# Patient Record
Sex: FEMALE | Race: BLACK OR AFRICAN AMERICAN | NOT HISPANIC OR LATINO | Employment: OTHER | URBAN - METROPOLITAN AREA
[De-identification: names, ages, dates, MRNs, and addresses within clinical notes are randomized per-mention and may not be internally consistent; named-entity substitution may affect disease eponyms.]

---

## 2019-03-27 ENCOUNTER — APPOINTMENT (OUTPATIENT)
Dept: CT IMAGING | Facility: HOSPITAL | Age: 54
End: 2019-03-27

## 2019-03-27 ENCOUNTER — HOSPITAL ENCOUNTER (EMERGENCY)
Facility: HOSPITAL | Age: 54
Discharge: HOME | End: 2019-03-27
Attending: EMERGENCY MEDICINE | Admitting: EMERGENCY MEDICINE

## 2019-03-27 VITALS
TEMPERATURE: 98.3 F | HEIGHT: 63 IN | HEART RATE: 74 BPM | BODY MASS INDEX: 22.5 KG/M2 | OXYGEN SATURATION: 97 % | DIASTOLIC BLOOD PRESSURE: 103 MMHG | RESPIRATION RATE: 20 BRPM | WEIGHT: 127 LBS | SYSTOLIC BLOOD PRESSURE: 198 MMHG

## 2019-03-27 DIAGNOSIS — N20.0 RIGHT KIDNEY STONE: Primary | ICD-10-CM

## 2019-03-27 DIAGNOSIS — R31.9 HEMATURIA, UNSPECIFIED TYPE: ICD-10-CM

## 2019-03-27 DIAGNOSIS — R10.9 RIGHT FLANK PAIN: ICD-10-CM

## 2019-03-27 LAB
ALBUMIN SERPL-MCNC: 3.9 G/DL (ref 3.2–4.8)
ALBUMIN/GLOB SERPL: 1.1 G/DL (ref 1.5–2.5)
ALP SERPL-CCNC: 125 U/L (ref 25–100)
ALT SERPL W P-5'-P-CCNC: 59 U/L (ref 7–40)
AMPHET+METHAMPHET UR QL: NEGATIVE
AMPHETAMINES UR QL: NEGATIVE
ANION GAP SERPL CALCULATED.3IONS-SCNC: 8 MMOL/L (ref 3–11)
AST SERPL-CCNC: 75 U/L (ref 0–33)
BACTERIA UR QL AUTO: ABNORMAL /HPF
BARBITURATES UR QL SCN: NEGATIVE
BASOPHILS # BLD AUTO: 0.05 10*3/MM3 (ref 0–0.2)
BASOPHILS NFR BLD AUTO: 0.6 % (ref 0–1)
BENZODIAZ UR QL SCN: NEGATIVE
BILIRUB SERPL-MCNC: 0.9 MG/DL (ref 0.3–1.2)
BILIRUB UR QL STRIP: NEGATIVE
BUN BLD-MCNC: 5 MG/DL (ref 9–23)
BUN/CREAT SERPL: 8.3 (ref 7–25)
BUPRENORPHINE SERPL-MCNC: NEGATIVE NG/ML
CALCIUM SPEC-SCNC: 9.2 MG/DL (ref 8.7–10.4)
CANNABINOIDS SERPL QL: POSITIVE
CHLORIDE SERPL-SCNC: 107 MMOL/L (ref 99–109)
CLARITY UR: ABNORMAL
CO2 SERPL-SCNC: 25 MMOL/L (ref 20–31)
COCAINE UR QL: NEGATIVE
COLOR UR: ABNORMAL
CREAT BLD-MCNC: 0.6 MG/DL (ref 0.6–1.3)
DEPRECATED RDW RBC AUTO: 43.6 FL (ref 37–54)
EOSINOPHIL # BLD AUTO: 0.14 10*3/MM3 (ref 0–0.3)
EOSINOPHIL NFR BLD AUTO: 1.6 % (ref 0–3)
ERYTHROCYTE [DISTWIDTH] IN BLOOD BY AUTOMATED COUNT: 13.3 % (ref 11.3–14.5)
GFR SERPL CREATININE-BSD FRML MDRD: 126 ML/MIN/1.73
GLOBULIN UR ELPH-MCNC: 3.5 GM/DL
GLUCOSE BLD-MCNC: 137 MG/DL (ref 70–100)
GLUCOSE UR STRIP-MCNC: NEGATIVE MG/DL
HCT VFR BLD AUTO: 41.8 % (ref 34.5–44)
HGB BLD-MCNC: 14.4 G/DL (ref 11.5–15.5)
HGB UR QL STRIP.AUTO: ABNORMAL
HYALINE CASTS UR QL AUTO: ABNORMAL /LPF
IMM GRANULOCYTES # BLD AUTO: 0.04 10*3/MM3 (ref 0–0.05)
IMM GRANULOCYTES NFR BLD AUTO: 0.5 % (ref 0–0.6)
KETONES UR QL STRIP: NEGATIVE
LEUKOCYTE ESTERASE UR QL STRIP.AUTO: ABNORMAL
LYMPHOCYTES # BLD AUTO: 2.79 10*3/MM3 (ref 0.6–4.8)
LYMPHOCYTES NFR BLD AUTO: 32.6 % (ref 24–44)
MCH RBC QN AUTO: 30.8 PG (ref 27–31)
MCHC RBC AUTO-ENTMCNC: 34.4 G/DL (ref 32–36)
MCV RBC AUTO: 89.5 FL (ref 80–99)
METHADONE UR QL SCN: NEGATIVE
MONOCYTES # BLD AUTO: 0.48 10*3/MM3 (ref 0–1)
MONOCYTES NFR BLD AUTO: 5.6 % (ref 0–12)
NEUTROPHILS # BLD AUTO: 5.06 10*3/MM3 (ref 1.5–8.3)
NEUTROPHILS NFR BLD AUTO: 59.1 % (ref 41–71)
NITRITE UR QL STRIP: NEGATIVE
OPIATES UR QL: NEGATIVE
OXYCODONE UR QL SCN: NEGATIVE
PCP UR QL SCN: NEGATIVE
PH UR STRIP.AUTO: 6.5 [PH] (ref 5–8)
PLATELET # BLD AUTO: 158 10*3/MM3 (ref 150–450)
PMV BLD AUTO: 10.5 FL (ref 6–12)
POTASSIUM BLD-SCNC: 3.8 MMOL/L (ref 3.5–5.5)
PROPOXYPH UR QL: NEGATIVE
PROT SERPL-MCNC: 7.4 G/DL (ref 5.7–8.2)
PROT UR QL STRIP: ABNORMAL
RBC # BLD AUTO: 4.67 10*6/MM3 (ref 3.89–5.14)
RBC # UR: ABNORMAL /HPF
REF LAB TEST METHOD: ABNORMAL
SODIUM BLD-SCNC: 140 MMOL/L (ref 132–146)
SP GR UR STRIP: 1.01 (ref 1–1.03)
SQUAMOUS #/AREA URNS HPF: ABNORMAL /HPF
TRICYCLICS UR QL SCN: POSITIVE
UROBILINOGEN UR QL STRIP: ABNORMAL
WBC NRBC COR # BLD: 8.56 10*3/MM3 (ref 3.5–10.8)
WBC UR QL AUTO: ABNORMAL /HPF

## 2019-03-27 PROCEDURE — 96376 TX/PRO/DX INJ SAME DRUG ADON: CPT

## 2019-03-27 PROCEDURE — 99283 EMERGENCY DEPT VISIT LOW MDM: CPT

## 2019-03-27 PROCEDURE — 96365 THER/PROPH/DIAG IV INF INIT: CPT

## 2019-03-27 PROCEDURE — 25010000002 HYDROMORPHONE PER 4 MG: Performed by: EMERGENCY MEDICINE

## 2019-03-27 PROCEDURE — 74176 CT ABD & PELVIS W/O CONTRAST: CPT

## 2019-03-27 PROCEDURE — 25010000002 CEFTRIAXONE PER 250 MG: Performed by: NURSE PRACTITIONER

## 2019-03-27 PROCEDURE — 85025 COMPLETE CBC W/AUTO DIFF WBC: CPT | Performed by: NURSE PRACTITIONER

## 2019-03-27 PROCEDURE — 81001 URINALYSIS AUTO W/SCOPE: CPT | Performed by: NURSE PRACTITIONER

## 2019-03-27 PROCEDURE — 80306 DRUG TEST PRSMV INSTRMNT: CPT | Performed by: EMERGENCY MEDICINE

## 2019-03-27 PROCEDURE — 80053 COMPREHEN METABOLIC PANEL: CPT | Performed by: NURSE PRACTITIONER

## 2019-03-27 PROCEDURE — 25010000002 ONDANSETRON PER 1 MG: Performed by: EMERGENCY MEDICINE

## 2019-03-27 PROCEDURE — 96375 TX/PRO/DX INJ NEW DRUG ADDON: CPT

## 2019-03-27 RX ORDER — ONDANSETRON 4 MG/1
4 TABLET, ORALLY DISINTEGRATING ORAL EVERY 8 HOURS PRN
Qty: 9 TABLET | Refills: 0 | Status: SHIPPED | OUTPATIENT
Start: 2019-03-27 | End: 2022-05-10 | Stop reason: SDUPTHER

## 2019-03-27 RX ORDER — ONDANSETRON 4 MG/1
8 TABLET, FILM COATED ORAL ONCE
Status: DISCONTINUED | OUTPATIENT
Start: 2019-03-27 | End: 2019-03-27 | Stop reason: HOSPADM

## 2019-03-27 RX ORDER — HYDROMORPHONE HYDROCHLORIDE 1 MG/ML
0.5 INJECTION, SOLUTION INTRAMUSCULAR; INTRAVENOUS; SUBCUTANEOUS ONCE
Status: COMPLETED | OUTPATIENT
Start: 2019-03-27 | End: 2019-03-27

## 2019-03-27 RX ORDER — ONDANSETRON 2 MG/ML
4 INJECTION INTRAMUSCULAR; INTRAVENOUS ONCE
Status: COMPLETED | OUTPATIENT
Start: 2019-03-27 | End: 2019-03-27

## 2019-03-27 RX ORDER — CEFTRIAXONE SODIUM 1 G/50ML
1 INJECTION, SOLUTION INTRAVENOUS ONCE
Status: COMPLETED | OUTPATIENT
Start: 2019-03-27 | End: 2019-03-27

## 2019-03-27 RX ORDER — OXYCODONE HYDROCHLORIDE AND ACETAMINOPHEN 5; 325 MG/1; MG/1
1 TABLET ORAL EVERY 6 HOURS PRN
Qty: 12 TABLET | Refills: 0 | Status: SHIPPED | OUTPATIENT
Start: 2019-03-27 | End: 2022-05-10 | Stop reason: SDUPTHER

## 2019-03-27 RX ORDER — SODIUM CHLORIDE 0.9 % (FLUSH) 0.9 %
10 SYRINGE (ML) INJECTION AS NEEDED
Status: DISCONTINUED | OUTPATIENT
Start: 2019-03-27 | End: 2019-03-27 | Stop reason: HOSPADM

## 2019-03-27 RX ORDER — CEFDINIR 300 MG/1
300 CAPSULE ORAL 2 TIMES DAILY
Qty: 20 CAPSULE | Refills: 0 | Status: SHIPPED | OUTPATIENT
Start: 2019-03-27 | End: 2022-05-10 | Stop reason: SDUPTHER

## 2019-03-27 RX ADMIN — CEFTRIAXONE SODIUM 1 G: 1 INJECTION, SOLUTION INTRAVENOUS at 16:11

## 2019-03-27 RX ADMIN — HYDROMORPHONE HYDROCHLORIDE 0.5 MG: 1 INJECTION, SOLUTION INTRAMUSCULAR; INTRAVENOUS; SUBCUTANEOUS at 16:09

## 2019-03-27 RX ADMIN — ONDANSETRON 4 MG: 2 INJECTION INTRAMUSCULAR; INTRAVENOUS at 15:15

## 2019-03-27 RX ADMIN — SODIUM CHLORIDE 1000 ML: 9 INJECTION, SOLUTION INTRAVENOUS at 15:11

## 2019-03-27 RX ADMIN — HYDROMORPHONE HYDROCHLORIDE 0.5 MG: 1 INJECTION, SOLUTION INTRAMUSCULAR; INTRAVENOUS; SUBCUTANEOUS at 15:16

## 2022-05-10 ENCOUNTER — OFFICE VISIT (OUTPATIENT)
Dept: FAMILY MEDICINE CLINIC | Facility: CLINIC | Age: 57
End: 2022-05-10

## 2022-05-10 VITALS
DIASTOLIC BLOOD PRESSURE: 88 MMHG | HEART RATE: 98 BPM | HEIGHT: 63 IN | SYSTOLIC BLOOD PRESSURE: 146 MMHG | OXYGEN SATURATION: 96 % | TEMPERATURE: 98.2 F | WEIGHT: 105 LBS | BODY MASS INDEX: 18.61 KG/M2

## 2022-05-10 DIAGNOSIS — M47.817 LUMBOSACRAL SPONDYLOSIS WITHOUT MYELOPATHY: ICD-10-CM

## 2022-05-10 DIAGNOSIS — I25.10 CAD IN NATIVE ARTERY: ICD-10-CM

## 2022-05-10 DIAGNOSIS — Z85.41 HX OF CERVICAL CANCER: ICD-10-CM

## 2022-05-10 DIAGNOSIS — K73.9 CHRONIC HEPATITIS: Primary | ICD-10-CM

## 2022-05-10 PROBLEM — K63.9 DISORDER OF INTESTINE: Status: ACTIVE | Noted: 2019-04-01

## 2022-05-10 PROBLEM — Z79.891 LONG-TERM CURRENT USE OF OPIATE ANALGESIC: Status: ACTIVE | Noted: 2019-04-01

## 2022-05-10 PROBLEM — M25.559 HIP PAIN: Status: ACTIVE | Noted: 2019-04-01

## 2022-05-10 PROBLEM — E11.9 DIABETES MELLITUS: Status: ACTIVE | Noted: 2019-04-01

## 2022-05-10 PROBLEM — G89.4 CHRONIC PAIN DISORDER: Status: ACTIVE | Noted: 2019-04-01

## 2022-05-10 PROBLEM — R10.2 PELVIC AND PERINEAL PAIN: Status: ACTIVE | Noted: 2019-04-01

## 2022-05-10 PROBLEM — I10 HYPERTENSIVE DISORDER: Status: ACTIVE | Noted: 2019-04-01

## 2022-05-10 PROBLEM — G43.909 MIGRAINE: Status: ACTIVE | Noted: 2019-04-01

## 2022-05-10 PROBLEM — S36.129A: Status: ACTIVE | Noted: 2019-04-01

## 2022-05-10 PROBLEM — K27.9 PEPTIC ULCER: Status: ACTIVE | Noted: 2019-04-01

## 2022-05-10 PROCEDURE — 99204 OFFICE O/P NEW MOD 45 MIN: CPT | Performed by: PHYSICIAN ASSISTANT

## 2022-05-10 RX ORDER — METOPROLOL SUCCINATE AND HYDROCHLOROTHIAZIDE 12.5; 1 MG/1; MG/1
TABLET ORAL
COMMUNITY
End: 2022-05-10 | Stop reason: SDUPTHER

## 2022-05-10 RX ORDER — INSULIN GLARGINE 100 [IU]/ML
15 INJECTION, SOLUTION SUBCUTANEOUS
COMMUNITY
End: 2022-05-26

## 2022-05-10 RX ORDER — AMLODIPINE BESYLATE 5 MG/1
5 TABLET ORAL 2 TIMES DAILY
COMMUNITY
End: 2022-05-26 | Stop reason: SDUPTHER

## 2022-05-10 RX ORDER — BISACODYL 5 MG/1
5 TABLET, DELAYED RELEASE ORAL 2 TIMES DAILY PRN
COMMUNITY
End: 2022-05-26

## 2022-05-10 RX ORDER — AMITRIPTYLINE HYDROCHLORIDE 50 MG/1
50 TABLET, FILM COATED ORAL NIGHTLY
COMMUNITY
End: 2022-05-26 | Stop reason: SDUPTHER

## 2022-05-10 RX ORDER — OXYCODONE HYDROCHLORIDE 10 MG/1
TABLET ORAL EVERY 8 HOURS SCHEDULED
COMMUNITY
End: 2022-05-10 | Stop reason: SDUPTHER

## 2022-05-10 RX ORDER — ASPIRIN 81 MG/1
81 TABLET ORAL DAILY
COMMUNITY
End: 2022-05-26 | Stop reason: SDUPTHER

## 2022-05-10 RX ORDER — QUETIAPINE FUMARATE 300 MG/1
300 TABLET, FILM COATED ORAL NIGHTLY
COMMUNITY
End: 2022-05-26 | Stop reason: SDUPTHER

## 2022-05-10 RX ORDER — AMLODIPINE BESYLATE AND ATORVASTATIN CALCIUM 10; 20 MG/1; MG/1
TABLET, FILM COATED ORAL
COMMUNITY
End: 2022-05-10 | Stop reason: SDUPTHER

## 2022-05-10 RX ORDER — ATORVASTATIN CALCIUM 40 MG/1
40 TABLET, FILM COATED ORAL DAILY
COMMUNITY
End: 2022-05-26 | Stop reason: SDUPTHER

## 2022-05-10 RX ORDER — OXYCODONE HYDROCHLORIDE 10 MG/1
10 TABLET ORAL EVERY 6 HOURS PRN
COMMUNITY
End: 2022-06-10

## 2022-05-10 RX ORDER — METOPROLOL TARTRATE 100 MG/1
100 TABLET ORAL 2 TIMES DAILY
COMMUNITY
End: 2022-05-26 | Stop reason: SDUPTHER

## 2022-05-10 RX ORDER — POLYETHYLENE GLYCOL 3350 17 G/17G
17 POWDER, FOR SOLUTION ORAL DAILY PRN
COMMUNITY
End: 2022-05-26 | Stop reason: SDUPTHER

## 2022-05-10 RX ORDER — GABAPENTIN 400 MG/1
800 CAPSULE ORAL EVERY 8 HOURS SCHEDULED
COMMUNITY

## 2022-05-10 RX ORDER — PANTOPRAZOLE SODIUM 40 MG/1
40 TABLET, DELAYED RELEASE ORAL DAILY
COMMUNITY
End: 2022-05-26 | Stop reason: SDUPTHER

## 2022-05-10 NOTE — PROGRESS NOTES
New Patient Office Visit      Date: 05/10/2022   Patient Name: Kaylie Cruz  : 1965   MRN: [id number]     Chief Complaint:    Chief Complaint   Patient presents with   • Med Refill   • Establish Care       History of Present Illness: Kaylie Cruz is a 56 y.o. female who is here today to establish care.  Patient is a bit of a difficult historian.  However it seems that she had been living in [place] and going to medical providers in [place].  About 4 weeks ago she stated she was in [facility] for what they told her was a mild heart attack.  She states she did not have to have a stent.  She also has chronic hepatitis C, she does have cirrhosis and ascites.  She has not had any treatment in the recent past for this or has seen anyone for this.  She does have some discharge papers from her hospital visit and that was one of the referrals that was made to a local provider for hepatitis C.  However she is living here now and needs referrals to a pain management down here as well as provider for hepatitis C.  She does have lumbosacral spondylosis and chronic pain for which she has seen [facility] here in [place] in the past.  She has not been there in approximately 2 years but would like to go back and see them for evaluation.    She also has hypertension and diabetes.  She states her diabetes has been staying well controlled.  It appears her metformin was stopped in the hospital and she was placed on Lantus at night and then insulin lispro with meals.  Also has a diagnosis of schizophrenia.  She has a history of cervical cancer status post surgical resection, chemotherapy and radiation.  She states she has had 4 abdominal surgeries and at one time had a colostomy.  She remembers very remote treatment for her hepatitis C with injections.    I review what little discharge information I have from [facility].  It does  include a medication list which is different from what she recollected from her memory.  For now I am going to go by the hospital discharge summary for her medications.    Subjective      Review of Systems:   Review of Systems   Constitutional: Positive for fatigue. Negative for diaphoresis.   HENT: Negative for trouble swallowing.    Eyes: Negative for visual disturbance.   Respiratory: Negative for cough, chest tightness, shortness of breath and wheezing.    Cardiovascular: Negative for chest pain and leg swelling.   Gastrointestinal: Positive for abdominal pain.   Musculoskeletal: Positive for arthralgias, back pain and myalgias.   Neurological: Positive for numbness.   Psychiatric/Behavioral: Negative for depressed mood. The patient is not nervous/anxious.         Past Medical History:   Past Medical History:   Diagnosis Date   • Depression    • Hypertension        Past Surgical History:   Past Surgical History:   Procedure Laterality Date   • BLADDER SURGERY     • HYSTERECTOMY         Family History: History reviewed. No pertinent family history.    Social History:   Social History     Socioeconomic History   • Marital status: Single   Tobacco Use   • Smoking status: Current Every Day Smoker     Packs/day: 0.50     Types: Cigarettes   • Smokeless tobacco: Never Used   • Tobacco comment: E- cigarettes   Substance and Sexual Activity   • Alcohol use: No   • Drug use: No       Medications:     Current Outpatient Medications:   •  amitriptyline (ELAVIL) 50 MG tablet, Take 50 mg by mouth Every Night., Disp: , Rfl:   •  amLODIPine (NORVASC) 5 MG tablet, Take 5 mg by mouth 2 (Two) Times a Day., Disp: , Rfl:   •  aspirin 81 MG EC tablet, Take 81 mg by mouth Daily., Disp: , Rfl:   •  atorvastatin (LIPITOR) 40 MG tablet, Take 40 mg by mouth Daily., Disp: , Rfl:   •  bisacodyl (DULCOLAX) 5 MG EC tablet, Take 5 mg by mouth 2 (Two) Times a Day As Needed., Disp: , Rfl:   •  gabapentin (NEURONTIN) 600 MG tablet, Every 8  "(Eight) Hours., Disp: , Rfl:   •  insulin glargine (LANTUS, SEMGLEE) 100 UNIT/ML injection, Inject 15 Units under the skin into the appropriate area as directed every night at bedtime., Disp: , Rfl:   •  Insulin Lispro 100 UNIT/ML solution cartridge, Inject 6 Units under the skin into the appropriate area as directed 3 (Three) Times a Day With Meals., Disp: , Rfl:   •  linaclotide (LINZESS) 145 MCG capsule capsule, Take 145 mcg by mouth. 2 capsules po QD, Disp: , Rfl:   •  metoprolol tartrate (LOPRESSOR) 100 MG tablet, Take 100 mg by mouth 2 (Two) Times a Day., Disp: , Rfl:   •  oxyCODONE (ROXICODONE) 10 MG tablet, Take 10 mg by mouth Every 6 (Six) Hours As Needed., Disp: , Rfl:   •  pantoprazole (PROTONIX) 40 MG EC tablet, Take 40 mg by mouth Daily., Disp: , Rfl:   •  polyethylene glycol (MIRALAX) 17 GM/SCOOP powder, Take 17 g by mouth Daily As Needed., Disp: , Rfl:   •  QUEtiapine (SEROquel) 300 MG tablet, Take 300 mg by mouth Every Night., Disp: , Rfl:     Allergies:   Allergies   Allergen Reactions   • Codeine Hives   • Morphine Hives   • Tagamet [Cimetidine] Other (See Comments)     DISCOLORATION OF SKIN   • Toradol [Ketorolac Tromethamine] Hives       Objective     Vital Signs:   Vitals:    05/10/22 1402   BP: 146/88   Pulse: 98   Temp: 98.2 °F (36.8 °C)   SpO2: 96%   Weight: 47.6 kg (105 lb)   Height: 160 cm (63\")   PainSc: 0-No pain     Body mass index is 18.6 kg/m².   BMI is within normal parameters. No follow-up required.      Physical Exam:   Physical Exam  Vitals and nursing note reviewed.   Constitutional:       Appearance: Normal appearance.   HENT:      Head: Normocephalic and atraumatic.      Nose: Nose normal. No congestion or rhinorrhea.      Mouth/Throat:      Mouth: Mucous membranes are moist.      Pharynx: Oropharynx is clear.   Eyes:      General: No scleral icterus.  Cardiovascular:      Rate and Rhythm: Normal rate and regular rhythm.      Heart sounds: Normal heart sounds.   Pulmonary:      " Effort: Pulmonary effort is normal.      Breath sounds: Normal breath sounds.   Abdominal:      General: Abdomen is protuberant.      Palpations: Abdomen is soft. There is fluid wave and hepatomegaly.      Tenderness: There is generalized abdominal tenderness.   Musculoskeletal:      Cervical back: Neck supple.      Right lower leg: No edema.      Left lower leg: No edema.   Lymphadenopathy:      Cervical: No cervical adenopathy.   Skin:     General: Skin is warm and dry.   Neurological:      Mental Status: She is alert.   Psychiatric:         Mood and Affect: Mood normal.            Assessment / Plan      Assessment/Plan:   Diagnoses and all orders for this visit:    1. Chronic hepatitis (HCC) (Primary)  -     Ambulatory Referral to Gastroenterology    2. Lumbosacral spondylosis without myelopathy  -     Ambulatory Referral to Pain Management    3. CAD in native artery    4. Hx of cervical cancer         1. Exam certainly does show hepatomegaly with some ascites.  She does not appear to be short of breath or toxic.  We will arrange an urgent referral for her hepatitis C and cirrhosis.  We will also arrange referral back to [facility] for pain management.  I am going to request records from her hospital stay 4 weeks ago rather than rechecking labs today since she is a very difficult stick for venipuncture.  I will also request actual discharge summary and any radiology reports as well.  Once these are available I will review these and make further management decisions.      Follow Up:   No follow-ups on file.    Guadalupe Ojeda PA-C   [facility]

## 2022-05-11 ENCOUNTER — TELEPHONE (OUTPATIENT)
Dept: FAMILY MEDICINE CLINIC | Facility: CLINIC | Age: 57
End: 2022-05-11

## 2022-05-11 NOTE — TELEPHONE ENCOUNTER
REC'D OUTSIDE RECORDS FROM [facility] PER RECORDS REQUEST, PUT IN SCANNING TO BE ROUTED TO [facility]

## 2022-05-23 ENCOUNTER — TELEPHONE (OUTPATIENT)
Dept: FAMILY MEDICINE CLINIC | Facility: CLINIC | Age: 57
End: 2022-05-23

## 2022-05-23 PROBLEM — F20.9 SCHIZOPHRENIA: Status: ACTIVE | Noted: 2022-03-31

## 2022-05-23 NOTE — TELEPHONE ENCOUNTER
Caller: Kaylie Cruz    Relationship: Self    Best call back number: [phone]    What is the medical concern/diagnosis:   LUMBOSACRAL SPONDYLOSIS WITHOUT MYELOPATHY     What specialty or service is being requested:   PAIN MANAGEMENT     What is the provider, practice or medical service name:  [facility]     Any additional details:   PATIENT WOULD LIKE FOR HER REFERRAL TO PAIN MANAGEMENT TO BE RESENT SINCE SHE HAS A APPOINTMENT SCHEDULED

## 2022-05-23 NOTE — TELEPHONE ENCOUNTER
Caller: Kaylie Cruz    Relationship: Self    Best call back number: [phone]        What is the provider, practice or medical service name: [facility]         Any additional details: THE PATIENT STATES THAT SHE WAS SEEN ON 05/10/2022 AND WAS SUPPOSED TO BE REFERRED TO [facility] SHE STATES THAT [facility] TOLD HER THAT THEY DO NOT HAVE A REFERRAL PLEASE CALL THE PATIENT TO LET HER KNOW IF THAT WAS COMPLETED THE PATIENT STATES THAT SHE IS SCHEDULED TO BE SEEN BY CATY ARMSTRONG ON 06/07/2022 AT 9:30 AND SHE NEEDS THE REFERRAL SENT TO THEM BEFORE HER APPOINTMENT

## 2022-05-26 ENCOUNTER — OFFICE VISIT (OUTPATIENT)
Dept: FAMILY MEDICINE CLINIC | Facility: CLINIC | Age: 57
End: 2022-05-26

## 2022-05-26 ENCOUNTER — LAB (OUTPATIENT)
Dept: LAB | Facility: HOSPITAL | Age: 57
End: 2022-05-26

## 2022-05-26 VITALS
HEIGHT: 63 IN | OXYGEN SATURATION: 99 % | DIASTOLIC BLOOD PRESSURE: 90 MMHG | HEART RATE: 96 BPM | BODY MASS INDEX: 19.31 KG/M2 | SYSTOLIC BLOOD PRESSURE: 140 MMHG | WEIGHT: 109 LBS

## 2022-05-26 DIAGNOSIS — Z79.4 TYPE 2 DIABETES MELLITUS WITH DIABETIC POLYNEUROPATHY, WITH LONG-TERM CURRENT USE OF INSULIN: ICD-10-CM

## 2022-05-26 DIAGNOSIS — I10 PRIMARY HYPERTENSION: ICD-10-CM

## 2022-05-26 DIAGNOSIS — Z85.41 HX OF CERVICAL CANCER: ICD-10-CM

## 2022-05-26 DIAGNOSIS — K74.60 DECOMPENSATED HEPATIC CIRRHOSIS: Primary | ICD-10-CM

## 2022-05-26 DIAGNOSIS — K73.9 CHRONIC HEPATITIS: ICD-10-CM

## 2022-05-26 DIAGNOSIS — K72.90 DECOMPENSATED HEPATIC CIRRHOSIS: Primary | ICD-10-CM

## 2022-05-26 DIAGNOSIS — E11.42 TYPE 2 DIABETES MELLITUS WITH DIABETIC POLYNEUROPATHY, WITH LONG-TERM CURRENT USE OF INSULIN: ICD-10-CM

## 2022-05-26 DIAGNOSIS — Z00.00 ENCOUNTER FOR MEDICAL EXAMINATION TO ESTABLISH CARE: ICD-10-CM

## 2022-05-26 DIAGNOSIS — G89.4 CHRONIC PAIN DISORDER: ICD-10-CM

## 2022-05-26 DIAGNOSIS — I85.00 ESOPHAGEAL VARICES WITHOUT BLEEDING, UNSPECIFIED ESOPHAGEAL VARICES TYPE: ICD-10-CM

## 2022-05-26 DIAGNOSIS — I50.20 HFREF (HEART FAILURE WITH REDUCED EJECTION FRACTION): ICD-10-CM

## 2022-05-26 DIAGNOSIS — F20.9 SCHIZOPHRENIA, UNSPECIFIED TYPE: ICD-10-CM

## 2022-05-26 DIAGNOSIS — I25.10 CORONARY ARTERY DISEASE INVOLVING NATIVE CORONARY ARTERY OF NATIVE HEART WITHOUT ANGINA PECTORIS: ICD-10-CM

## 2022-05-26 DIAGNOSIS — B18.2 CHRONIC HEPATITIS C WITHOUT HEPATIC COMA: ICD-10-CM

## 2022-05-26 PROBLEM — K63.9 DISORDER OF INTESTINE: Status: RESOLVED | Noted: 2019-04-01 | Resolved: 2022-05-26

## 2022-05-26 PROBLEM — E11.9 DIABETES MELLITUS: Status: RESOLVED | Noted: 2019-04-01 | Resolved: 2022-05-26

## 2022-05-26 LAB
DEPRECATED RDW RBC AUTO: 53.2 FL (ref 37–54)
ERYTHROCYTE [DISTWIDTH] IN BLOOD BY AUTOMATED COUNT: 17.4 % (ref 12.3–15.4)
EXPIRATION DATE: NORMAL
HBA1C MFR BLD: 8.5 %
HCT VFR BLD AUTO: 29.6 % (ref 34–46.6)
HGB BLD-MCNC: 9.4 G/DL (ref 12–15.9)
INR PPP: 1.41 (ref 0.84–1.13)
Lab: NORMAL
MCH RBC QN AUTO: 26.6 PG (ref 26.6–33)
MCHC RBC AUTO-ENTMCNC: 31.8 G/DL (ref 31.5–35.7)
MCV RBC AUTO: 83.9 FL (ref 79–97)
PLATELET # BLD AUTO: 151 10*3/MM3 (ref 140–450)
PMV BLD AUTO: 11.2 FL (ref 6–12)
PROTHROMBIN TIME: 17.2 SECONDS (ref 11.4–14.4)
RBC # BLD AUTO: 3.53 10*6/MM3 (ref 3.77–5.28)
WBC NRBC COR # BLD: 4.52 10*3/MM3 (ref 3.4–10.8)

## 2022-05-26 PROCEDURE — 87522 HEPATITIS C REVRS TRNSCRPJ: CPT

## 2022-05-26 PROCEDURE — 84443 ASSAY THYROID STIM HORMONE: CPT

## 2022-05-26 PROCEDURE — 86706 HEP B SURFACE ANTIBODY: CPT

## 2022-05-26 PROCEDURE — 80061 LIPID PANEL: CPT

## 2022-05-26 PROCEDURE — 87340 HEPATITIS B SURFACE AG IA: CPT

## 2022-05-26 PROCEDURE — 85027 COMPLETE CBC AUTOMATED: CPT

## 2022-05-26 PROCEDURE — 3052F HG A1C>EQUAL 8.0%<EQUAL 9.0%: CPT | Performed by: STUDENT IN AN ORGANIZED HEALTH CARE EDUCATION/TRAINING PROGRAM

## 2022-05-26 PROCEDURE — 86803 HEPATITIS C AB TEST: CPT

## 2022-05-26 PROCEDURE — 99215 OFFICE O/P EST HI 40 MIN: CPT | Performed by: STUDENT IN AN ORGANIZED HEALTH CARE EDUCATION/TRAINING PROGRAM

## 2022-05-26 PROCEDURE — 82043 UR ALBUMIN QUANTITATIVE: CPT

## 2022-05-26 PROCEDURE — 83036 HEMOGLOBIN GLYCOSYLATED A1C: CPT | Performed by: STUDENT IN AN ORGANIZED HEALTH CARE EDUCATION/TRAINING PROGRAM

## 2022-05-26 PROCEDURE — 85610 PROTHROMBIN TIME: CPT

## 2022-05-26 PROCEDURE — 82570 ASSAY OF URINE CREATININE: CPT

## 2022-05-26 PROCEDURE — 80053 COMPREHEN METABOLIC PANEL: CPT

## 2022-05-26 RX ORDER — NALOXONE HYDROCHLORIDE 4 MG/.1ML
SPRAY NASAL
COMMUNITY
Start: 2022-03-03 | End: 2022-06-10 | Stop reason: HOSPADM

## 2022-05-26 RX ORDER — QUETIAPINE FUMARATE 300 MG/1
300 TABLET, FILM COATED ORAL NIGHTLY
Qty: 30 TABLET | Refills: 2 | Status: SHIPPED | OUTPATIENT
Start: 2022-05-26 | End: 2022-07-29 | Stop reason: SDUPTHER

## 2022-05-26 RX ORDER — FUROSEMIDE 20 MG/1
20 TABLET ORAL DAILY
Qty: 30 TABLET | Refills: 2 | Status: SHIPPED | OUTPATIENT
Start: 2022-05-26 | End: 2022-06-10 | Stop reason: SDUPTHER

## 2022-05-26 RX ORDER — POLYETHYLENE GLYCOL 3350 17 G/17G
17 POWDER, FOR SOLUTION ORAL DAILY PRN
Qty: 255 G | Refills: 2 | Status: SHIPPED | OUTPATIENT
Start: 2022-05-26 | End: 2022-07-27

## 2022-05-26 RX ORDER — ASPIRIN 81 MG/1
81 TABLET ORAL DAILY
Qty: 30 TABLET | Refills: 2 | Status: SHIPPED | OUTPATIENT
Start: 2022-05-26 | End: 2022-07-29 | Stop reason: SDUPTHER

## 2022-05-26 RX ORDER — ATORVASTATIN CALCIUM 40 MG/1
40 TABLET, FILM COATED ORAL DAILY
Qty: 30 TABLET | Refills: 2 | Status: SHIPPED | OUTPATIENT
Start: 2022-05-26 | End: 2022-07-29 | Stop reason: SDUPTHER

## 2022-05-26 RX ORDER — HYDROCHLOROTHIAZIDE 25 MG/1
25 TABLET ORAL DAILY
COMMUNITY
Start: 2022-03-22 | End: 2022-07-29

## 2022-05-26 RX ORDER — CLONIDINE HYDROCHLORIDE 0.1 MG/1
0.1 TABLET ORAL 2 TIMES DAILY
COMMUNITY
Start: 2022-03-22 | End: 2022-05-26

## 2022-05-26 RX ORDER — AMITRIPTYLINE HYDROCHLORIDE 50 MG/1
50 TABLET, FILM COATED ORAL NIGHTLY
Qty: 30 TABLET | Refills: 2 | Status: SHIPPED | OUTPATIENT
Start: 2022-05-26 | End: 2022-07-29 | Stop reason: SDUPTHER

## 2022-05-26 RX ORDER — PANTOPRAZOLE SODIUM 40 MG/1
40 TABLET, DELAYED RELEASE ORAL DAILY
Qty: 30 TABLET | Refills: 2 | Status: SHIPPED | OUTPATIENT
Start: 2022-05-26 | End: 2022-07-29

## 2022-05-26 RX ORDER — LANCETS 30 GAUGE
EACH MISCELLANEOUS
COMMUNITY
Start: 2022-03-04

## 2022-05-26 RX ORDER — BLOOD SUGAR DIAGNOSTIC
STRIP MISCELLANEOUS
COMMUNITY
Start: 2022-03-04

## 2022-05-26 RX ORDER — INSULIN GLARGINE 100 [IU]/ML
10 INJECTION, SOLUTION SUBCUTANEOUS NIGHTLY
Qty: 3 ML | Refills: 2 | Status: SHIPPED | OUTPATIENT
Start: 2022-05-26 | End: 2022-07-29 | Stop reason: SDUPTHER

## 2022-05-26 RX ORDER — METOPROLOL TARTRATE 50 MG/1
50 TABLET, FILM COATED ORAL 2 TIMES DAILY
Qty: 60 TABLET | Refills: 2 | Status: SHIPPED | OUTPATIENT
Start: 2022-05-26 | End: 2022-07-29 | Stop reason: SDUPTHER

## 2022-05-26 RX ORDER — FUROSEMIDE 20 MG/1
10 TABLET ORAL 2 TIMES DAILY
COMMUNITY
End: 2022-05-26

## 2022-05-26 RX ORDER — LANOLIN ALCOHOL/MO/W.PET/CERES
CREAM (GRAM) TOPICAL
COMMUNITY
Start: 2022-03-22

## 2022-05-26 RX ORDER — AMLODIPINE BESYLATE 5 MG/1
5 TABLET ORAL DAILY
Qty: 30 TABLET | Refills: 2 | Status: SHIPPED | OUTPATIENT
Start: 2022-05-26 | End: 2022-07-29 | Stop reason: SDUPTHER

## 2022-05-26 NOTE — PROGRESS NOTES
New Patient Office Visit      Patient Name: Kaylie Cruz  : 1965   MRN: [id number]   Care Team: Patient Care Team:  Iesha Harris DO as PCP - General (Internal Medicine)    Chief Complaint:    Chief Complaint   Patient presents with   • Establish Care   • hepatits      Hep c enlarged liver        History of Present Illness: Kaylie Cruz is a 56 y.o. female with hypertension, chronic hepatitis C, decompensated cirrhosis, esophageal and gastric varices, mood disorder, schizophrenia, history of cervical cancer, polysubstance abuse who is here today to establish care.  She is from [place] but has been living in [place] until recently.  She has not followed with primary care doctor in some time.  Was recently admitted to [facility] for decompensated cirrhosis and type II NSTEMI as detailed below.      Reviewed recent discharge summary.  Patient was admitted 3/30/2022 and discharged on 2022.  She presented with chest pain, cardiac work-up concerning for NSTEMI.  CT revealed severe coronary calcifications and left heart cath was performed revealing nonobstructive CAD-LAD with 60% stenosis in mid region.  Echo on 3/30/2022 with mild concentric LV hypertrophy and globally reduced EF 49%, grade 1 diastolic dysfunction..  Medical management was recommended.  She was started on aspirin, statin, beta-blocker, nitrates as needed.  GI was consulted for decompensated cirrhosis.  EGD was performed on 2022 which revealed portal hypertensive gastropathy, gastric varices, nonbleeding esophageal varices.  She was started on PPI and provided with Lasix to help control fluid retention.  Psychiatry was also consulted for history of schizophrenia and mood disorder.  She was started on Elavil and Seroquel.  She was discharged to [facility] in [place] for history of crack cocaine abuse but reports last use was 1 year ago.     She was discharged  with medications but ran out about one week ago. Has not taken any medications other than insulin in the past week. Overall she is feeling stable. Reports chronic abdominal discomfort/distention. Has noticed some increase LE edema since not taking her lasix over the past week. Admits to some nausea. Denies EtOH use. Denies hematemesis. Taking miralax as needed for constipation. Denies hx of HE.     Follows with [facility] for chronic pelvic pain, back pain, neuropathy.     History of ovarian cancer - s/p JONATHON and multiple surgical procedures - unclear of dates or complete treatment history. Has not followed with oncology in >1 year.    T2DM - uncontrolled, she is currently only taking short acting 5u TID, not taking lantus.     Anxiety/Depression/Schizophrenic/Paranoid- was taking amitriptyline 50mg daily for mood and headaches, seroquel 300mg qhs. Wants to establish with mental health in person.      Subjective      Review of Systems:   Review of Systems - See HPI    Past Medical History:   Past Medical History:   Diagnosis Date   • Anemia    • Cancer (HCC)     cervical   • Depression    • Hypertension    • Infectious viral hepatitis    • Myocardial infarction (HCC)    • Substance abuse (HCC)        Past Surgical History:   Past Surgical History:   Procedure Laterality Date   • BLADDER SURGERY     • CARDIAC CATHETERIZATION      4/3/2022   • CHOLECYSTECTOMY     • COLON RESECTION      7/19/2017, descending and transverse colon   • COLON SURGERY     • COLONOSCOPY      7/19/2017   • ENDOSCOPY      EGD 4/1/22   • HYSTERECTOMY     • TRANSESOPHAGEAL ECHOCARDIOGRAM (AVINASH)      Global hyokinesis, 49% EF, LVH       Family History: History reviewed. No pertinent family history.    Social History:   Social History     Socioeconomic History   • Marital status: Single   Tobacco Use   • Smoking status: Current Every Day Smoker     Packs/day: 0.50     Types: Cigarettes   • Smokeless tobacco: Never Used   • Tobacco  comment: E- cigarettes   Substance and Sexual Activity   • Alcohol use: No   • Drug use: Not Currently     Types: Marijuana, Cocaine(coke)     Comment: [facility] History April 2022       Tobacco History:   Social History     Tobacco Use   Smoking Status Current Every Day Smoker   • Packs/day: 0.50   • Types: Cigarettes   Smokeless Tobacco Never Used   Tobacco Comment    E- cigarettes       Medications:     Current Outpatient Medications:   •  amitriptyline (ELAVIL) 50 MG tablet, Take 1 tablet by mouth Every Night., Disp: 30 tablet, Rfl: 2  •  amLODIPine (NORVASC) 5 MG tablet, Take 1 tablet by mouth Daily., Disp: 30 tablet, Rfl: 2  •  aspirin 81 MG EC tablet, Take 1 tablet by mouth Daily., Disp: 30 tablet, Rfl: 2  •  atorvastatin (LIPITOR) 40 MG tablet, Take 1 tablet by mouth Daily., Disp: 30 tablet, Rfl: 2  •  Easy Touch Lancets 30G misc, , Disp: , Rfl:   •  gabapentin (NEURONTIN) 600 MG tablet, Every 8 (Eight) Hours., Disp: , Rfl:   •  hydroCHLOROthiazide (HYDRODIURIL) 25 MG tablet, Take 25 mg by mouth Daily., Disp: , Rfl:   •  insulin glargine (LANTUS, SEMGLEE) 100 UNIT/ML injection, Inject 10 Units under the skin into the appropriate area as directed Every Night., Disp: 3 mL, Rfl: 2  •  Insulin Lispro 100 UNIT/ML solution cartridge, Inject 5 Units under the skin into the appropriate area as directed 3 (Three) Times a Day With Meals., Disp: 9 mL, Rfl: 2  •  metoprolol tartrate (LOPRESSOR) 50 MG tablet, Take 1 tablet by mouth 2 (Two) Times a Day., Disp: 60 tablet, Rfl: 2  •  naloxone (NARCAN) 4 MG/0.1ML nasal spray, , Disp: , Rfl:   •  OneTouch Verio test strip, , Disp: , Rfl:   •  oxyCODONE (ROXICODONE) 10 MG tablet, Take 10 mg by mouth Every 6 (Six) Hours As Needed., Disp: , Rfl:   •  pantoprazole (PROTONIX) 40 MG EC tablet, Take 1 tablet by mouth Daily., Disp: 30 tablet, Rfl: 2  •  polyethylene glycol (MIRALAX) 17 GM/SCOOP powder, Take 17 g by mouth Daily As Needed (constipation)., Disp: 255 g, Rfl:  "2  •  QUEtiapine (SEROquel) 300 MG tablet, Take 1 tablet by mouth Every Night., Disp: 30 tablet, Rfl: 2  •  thiamine 100 MG tablet, , Disp: , Rfl:   •  furosemide (Lasix) 20 MG tablet, Take 1 tablet by mouth Daily., Disp: 30 tablet, Rfl: 2    Allergies:   Allergies   Allergen Reactions   • Codeine Hives   • Morphine Hives   • Tagamet [Cimetidine] Other (See Comments)     DISCOLORATION OF SKIN   • Toradol [Ketorolac Tromethamine] Hives       Objective     Physical Exam:  Vital Signs:   Vitals:    05/26/22 1434   BP: 140/90   Pulse: 96   SpO2: 99%   Weight: 49.4 kg (109 lb)   Height: 160 cm (63\")     Body mass index is 19.31 kg/m².     Physical Exam  Vitals reviewed.   Constitutional:       Appearance: Normal appearance.      Comments: Chronically ill appearing   Eyes:      Conjunctiva/sclera: Conjunctivae normal.   Cardiovascular:      Rate and Rhythm: Normal rate and regular rhythm.      Pulses: Normal pulses.      Heart sounds: Normal heart sounds. No murmur heard.  Pulmonary:      Effort: Pulmonary effort is normal. No respiratory distress.      Breath sounds: Normal breath sounds. No wheezing or rhonchi.   Abdominal:      Comments: Ascites present. Abdomen is nontender, soft. Hepatomegaly present.   Skin:     General: Skin is warm and dry.   Neurological:      General: No focal deficit present.      Mental Status: She is alert and oriented to person, place, and time.      Cranial Nerves: No cranial nerve deficit.   Psychiatric:         Mood and Affect: Mood normal.         Behavior: Behavior normal.         Judgment: Judgment normal.         Assessment / Plan      Assessment/Plan:   Problems Addressed This Visit    Patient presents with complex medical history, numerous uncontrolled medical issues and poor history. I reviewed records from [facility] hospitalization (scanned under media). Explained to patient the importance of following up with myself along with her subspecialities, emphasized the importance of " medical compliance.    Diagnoses and all orders for this visit:    1. Decompensated hepatic cirrhosis (HCC) (Primary)  2. Esophageal varices without bleeding, unspecified esophageal varices type (HCC)  3. Chronic hepatitis C without hepatic coma (HCC)  -     pantoprazole (PROTONIX) 40 MG EC tablet; Take 1 tablet by mouth Daily.  Dispense: 30 tablet; Refill: 2  -     Protime-INR; Future  -     HCV Antibody Rfx To Qnt PCR; Future  -     Hepatitis B Surface Antibody; Future  -     Hepatitis B surface antigen; Future  Has appointment to establish with GI tomorrow. Cirrhosis is decompensated by ascites, nonbleeding EV. EGD 4/1/22 per discharge summary scanned into media. Will obtain labs today. Will continue to follow up with GI consult. Avoid EtOH. Discussed importance of avoiding NSAIDs. Avoid Tylenol >2g. Follow low salt diet. Refilled PPI.     She is currently on ASA81 for CAD - will need to monitor plt closely and discuss risks vs benefit of continued therapy.    4. Type 2 diabetes mellitus with diabetic polyneuropathy, with long-term current use of insulin (HCC)  -     CBC (No Diff); Future  -     Lipid Panel; Future  -     Comprehensive Metabolic Panel; Future  -     TSH; Future  -     Microalbumin / Creatinine Urine Ratio - Urine, Clean Catch; Future  -     POC Glycosylated Hemoglobin (Hb A1C)  -     Insulin Lispro 100 UNIT/ML solution cartridge; Inject 5 Units under the skin into the appropriate area as directed 3 (Three) Times a Day With Meals.  Dispense: 9 mL; Refill: 2  -     insulin glargine (LANTUS, SEMGLEE) 100 UNIT/ML injection; Inject 10 Units under the skin into the appropriate area as directed Every Night.  Dispense: 3 mL; Refill: 2    Uncontrolled due to medication noncompliance. Will refill insulin today - educated on appropriate use. We will continue 5u TID with meals and add Lantus 10u qhs. She will continue to check her FSBG at home and we will follow up in 2 weeks.    5. Primary hypertension  -      CBC (No Diff); Future  -     Lipid Panel; Future  -     Comprehensive Metabolic Panel; Future  -     TSH; Future  -     Microalbumin / Creatinine Urine Ratio - Urine, Clean Catch; Future  -     amLODIPine (NORVASC) 5 MG tablet; Take 1 tablet by mouth Daily.  Dispense: 30 tablet; Refill: 2  -     metoprolol tartrate (LOPRESSOR) 50 MG tablet; Take 1 tablet by mouth 2 (Two) Times a Day.  Dispense: 60 tablet; Refill: 2    Uncontrolled due to medication noncompliance - restart amlodipine and metoprolol today    6. Schizophrenia, unspecified type ([facility])  -     amitriptyline (ELAVIL) 50 MG tablet; Take 1 tablet by mouth Every Night.  Dispense: 30 tablet; Refill: 2  -     QUEtiapine (SEROquel) 300 MG tablet; Take 1 tablet by mouth Every Night.  Dispense: 30 tablet; Refill: 2    Needs to establish with behavioral health - she would like in person therapy and medication management. I have given her information to reach out to [place] [facility].     7. Coronary artery disease involving native coronary artery of native heart without angina pectoris  8. HFrEF (heart failure with reduced ejection fraction) ([facility])  -     Ambulatory Referral to Cardiology  -     aspirin 81 MG EC tablet; Take 1 tablet by mouth Daily.  Dispense: 30 tablet; Refill: 2  -     atorvastatin (LIPITOR) 40 MG tablet; Take 1 tablet by mouth Daily.  Dispense: 30 tablet; Refill: 2  -     metoprolol tartrate (LOPRESSOR) 50 MG tablet; Take 1 tablet by mouth 2 (Two) Times a Day.  Dispense: 60 tablet; Refill: 2  -     furosemide (Lasix) 20 MG tablet; Take 1 tablet by mouth Daily.  Dispense: 30 tablet; Refill: 2    Reviewed discharge summary from [facility] scanned into media -   Echo 3/2022 - EF 49%, global hypokinesis, grade 1 diastolic dysfunction  C 4/3/2022 - nonobstructive LAD to LAD 60% in mid region    Referred to Cardiology - refilled her ASA81, metoprolol, statin. We will obtain labs today. She does have trace LE edema today, has not been taking  her lasix. Refilled today.    9. Chronic pain disorder  Following with  [facility] for chronic pelvic pain, back pain, neuropathy.     10. Hx of cervical cancer  Need to discuss further at next visit in 2 weeks, did not have time today given above problems. She is overdue for follow up with oncology.    11. Encounter for medical examination to establish care  -     CBC (No Diff); Future  -     Lipid Panel; Future  -     Comprehensive Metabolic Panel; Future  -     TSH; Future  -     Microalbumin / Creatinine Urine Ratio - Urine, Clean Catch; Future    Other orders  -     polyethylene glycol (MIRALAX) 17 GM/SCOOP powder; Take 17 g by mouth Daily As Needed (constipation).  Dispense: 255 g; Refill: 2          Plan of care reviewed with patient at the conclusion of today's visit. Education was provided regarding diagnosis and management.  Patient verbalizes understanding of and agreement with management plan.      Follow Up:   Return in about 2 weeks (around 6/9/2022) for Recheck HTN, T2DM, cirrhosis - needs 30 minute appointment.    I spent 65 minutes caring for Kaylie on this date of service. This time includes time spent by me in the following activities:preparing for the visit, reviewing tests, obtaining and/or reviewing a separately obtained history, performing a medically appropriate examination and/or evaluation , counseling and educating the patient/family/caregiver, ordering medications, tests, or procedures, referring and communicating with other health care professionals  and documenting information in the medical record      DO FIOR Sharma RD  [facility]  [address]  [facility] [address]  Fax [phone]  Phone [phone]

## 2022-05-27 ENCOUNTER — OFFICE VISIT (OUTPATIENT)
Dept: GASTROENTEROLOGY | Facility: CLINIC | Age: 57
End: 2022-05-27

## 2022-05-27 ENCOUNTER — LAB (OUTPATIENT)
Dept: LAB | Facility: HOSPITAL | Age: 57
End: 2022-05-27

## 2022-05-27 VITALS
HEIGHT: 63 IN | TEMPERATURE: 98.4 F | SYSTOLIC BLOOD PRESSURE: 153 MMHG | DIASTOLIC BLOOD PRESSURE: 97 MMHG | WEIGHT: 109.6 LBS | HEART RATE: 105 BPM | BODY MASS INDEX: 19.42 KG/M2

## 2022-05-27 DIAGNOSIS — K74.60 CIRRHOSIS OF LIVER WITHOUT ASCITES, UNSPECIFIED HEPATIC CIRRHOSIS TYPE: ICD-10-CM

## 2022-05-27 DIAGNOSIS — B18.2 CHRONIC HEPATITIS C WITHOUT HEPATIC COMA: ICD-10-CM

## 2022-05-27 DIAGNOSIS — B18.2 CHRONIC HEPATITIS C WITHOUT HEPATIC COMA: Primary | ICD-10-CM

## 2022-05-27 LAB
ALBUMIN SERPL-MCNC: 2.6 G/DL (ref 3.5–5.2)
ALBUMIN SERPL-MCNC: 3 G/DL (ref 3.5–5.2)
ALBUMIN UR-MCNC: 10.4 MG/DL
ALBUMIN/GLOB SERPL: 0.5 G/DL
ALBUMIN/GLOB SERPL: 0.6 G/DL
ALP SERPL-CCNC: 160 U/L (ref 39–117)
ALP SERPL-CCNC: 173 U/L (ref 39–117)
ALT SERPL W P-5'-P-CCNC: 22 U/L (ref 1–33)
ALT SERPL W P-5'-P-CCNC: 29 U/L (ref 1–33)
ANION GAP SERPL CALCULATED.3IONS-SCNC: 5.6 MMOL/L (ref 5–15)
ANION GAP SERPL CALCULATED.3IONS-SCNC: 8.4 MMOL/L (ref 5–15)
AST SERPL-CCNC: 43 U/L (ref 1–32)
AST SERPL-CCNC: 52 U/L (ref 1–32)
BASOPHILS # BLD AUTO: 0.07 10*3/MM3 (ref 0–0.2)
BASOPHILS NFR BLD AUTO: 1.4 % (ref 0–1.5)
BILIRUB SERPL-MCNC: 1.3 MG/DL (ref 0–1.2)
BILIRUB SERPL-MCNC: 1.5 MG/DL (ref 0–1.2)
BUN SERPL-MCNC: 16 MG/DL (ref 6–20)
BUN SERPL-MCNC: 16 MG/DL (ref 6–20)
BUN/CREAT SERPL: 16.3 (ref 7–25)
BUN/CREAT SERPL: 17.8 (ref 7–25)
CALCIUM SPEC-SCNC: 8.7 MG/DL (ref 8.6–10.5)
CALCIUM SPEC-SCNC: 8.8 MG/DL (ref 8.6–10.5)
CHLORIDE SERPL-SCNC: 101 MMOL/L (ref 98–107)
CHLORIDE SERPL-SCNC: 107 MMOL/L (ref 98–107)
CHOLEST SERPL-MCNC: 98 MG/DL (ref 0–200)
CO2 SERPL-SCNC: 20.4 MMOL/L (ref 22–29)
CO2 SERPL-SCNC: 21.6 MMOL/L (ref 22–29)
CREAT SERPL-MCNC: 0.9 MG/DL (ref 0.57–1)
CREAT SERPL-MCNC: 0.98 MG/DL (ref 0.57–1)
CREAT UR-MCNC: 52.9 MG/DL
DEPRECATED RDW RBC AUTO: 48.1 FL (ref 37–54)
EGFRCR SERPLBLD CKD-EPI 2021: 67 ML/MIN/1.73
EGFRCR SERPLBLD CKD-EPI 2021: 74.3 ML/MIN/1.73
EOSINOPHIL # BLD AUTO: 0.13 10*3/MM3 (ref 0–0.4)
EOSINOPHIL NFR BLD AUTO: 2.6 % (ref 0.3–6.2)
ERYTHROCYTE [DISTWIDTH] IN BLOOD BY AUTOMATED COUNT: 15.9 % (ref 12.3–15.4)
GLOBULIN UR ELPH-MCNC: 5 GM/DL
GLOBULIN UR ELPH-MCNC: 5.3 GM/DL
GLUCOSE SERPL-MCNC: 307 MG/DL (ref 65–99)
GLUCOSE SERPL-MCNC: 366 MG/DL (ref 65–99)
HBV SURFACE AB SER RIA-ACNC: NORMAL
HBV SURFACE AB SER RIA-ACNC: NORMAL
HBV SURFACE AG SERPL QL IA: NORMAL
HBV SURFACE AG SERPL QL IA: NORMAL
HCT VFR BLD AUTO: 27 % (ref 34–46.6)
HDLC SERPL-MCNC: 33 MG/DL (ref 40–60)
HGB BLD-MCNC: 8.8 G/DL (ref 12–15.9)
HIV1+2 AB SER QL: NORMAL
INR PPP: 1.36 (ref 0.84–1.13)
LDLC SERPL CALC-MCNC: 37 MG/DL (ref 0–100)
LDLC/HDLC SERPL: 0.97 {RATIO}
LYMPHOCYTES # BLD AUTO: 1.37 10*3/MM3 (ref 0.7–3.1)
LYMPHOCYTES NFR BLD AUTO: 27.2 % (ref 19.6–45.3)
MCH RBC QN AUTO: 27 PG (ref 26.6–33)
MCHC RBC AUTO-ENTMCNC: 32.6 G/DL (ref 31.5–35.7)
MCV RBC AUTO: 82.8 FL (ref 79–97)
MICROALBUMIN/CREAT UR: 196.6 MG/G
MONOCYTES # BLD AUTO: 0.38 10*3/MM3 (ref 0.1–0.9)
MONOCYTES NFR BLD AUTO: 7.6 % (ref 5–12)
NEUTROPHILS NFR BLD AUTO: 3.06 10*3/MM3 (ref 1.7–7)
NEUTROPHILS NFR BLD AUTO: 60.8 % (ref 42.7–76)
PLATELET # BLD AUTO: 129 10*3/MM3 (ref 140–450)
POTASSIUM SERPL-SCNC: 3.9 MMOL/L (ref 3.5–5.2)
POTASSIUM SERPL-SCNC: 3.9 MMOL/L (ref 3.5–5.2)
PROT SERPL-MCNC: 7.9 G/DL (ref 6–8.5)
PROT SERPL-MCNC: 8 G/DL (ref 6–8.5)
PROTHROMBIN TIME: 16.7 SECONDS (ref 11.4–14.4)
RBC # BLD AUTO: 3.26 10*6/MM3 (ref 3.77–5.28)
SODIUM SERPL-SCNC: 131 MMOL/L (ref 136–145)
SODIUM SERPL-SCNC: 133 MMOL/L (ref 136–145)
TRIGL SERPL-MCNC: 165 MG/DL (ref 0–150)
TSH SERPL DL<=0.05 MIU/L-ACNC: 0.8 UIU/ML (ref 0.27–4.2)
VLDLC SERPL-MCNC: 28 MG/DL (ref 5–40)
WBC NRBC COR # BLD: 5.03 10*3/MM3 (ref 3.4–10.8)

## 2022-05-27 PROCEDURE — 87522 HEPATITIS C REVRS TRNSCRPJ: CPT

## 2022-05-27 PROCEDURE — 87340 HEPATITIS B SURFACE AG IA: CPT

## 2022-05-27 PROCEDURE — 86706 HEP B SURFACE ANTIBODY: CPT

## 2022-05-27 PROCEDURE — 80053 COMPREHEN METABOLIC PANEL: CPT

## 2022-05-27 PROCEDURE — 86708 HEPATITIS A ANTIBODY: CPT

## 2022-05-27 PROCEDURE — 99204 OFFICE O/P NEW MOD 45 MIN: CPT | Performed by: NURSE PRACTITIONER

## 2022-05-27 PROCEDURE — G0432 EIA HIV-1/HIV-2 SCREEN: HCPCS

## 2022-05-27 PROCEDURE — 86317 IMMUNOASSAY INFECTIOUS AGENT: CPT

## 2022-05-27 PROCEDURE — 85025 COMPLETE CBC W/AUTO DIFF WBC: CPT

## 2022-05-27 PROCEDURE — 86704 HEP B CORE ANTIBODY TOTAL: CPT

## 2022-05-27 PROCEDURE — 87902 NFCT AGT GNTYP ALYS HEP C: CPT

## 2022-05-27 PROCEDURE — 81596 NFCT DS CHRNC HCV 6 ASSAYS: CPT

## 2022-05-27 PROCEDURE — 85610 PROTHROMBIN TIME: CPT

## 2022-05-27 RX ORDER — ONDANSETRON 4 MG/1
4 TABLET, FILM COATED ORAL AS NEEDED
COMMUNITY
Start: 2022-03-22 | End: 2022-06-10

## 2022-05-27 RX ORDER — INSULIN ASPART 100 [IU]/ML
INJECTION, SOLUTION INTRAVENOUS; SUBCUTANEOUS
COMMUNITY
Start: 2022-04-08 | End: 2022-06-10 | Stop reason: SDUPTHER

## 2022-05-27 RX ORDER — CHOLECALCIFEROL (VITAMIN D3) 125 MCG
5 CAPSULE ORAL AS NEEDED
COMMUNITY
Start: 2022-03-22 | End: 2022-06-10 | Stop reason: HOSPADM

## 2022-05-28 LAB
HAV AB SER QL IA: POSITIVE
HBV CORE AB SERPL QL IA: POSITIVE
HBV SURFACE AB SER-ACNC: <3.1 MIU/ML

## 2022-05-30 LAB
DIAGNOSTIC IMP SPEC-IMP: NORMAL
HCV AB S/CO SERPL IA: >11 S/CO RATIO (ref 0–0.9)
HCV RNA SERPL NAA+PROBE-ACNC: NORMAL IU/ML
HCV RNA SERPL NAA+PROBE-ACNC: NORMAL IU/ML
HCV RNA SERPL NAA+PROBE-LOG IU: 7.04 LOG10 IU/ML
REF LAB TEST REF RANGE: NORMAL

## 2022-05-31 LAB
A2 MACROGLOB SERPL-MCNC: 356 MG/DL (ref 110–276)
ALT SERPL W P-5'-P-CCNC: 23 IU/L (ref 0–40)
APO A-I SERPL-MCNC: 100 MG/DL (ref 116–209)
BILIRUB SERPL-MCNC: 1.2 MG/DL (ref 0–1.2)
FIBROSIS SCORING:: ABNORMAL
FIBROSIS STAGE SERPL QL: ABNORMAL
GGT SERPL-CCNC: 336 IU/L (ref 0–60)
HAPTOGLOB SERPL-MCNC: <10 MG/DL (ref 33–346)
HCV AB SER QL: ABNORMAL
HCV GENTYP SERPL NAA+PROBE: NORMAL
HCV GENTYP SERPL NAA+PROBE: NORMAL
HCV RNA SERPL NAA+PROBE-ACNC: NORMAL IU/ML
HCV RNA SERPL NAA+PROBE-LOG IU: 6.87 LOG10 IU/ML
LABORATORY COMMENT REPORT: ABNORMAL
LABORATORY COMMENT REPORT: NORMAL
LABORATORY COMMENT REPORT: NORMAL
LIVER FIBR SCORE SERPL CALC.FIBROSURE: 0.97 (ref 0–0.21)
NECROINFLAMM ACTIVITY SCORING:: ABNORMAL
NECROINFLAMMATORY ACT GRADE SERPL QL: ABNORMAL
NECROINFLAMMATORY ACT SCORE SERPL: 0.23 (ref 0–0.17)
SERVICE CMNT-IMP: ABNORMAL

## 2022-06-07 ENCOUNTER — TELEPHONE (OUTPATIENT)
Dept: FAMILY MEDICINE CLINIC | Facility: CLINIC | Age: 57
End: 2022-06-07

## 2022-06-07 NOTE — TELEPHONE ENCOUNTER
Caller: Kaylie Cruz    Relationship: Self    Best call back number: [phone]    What is the best time to reach you: ANYTIME     Who are you requesting to speak with (clinical staff, provider,  specific staff member): CLINICAL STAFF        What was the call regarding: PATIENT STATES THAT SHE NEEDS TO KNOW THE ADDRESS FOR [facility]     Do you require a callback: YES

## 2022-06-10 ENCOUNTER — OFFICE VISIT (OUTPATIENT)
Dept: FAMILY MEDICINE CLINIC | Facility: CLINIC | Age: 57
End: 2022-06-10

## 2022-06-10 VITALS
OXYGEN SATURATION: 99 % | SYSTOLIC BLOOD PRESSURE: 132 MMHG | HEART RATE: 113 BPM | BODY MASS INDEX: 19.07 KG/M2 | WEIGHT: 107.6 LBS | DIASTOLIC BLOOD PRESSURE: 90 MMHG | HEIGHT: 63 IN

## 2022-06-10 DIAGNOSIS — Z79.4 TYPE 2 DIABETES MELLITUS WITH DIABETIC POLYNEUROPATHY, WITH LONG-TERM CURRENT USE OF INSULIN: Primary | ICD-10-CM

## 2022-06-10 DIAGNOSIS — K74.60 DECOMPENSATED HEPATIC CIRRHOSIS: ICD-10-CM

## 2022-06-10 DIAGNOSIS — E11.42 TYPE 2 DIABETES MELLITUS WITH DIABETIC POLYNEUROPATHY, WITH LONG-TERM CURRENT USE OF INSULIN: Primary | ICD-10-CM

## 2022-06-10 DIAGNOSIS — I50.20 HFREF (HEART FAILURE WITH REDUCED EJECTION FRACTION): ICD-10-CM

## 2022-06-10 DIAGNOSIS — K72.90 DECOMPENSATED HEPATIC CIRRHOSIS: ICD-10-CM

## 2022-06-10 DIAGNOSIS — I10 PRIMARY HYPERTENSION: ICD-10-CM

## 2022-06-10 PROCEDURE — 99214 OFFICE O/P EST MOD 30 MIN: CPT | Performed by: STUDENT IN AN ORGANIZED HEALTH CARE EDUCATION/TRAINING PROGRAM

## 2022-06-10 RX ORDER — POTASSIUM CHLORIDE 20 MEQ/1
20 TABLET, EXTENDED RELEASE ORAL DAILY PRN
Qty: 30 TABLET | Refills: 2 | Status: SHIPPED | OUTPATIENT
Start: 2022-06-10 | End: 2022-09-10

## 2022-06-10 RX ORDER — INSULIN ASPART 100 [IU]/ML
5 INJECTION, SOLUTION INTRAVENOUS; SUBCUTANEOUS
Qty: 9 ML | Refills: 5 | Status: SHIPPED | OUTPATIENT
Start: 2022-06-10 | End: 2022-12-16 | Stop reason: SDUPTHER

## 2022-06-10 RX ORDER — FUROSEMIDE 20 MG/1
20 TABLET ORAL DAILY PRN
Qty: 30 TABLET | Refills: 2 | Status: SHIPPED | OUTPATIENT
Start: 2022-06-10 | End: 2022-08-18 | Stop reason: SDUPTHER

## 2022-06-10 RX ORDER — PEN NEEDLE, DIABETIC 30 GX3/16"
1 NEEDLE, DISPOSABLE MISCELLANEOUS
Qty: 300 EACH | Refills: 2 | Status: SHIPPED | OUTPATIENT
Start: 2022-06-10

## 2022-06-10 NOTE — PROGRESS NOTES
Established Office Visit      Patient Name: Kaylie Cruz  : 1965   MRN: [id number]   Care Team: Patient Care Team:  Iehsa Harris DO as PCP - General (Internal Medicine)    Chief Complaint:    Chief Complaint   Patient presents with   • Hypertension   • Diabetes       History of Present Illness: Kaylie Cruz is a 56 y.o. female who is here today to follow up with HTN, T2DM, HCV cirrhosis. She is still living in sober living home, has not used since last visit. Feeling well today.    HCV, decompensated cirrhosis - since last visit she has established with GI. She has been approved for Mavyret and is eager to start treatment. Denies nausea, vomiting, abdominal pain but she has noticed some additional bloating and LE edema since being off lasix. Denies SOA    T2DM - has been compliant with lantus 10u daily but she was unable to  her meal time insulin from the pharmacy. Denies hypoglycemic episodes.    HTN - elevated today but she checks at home and reports almost all readings at goal. Denies chest pain. She reports compliance with all medications but has not taken her meds yet this morning.    Subjective      Review of Systems:   Review of Systems - See HPI    I have reviewed and the following portions of the patient's history were updated as appropriate: past family history, past medical history, past social history, past surgical history and problem list.    Medications:     Current Outpatient Medications:   •  amitriptyline (ELAVIL) 50 MG tablet, Take 1 tablet by mouth Every Night., Disp: 30 tablet, Rfl: 2  •  amLODIPine (NORVASC) 5 MG tablet, Take 1 tablet by mouth Daily., Disp: 30 tablet, Rfl: 2  •  aspirin 81 MG EC tablet, Take 1 tablet by mouth Daily., Disp: 30 tablet, Rfl: 2  •  atorvastatin (LIPITOR) 40 MG tablet, Take 1 tablet by mouth Daily., Disp: 30 tablet, Rfl: 2  •  Easy Touch Lancets 30G misc, , Disp: , Rfl:   •  furosemide (Lasix) 20 MG tablet, Take 1 tablet by mouth  "Daily As Needed (fluid retention)., Disp: 30 tablet, Rfl: 2  •  gabapentin (NEURONTIN) 600 MG tablet, Every 8 (Eight) Hours., Disp: , Rfl:   •  hydroCHLOROthiazide (HYDRODIURIL) 25 MG tablet, Take 25 mg by mouth Daily., Disp: , Rfl:   •  Insulin Aspart FlexPen 100 UNIT/ML solution pen-injector, Inject 5 Units under the skin into the appropriate area as directed 3 (Three) Times a Day Before Meals., Disp: 9 mL, Rfl: 5  •  insulin glargine (LANTUS, SEMGLEE) 100 UNIT/ML injection, Inject 10 Units under the skin into the appropriate area as directed Every Night., Disp: 3 mL, Rfl: 2  •  metoprolol tartrate (LOPRESSOR) 50 MG tablet, Take 1 tablet by mouth 2 (Two) Times a Day., Disp: 60 tablet, Rfl: 2  •  OneTouch Verio test strip, , Disp: , Rfl:   •  pantoprazole (PROTONIX) 40 MG EC tablet, Take 1 tablet by mouth Daily., Disp: 30 tablet, Rfl: 2  •  polyethylene glycol (MIRALAX) 17 GM/SCOOP powder, Take 17 g by mouth Daily As Needed (constipation)., Disp: 255 g, Rfl: 2  •  QUEtiapine (SEROquel) 300 MG tablet, Take 1 tablet by mouth Every Night., Disp: 30 tablet, Rfl: 2  •  Insulin Pen Needle (Pen Needles) 31G X 5 MM misc, 1 each 4 (Four) Times a Day Before Meals & at Bedtime., Disp: 300 each, Rfl: 2  •  potassium chloride (K-DUR,KLOR-CON) 20 MEQ CR tablet, Take 1 tablet by mouth Daily As Needed (when taking furosemide (lasix))., Disp: 30 tablet, Rfl: 2  •  thiamine 100 MG tablet, , Disp: , Rfl:     Allergies:   Allergies   Allergen Reactions   • Codeine Hives   • Morphine Hives   • Tagamet [Cimetidine] Other (See Comments)     DISCOLORATION OF SKIN   • Toradol [Ketorolac Tromethamine] Hives       Objective     Physical Exam:  Vital Signs:   Vitals:    06/10/22 0838   BP: 132/90   Pulse: 113   SpO2: 99%   Weight: 48.8 kg (107 lb 9.6 oz)   Height: 160 cm (63\")     Body mass index is 19.06 kg/m².     Physical Exam  Vitals reviewed.   Constitutional:       General: She is not in acute distress.     Appearance: Normal " appearance. She is not ill-appearing.   Cardiovascular:      Rate and Rhythm: Normal rate.      Pulses: Normal pulses.      Comments: +trace b/l LE edema   Pulmonary:      Effort: Pulmonary effort is normal. No respiratory distress.   Abdominal:      General: There is distension.      Tenderness: There is no abdominal tenderness.   Skin:     General: Skin is warm and dry.   Neurological:      General: No focal deficit present.      Mental Status: She is alert and oriented to person, place, and time.   Psychiatric:         Mood and Affect: Mood normal.         Behavior: Behavior normal.         Judgment: Judgment normal.         Assessment / Plan      Assessment/Plan:   Problems Addressed This Visit  Diagnoses and all orders for this visit:    1. Type 2 diabetes mellitus with diabetic polyneuropathy, with long-term current use of insulin (formerly [facility]) (Primary)  -     Insulin Pen Needle (Pen Needles) 31G X 5 MM misc; 1 each 4 (Four) Times a Day Before Meals & at Bedtime.  Dispense: 300 each; Refill: 2  -     Insulin Aspart FlexPen 100 UNIT/ML solution pen-injector; Inject 5 Units under the skin into the appropriate area as directed 3 (Three) Times a Day Before Meals.  Dispense: 9 mL; Refill: 5    A1c 8.5%. Has not been taking Aspart. Resent to pharmacy. Continue lantus 10u qhs and aspart 5u TID with meals. Continue keeping log and bring to next appointment. Goal A1c <7%. Counseled on importance of dietary modification. Her LDL is at goal <70 on statin.         2. HFrEF (heart failure with reduced ejection fraction) (formerly [facility])  3. CAD  -     furosemide (Lasix) 20 MG tablet; Take 1 tablet by mouth Daily As Needed (fluid retention).  Dispense: 30 tablet; Refill: 2  -     potassium chloride (K-DUR,KLOR-CON) 20 MEQ CR tablet; Take 1 tablet by mouth Daily As Needed (when taking furosemide (lasix)).  Dispense: 30 tablet; Refill: 2    Echo 3/2022 - EF 49%, global hypokinesis, grade 1 diastolic dysfunction  C 4/3/2022 - nonobstructive  LAD to LAD 60% in mid region    Appears to be volume up today - restart lasix 20mg as needed with potassium supplement. Encouraged her to let me know if she feels LE edema or abdominal distention is not well controlled. Encouraged her to weight daily and follow low Na diet.     Referred to Cardiology  Last visit - has appointment next month. Reminded her of this appointment and stressed the importance of follow up. Continue ASA81, metoprolol, statin. Her HR is elevated today, she has not taken her BB.     3. Decompensated hepatic cirrhosis (HCC)  2/2 chronic HCV. Following with GI and planning to start treatment for this. She has received her first HBV vaccine. Due for next at end of June. Cirrhosis is decompensated by ascites, nonbleeding EV. EGD 4/1/22. Encouraged compliance with PPI. Restart lasix as detailed above. Discussed importance of avoiding NSAIDs and alcohol. Avoid Tylenol >2g. Follow low salt diet.     4. Primary hypertension  Slightly elevated today but has not taken her medications this morning. We discussed goal BP <130/80. She will keep log at home and let me know if persistently elevated. Continue amlodipine and metoprolol.         Plan of care reviewed with patient at the conclusion of today's visit. Education was provided regarding diagnosis and management.  Patient verbalizes understanding of and agreement with management plan.    Follow Up:   Return in about 7 weeks (around 7/29/2022) for Recheck.        DO FIOR Sharma RD  [facility]  [address]  [facility] [address]  Fax [phone]  Phone [phone]

## 2022-06-16 ENCOUNTER — OFFICE VISIT (OUTPATIENT)
Dept: GASTROENTEROLOGY | Facility: CLINIC | Age: 57
End: 2022-06-16

## 2022-06-16 VITALS
BODY MASS INDEX: 18.74 KG/M2 | DIASTOLIC BLOOD PRESSURE: 93 MMHG | HEART RATE: 97 BPM | SYSTOLIC BLOOD PRESSURE: 162 MMHG | WEIGHT: 105.8 LBS | TEMPERATURE: 98.2 F

## 2022-06-16 DIAGNOSIS — B18.2 CHRONIC HEPATITIS C WITHOUT HEPATIC COMA: Primary | ICD-10-CM

## 2022-06-16 DIAGNOSIS — K74.60 CIRRHOSIS OF LIVER WITHOUT ASCITES, UNSPECIFIED HEPATIC CIRRHOSIS TYPE: ICD-10-CM

## 2022-06-16 DIAGNOSIS — Z01.812 ENCOUNTER FOR PREPROCEDURE SCREENING LABORATORY TESTING FOR COVID-19: Primary | ICD-10-CM

## 2022-06-16 DIAGNOSIS — Z20.822 ENCOUNTER FOR PREPROCEDURE SCREENING LABORATORY TESTING FOR COVID-19: Primary | ICD-10-CM

## 2022-06-16 PROCEDURE — 99214 OFFICE O/P EST MOD 30 MIN: CPT | Performed by: NURSE PRACTITIONER

## 2022-06-16 RX ORDER — FOLIC ACID 1 MG/1
1 TABLET ORAL DAILY
COMMUNITY
Start: 2022-02-07 | End: 2023-02-07

## 2022-06-16 RX ORDER — NORTRIPTYLINE HYDROCHLORIDE 25 MG/1
25 CAPSULE ORAL DAILY
COMMUNITY
End: 2022-07-29

## 2022-06-16 RX ORDER — VELPATASVIR AND SOFOSBUVIR 100; 400 MG/1; MG/1
TABLET, FILM COATED ORAL
COMMUNITY
Start: 2022-06-09 | End: 2022-12-16

## 2022-06-16 RX ORDER — LOPERAMIDE HYDROCHLORIDE 2 MG/1
2 CAPSULE ORAL
COMMUNITY
End: 2022-07-29

## 2022-06-16 NOTE — PROGRESS NOTES
GASTROENTEROLOGY OFFICE NOTE  Kaylie Cruz  [id number]  1965    CARE TEAM  Patient Care Team:  Iesha Harris DO as PCP - General (Internal Medicine)  Provider, No Known as PCP - Family Medicine  Provider, No Known  Provider, No Known  Provider, No Known    Referring Provider: Iesha Harris DO    Chief Complaint   Patient presents with   • Hepatitis        HISTORY OF PRESENT ILLNESS:  Ms. Cruz is seen today to begin treatment for chronic hepatitis C, genotype 1a.    She was diagnosed with cirrhosis secondary to chronic hepatitis C while hospitalized at [facility] for an NSTEMI in April 2022.    PAST MEDICAL HISTORY  Past Medical History:   Diagnosis Date   • Anemia    • Cancer (HCC)     cervical   • Depression    • Hyperlipidemia    • Hypertension    • Infectious viral hepatitis    • Myocardial infarction (HCC)    • Substance abuse (HCC)         PAST SURGICAL HISTORY  Past Surgical History:   Procedure Laterality Date   • BLADDER SURGERY     • CARDIAC CATHETERIZATION      4/3/2022   • CHOLECYSTECTOMY     • COLON RESECTION      7/19/2017, descending and transverse colon   • COLON SURGERY     • COLONOSCOPY      7/19/2017   • ENDOSCOPY      EGD 4/1/22   • HYSTERECTOMY     • TRANSESOPHAGEAL ECHOCARDIOGRAM (AVINASH)      Global hyokinesis, 49% EF, LVH        MEDICATIONS:    Current Outpatient Medications:   •  amitriptyline (ELAVIL) 50 MG tablet, Take 1 tablet by mouth Every Night., Disp: 30 tablet, Rfl: 2  •  amLODIPine (NORVASC) 5 MG tablet, Take 1 tablet by mouth Daily., Disp: 30 tablet, Rfl: 2  •  aspirin 81 MG EC tablet, Take 1 tablet by mouth Daily., Disp: 30 tablet, Rfl: 2  •  atorvastatin (LIPITOR) 40 MG tablet, Take 1 tablet by mouth Daily., Disp: 30 tablet, Rfl: 2  •  Cholecalciferol 50 MCG (2000 UT) capsule, Take 2,000 Units by mouth Daily., Disp: , Rfl:   •  Easy Touch Lancets 30G misc, , Disp: , Rfl:   •  folic acid (FOLVITE) 1 MG tablet, Take 1 mg by mouth Daily., Disp: , Rfl:    •  furosemide (Lasix) 20 MG tablet, Take 1 tablet by mouth Daily As Needed (fluid retention)., Disp: 30 tablet, Rfl: 2  •  gabapentin (NEURONTIN) 600 MG tablet, Every 8 (Eight) Hours., Disp: , Rfl:   •  hydroCHLOROthiazide (HYDRODIURIL) 25 MG tablet, Take 25 mg by mouth Daily., Disp: , Rfl:   •  Insulin Aspart FlexPen 100 UNIT/ML solution pen-injector, Inject 5 Units under the skin into the appropriate area as directed 3 (Three) Times a Day Before Meals., Disp: 9 mL, Rfl: 5  •  insulin glargine (LANTUS, SEMGLEE) 100 UNIT/ML injection, Inject 10 Units under the skin into the appropriate area as directed Every Night., Disp: 3 mL, Rfl: 2  •  Insulin Pen Needle (Pen Needles) 31G X 5 MM misc, 1 each 4 (Four) Times a Day Before Meals & at Bedtime., Disp: 300 each, Rfl: 2  •  loperamide (IMODIUM) 2 MG capsule, Take 2 mg by mouth., Disp: , Rfl:   •  metoprolol tartrate (LOPRESSOR) 50 MG tablet, Take 1 tablet by mouth 2 (Two) Times a Day., Disp: 60 tablet, Rfl: 2  •  nortriptyline (PAMELOR) 25 MG capsule, Take 25 mg by mouth Daily., Disp: , Rfl:   •  OneTouch Verio test strip, , Disp: , Rfl:   •  pantoprazole (PROTONIX) 40 MG EC tablet, Take 1 tablet by mouth Daily., Disp: 30 tablet, Rfl: 2  •  polyethylene glycol (MIRALAX) 17 GM/SCOOP powder, Take 17 g by mouth Daily As Needed (constipation)., Disp: 255 g, Rfl: 2  •  potassium chloride (K-DUR,KLOR-CON) 20 MEQ CR tablet, Take 1 tablet by mouth Daily As Needed (when taking furosemide (lasix))., Disp: 30 tablet, Rfl: 2  •  QUEtiapine (SEROquel) 300 MG tablet, Take 1 tablet by mouth Every Night., Disp: 30 tablet, Rfl: 2  •  Sofosbuvir-Velpatasvir 400-100 MG tablet, , Disp: , Rfl:   •  thiamine 100 MG tablet, , Disp: , Rfl:     ALLERGIES  Allergies   Allergen Reactions   • Codeine Hives   • Morphine Hives   • Tagamet [Cimetidine] Other (See Comments)     DISCOLORATION OF SKIN   • Toradol [Ketorolac Tromethamine] Hives       FAMILY HISTORY:  Family History   Problem Relation  Age of Onset   • Colon cancer Neg Hx        SOCIAL HISTORY  Social History     Socioeconomic History   • Marital status: Single   Tobacco Use   • Smoking status: Current Every Day Smoker     Packs/day: 0.50     Types: Cigarettes   • Smokeless tobacco: Never Used   • Tobacco comment: E- cigarettes   Vaping Use   • Vaping Use: Every day   • Substances: Flavoring   • Devices: Disposable   Substance and Sexual Activity   • Alcohol use: No   • Drug use: Not Currently     Types: Marijuana, Cocaine(coke)     Comment: from [facility] History April 2022   • Sexual activity: Defer         PHYSICAL EXAM   /93 (BP Location: Right arm, Patient Position: Sitting, Cuff Size: Adult)   Pulse 97   Temp 98.2 °F (36.8 °C) (Temporal)   Wt 48 kg (105 lb 12.8 oz)   BMI 18.74 kg/m²   Physical Exam  Vitals and nursing note reviewed.   Constitutional:       Appearance: Normal appearance. She is well-developed.   HENT:      Head: Normocephalic and atraumatic.      Nose: Nose normal.      Mouth/Throat:      Mouth: Mucous membranes are moist.      Pharynx: Oropharynx is clear.   Eyes:      Pupils: Pupils are equal, round, and reactive to light.   Cardiovascular:      Rate and Rhythm: Normal rate and regular rhythm.   Pulmonary:      Effort: Pulmonary effort is normal.      Breath sounds: Normal breath sounds. No wheezing or rales.   Abdominal:      General: Bowel sounds are normal.      Palpations: Abdomen is soft. There is no mass.      Tenderness: There is no abdominal tenderness. There is no guarding or rebound.      Hernia: No hernia is present.   Musculoskeletal:         General: Normal range of motion.      Cervical back: Normal range of motion and neck supple.   Skin:     General: Skin is warm and dry.   Neurological:      Mental Status: She is alert and oriented to person, place, and time.      Cranial Nerves: No cranial nerve deficit.   Psychiatric:         Behavior: Behavior normal.         Judgment: Judgment normal.            Results Review:  4/1/2022:  EGD: Distal esophagitis, hiatal hernia, portal hypertensive gastropathy, suggestion of fundal gastric varices.    Exam/Procedure: CT ABDOMEN PELVIS W IV CONTRAST ordered by EDGARDO MORAN, [id number]     CLINICAL INDICATION:   Abdominal distension     TECHNIQUE:   Imaging of the abdomen and pelvis was performed, from lung bases through pubic symphysis, using spiral technique, following administration of IV contrast, Omnipaque 300, 100 mL. Delayed (excretory phase) images were performed through the kidneys. Reformatted images in the coronal and sagittal planes were generated from the axial data set to facilitate diagnostic accuracy.     Total DLP (Dose-Length Product): 1175. Please note: The reported value represents the total of one or more individual components during the CT acquisition on this date and at this time, and as such, the same value may appear in more than one CT report depending on the interpreting/reporting physicians.     COMPARISON:   CT abdomen and pelvis without contrast 12/31/2021. CT abdomen and pelvis with IV contrast 820 8/2/2020.     FINDINGS:   Lung Bases: Bibasilar atelectasis. Additional somewhat more focal area of airspace disease posteriorly in the left base may be atelectasis, infection or aspiration, series 3 image 4. Small hiatal hernia.     Liver/Gallbladder/Biliary system: Shrunken nodular hepatic contour consistent with hepatocellular disease/ cirrhotic morphology. Cholecystectomy. Ectasia of the common bile duct likely due to postcholecystectomy state.     Spleen: The spleen enhances homogeneously.     Pancreas: The pancreas enhances homogeneously.     Adrenals: The adrenals are morphologically unremarkable.     Kidneys: The kidneys demonstrate symmetric nephrogram and excretion. No renal or ureteral calculi. No hydronephrosis. Surgical clips anterior to the left kidney.     Bowel/Mesentery: The small bowel loops are not dilated. The large bowel  loops are not dilated. Moderate volume of formed stool throughout the colon suggest a degree of constipation. The appendix is not visualized. Mesenteric edema.     Vessels/Lymph Nodes: Diffuse aortic and iliac artery atherosclerosis without aneurysmal dilatation. No lymphadenopathy within the abdomen or pelvis. Perigastric varicosities.     Fluid Survey: Small to moderate volume of ascites throughout the abdomen and pelvis.     Pelvis: Bladder distended. Prior hysterectomy. Bowel anastomotic sutures in the pelvis.     Body Wall: Unchanged asymmetric thickening of the right rectus abdominis muscle may be secondary to scarring. Subcutaneous edema.     Bones: No acute fracture.  Exam End: 22 14:44 Last Resulted: 22 15:18   Received From: [facility]  Result Received: 22 09:41     ASSESSMENT / PLAN  1.  Chronic hepatitis C  HCV treatment teaching to patient  12 week treatment with Epclusa on 2022    Discussed various aspects of treatment includin. How to contact office staff for any questions/concerns..   2. Refill process, Rx coming from [facility] and contact information given to patient.  First fill was mobile dispensed to patient in GI Clinic.  3. Given general information on Epclusa, HCV, and sunscreen use.  Patient instructed to replace razor, toothbrush, nail file/clippers, and tweezers in 30 days.  4. Follow up appointments and bloodwork schedule discussed with patient.  5. Patient medication list was reviewed and updated for accuracy.  Updated med list reviewed for DDIs. Interactions identified via Hepatitis [place] with pantoprazole, she will discontinue use of pantoprazole while taking Epclusa.   Patient understands the importance of contacting my office prior to any changes/additions in medication regimen.   6. Counseled on appearance and packaging of medications, reminded of keep out of reach of children. Reviewed  Epclusa daily dosing schedule (1 tablet once daily at same time  each day), potential side effects and how to prevent and treat them including staying well hydrated with plenty of water (6-8 glasses of water/day), Tylenol up to 2000 mg total per day (4 ES tabs/day or 6 regular tabs/day) if needed for headache, moisturizing lotions for any skin reactions and report if any concern, medication storage at room temperature, pregnancy considerations (TWO forms of barrier birth control to prevent pregancy), and taking medication to the hospital, if admitted.  Pain:  - Epclusa 400/100 mg p.o. daily for 12 weeks  - Discontinue pantoprazole while on Epclusa    2.  Cirrhosis  - EGD in April 2023 to assess for varices  - Ultrasound abdomen in August for HCC surveillance  - 2 g sodium diet  - Avoid NSAIDs; Tylenol okay but not to exceed 2000 mg daily     Return in about 3 months (around 9/16/2022).    I discussed the patients findings and my recommendations with patient    FRANK Styles

## 2022-07-06 ENCOUNTER — TELEPHONE (OUTPATIENT)
Dept: FAMILY MEDICINE CLINIC | Facility: CLINIC | Age: 57
End: 2022-07-06

## 2022-07-06 NOTE — TELEPHONE ENCOUNTER
Okay to double her dose (take lasix 40mg daily) over the next 1-2 days. If no improvement or if she develops abdominal tenderness, vomiting, shortness of breath, decreased urine output, or fever she needs be seen. If it is her abdomen that is retaining fluid unresponsive to lasix, it would be best to be evaluated in the ED. She has cirrhosis and would need an abdominal ultrasound to look for fluid and possibly paracentesis (drainage)      Contacted patient to notify. Verbalized understanding

## 2022-07-06 NOTE — TELEPHONE ENCOUNTER
Caller: Kaylie Cruz    Relationship: Self    Best call back number: [phone]    What is the best time to reach you: anythime    Who are you requesting to speak with (clinical staff, provider,  specific staff member): Dr. Harris    Do you know the name of the person who called:     What was the call regarding: patient is retaining fluid and she is on fluid pills and they do not seem to be helping and patient would like to know what else can be done or if the dosage can be increased    Do you require a callback: YES

## 2022-07-27 RX ORDER — POLYETHYLENE GLYCOL 3350 17 G/17G
POWDER, FOR SOLUTION ORAL
Qty: 238 G | Refills: 1 | Status: SHIPPED | OUTPATIENT
Start: 2022-07-27

## 2022-07-29 ENCOUNTER — OFFICE VISIT (OUTPATIENT)
Dept: FAMILY MEDICINE CLINIC | Facility: CLINIC | Age: 57
End: 2022-07-29

## 2022-07-29 VITALS
BODY MASS INDEX: 20.25 KG/M2 | TEMPERATURE: 97.7 F | HEIGHT: 63 IN | SYSTOLIC BLOOD PRESSURE: 132 MMHG | OXYGEN SATURATION: 100 % | RESPIRATION RATE: 18 BRPM | HEART RATE: 85 BPM | WEIGHT: 114.3 LBS | DIASTOLIC BLOOD PRESSURE: 84 MMHG

## 2022-07-29 DIAGNOSIS — K72.90 DECOMPENSATED HEPATIC CIRRHOSIS: ICD-10-CM

## 2022-07-29 DIAGNOSIS — Z79.4 TYPE 2 DIABETES MELLITUS WITH DIABETIC POLYNEUROPATHY, WITH LONG-TERM CURRENT USE OF INSULIN: ICD-10-CM

## 2022-07-29 DIAGNOSIS — R87.623 HIGH GRADE SQUAMOUS INTRAEPITHELIAL LESION (HGSIL) ON CYTOLOGIC SMEAR OF VAGINA: ICD-10-CM

## 2022-07-29 DIAGNOSIS — Z79.4 TYPE 2 DIABETES MELLITUS WITH HYPERGLYCEMIA, WITH LONG-TERM CURRENT USE OF INSULIN: Primary | ICD-10-CM

## 2022-07-29 DIAGNOSIS — I25.10 CORONARY ARTERY DISEASE INVOLVING NATIVE CORONARY ARTERY OF NATIVE HEART WITHOUT ANGINA PECTORIS: ICD-10-CM

## 2022-07-29 DIAGNOSIS — K74.60 DECOMPENSATED HEPATIC CIRRHOSIS: ICD-10-CM

## 2022-07-29 DIAGNOSIS — I50.20 HFREF (HEART FAILURE WITH REDUCED EJECTION FRACTION): ICD-10-CM

## 2022-07-29 DIAGNOSIS — C52 VAGINAL CANCER: ICD-10-CM

## 2022-07-29 DIAGNOSIS — B18.2 CHRONIC HEPATITIS C WITHOUT HEPATIC COMA: ICD-10-CM

## 2022-07-29 DIAGNOSIS — E11.65 TYPE 2 DIABETES MELLITUS WITH HYPERGLYCEMIA, WITH LONG-TERM CURRENT USE OF INSULIN: Primary | ICD-10-CM

## 2022-07-29 DIAGNOSIS — E11.42 TYPE 2 DIABETES MELLITUS WITH DIABETIC POLYNEUROPATHY, WITH LONG-TERM CURRENT USE OF INSULIN: ICD-10-CM

## 2022-07-29 DIAGNOSIS — F20.9 SCHIZOPHRENIA, UNSPECIFIED TYPE: ICD-10-CM

## 2022-07-29 DIAGNOSIS — I10 PRIMARY HYPERTENSION: ICD-10-CM

## 2022-07-29 PROBLEM — Z85.41 HX OF CERVICAL CANCER: Status: RESOLVED | Noted: 2022-05-10 | Resolved: 2022-07-29

## 2022-07-29 PROBLEM — M25.559 HIP PAIN: Status: RESOLVED | Noted: 2019-04-01 | Resolved: 2022-07-29

## 2022-07-29 PROBLEM — S36.129A: Status: RESOLVED | Noted: 2019-04-01 | Resolved: 2022-07-29

## 2022-07-29 PROBLEM — K27.9 PEPTIC ULCER: Status: RESOLVED | Noted: 2019-04-01 | Resolved: 2022-07-29

## 2022-07-29 PROBLEM — R10.2 PELVIC AND PERINEAL PAIN: Status: RESOLVED | Noted: 2019-04-01 | Resolved: 2022-07-29

## 2022-07-29 PROCEDURE — 99214 OFFICE O/P EST MOD 30 MIN: CPT | Performed by: STUDENT IN AN ORGANIZED HEALTH CARE EDUCATION/TRAINING PROGRAM

## 2022-07-29 RX ORDER — QUETIAPINE FUMARATE 300 MG/1
300 TABLET, FILM COATED ORAL NIGHTLY
Qty: 30 TABLET | Refills: 2 | Status: SHIPPED | OUTPATIENT
Start: 2022-07-29 | End: 2022-12-16 | Stop reason: SDUPTHER

## 2022-07-29 RX ORDER — INSULIN GLARGINE 100 [IU]/ML
10 INJECTION, SOLUTION SUBCUTANEOUS NIGHTLY
Qty: 3 ML | Refills: 2 | Status: SHIPPED | OUTPATIENT
Start: 2022-07-29 | End: 2022-09-09 | Stop reason: SDUPTHER

## 2022-07-29 RX ORDER — GABAPENTIN 800 MG/1
800 TABLET ORAL DAILY
Status: ON HOLD | COMMUNITY
Start: 2022-07-07 | End: 2022-09-29

## 2022-07-29 RX ORDER — METOPROLOL TARTRATE 50 MG/1
50 TABLET, FILM COATED ORAL 2 TIMES DAILY
Qty: 60 TABLET | Refills: 2 | Status: SHIPPED | OUTPATIENT
Start: 2022-07-29 | End: 2022-12-16

## 2022-07-29 RX ORDER — ASPIRIN 81 MG/1
81 TABLET ORAL DAILY
Qty: 30 TABLET | Refills: 2 | Status: SHIPPED | OUTPATIENT
Start: 2022-07-29

## 2022-07-29 RX ORDER — AMITRIPTYLINE HYDROCHLORIDE 50 MG/1
50 TABLET, FILM COATED ORAL NIGHTLY
Qty: 30 TABLET | Refills: 2 | Status: SHIPPED | OUTPATIENT
Start: 2022-07-29 | End: 2022-12-16 | Stop reason: SDUPTHER

## 2022-07-29 RX ORDER — AMLODIPINE BESYLATE 5 MG/1
5 TABLET ORAL DAILY
Qty: 30 TABLET | Refills: 2 | Status: SHIPPED | OUTPATIENT
Start: 2022-07-29 | End: 2022-12-16 | Stop reason: SDUPTHER

## 2022-07-29 RX ORDER — ATORVASTATIN CALCIUM 40 MG/1
40 TABLET, FILM COATED ORAL DAILY
Qty: 30 TABLET | Refills: 2 | Status: SHIPPED | OUTPATIENT
Start: 2022-07-29 | End: 2022-12-16

## 2022-07-29 NOTE — PROGRESS NOTES
"  Established Office Visit      Patient Name: Kaylie Cruz  : 1965   MRN: [id number]   Care Team: Patient Care Team:  Iesha Harris DO as PCP - General (Internal Medicine)  Provider, No Known  Provider, No Known  Provider, No Known    Chief Complaint:    Chief Complaint   Patient presents with   • Follow-up     7 week   • Diabetes   • Hypertension       History of Present Illness: Kaylie Cruz is a 57 y.o. female with cervical cancer s/p hysterectomy and chemo radiation, HFrEF, liver cirrhosis, diabetes, hepatitis-C, history of substance use now in remission, and HTN who is here today to follow up with HTN, T2DM.    Since last visit she was sent to  [facility] for hyperglycemic episode with excessive daytime sleepiness. Her glucose was 570 but she was AAOx3 and refused treatment with IVF and medications. This is documented in her chart but when I brought this up she denies ever going to the ER or experiencing any of this.   She has been compliant with lantus 10u qhs and Aspart 5u TID with meals. She reports all readings between 100-140. She requests referral to diabetic specialist and would like to discuss CGM.    HCV, cirrhosis - following with GI.recently started treatment for HCV and is tolerating this well. Denies medication side effect.   Feels volume status has improved with lasix daily. Continue to have intermittent bilateral LE edema but this is more activity dependent.     HTN - well controlled with amlodipine and metoprolol     HFrEF, CAD - compliant with BB, ASA, statin. Denies chest pain ,SOA, exertional decline. She did not follow up with cardiology this month as planned because she was scared of them \"working on her heart\". She is agreeable to reschedule.     Schizophrenia, depression, anxiety - follows with therapist at [facility]. Has been taking Seroquel and Amitriptiline for years and feels mood is stable with these.     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 She was lost to follow up. Per review of records, she had vaginal pap smear 06/2021 with HGSIL but did not follow up with GynOnc as recommended. She denies current pelvic pain or vaginal bleeding.       Subjective      Review of Systems:   Review of Systems - See HPI    I have reviewed and the following portions of the patient's history were updated as appropriate: past family history, past medical history, past social history, past surgical history and problem list.    Medications:     Current Outpatient Medications:   •  amitriptyline (ELAVIL) 50 MG tablet, Take 1 tablet by mouth Every Night., Disp: 30 tablet, Rfl: 2  •  amLODIPine (NORVASC) 5 MG tablet, Take 1 tablet by mouth Daily., Disp: 30 tablet, Rfl: 2  •  aspirin 81 MG EC tablet, Take 1 tablet by mouth Daily., Disp: 30 tablet, Rfl: 2  •  atorvastatin (LIPITOR) 40 MG tablet, Take 1 tablet by mouth Daily., Disp: 30 tablet, Rfl: 2  •  Cholecalciferol 50 MCG (2000 UT) capsule, Take 2,000 Units by mouth Daily., Disp: , Rfl:   •  Easy Touch Lancets 30G misc, , Disp: , Rfl:   •  folic acid (FOLVITE) 1 MG tablet, Take 1 mg by mouth Daily., Disp: , Rfl:   •  furosemide (Lasix) 20 MG tablet, Take 1 tablet by mouth Daily As Needed (fluid retention)., Disp: 30 tablet, Rfl: 2  •  gabapentin (NEURONTIN) 800 MG tablet, Take 800 mg by mouth Daily., Disp: , Rfl:   •  Insulin Aspart FlexPen 100 UNIT/ML solution pen-injector, Inject 5 Units under the skin into the appropriate area as directed 3 (Three) Times a Day Before Meals., Disp: 9 mL, Rfl: 5  •  insulin glargine (LANTUS, SEMGLEE) 100 UNIT/ML injection, Inject 10 Units under the skin into the appropriate area as directed Every Night., Disp: 3 mL, Rfl: 2  •  Insulin Pen Needle (Pen Needles) 31G X 5 MM misc, 1 each 4 (Four) Times a Day Before Meals & at Bedtime., Disp: 300 each, Rfl: 2  •  metoprolol tartrate (LOPRESSOR) 50 MG tablet, Take 1 tablet by mouth 2 (Two) Times a Day.,  "Disp: 60 tablet, Rfl: 2  •  OneTouch Verio test strip, , Disp: , Rfl:   •  polyethylene glycol (MIRALAX) 17 GM/SCOOP powder, TAKE 17 GRAMS BY MOUTH DAILY AS NEEDED (CONSTIPATION)., Disp: 238 g, Rfl: 1  •  potassium chloride (K-DUR,KLOR-CON) 20 MEQ CR tablet, Take 1 tablet by mouth Daily As Needed (when taking furosemide (lasix))., Disp: 30 tablet, Rfl: 2  •  QUEtiapine (SEROquel) 300 MG tablet, Take 1 tablet by mouth Every Night., Disp: 30 tablet, Rfl: 2  •  Sofosbuvir-Velpatasvir 400-100 MG tablet, , Disp: , Rfl:   •  thiamine 100 MG tablet, , Disp: , Rfl:   •  gabapentin (NEURONTIN) 600 MG tablet, Every 8 (Eight) Hours., Disp: , Rfl:     Allergies:   Allergies   Allergen Reactions   • Codeine Hives   • Morphine Hives   • Tagamet [Cimetidine] Other (See Comments)     DISCOLORATION OF SKIN   • Toradol [Ketorolac Tromethamine] Hives       Objective     Physical Exam:  Vital Signs:   Vitals:    07/29/22 1501   BP: 132/84   BP Location: Left arm   Patient Position: Sitting   Cuff Size: Adult   Pulse: 85   Resp: 18   Temp: 97.7 °F (36.5 °C)   SpO2: 100%   Weight: 51.8 kg (114 lb 4.8 oz)   Height: 160 cm (63\")     Body mass index is 20.25 kg/m².     Physical Exam  Vitals reviewed.   Constitutional:       General: She is not in acute distress.     Appearance: Normal appearance.   Cardiovascular:      Rate and Rhythm: Normal rate.      Pulses: Normal pulses.      Comments: +trace bilateral LE edema  Pulmonary:      Effort: Pulmonary effort is normal. No respiratory distress.   Abdominal:      General: There is distension.      Tenderness: There is no abdominal tenderness. There is no guarding or rebound.   Skin:     General: Skin is warm and dry.   Neurological:      Mental Status: She is alert.   Psychiatric:         Mood and Affect: Mood normal.         Behavior: Behavior normal.         Judgment: Judgment normal.         Assessment / Plan      Assessment/Plan:   Problems Addressed This Visit  Diagnoses and all orders " for this visit:    1. Type 2 diabetes mellitus with hyperglycemia, with long-term current use of insulin ([facility]) (Primary)  -     Ambulatory Referral to Endocrinology    A1c 8.5% 05/2022 while not taking medications. She reports FSBG at goal with compliance with lantus 10u qhs and aspart 5u TID with meals, will plan to repeat A1c at next visit. Counseled on importance of dietary modification. Her LDL is at goal <70 on statin. Referred to endocrinology per patient request. She is due for eye exam but we were unable to discuss this due to time constraint    2. Primary hypertension  3. Coronary artery disease involving native coronary artery of native heart without angina pectoris  4. HFrEF (heart failure with reduced ejection fraction) ([facility])  -     aspirin 81 MG EC tablet; Take 1 tablet by mouth Daily.  Dispense: 30 tablet; Refill: 2  -     atorvastatin (LIPITOR) 40 MG tablet; Take 1 tablet by mouth Daily.  Dispense: 30 tablet; Refill: 2  -     metoprolol tartrate (LOPRESSOR) 50 MG tablet; Take 1 tablet by mouth 2 (Two) Times a Day.  Dispense: 60 tablet; Refill: 2    Echo 3/2022 - EF 49%, global hypokinesis, grade 1 diastolic dysfunction  LHC 4/3/2022 - nonobstructive LAD to LAD 60% in mid region  Encouraged her to reschedule with Cardiology - refilled her ASA81, metoprolol, statin. Avoid ACEi with ascites.   BP is well controlled, goal is <130/80    5. Decompensated hepatic cirrhosis (HCC)  6. Chronic HCV - currently following with GI and on Epclusa   Following with GI. Cirrhosis is decompensated by ascites, nonbleeding EV. EGD 4/1/22. Discussed importance of avoiding NSAIDs and alcohol. Avoid Tylenol >2g. Follow low salt diet.   Has appointment for US (HCV) surveillance in august  Holding PPI while on Epclusa   EDG due 04/2023 for EV screening    7. Schizophrenia, unspecified type (HCC)  -     amitriptyline (ELAVIL) 50 MG tablet; Take 1 tablet by mouth Every Night.  Dispense: 30 tablet; Refill: 2  -     QUEtiapine  (SEROquel) 300 MG tablet; Take 1 tablet by mouth Every Night.  Dispense: 30 tablet; Refill: 2  Following with [facility] for talk therapy. She has been on current medications for years and mood has remained stable. Will continue without changes.    8. 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 Vaginal cancer (HCC)  -     Ambulatory Referral to Gynecologic Oncology     Diagnosed 2010? s/p radiation, surgical resection, and hysterectomy. Previously following with Dr. Roxanne Chaudhry, Gyn-Onc in [place] with [facility]. Notes are in chart. Reviewed records, she had vaginal pap smear with HGSIL and was lost to follow up. I will refer her to gyn-onc today to transition care in [place].       Plan of care reviewed with patient at the conclusion of today's visit. Education was provided regarding diagnosis and management.  Patient verbalizes understanding of and agreement with management plan.    Follow Up:   Return in about 3 months (around 10/29/2022) for Recheck.        DO FIOR Sharma RD  [facility]  [address]  [facility] [address]  Fax [phone]  Phone [phone]

## 2022-08-09 DIAGNOSIS — C52 VAGINAL CANCER: ICD-10-CM

## 2022-08-09 DIAGNOSIS — R87.623 HIGH GRADE SQUAMOUS INTRAEPITHELIAL LESION (HGSIL) ON CYTOLOGIC SMEAR OF VAGINA: Primary | ICD-10-CM

## 2022-08-18 ENCOUNTER — TELEPHONE (OUTPATIENT)
Dept: FAMILY MEDICINE CLINIC | Facility: CLINIC | Age: 57
End: 2022-08-18

## 2022-08-18 DIAGNOSIS — I50.20 HFREF (HEART FAILURE WITH REDUCED EJECTION FRACTION): ICD-10-CM

## 2022-08-18 RX ORDER — FUROSEMIDE 20 MG/1
20 TABLET ORAL DAILY PRN
Qty: 30 TABLET | Refills: 2 | Status: SHIPPED | OUTPATIENT
Start: 2022-08-18 | End: 2022-09-09 | Stop reason: SDUPTHER

## 2022-08-18 NOTE — TELEPHONE ENCOUNTER
Incoming Refill Request      Medication requested (name and dose): LASIX    Pharmacy where request should be sent: [facility]    Additional details provided by patient: PATIENT SAYS SHE HAS NO MORE OF THE MEDICATION. SHE IS REQUESTING A CALL FROM AN MA OR BELINDA REGARDING HER MEDICATION    Best call back number: [phone]    Does the patient have less than a 3 day supply:  [x] Yes  [] No    Gia LOYA Rep  08/18/22, 09:00 EDT

## 2022-08-18 NOTE — TELEPHONE ENCOUNTER
Rx Refill Note  Requested Prescriptions     Signed Prescriptions Disp Refills   • furosemide (Lasix) 20 MG tablet 30 tablet 2     Sig: Take 1 tablet by mouth Daily As Needed (fluid retention).     Authorizing Provider: HUBER TREVINO     Ordering User: AMPARO GALICIA      Last office visit with prescribing clinician: 7/29/2022      Next office visit with prescribing clinician: 10/31/2022            Amparo Galicia MA  08/18/22, 11:54 EDT

## 2022-08-22 DIAGNOSIS — E11.42 TYPE 2 DIABETES MELLITUS WITH DIABETIC POLYNEUROPATHY, WITH LONG-TERM CURRENT USE OF INSULIN: ICD-10-CM

## 2022-08-22 DIAGNOSIS — Z79.4 TYPE 2 DIABETES MELLITUS WITH DIABETIC POLYNEUROPATHY, WITH LONG-TERM CURRENT USE OF INSULIN: ICD-10-CM

## 2022-08-23 RX ORDER — INSULIN GLARGINE 100 [IU]/ML
INJECTION, SOLUTION SUBCUTANEOUS
Qty: 3 ML | Refills: 1 | OUTPATIENT
Start: 2022-08-23

## 2022-09-09 ENCOUNTER — OFFICE VISIT (OUTPATIENT)
Dept: FAMILY MEDICINE CLINIC | Facility: CLINIC | Age: 57
End: 2022-09-09

## 2022-09-09 VITALS
HEART RATE: 88 BPM | SYSTOLIC BLOOD PRESSURE: 152 MMHG | HEIGHT: 63 IN | WEIGHT: 126 LBS | DIASTOLIC BLOOD PRESSURE: 80 MMHG | BODY MASS INDEX: 22.32 KG/M2 | OXYGEN SATURATION: 98 %

## 2022-09-09 DIAGNOSIS — Z79.4 TYPE 2 DIABETES MELLITUS WITH DIABETIC POLYNEUROPATHY, WITH LONG-TERM CURRENT USE OF INSULIN: ICD-10-CM

## 2022-09-09 DIAGNOSIS — R06.2 WHEEZING: ICD-10-CM

## 2022-09-09 DIAGNOSIS — E11.42 TYPE 2 DIABETES MELLITUS WITH DIABETIC POLYNEUROPATHY, WITH LONG-TERM CURRENT USE OF INSULIN: ICD-10-CM

## 2022-09-09 DIAGNOSIS — I25.10 CORONARY ARTERY DISEASE INVOLVING NATIVE CORONARY ARTERY OF NATIVE HEART WITHOUT ANGINA PECTORIS: ICD-10-CM

## 2022-09-09 DIAGNOSIS — F20.9 SCHIZOPHRENIA, UNSPECIFIED TYPE: ICD-10-CM

## 2022-09-09 DIAGNOSIS — I10 PRIMARY HYPERTENSION: ICD-10-CM

## 2022-09-09 DIAGNOSIS — I50.20 HFREF (HEART FAILURE WITH REDUCED EJECTION FRACTION): ICD-10-CM

## 2022-09-09 DIAGNOSIS — K72.90 DECOMPENSATED HEPATIC CIRRHOSIS: Primary | ICD-10-CM

## 2022-09-09 DIAGNOSIS — Z72.0 TOBACCO USE: ICD-10-CM

## 2022-09-09 DIAGNOSIS — R87.623 HIGH GRADE SQUAMOUS INTRAEPITHELIAL LESION (HGSIL) ON CYTOLOGIC SMEAR OF VAGINA: ICD-10-CM

## 2022-09-09 DIAGNOSIS — K74.60 DECOMPENSATED HEPATIC CIRRHOSIS: Primary | ICD-10-CM

## 2022-09-09 DIAGNOSIS — R31.0 GROSS HEMATURIA: ICD-10-CM

## 2022-09-09 LAB
EXPIRATION DATE: NORMAL
HBA1C MFR BLD: 6.6 %
Lab: NORMAL

## 2022-09-09 PROCEDURE — 99215 OFFICE O/P EST HI 40 MIN: CPT | Performed by: STUDENT IN AN ORGANIZED HEALTH CARE EDUCATION/TRAINING PROGRAM

## 2022-09-09 PROCEDURE — 81001 URINALYSIS AUTO W/SCOPE: CPT | Performed by: STUDENT IN AN ORGANIZED HEALTH CARE EDUCATION/TRAINING PROGRAM

## 2022-09-09 PROCEDURE — 3044F HG A1C LEVEL LT 7.0%: CPT | Performed by: STUDENT IN AN ORGANIZED HEALTH CARE EDUCATION/TRAINING PROGRAM

## 2022-09-09 PROCEDURE — 87086 URINE CULTURE/COLONY COUNT: CPT | Performed by: STUDENT IN AN ORGANIZED HEALTH CARE EDUCATION/TRAINING PROGRAM

## 2022-09-09 PROCEDURE — 83036 HEMOGLOBIN GLYCOSYLATED A1C: CPT | Performed by: STUDENT IN AN ORGANIZED HEALTH CARE EDUCATION/TRAINING PROGRAM

## 2022-09-09 RX ORDER — HYDROCHLOROTHIAZIDE 12.5 MG/1
12.5 TABLET ORAL DAILY
Qty: 30 TABLET | Refills: 2 | Status: SHIPPED | OUTPATIENT
Start: 2022-09-09 | End: 2022-09-09

## 2022-09-09 RX ORDER — FUROSEMIDE 40 MG/1
40 TABLET ORAL DAILY
Qty: 30 TABLET | Refills: 5 | Status: SHIPPED | OUTPATIENT
Start: 2022-09-09 | End: 2022-12-16 | Stop reason: SDUPTHER

## 2022-09-09 RX ORDER — SPIRONOLACTONE 50 MG/1
50 TABLET, FILM COATED ORAL DAILY
Qty: 30 TABLET | Refills: 2 | Status: SHIPPED | OUTPATIENT
Start: 2022-09-09 | End: 2022-12-16 | Stop reason: SDUPTHER

## 2022-09-09 RX ORDER — LACTULOSE 10 G/15ML
10 SOLUTION ORAL 3 TIMES DAILY PRN
Qty: 473 ML | Refills: 1 | Status: SHIPPED | OUTPATIENT
Start: 2022-09-09

## 2022-09-09 RX ORDER — ALBUTEROL SULFATE 90 UG/1
2 AEROSOL, METERED RESPIRATORY (INHALATION) EVERY 4 HOURS PRN
Qty: 8 G | Refills: 1 | Status: SHIPPED | OUTPATIENT
Start: 2022-09-09 | End: 2022-12-16 | Stop reason: SDUPTHER

## 2022-09-09 RX ORDER — INSULIN GLARGINE 100 [IU]/ML
10 INJECTION, SOLUTION SUBCUTANEOUS NIGHTLY
Qty: 3 ML | Refills: 2 | Status: SHIPPED | OUTPATIENT
Start: 2022-09-09 | End: 2022-12-16 | Stop reason: SDUPTHER

## 2022-09-09 NOTE — PROGRESS NOTES
Established Office Visit      Patient Name: Kaylie Cruz  : 1965   MRN: [id number]   Care Team: Patient Care Team:  Iesha Harris DO as PCP - General (Internal Medicine)  Provider, No Known  Provider, No Known  Provider, No Known    Chief Complaint:    Chief Complaint   Patient presents with   • Fall     Pt had fallen, and having hip pain. Needs PT referral.    • Edema     Discuss dose increase on water pills.    • Wheezing     Discuss inhaler    • Cystitis     Has blood clots in bladder and would like to discuss referral        History of Present Illness: Kaylie Cruz is a 57 y.o. female  T2DM, vaginal squamous cell carcinoma in  s/p WPRT, vaginal brachytherapy hysterectomy, HFrEF (EF 49%), HCV cirrhosis, history of substance use now in remission, schizophrenia, CAD and HTN who is here today to follow up with chronic medical problems. She has several complaints today.    She was recently seen at  ER for hematuria and dysuria. She was treated with antibiotics and completed course. Dysuria resolved but hematuria persists. She continues to see bright red urine intermittently. She has never experienced this in the past and is worried.       She is retaining more fluid in her abdomen. She is compliant with her lasix 20mg daily. Wants to increase dose. She is compliant with her HCV treatment. She has follow up with GI this month but was unaware of appointment.    She denies cardiac symptoms. Compliant with her BB, ASA, statin. Her BP remained uncontrolled.     Schizophrenia, depression, anxiety - follows with therapist at [facility]. Taking Seroquel and Amitriptiline. Mood is stable     T2DM - reports home glucose readings between 100-135. She is now compliant with lantus 10u qhs and aspart 5u TID with meals. She requests new lantus.     She still has not followed up with GYN regarding HGSIL    Tobacco use - smoking 1/2 ppd.  She complains of hearing herself wheezing at night time. She does  not cough. She does not feel shortness of breath currently. She requests an albuterol inhaler as this has helped in the past. Has never had PFTs or been worked up for underlying lung disease.     Subjective      Review of Systems:   Review of Systems - See HPI    I have reviewed and the following portions of the patient's history were updated as appropriate: past family history, past medical history, past social history, past surgical history and problem list.    Medications:     Current Outpatient Medications:   •  furosemide (LASIX) 40 MG tablet, Take 1 tablet by mouth Daily., Disp: 30 tablet, Rfl: 5  •  insulin glargine (LANTUS, SEMGLEE) 100 UNIT/ML injection, Inject 10 Units under the skin into the appropriate area as directed Every Night., Disp: 3 mL, Rfl: 2  •  albuterol sulfate  (90 Base) MCG/ACT inhaler, Inhale 2 puffs Every 4 (Four) Hours As Needed for Wheezing., Disp: 8 g, Rfl: 1  •  amitriptyline (ELAVIL) 50 MG tablet, Take 1 tablet by mouth Every Night., Disp: 30 tablet, Rfl: 2  •  amLODIPine (NORVASC) 5 MG tablet, Take 1 tablet by mouth Daily., Disp: 30 tablet, Rfl: 2  •  aspirin 81 MG EC tablet, Take 1 tablet by mouth Daily., Disp: 30 tablet, Rfl: 2  •  atorvastatin (LIPITOR) 40 MG tablet, Take 1 tablet by mouth Daily., Disp: 30 tablet, Rfl: 2  •  Cholecalciferol 50 MCG (2000 UT) capsule, Take 2,000 Units by mouth Daily., Disp: , Rfl:   •  Easy Touch Lancets 30G misc, , Disp: , Rfl:   •  folic acid (FOLVITE) 1 MG tablet, Take 1 mg by mouth Daily., Disp: , Rfl:   •  gabapentin (NEURONTIN) 600 MG tablet, Every 8 (Eight) Hours., Disp: , Rfl:   •  gabapentin (NEURONTIN) 800 MG tablet, Take 800 mg by mouth Daily., Disp: , Rfl:   •  Insulin Aspart FlexPen 100 UNIT/ML solution pen-injector, Inject 5 Units under the skin into the appropriate area as directed 3 (Three) Times a Day Before Meals., Disp: 9 mL, Rfl: 5  •  Insulin Pen Needle (Pen Needles) 31G X 5 MM misc, 1 each 4 (Four) Times a Day Before  "Meals & at Bedtime., Disp: 300 each, Rfl: 2  •  lactulose (CHRONULAC) 10 GM/15ML solution, Take 15 mL by mouth 3 (Three) Times a Day As Needed (constipation)., Disp: 473 mL, Rfl: 1  •  metoprolol tartrate (LOPRESSOR) 50 MG tablet, Take 1 tablet by mouth 2 (Two) Times a Day., Disp: 60 tablet, Rfl: 2  •  OneTouch Verio test strip, , Disp: , Rfl:   •  polyethylene glycol (MIRALAX) 17 GM/SCOOP powder, TAKE 17 GRAMS BY MOUTH DAILY AS NEEDED (CONSTIPATION)., Disp: 238 g, Rfl: 1  •  potassium chloride (K-DUR,KLOR-CON) 20 MEQ CR tablet, Take 1 tablet by mouth Daily As Needed (when taking furosemide (lasix))., Disp: 30 tablet, Rfl: 2  •  QUEtiapine (SEROquel) 300 MG tablet, Take 1 tablet by mouth Every Night., Disp: 30 tablet, Rfl: 2  •  Sofosbuvir-Velpatasvir 400-100 MG tablet, , Disp: , Rfl:   •  spironolactone (Aldactone) 50 MG tablet, Take 1 tablet by mouth Daily., Disp: 30 tablet, Rfl: 2  •  thiamine 100 MG tablet, , Disp: , Rfl:     Allergies:   Allergies   Allergen Reactions   • Codeine Hives   • Morphine Hives   • Tagamet [Cimetidine] Other (See Comments)     DISCOLORATION OF SKIN   • Toradol [Ketorolac Tromethamine] Hives       Objective     Physical Exam:  Vital Signs:   Vitals:    09/09/22 1346   BP: 152/80   Pulse: 88   SpO2: 98%   Weight: 57.2 kg (126 lb)   Height: 160 cm (63\")     Body mass index is 22.32 kg/m².     Physical Exam  Vitals reviewed.   Constitutional:       Appearance: Normal appearance.   Cardiovascular:      Rate and Rhythm: Normal rate.      Comments: +1 pitting edema in bilateral LE   Pulmonary:      Effort: Pulmonary effort is normal. No respiratory distress.   Abdominal:      General: There is distension.      Palpations: Abdomen is soft.      Tenderness: There is no abdominal tenderness. There is no guarding or rebound.   Skin:     General: Skin is warm and dry.   Neurological:      Mental Status: She is alert.   Psychiatric:         Mood and Affect: Mood normal.         Behavior: Behavior " normal.         Judgment: Judgment normal.         Assessment / Plan      Assessment/Plan:   Problems Addressed This Visit  Diagnoses and all orders for this visit:    1. Decompensated hepatic cirrhosis (HCC) (Primary)  -     spironolactone (Aldactone) 50 MG tablet; Take 1 tablet by mouth Daily.  Dispense: 30 tablet; Refill: 2  -     lactulose (CHRONULAC) 10 GM/15ML solution; Take 15 mL by mouth 3 (Three) Times a Day As Needed (constipation).  Dispense: 473 mL; Refill: 1    Following with GI - next appointment is 9/20/22. Reminded her of this appointment.   Cirrhosis is decompensated by ascites, nonbleeding EV.  Holding PPI while on Epclusa   Added Lactulose 15mL TID as needed for constipation - counseled on importance of this to avoid HE  Continue Lasix, increase to 40mg daily to help with ascites. Will add Spironolactone 50mg daily today.  EDG due 04/2023 for EV screening  Discussed importance of avoiding NSAIDs and alcohol. Avoid Tylenol >2g. Follow low salt diet.   Missed US appointment for HCC surveillance in 8/2022    2. HFrEF (heart failure with reduced ejection fraction) (HCC)  3. Coronary artery disease involving native coronary artery of native heart without angina pectoris  4. Primary hypertension  -     spironolactone (Aldactone) 50 MG tablet; Take 1 tablet by mouth Daily.  Dispense: 30 tablet; Refill: 2  -     furosemide (LASIX) 40 MG tablet; Take 1 tablet by mouth Daily.  Dispense: 30 tablet; Refill: 5    Echo 3/2022 - EF 49%, global hypokinesis, grade 1 diastolic dysfunction  LHC 4/3/2022 - nonobstructive LAD to LAD 60% in mid region  Again, I have strongly encouraged her to reschedule with Cardiology. I have provided her with the office number to reschedule appointment with Dr. Daniel.  Continue ASA81, metoprolol, statin. Avoid ACEi with ascites.   Add spironolactone to help with ascites and HTN    5. Type 2 diabetes mellitus with diabetic polyneuropathy, with long-term current use of insulin (HCC)  -      POC Glycosylated Hemoglobin (Hb A1C)  -     Ambulatory Referral for Diabetic Eye Exam-Optometry  -     insulin glargine (LANTUS, SEMGLEE) 100 UNIT/ML injection; Inject 10 Units under the skin into the appropriate area as directed Every Night.  Dispense: 3 mL; Refill: 2    A1c has improved from 8.5% to 6.6% with medication compliance over the past 3 months  Continue Lantus 10u qhs and Aspart 5u TID  Referred for diabetic eye exam  LDL is at goal <70 on atorvastatin 40mg daily  Avoid ACEi with ascites.   Has appointment to establish with Endocrinology next month    6. Gross hematuria  -     Ambulatory Referral to Urology  -     Urinalysis With Microscopic - Urine, Clean Catch; Future  -     Urine Culture - Urine, Urine, Clean Catch; Future    CT A/P 08/2022 - new 2.6 x 4.8 cm heterogenous higher density focus located within the dependent left portion of the urinary bladder, thought to be consistent with blood clot  Referred to Urology   Will repeat UA today to confirm resolution of infection  Encouraged smoking cessation    7. Wheezing  8. Tobacco use  -     albuterol sulfate  (90 Base) MCG/ACT inhaler; Inhale 2 puffs Every 4 (Four) Hours As Needed for Wheezing.  Dispense: 8 g; Refill: 1    Encouraged smoking cessation. Due to time limitations, we did not have time to discuss this in detail today. She would benefit from workup for underlying obstructive/restrictive disease but she requests holding off on PFT referral/imaging while undergoing workup for above problems.     9. 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 Vaginal cancer (HCC)  -     Ambulatory Referral to Gynecologic Oncology     Diagnosed 2010? s/p radiation, surgical resection, and hysterectomy. Previously following with Dr. Roxanne Chaudhry, Gyn-Onc in [place] with [facility]. Notes are in chart. Reviewed records, she had vaginal pap smear with HGSIL and was lost to follow up. She has been  referred to GYN and gyn-onc. I have encouraged her again to follow up with these appointments.    11. Schizophrenia, unspecified type (HCC)  Following with [facility] for talk therapy. She has been on current medications for years and mood has remained stable.         Plan of care reviewed with patient at the conclusion of today's visit. Education was provided regarding diagnosis and management.  Patient verbalizes understanding of and agreement with management plan.    Follow Up:   Return in about 6 weeks (around 10/21/2022) for Recheck - needs 30 min appointment for all future appointments .    I spent 58 minutes caring for Kaylie on this date of service. This time includes time spent by me in the following activities:preparing for the visit, reviewing tests, obtaining and/or reviewing a separately obtained history, performing a medically appropriate examination and/or evaluation , counseling and educating the patient/family/caregiver, ordering medications, tests, or procedures, referring and communicating with other health care professionals  and documenting information in the medical record    DO FIOR Sharma RD  [facility]  [address]  [facility] [address]  Fax [phone]  Phone [phone]

## 2022-09-10 LAB
BACTERIA SPEC AEROBE CULT: NO GROWTH
BACTERIA UR QL AUTO: ABNORMAL /HPF
BILIRUB UR QL STRIP: NEGATIVE
CLARITY UR: CLEAR
COLOR UR: YELLOW
GLUCOSE UR STRIP-MCNC: NEGATIVE MG/DL
HGB UR QL STRIP.AUTO: ABNORMAL
HYALINE CASTS UR QL AUTO: ABNORMAL /LPF
KETONES UR QL STRIP: NEGATIVE
LEUKOCYTE ESTERASE UR QL STRIP.AUTO: ABNORMAL
NITRITE UR QL STRIP: NEGATIVE
PH UR STRIP.AUTO: 7 [PH] (ref 5–8)
PROT UR QL STRIP: ABNORMAL
RBC # UR STRIP: ABNORMAL /HPF
REF LAB TEST METHOD: ABNORMAL
SP GR UR STRIP: 1.01 (ref 1–1.03)
SQUAMOUS #/AREA URNS HPF: ABNORMAL /HPF
UROBILINOGEN UR QL STRIP: ABNORMAL
WBC # UR STRIP: ABNORMAL /HPF

## 2022-09-10 RX ORDER — POTASSIUM CHLORIDE 20 MEQ/1
20 TABLET, EXTENDED RELEASE ORAL DAILY PRN
Qty: 30 TABLET | Refills: 1 | Status: SHIPPED | OUTPATIENT
Start: 2022-09-10 | End: 2022-12-16

## 2022-09-27 ENCOUNTER — APPOINTMENT (OUTPATIENT)
Dept: CT IMAGING | Facility: HOSPITAL | Age: 57
End: 2022-09-27

## 2022-09-27 ENCOUNTER — HOSPITAL ENCOUNTER (INPATIENT)
Facility: HOSPITAL | Age: 57
LOS: 15 days | Discharge: LEFT BEFORE DISCHARGE | End: 2022-10-12
Attending: EMERGENCY MEDICINE | Admitting: PEDIATRICS

## 2022-09-27 ENCOUNTER — APPOINTMENT (OUTPATIENT)
Dept: GENERAL RADIOLOGY | Facility: HOSPITAL | Age: 57
End: 2022-09-27

## 2022-09-27 DIAGNOSIS — A41.9 SEPSIS, DUE TO UNSPECIFIED ORGANISM, UNSPECIFIED WHETHER ACUTE ORGAN DYSFUNCTION PRESENT: ICD-10-CM

## 2022-09-27 DIAGNOSIS — R13.10 DYSPHAGIA, UNSPECIFIED TYPE: ICD-10-CM

## 2022-09-27 DIAGNOSIS — R10.9 RIGHT SIDED ABDOMINAL PAIN: ICD-10-CM

## 2022-09-27 DIAGNOSIS — J18.9 PNEUMONIA OF RIGHT MIDDLE LOBE DUE TO INFECTIOUS ORGANISM: Primary | ICD-10-CM

## 2022-09-27 DIAGNOSIS — K74.60 CIRRHOSIS OF LIVER WITH ASCITES, UNSPECIFIED HEPATIC CIRRHOSIS TYPE: ICD-10-CM

## 2022-09-27 DIAGNOSIS — R18.8 CIRRHOSIS OF LIVER WITH ASCITES, UNSPECIFIED HEPATIC CIRRHOSIS TYPE: ICD-10-CM

## 2022-09-27 DIAGNOSIS — I50.9 ACUTE ON CHRONIC CONGESTIVE HEART FAILURE, UNSPECIFIED HEART FAILURE TYPE: ICD-10-CM

## 2022-09-27 DIAGNOSIS — N17.9 AKI (ACUTE KIDNEY INJURY): ICD-10-CM

## 2022-09-27 LAB
ALBUMIN SERPL-MCNC: 2.3 G/DL (ref 3.5–5.2)
ALBUMIN/GLOB SERPL: 0.5 G/DL
ALP SERPL-CCNC: 118 U/L (ref 39–117)
ALT SERPL W P-5'-P-CCNC: 24 U/L (ref 1–33)
AMMONIA BLD-SCNC: 52 UMOL/L (ref 11–51)
ANION GAP SERPL CALCULATED.3IONS-SCNC: 13 MMOL/L (ref 5–15)
ANISOCYTOSIS BLD QL: ABNORMAL
APTT PPP: 30.3 SECONDS (ref 60–90)
AST SERPL-CCNC: 74 U/L (ref 1–32)
BASOPHILS # BLD MANUAL: 0 10*3/MM3 (ref 0–0.2)
BASOPHILS NFR BLD MANUAL: 0 % (ref 0–1.5)
BILIRUB SERPL-MCNC: 1.7 MG/DL (ref 0–1.2)
BUN SERPL-MCNC: 40 MG/DL (ref 6–20)
BUN/CREAT SERPL: 30.3 (ref 7–25)
BURR CELLS BLD QL SMEAR: ABNORMAL
CALCIUM SPEC-SCNC: 8.5 MG/DL (ref 8.6–10.5)
CHLORIDE SERPL-SCNC: 101 MMOL/L (ref 98–107)
CO2 SERPL-SCNC: 18 MMOL/L (ref 22–29)
CREAT SERPL-MCNC: 1.32 MG/DL (ref 0.57–1)
D-LACTATE SERPL-SCNC: 1.8 MMOL/L (ref 0.5–2)
D-LACTATE SERPL-SCNC: 2.5 MMOL/L (ref 0.5–2)
DEPRECATED RDW RBC AUTO: 47.4 FL (ref 37–54)
EGFRCR SERPLBLD CKD-EPI 2021: 46.6 ML/MIN/1.73
EOSINOPHIL # BLD MANUAL: 0 10*3/MM3 (ref 0–0.4)
EOSINOPHIL NFR BLD MANUAL: 0 % (ref 0.3–6.2)
ERYTHROCYTE [DISTWIDTH] IN BLOOD BY AUTOMATED COUNT: 16.2 % (ref 12.3–15.4)
GLOBULIN UR ELPH-MCNC: 5 GM/DL
GLUCOSE BLDC GLUCOMTR-MCNC: 215 MG/DL (ref 70–130)
GLUCOSE BLDC GLUCOMTR-MCNC: 308 MG/DL (ref 70–130)
GLUCOSE SERPL-MCNC: 156 MG/DL (ref 65–99)
HCT VFR BLD AUTO: 28.1 % (ref 34–46.6)
HGB BLD-MCNC: 9.3 G/DL (ref 12–15.9)
HOLD SPECIMEN: NORMAL
INR PPP: 1.59 (ref 0.84–1.13)
LIPASE SERPL-CCNC: 11 U/L (ref 13–60)
LYMPHOCYTES # BLD MANUAL: 0.77 10*3/MM3 (ref 0.7–3.1)
LYMPHOCYTES NFR BLD MANUAL: 3 % (ref 5–12)
MCH RBC QN AUTO: 26.8 PG (ref 26.6–33)
MCHC RBC AUTO-ENTMCNC: 33.1 G/DL (ref 31.5–35.7)
MCV RBC AUTO: 81 FL (ref 79–97)
MONOCYTES # BLD: 0.46 10*3/MM3 (ref 0.1–0.9)
NEUTROPHILS # BLD AUTO: 14.15 10*3/MM3 (ref 1.7–7)
NEUTROPHILS NFR BLD MANUAL: 73 % (ref 42.7–76)
NEUTS BAND NFR BLD MANUAL: 19 % (ref 0–5)
NEUTS VAC BLD QL SMEAR: ABNORMAL
NRBC SPEC MANUAL: 0 /100 WBC (ref 0–0.2)
NT-PROBNP SERPL-MCNC: 6712 PG/ML (ref 0–900)
PLAT MORPH BLD: NORMAL
PLATELET # BLD AUTO: 163 10*3/MM3 (ref 140–450)
PMV BLD AUTO: 10.5 FL (ref 6–12)
POTASSIUM SERPL-SCNC: 4.1 MMOL/L (ref 3.5–5.2)
PROCALCITONIN SERPL-MCNC: 5.02 NG/ML (ref 0–0.25)
PROT SERPL-MCNC: 7.3 G/DL (ref 6–8.5)
PROTHROMBIN TIME: 19 SECONDS (ref 11.4–14.4)
QT INTERVAL: 408 MS
QTC INTERVAL: 493 MS
RBC # BLD AUTO: 3.47 10*6/MM3 (ref 3.77–5.28)
SMUDGE CELLS BLD QL SMEAR: ABNORMAL
SODIUM SERPL-SCNC: 132 MMOL/L (ref 136–145)
TROPONIN T SERPL-MCNC: 0.02 NG/ML (ref 0–0.03)
VARIANT LYMPHS NFR BLD MANUAL: 5 % (ref 19.6–45.3)
WBC NRBC COR # BLD: 15.38 10*3/MM3 (ref 3.4–10.8)
WHOLE BLOOD HOLD COAG: NORMAL
WHOLE BLOOD HOLD SPECIMEN: NORMAL

## 2022-09-27 PROCEDURE — 99285 EMERGENCY DEPT VISIT HI MDM: CPT

## 2022-09-27 PROCEDURE — 99221 1ST HOSP IP/OBS SF/LOW 40: CPT | Performed by: FAMILY MEDICINE

## 2022-09-27 PROCEDURE — 83880 ASSAY OF NATRIURETIC PEPTIDE: CPT | Performed by: PHYSICIAN ASSISTANT

## 2022-09-27 PROCEDURE — 0 PHYTONADIONE 10 MG/ML SOLUTION 1 ML AMPULE: Performed by: FAMILY MEDICINE

## 2022-09-27 PROCEDURE — 93005 ELECTROCARDIOGRAM TRACING: CPT | Performed by: PHYSICIAN ASSISTANT

## 2022-09-27 PROCEDURE — 63710000001 INSULIN DETEMIR PER 5 UNITS: Performed by: FAMILY MEDICINE

## 2022-09-27 PROCEDURE — 25010000002 ONDANSETRON PER 1 MG: Performed by: EMERGENCY MEDICINE

## 2022-09-27 PROCEDURE — 82140 ASSAY OF AMMONIA: CPT | Performed by: PHYSICIAN ASSISTANT

## 2022-09-27 PROCEDURE — 85025 COMPLETE CBC W/AUTO DIFF WBC: CPT | Performed by: PHYSICIAN ASSISTANT

## 2022-09-27 PROCEDURE — 85007 BL SMEAR W/DIFF WBC COUNT: CPT | Performed by: PHYSICIAN ASSISTANT

## 2022-09-27 PROCEDURE — 83605 ASSAY OF LACTIC ACID: CPT | Performed by: PHYSICIAN ASSISTANT

## 2022-09-27 PROCEDURE — 83690 ASSAY OF LIPASE: CPT | Performed by: PHYSICIAN ASSISTANT

## 2022-09-27 PROCEDURE — 25010000002 PIPERACILLIN SOD-TAZOBACTAM PER 1 G: Performed by: FAMILY MEDICINE

## 2022-09-27 PROCEDURE — 25010000002 CEFTRIAXONE PER 250 MG: Performed by: PHYSICIAN ASSISTANT

## 2022-09-27 PROCEDURE — 25010000002 AZITHROMYCIN PER 500 MG: Performed by: PHYSICIAN ASSISTANT

## 2022-09-27 PROCEDURE — 82962 GLUCOSE BLOOD TEST: CPT

## 2022-09-27 PROCEDURE — 74176 CT ABD & PELVIS W/O CONTRAST: CPT

## 2022-09-27 PROCEDURE — 63710000001 INSULIN LISPRO (HUMAN) PER 5 UNITS: Performed by: FAMILY MEDICINE

## 2022-09-27 PROCEDURE — 71045 X-RAY EXAM CHEST 1 VIEW: CPT

## 2022-09-27 PROCEDURE — 87040 BLOOD CULTURE FOR BACTERIA: CPT | Performed by: PHYSICIAN ASSISTANT

## 2022-09-27 PROCEDURE — 84484 ASSAY OF TROPONIN QUANT: CPT | Performed by: PHYSICIAN ASSISTANT

## 2022-09-27 PROCEDURE — 80053 COMPREHEN METABOLIC PANEL: CPT | Performed by: PHYSICIAN ASSISTANT

## 2022-09-27 PROCEDURE — 84145 PROCALCITONIN (PCT): CPT | Performed by: PHYSICIAN ASSISTANT

## 2022-09-27 PROCEDURE — 85610 PROTHROMBIN TIME: CPT | Performed by: PHYSICIAN ASSISTANT

## 2022-09-27 PROCEDURE — 85730 THROMBOPLASTIN TIME PARTIAL: CPT | Performed by: PHYSICIAN ASSISTANT

## 2022-09-27 PROCEDURE — 36415 COLL VENOUS BLD VENIPUNCTURE: CPT

## 2022-09-27 RX ORDER — OXYCODONE HYDROCHLORIDE 5 MG/1
5 TABLET ORAL EVERY 4 HOURS PRN
Status: DISCONTINUED | OUTPATIENT
Start: 2022-09-27 | End: 2022-10-02

## 2022-09-27 RX ORDER — ACETAMINOPHEN 325 MG/1
650 TABLET ORAL EVERY 4 HOURS PRN
Status: DISCONTINUED | OUTPATIENT
Start: 2022-09-27 | End: 2022-10-12 | Stop reason: HOSPADM

## 2022-09-27 RX ORDER — ONDANSETRON 2 MG/ML
4 INJECTION INTRAMUSCULAR; INTRAVENOUS EVERY 6 HOURS PRN
Status: DISCONTINUED | OUTPATIENT
Start: 2022-09-27 | End: 2022-10-12 | Stop reason: HOSPADM

## 2022-09-27 RX ORDER — SODIUM CHLORIDE 0.9 % (FLUSH) 0.9 %
10 SYRINGE (ML) INJECTION AS NEEDED
Status: DISCONTINUED | OUTPATIENT
Start: 2022-09-27 | End: 2022-10-12 | Stop reason: HOSPADM

## 2022-09-27 RX ORDER — INSULIN LISPRO 100 [IU]/ML
0-9 INJECTION, SOLUTION INTRAVENOUS; SUBCUTANEOUS
Status: DISCONTINUED | OUTPATIENT
Start: 2022-09-27 | End: 2022-10-12 | Stop reason: HOSPADM

## 2022-09-27 RX ORDER — ALBUTEROL SULFATE 2.5 MG/3ML
2.5 SOLUTION RESPIRATORY (INHALATION) EVERY 6 HOURS PRN
Refills: 1 | Status: DISCONTINUED | OUTPATIENT
Start: 2022-09-27 | End: 2022-10-12 | Stop reason: HOSPADM

## 2022-09-27 RX ORDER — AMLODIPINE BESYLATE 5 MG/1
5 TABLET ORAL DAILY
Status: DISCONTINUED | OUTPATIENT
Start: 2022-09-27 | End: 2022-10-12 | Stop reason: HOSPADM

## 2022-09-27 RX ORDER — SODIUM CHLORIDE 9 MG/ML
100 INJECTION, SOLUTION INTRAVENOUS CONTINUOUS
Status: ACTIVE | OUTPATIENT
Start: 2022-09-27 | End: 2022-09-28

## 2022-09-27 RX ORDER — ACETAMINOPHEN 160 MG/5ML
650 SOLUTION ORAL EVERY 4 HOURS PRN
Status: DISCONTINUED | OUTPATIENT
Start: 2022-09-27 | End: 2022-10-12 | Stop reason: HOSPADM

## 2022-09-27 RX ORDER — AMITRIPTYLINE HYDROCHLORIDE 50 MG/1
50 TABLET, FILM COATED ORAL NIGHTLY
Status: DISCONTINUED | OUTPATIENT
Start: 2022-09-27 | End: 2022-10-03

## 2022-09-27 RX ORDER — METOPROLOL TARTRATE 50 MG/1
50 TABLET, FILM COATED ORAL 2 TIMES DAILY
Status: DISCONTINUED | OUTPATIENT
Start: 2022-09-27 | End: 2022-10-12 | Stop reason: HOSPADM

## 2022-09-27 RX ORDER — SODIUM CHLORIDE 0.9 % (FLUSH) 0.9 %
10 SYRINGE (ML) INJECTION EVERY 12 HOURS SCHEDULED
Status: DISCONTINUED | OUTPATIENT
Start: 2022-09-27 | End: 2022-10-12 | Stop reason: HOSPADM

## 2022-09-27 RX ORDER — NICOTINE POLACRILEX 4 MG
15 LOZENGE BUCCAL
Status: DISCONTINUED | OUTPATIENT
Start: 2022-09-27 | End: 2022-10-12 | Stop reason: HOSPADM

## 2022-09-27 RX ORDER — DEXTROSE MONOHYDRATE 25 G/50ML
25 INJECTION, SOLUTION INTRAVENOUS
Status: DISCONTINUED | OUTPATIENT
Start: 2022-09-27 | End: 2022-10-12 | Stop reason: HOSPADM

## 2022-09-27 RX ORDER — ONDANSETRON 2 MG/ML
4 INJECTION INTRAMUSCULAR; INTRAVENOUS ONCE
Status: COMPLETED | OUTPATIENT
Start: 2022-09-27 | End: 2022-09-27

## 2022-09-27 RX ORDER — OXYCODONE AND ACETAMINOPHEN 7.5; 325 MG/1; MG/1
1 TABLET ORAL ONCE
Status: COMPLETED | OUTPATIENT
Start: 2022-09-27 | End: 2022-09-27

## 2022-09-27 RX ORDER — FOLIC ACID 1 MG/1
1 TABLET ORAL DAILY
Status: DISCONTINUED | OUTPATIENT
Start: 2022-09-27 | End: 2022-10-12 | Stop reason: HOSPADM

## 2022-09-27 RX ORDER — ASPIRIN 81 MG/1
81 TABLET ORAL DAILY
Status: DISCONTINUED | OUTPATIENT
Start: 2022-09-27 | End: 2022-10-12 | Stop reason: HOSPADM

## 2022-09-27 RX ORDER — ONDANSETRON 4 MG/1
4 TABLET, FILM COATED ORAL EVERY 6 HOURS PRN
Status: DISCONTINUED | OUTPATIENT
Start: 2022-09-27 | End: 2022-10-12 | Stop reason: HOSPADM

## 2022-09-27 RX ORDER — ACETAMINOPHEN 650 MG/1
650 SUPPOSITORY RECTAL EVERY 4 HOURS PRN
Status: DISCONTINUED | OUTPATIENT
Start: 2022-09-27 | End: 2022-10-12 | Stop reason: HOSPADM

## 2022-09-27 RX ORDER — GABAPENTIN 300 MG/1
300 CAPSULE ORAL EVERY 8 HOURS SCHEDULED
Status: DISCONTINUED | OUTPATIENT
Start: 2022-09-27 | End: 2022-10-03

## 2022-09-27 RX ADMIN — AZITHROMYCIN 500 MG: 500 INJECTION, POWDER, LYOPHILIZED, FOR SOLUTION INTRAVENOUS at 13:46

## 2022-09-27 RX ADMIN — INSULIN LISPRO 7 UNITS: 100 INJECTION, SOLUTION INTRAVENOUS; SUBCUTANEOUS at 17:59

## 2022-09-27 RX ADMIN — SODIUM CHLORIDE 2 G: 900 INJECTION INTRAVENOUS at 13:07

## 2022-09-27 RX ADMIN — AMLODIPINE BESYLATE 5 MG: 5 TABLET ORAL at 15:57

## 2022-09-27 RX ADMIN — Medication 10 ML: at 21:19

## 2022-09-27 RX ADMIN — OXYCODONE HYDROCHLORIDE AND ACETAMINOPHEN 1 TABLET: 7.5; 325 TABLET ORAL at 12:20

## 2022-09-27 RX ADMIN — FOLIC ACID 1 MG: 1 TABLET ORAL at 15:56

## 2022-09-27 RX ADMIN — THIAMINE HCL TAB 100 MG 100 MG: 100 TAB at 15:56

## 2022-09-27 RX ADMIN — ONDANSETRON 4 MG: 2 INJECTION INTRAMUSCULAR; INTRAVENOUS at 12:20

## 2022-09-27 RX ADMIN — PHYTONADIONE 10 MG: 10 INJECTION, EMULSION INTRAMUSCULAR; INTRAVENOUS; SUBCUTANEOUS at 15:57

## 2022-09-27 RX ADMIN — QUETIAPINE FUMARATE 150 MG: 100 TABLET ORAL at 21:15

## 2022-09-27 RX ADMIN — OXYCODONE 5 MG: 5 TABLET ORAL at 18:05

## 2022-09-27 RX ADMIN — SODIUM CHLORIDE 100 ML/HR: 9 INJECTION, SOLUTION INTRAVENOUS at 15:57

## 2022-09-27 RX ADMIN — METOPROLOL TARTRATE 50 MG: 50 TABLET, FILM COATED ORAL at 21:14

## 2022-09-27 RX ADMIN — INSULIN DETEMIR 10 UNITS: 100 INJECTION, SOLUTION SUBCUTANEOUS at 21:19

## 2022-09-27 RX ADMIN — AMITRIPTYLINE HYDROCHLORIDE 50 MG: 50 TABLET, FILM COATED ORAL at 21:14

## 2022-09-27 RX ADMIN — TAZOBACTAM SODIUM AND PIPERACILLIN SODIUM 3.38 G: 375; 3 INJECTION, SOLUTION INTRAVENOUS at 21:10

## 2022-09-27 RX ADMIN — ASPIRIN 81 MG: 81 TABLET, COATED ORAL at 15:56

## 2022-09-27 RX ADMIN — TAZOBACTAM SODIUM AND PIPERACILLIN SODIUM 3.38 G: 375; 3 INJECTION, SOLUTION INTRAVENOUS at 15:57

## 2022-09-27 RX ADMIN — GABAPENTIN 300 MG: 300 CAPSULE ORAL at 21:14

## 2022-09-27 RX ADMIN — GABAPENTIN 300 MG: 300 CAPSULE ORAL at 15:56

## 2022-09-28 ENCOUNTER — APPOINTMENT (OUTPATIENT)
Dept: ULTRASOUND IMAGING | Facility: HOSPITAL | Age: 57
End: 2022-09-28

## 2022-09-28 ENCOUNTER — APPOINTMENT (OUTPATIENT)
Dept: CT IMAGING | Facility: HOSPITAL | Age: 57
End: 2022-09-28

## 2022-09-28 LAB
ALBUMIN SERPL-MCNC: 1.7 G/DL (ref 3.5–5.2)
ALBUMIN/GLOB SERPL: 0.4 G/DL
ALP SERPL-CCNC: 82 U/L (ref 39–117)
ALT SERPL W P-5'-P-CCNC: 17 U/L (ref 1–33)
AMMONIA BLD-SCNC: 64 UMOL/L (ref 11–51)
AMPHET+METHAMPHET UR QL: NEGATIVE
AMPHETAMINES UR QL: NEGATIVE
ANION GAP SERPL CALCULATED.3IONS-SCNC: 9 MMOL/L (ref 5–15)
APPEARANCE FLD: CLEAR
ARTERIAL PATENCY WRIST A: ABNORMAL
AST SERPL-CCNC: 44 U/L (ref 1–32)
ATMOSPHERIC PRESS: ABNORMAL MM[HG]
BACTERIA UR QL AUTO: ABNORMAL /HPF
BARBITURATES UR QL SCN: NEGATIVE
BASE EXCESS BLDA CALC-SCNC: -3.4 MMOL/L (ref 0–2)
BASOPHILS # BLD MANUAL: 0 10*3/MM3 (ref 0–0.2)
BASOPHILS NFR BLD MANUAL: 0 % (ref 0–1.5)
BDY SITE: ABNORMAL
BENZODIAZ UR QL SCN: NEGATIVE
BILIRUB SERPL-MCNC: 0.9 MG/DL (ref 0–1.2)
BILIRUB UR QL STRIP: ABNORMAL
BODY TEMPERATURE: 37 C
BUN SERPL-MCNC: 44 MG/DL (ref 6–20)
BUN/CREAT SERPL: 31 (ref 7–25)
BUPRENORPHINE SERPL-MCNC: NEGATIVE NG/ML
BURR CELLS BLD QL SMEAR: ABNORMAL
CALCIUM SPEC-SCNC: 8 MG/DL (ref 8.6–10.5)
CANNABINOIDS SERPL QL: NEGATIVE
CHLORIDE SERPL-SCNC: 105 MMOL/L (ref 98–107)
CLARITY UR: CLEAR
CO2 BLDA-SCNC: 22.1 MMOL/L (ref 22–33)
CO2 SERPL-SCNC: 20 MMOL/L (ref 22–29)
COCAINE UR QL: POSITIVE
COHGB MFR BLD: 1.2 % (ref 0–2)
COLOR FLD: YELLOW
COLOR UR: ABNORMAL
CREAT SERPL-MCNC: 1.42 MG/DL (ref 0.57–1)
DEPRECATED RDW RBC AUTO: 45.8 FL (ref 37–54)
DOHLE BODIES: PRESENT
EGFRCR SERPLBLD CKD-EPI 2021: 42.7 ML/MIN/1.73
EOSINOPHIL # BLD MANUAL: 0 10*3/MM3 (ref 0–0.4)
EOSINOPHIL NFR BLD MANUAL: 0 % (ref 0.3–6.2)
EPAP: 0
ERYTHROCYTE [DISTWIDTH] IN BLOOD BY AUTOMATED COUNT: 16 % (ref 12.3–15.4)
GLOBULIN UR ELPH-MCNC: 4.3 GM/DL
GLUCOSE BLDC GLUCOMTR-MCNC: 127 MG/DL (ref 70–130)
GLUCOSE BLDC GLUCOMTR-MCNC: 170 MG/DL (ref 70–130)
GLUCOSE BLDC GLUCOMTR-MCNC: 171 MG/DL (ref 70–130)
GLUCOSE BLDC GLUCOMTR-MCNC: 190 MG/DL (ref 70–130)
GLUCOSE BLDC GLUCOMTR-MCNC: 209 MG/DL (ref 70–130)
GLUCOSE SERPL-MCNC: 137 MG/DL (ref 65–99)
GLUCOSE UR STRIP-MCNC: NEGATIVE MG/DL
HCO3 BLDA-SCNC: 21 MMOL/L (ref 20–26)
HCT VFR BLD AUTO: 24.2 % (ref 34–46.6)
HCT VFR BLD CALC: 25.1 % (ref 38–51)
HGB BLD-MCNC: 8.2 G/DL (ref 12–15.9)
HGB BLDA-MCNC: 8.2 G/DL (ref 14–18)
HGB UR QL STRIP.AUTO: NEGATIVE
HYALINE CASTS UR QL AUTO: ABNORMAL /LPF
INHALED O2 CONCENTRATION: 21 %
INR PPP: 1.69 (ref 0.84–1.13)
IPAP: 0
KETONES UR QL STRIP: NEGATIVE
L PNEUMO1 AG UR QL IA: NEGATIVE
LEUKOCYTE ESTERASE UR QL STRIP.AUTO: NEGATIVE
LYMPHOCYTES # BLD MANUAL: 0.74 10*3/MM3 (ref 0.7–3.1)
LYMPHOCYTES NFR BLD MANUAL: 4 % (ref 5–12)
LYMPHOCYTES NFR FLD MANUAL: 2 %
MACROPHAGE FLUID: 29 %
MAGNESIUM SERPL-MCNC: 2.3 MG/DL (ref 1.6–2.6)
MCH RBC QN AUTO: 26.9 PG (ref 26.6–33)
MCHC RBC AUTO-ENTMCNC: 33.9 G/DL (ref 31.5–35.7)
MCV RBC AUTO: 79.3 FL (ref 79–97)
MESOTHL CELL NFR FLD MANUAL: 2 %
METHADONE UR QL SCN: NEGATIVE
METHGB BLD QL: 0.1 % (ref 0–1.5)
MONOCYTES # BLD: 0.49 10*3/MM3 (ref 0.1–0.9)
MONOCYTES NFR FLD: 9 %
NEUTROPHILS # BLD AUTO: 11.1 10*3/MM3 (ref 1.7–7)
NEUTROPHILS NFR BLD MANUAL: 70 % (ref 42.7–76)
NEUTROPHILS NFR FLD MANUAL: 58 %
NEUTS BAND NFR BLD MANUAL: 20 % (ref 0–5)
NITRITE UR QL STRIP: NEGATIVE
NOTE: ABNORMAL
OPIATES UR QL: NEGATIVE
OXYCODONE UR QL SCN: POSITIVE
OXYHGB MFR BLDV: 90.7 % (ref 94–99)
PAW @ PEAK INSP FLOW SETTING VENT: 0 CMH2O
PCO2 BLDA: 34.5 MM HG (ref 35–45)
PCO2 TEMP ADJ BLD: 34.5 MM HG (ref 35–45)
PCP UR QL SCN: NEGATIVE
PH BLDA: 7.39 PH UNITS (ref 7.35–7.45)
PH UR STRIP.AUTO: 5.5 [PH] (ref 5–8)
PH, TEMP CORRECTED: 7.39 PH UNITS
PLAT MORPH BLD: NORMAL
PLATELET # BLD AUTO: 130 10*3/MM3 (ref 140–450)
PMV BLD AUTO: 10.1 FL (ref 6–12)
PO2 BLDA: 62.6 MM HG (ref 83–108)
PO2 TEMP ADJ BLD: 62.6 MM HG (ref 83–108)
POTASSIUM SERPL-SCNC: 3 MMOL/L (ref 3.5–5.2)
PROCALCITONIN SERPL-MCNC: 5.71 NG/ML (ref 0–0.25)
PROPOXYPH UR QL: NEGATIVE
PROT SERPL-MCNC: 6 G/DL (ref 6–8.5)
PROT UR QL STRIP: ABNORMAL
PROTHROMBIN TIME: 19.9 SECONDS (ref 11.4–14.4)
RBC # BLD AUTO: 3.05 10*6/MM3 (ref 3.77–5.28)
RBC # FLD AUTO: <2000 /MM3
RBC # UR STRIP: ABNORMAL /HPF
REF LAB TEST METHOD: ABNORMAL
S PNEUM AG SPEC QL LA: NEGATIVE
SODIUM SERPL-SCNC: 134 MMOL/L (ref 136–145)
SP GR UR STRIP: 1.02 (ref 1–1.03)
SQUAMOUS #/AREA URNS HPF: ABNORMAL /HPF
TOTAL RATE: 0 BREATHS/MINUTE
TOXIC GRANULATION: ABNORMAL
TRICYCLICS UR QL SCN: POSITIVE
UROBILINOGEN UR QL STRIP: ABNORMAL
VARIANT LYMPHS NFR BLD MANUAL: 6 % (ref 19.6–45.3)
WBC # FLD AUTO: 208 /MM3
WBC # UR STRIP: ABNORMAL /HPF
WBC NRBC COR # BLD: 12.33 10*3/MM3 (ref 3.4–10.8)

## 2022-09-28 PROCEDURE — 83735 ASSAY OF MAGNESIUM: CPT | Performed by: NURSE PRACTITIONER

## 2022-09-28 PROCEDURE — 97162 PT EVAL MOD COMPLEX 30 MIN: CPT

## 2022-09-28 PROCEDURE — 99232 SBSQ HOSP IP/OBS MODERATE 35: CPT | Performed by: INTERNAL MEDICINE

## 2022-09-28 PROCEDURE — 0W9G3ZZ DRAINAGE OF PERITONEAL CAVITY, PERCUTANEOUS APPROACH: ICD-10-PCS | Performed by: RADIOLOGY

## 2022-09-28 PROCEDURE — 70450 CT HEAD/BRAIN W/O DYE: CPT

## 2022-09-28 PROCEDURE — 97165 OT EVAL LOW COMPLEX 30 MIN: CPT

## 2022-09-28 PROCEDURE — 87449 NOS EACH ORGANISM AG IA: CPT | Performed by: FAMILY MEDICINE

## 2022-09-28 PROCEDURE — 82962 GLUCOSE BLOOD TEST: CPT

## 2022-09-28 PROCEDURE — 87015 SPECIMEN INFECT AGNT CONCNTJ: CPT | Performed by: FAMILY MEDICINE

## 2022-09-28 PROCEDURE — 81001 URINALYSIS AUTO W/SCOPE: CPT | Performed by: PHYSICIAN ASSISTANT

## 2022-09-28 PROCEDURE — 82805 BLOOD GASES W/O2 SATURATION: CPT

## 2022-09-28 PROCEDURE — 80306 DRUG TEST PRSMV INSTRMNT: CPT | Performed by: PHYSICIAN ASSISTANT

## 2022-09-28 PROCEDURE — 92610 EVALUATE SWALLOWING FUNCTION: CPT

## 2022-09-28 PROCEDURE — 97530 THERAPEUTIC ACTIVITIES: CPT

## 2022-09-28 PROCEDURE — 36600 WITHDRAWAL OF ARTERIAL BLOOD: CPT

## 2022-09-28 PROCEDURE — 80053 COMPREHEN METABOLIC PANEL: CPT | Performed by: FAMILY MEDICINE

## 2022-09-28 PROCEDURE — 25010000002 PIPERACILLIN SOD-TAZOBACTAM PER 1 G: Performed by: FAMILY MEDICINE

## 2022-09-28 PROCEDURE — 87899 AGENT NOS ASSAY W/OPTIC: CPT | Performed by: FAMILY MEDICINE

## 2022-09-28 PROCEDURE — 25010000002 ALBUMIN HUMAN 25% PER 50 ML: Performed by: INTERNAL MEDICINE

## 2022-09-28 PROCEDURE — 89051 BODY FLUID CELL COUNT: CPT | Performed by: FAMILY MEDICINE

## 2022-09-28 PROCEDURE — 76942 ECHO GUIDE FOR BIOPSY: CPT

## 2022-09-28 PROCEDURE — 97110 THERAPEUTIC EXERCISES: CPT

## 2022-09-28 PROCEDURE — 83050 HGB METHEMOGLOBIN QUAN: CPT

## 2022-09-28 PROCEDURE — 0 POTASSIUM CHLORIDE 10 MEQ/100ML SOLUTION: Performed by: INTERNAL MEDICINE

## 2022-09-28 PROCEDURE — 85007 BL SMEAR W/DIFF WBC COUNT: CPT | Performed by: FAMILY MEDICINE

## 2022-09-28 PROCEDURE — 63710000001 INSULIN LISPRO (HUMAN) PER 5 UNITS: Performed by: FAMILY MEDICINE

## 2022-09-28 PROCEDURE — C1729 CATH, DRAINAGE: HCPCS

## 2022-09-28 PROCEDURE — 82375 ASSAY CARBOXYHB QUANT: CPT

## 2022-09-28 PROCEDURE — 82140 ASSAY OF AMMONIA: CPT | Performed by: NURSE PRACTITIONER

## 2022-09-28 PROCEDURE — 84145 PROCALCITONIN (PCT): CPT | Performed by: FAMILY MEDICINE

## 2022-09-28 PROCEDURE — 85025 COMPLETE CBC W/AUTO DIFF WBC: CPT | Performed by: FAMILY MEDICINE

## 2022-09-28 PROCEDURE — 87205 SMEAR GRAM STAIN: CPT | Performed by: FAMILY MEDICINE

## 2022-09-28 PROCEDURE — 87070 CULTURE OTHR SPECIMN AEROBIC: CPT | Performed by: FAMILY MEDICINE

## 2022-09-28 PROCEDURE — 85610 PROTHROMBIN TIME: CPT | Performed by: FAMILY MEDICINE

## 2022-09-28 PROCEDURE — P9047 ALBUMIN (HUMAN), 25%, 50ML: HCPCS | Performed by: INTERNAL MEDICINE

## 2022-09-28 PROCEDURE — 63710000001 INSULIN DETEMIR PER 5 UNITS: Performed by: FAMILY MEDICINE

## 2022-09-28 RX ORDER — LIDOCAINE HYDROCHLORIDE 10 MG/ML
5 INJECTION, SOLUTION EPIDURAL; INFILTRATION; INTRACAUDAL; PERINEURAL ONCE
Status: COMPLETED | OUTPATIENT
Start: 2022-09-28 | End: 2022-09-28

## 2022-09-28 RX ORDER — POTASSIUM CHLORIDE 1.5 G/1.77G
40 POWDER, FOR SOLUTION ORAL AS NEEDED
Status: DISCONTINUED | OUTPATIENT
Start: 2022-09-28 | End: 2022-09-28

## 2022-09-28 RX ORDER — MIDODRINE HYDROCHLORIDE 5 MG/1
5 TABLET ORAL
Status: DISCONTINUED | OUTPATIENT
Start: 2022-09-28 | End: 2022-09-30

## 2022-09-28 RX ORDER — POTASSIUM CHLORIDE 750 MG/1
40 CAPSULE, EXTENDED RELEASE ORAL AS NEEDED
Status: DISCONTINUED | OUTPATIENT
Start: 2022-09-28 | End: 2022-09-28

## 2022-09-28 RX ORDER — ALBUMIN (HUMAN) 12.5 G/50ML
50 SOLUTION INTRAVENOUS ONCE
Status: COMPLETED | OUTPATIENT
Start: 2022-09-28 | End: 2022-09-28

## 2022-09-28 RX ORDER — POTASSIUM CHLORIDE 750 MG/1
40 CAPSULE, EXTENDED RELEASE ORAL AS NEEDED
Status: DISCONTINUED | OUTPATIENT
Start: 2022-09-28 | End: 2022-10-12 | Stop reason: HOSPADM

## 2022-09-28 RX ORDER — POTASSIUM CHLORIDE 7.45 MG/ML
10 INJECTION INTRAVENOUS
Status: DISCONTINUED | OUTPATIENT
Start: 2022-09-28 | End: 2022-10-12 | Stop reason: HOSPADM

## 2022-09-28 RX ORDER — POTASSIUM CHLORIDE 1.5 G/1.77G
40 POWDER, FOR SOLUTION ORAL AS NEEDED
Status: DISCONTINUED | OUTPATIENT
Start: 2022-09-28 | End: 2022-10-12 | Stop reason: HOSPADM

## 2022-09-28 RX ORDER — LACTULOSE 10 G/15ML
20 SOLUTION ORAL 3 TIMES DAILY
Status: DISCONTINUED | OUTPATIENT
Start: 2022-09-28 | End: 2022-10-05

## 2022-09-28 RX ADMIN — AMITRIPTYLINE HYDROCHLORIDE 50 MG: 50 TABLET, FILM COATED ORAL at 21:35

## 2022-09-28 RX ADMIN — Medication 10 ML: at 23:04

## 2022-09-28 RX ADMIN — INSULIN LISPRO 2 UNITS: 100 INJECTION, SOLUTION INTRAVENOUS; SUBCUTANEOUS at 08:19

## 2022-09-28 RX ADMIN — ALBUMIN (HUMAN) 50 G: 0.25 INJECTION, SOLUTION INTRAVENOUS at 12:16

## 2022-09-28 RX ADMIN — RIFAXIMIN 550 MG: 550 TABLET ORAL at 21:35

## 2022-09-28 RX ADMIN — POTASSIUM CHLORIDE 10 MEQ: 7.46 INJECTION, SOLUTION INTRAVENOUS at 17:28

## 2022-09-28 RX ADMIN — LIDOCAINE HYDROCHLORIDE 5 ML: 10 INJECTION, SOLUTION EPIDURAL; INFILTRATION; INTRACAUDAL; PERINEURAL at 11:43

## 2022-09-28 RX ADMIN — POTASSIUM CHLORIDE 10 MEQ: 7.46 INJECTION, SOLUTION INTRAVENOUS at 13:44

## 2022-09-28 RX ADMIN — Medication 10 ML: at 08:19

## 2022-09-28 RX ADMIN — POTASSIUM CHLORIDE 10 MEQ: 7.46 INJECTION, SOLUTION INTRAVENOUS at 15:07

## 2022-09-28 RX ADMIN — LACTULOSE 20 G: 20 SOLUTION ORAL at 15:21

## 2022-09-28 RX ADMIN — INSULIN DETEMIR 10 UNITS: 100 INJECTION, SOLUTION SUBCUTANEOUS at 21:40

## 2022-09-28 RX ADMIN — TAZOBACTAM SODIUM AND PIPERACILLIN SODIUM 3.38 G: 375; 3 INJECTION, SOLUTION INTRAVENOUS at 16:04

## 2022-09-28 RX ADMIN — POTASSIUM CHLORIDE 10 MEQ: 7.46 INJECTION, SOLUTION INTRAVENOUS at 21:32

## 2022-09-28 RX ADMIN — GABAPENTIN 300 MG: 300 CAPSULE ORAL at 21:35

## 2022-09-28 RX ADMIN — TAZOBACTAM SODIUM AND PIPERACILLIN SODIUM 3.38 G: 375; 3 INJECTION, SOLUTION INTRAVENOUS at 08:20

## 2022-09-28 RX ADMIN — INSULIN LISPRO 4 UNITS: 100 INJECTION, SOLUTION INTRAVENOUS; SUBCUTANEOUS at 12:13

## 2022-09-28 RX ADMIN — POTASSIUM CHLORIDE 10 MEQ: 7.46 INJECTION, SOLUTION INTRAVENOUS at 23:04

## 2022-09-28 RX ADMIN — POTASSIUM CHLORIDE 10 MEQ: 7.46 INJECTION, SOLUTION INTRAVENOUS at 20:08

## 2022-09-29 ENCOUNTER — ANCILLARY PROCEDURE (OUTPATIENT)
Dept: SPEECH THERAPY | Facility: HOSPITAL | Age: 57
End: 2022-09-29

## 2022-09-29 LAB
ANION GAP SERPL CALCULATED.3IONS-SCNC: 11 MMOL/L (ref 5–15)
BUN SERPL-MCNC: 51 MG/DL (ref 6–20)
BUN/CREAT SERPL: 30.4 (ref 7–25)
CALCIUM SPEC-SCNC: 8.5 MG/DL (ref 8.6–10.5)
CHLORIDE SERPL-SCNC: 108 MMOL/L (ref 98–107)
CO2 SERPL-SCNC: 20 MMOL/L (ref 22–29)
CREAT SERPL-MCNC: 1.68 MG/DL (ref 0.57–1)
DEPRECATED RDW RBC AUTO: 47.6 FL (ref 37–54)
EGFRCR SERPLBLD CKD-EPI 2021: 34.9 ML/MIN/1.73
ERYTHROCYTE [DISTWIDTH] IN BLOOD BY AUTOMATED COUNT: 16 % (ref 12.3–15.4)
GLUCOSE BLDC GLUCOMTR-MCNC: 157 MG/DL (ref 70–130)
GLUCOSE BLDC GLUCOMTR-MCNC: 159 MG/DL (ref 70–130)
GLUCOSE BLDC GLUCOMTR-MCNC: 164 MG/DL (ref 70–130)
GLUCOSE BLDC GLUCOMTR-MCNC: 231 MG/DL (ref 70–130)
GLUCOSE SERPL-MCNC: 109 MG/DL (ref 65–99)
HCT VFR BLD AUTO: 26 % (ref 34–46.6)
HGB BLD-MCNC: 8.6 G/DL (ref 12–15.9)
MCH RBC QN AUTO: 26.9 PG (ref 26.6–33)
MCHC RBC AUTO-ENTMCNC: 33.1 G/DL (ref 31.5–35.7)
MCV RBC AUTO: 81.3 FL (ref 79–97)
PLATELET # BLD AUTO: 142 10*3/MM3 (ref 140–450)
PMV BLD AUTO: 10.3 FL (ref 6–12)
POTASSIUM SERPL-SCNC: 3.3 MMOL/L (ref 3.5–5.2)
RBC # BLD AUTO: 3.2 10*6/MM3 (ref 3.77–5.28)
SODIUM SERPL-SCNC: 139 MMOL/L (ref 136–145)
WBC NRBC COR # BLD: 9.05 10*3/MM3 (ref 3.4–10.8)

## 2022-09-29 PROCEDURE — P9047 ALBUMIN (HUMAN), 25%, 50ML: HCPCS | Performed by: INTERNAL MEDICINE

## 2022-09-29 PROCEDURE — 82962 GLUCOSE BLOOD TEST: CPT

## 2022-09-29 PROCEDURE — 63710000001 ONDANSETRON PER 8 MG: Performed by: FAMILY MEDICINE

## 2022-09-29 PROCEDURE — 92612 ENDOSCOPY SWALLOW (FEES) VID: CPT

## 2022-09-29 PROCEDURE — 92610 EVALUATE SWALLOWING FUNCTION: CPT

## 2022-09-29 PROCEDURE — 63710000001 INSULIN LISPRO (HUMAN) PER 5 UNITS: Performed by: FAMILY MEDICINE

## 2022-09-29 PROCEDURE — 80048 BASIC METABOLIC PNL TOTAL CA: CPT | Performed by: INTERNAL MEDICINE

## 2022-09-29 PROCEDURE — 25010000002 ALBUMIN HUMAN 25% PER 50 ML: Performed by: INTERNAL MEDICINE

## 2022-09-29 PROCEDURE — 0 POTASSIUM CHLORIDE 10 MEQ/100ML SOLUTION: Performed by: INTERNAL MEDICINE

## 2022-09-29 PROCEDURE — 99233 SBSQ HOSP IP/OBS HIGH 50: CPT | Performed by: INTERNAL MEDICINE

## 2022-09-29 PROCEDURE — 25010000002 PIPERACILLIN SOD-TAZOBACTAM PER 1 G: Performed by: FAMILY MEDICINE

## 2022-09-29 PROCEDURE — 85027 COMPLETE CBC AUTOMATED: CPT | Performed by: INTERNAL MEDICINE

## 2022-09-29 PROCEDURE — 97535 SELF CARE MNGMENT TRAINING: CPT

## 2022-09-29 PROCEDURE — 63710000001 INSULIN DETEMIR PER 5 UNITS: Performed by: FAMILY MEDICINE

## 2022-09-29 RX ORDER — ATORVASTATIN CALCIUM 40 MG/1
40 TABLET, FILM COATED ORAL NIGHTLY
Status: DISCONTINUED | OUTPATIENT
Start: 2022-09-29 | End: 2022-10-12 | Stop reason: HOSPADM

## 2022-09-29 RX ORDER — POTASSIUM CHLORIDE 1.5 G/1.77G
40 POWDER, FOR SOLUTION ORAL AS NEEDED
Status: DISCONTINUED | OUTPATIENT
Start: 2022-09-29 | End: 2022-09-29 | Stop reason: SDUPTHER

## 2022-09-29 RX ORDER — POTASSIUM CHLORIDE 750 MG/1
40 CAPSULE, EXTENDED RELEASE ORAL AS NEEDED
Status: DISCONTINUED | OUTPATIENT
Start: 2022-09-29 | End: 2022-09-29 | Stop reason: SDUPTHER

## 2022-09-29 RX ORDER — ALBUMIN (HUMAN) 12.5 G/50ML
25 SOLUTION INTRAVENOUS ONCE
Status: COMPLETED | OUTPATIENT
Start: 2022-09-29 | End: 2022-09-29

## 2022-09-29 RX ADMIN — INSULIN LISPRO 4 UNITS: 100 INJECTION, SOLUTION INTRAVENOUS; SUBCUTANEOUS at 16:39

## 2022-09-29 RX ADMIN — Medication 10 ML: at 09:35

## 2022-09-29 RX ADMIN — LACTULOSE 20 G: 20 SOLUTION ORAL at 15:13

## 2022-09-29 RX ADMIN — GABAPENTIN 300 MG: 300 CAPSULE ORAL at 21:38

## 2022-09-29 RX ADMIN — RIFAXIMIN 550 MG: 550 TABLET ORAL at 21:38

## 2022-09-29 RX ADMIN — THIAMINE HCL TAB 100 MG 100 MG: 100 TAB at 09:36

## 2022-09-29 RX ADMIN — METOPROLOL TARTRATE 50 MG: 50 TABLET, FILM COATED ORAL at 21:38

## 2022-09-29 RX ADMIN — METOPROLOL TARTRATE 50 MG: 50 TABLET, FILM COATED ORAL at 09:35

## 2022-09-29 RX ADMIN — RIFAXIMIN 550 MG: 550 TABLET ORAL at 09:36

## 2022-09-29 RX ADMIN — POTASSIUM CHLORIDE 40 MEQ: 750 CAPSULE, EXTENDED RELEASE ORAL at 21:43

## 2022-09-29 RX ADMIN — POTASSIUM CHLORIDE 40 MEQ: 750 CAPSULE, EXTENDED RELEASE ORAL at 15:13

## 2022-09-29 RX ADMIN — AMLODIPINE BESYLATE 5 MG: 5 TABLET ORAL at 09:35

## 2022-09-29 RX ADMIN — INSULIN LISPRO 2 UNITS: 100 INJECTION, SOLUTION INTRAVENOUS; SUBCUTANEOUS at 11:51

## 2022-09-29 RX ADMIN — OXYCODONE 5 MG: 5 TABLET ORAL at 21:43

## 2022-09-29 RX ADMIN — GABAPENTIN 300 MG: 300 CAPSULE ORAL at 15:13

## 2022-09-29 RX ADMIN — ONDANSETRON HYDROCHLORIDE 4 MG: 4 TABLET, FILM COATED ORAL at 11:50

## 2022-09-29 RX ADMIN — ATORVASTATIN CALCIUM 40 MG: 40 TABLET, FILM COATED ORAL at 21:38

## 2022-09-29 RX ADMIN — TAZOBACTAM SODIUM AND PIPERACILLIN SODIUM 3.38 G: 375; 3 INJECTION, SOLUTION INTRAVENOUS at 23:02

## 2022-09-29 RX ADMIN — ASPIRIN 81 MG: 81 TABLET, COATED ORAL at 09:35

## 2022-09-29 RX ADMIN — TAZOBACTAM SODIUM AND PIPERACILLIN SODIUM 3.38 G: 375; 3 INJECTION, SOLUTION INTRAVENOUS at 15:50

## 2022-09-29 RX ADMIN — ALBUMIN (HUMAN) 25 G: 0.25 INJECTION, SOLUTION INTRAVENOUS at 15:14

## 2022-09-29 RX ADMIN — INSULIN LISPRO 2 UNITS: 100 INJECTION, SOLUTION INTRAVENOUS; SUBCUTANEOUS at 09:37

## 2022-09-29 RX ADMIN — OXYCODONE 5 MG: 5 TABLET ORAL at 09:37

## 2022-09-29 RX ADMIN — AMITRIPTYLINE HYDROCHLORIDE 50 MG: 50 TABLET, FILM COATED ORAL at 21:38

## 2022-09-29 RX ADMIN — INSULIN DETEMIR 10 UNITS: 100 INJECTION, SOLUTION SUBCUTANEOUS at 21:38

## 2022-09-29 RX ADMIN — FOLIC ACID 1 MG: 1 TABLET ORAL at 09:35

## 2022-09-29 RX ADMIN — POTASSIUM CHLORIDE 10 MEQ: 7.46 INJECTION, SOLUTION INTRAVENOUS at 11:52

## 2022-09-29 RX ADMIN — TAZOBACTAM SODIUM AND PIPERACILLIN SODIUM 3.38 G: 375; 3 INJECTION, SOLUTION INTRAVENOUS at 00:54

## 2022-09-29 RX ADMIN — TAZOBACTAM SODIUM AND PIPERACILLIN SODIUM 3.38 G: 375; 3 INJECTION, SOLUTION INTRAVENOUS at 09:35

## 2022-09-30 LAB
ALBUMIN SERPL-MCNC: 2.3 G/DL (ref 3.5–5.2)
ALBUMIN/GLOB SERPL: 0.7 G/DL
ALP SERPL-CCNC: 111 U/L (ref 39–117)
ALT SERPL W P-5'-P-CCNC: 18 U/L (ref 1–33)
ANION GAP SERPL CALCULATED.3IONS-SCNC: 9 MMOL/L (ref 5–15)
AST SERPL-CCNC: 48 U/L (ref 1–32)
BASOPHILS # BLD MANUAL: 0.09 10*3/MM3 (ref 0–0.2)
BASOPHILS NFR BLD MANUAL: 1 % (ref 0–1.5)
BILIRUB SERPL-MCNC: 0.6 MG/DL (ref 0–1.2)
BUN SERPL-MCNC: 43 MG/DL (ref 6–20)
BUN/CREAT SERPL: 28.5 (ref 7–25)
BURR CELLS BLD QL SMEAR: ABNORMAL
CALCIUM SPEC-SCNC: 8.1 MG/DL (ref 8.6–10.5)
CHLORIDE SERPL-SCNC: 109 MMOL/L (ref 98–107)
CO2 SERPL-SCNC: 20 MMOL/L (ref 22–29)
CREAT SERPL-MCNC: 1.51 MG/DL (ref 0.57–1)
DEPRECATED RDW RBC AUTO: 48.4 FL (ref 37–54)
EGFRCR SERPLBLD CKD-EPI 2021: 39.7 ML/MIN/1.73
EOSINOPHIL # BLD MANUAL: 0.26 10*3/MM3 (ref 0–0.4)
EOSINOPHIL NFR BLD MANUAL: 3 % (ref 0.3–6.2)
ERYTHROCYTE [DISTWIDTH] IN BLOOD BY AUTOMATED COUNT: 16.2 % (ref 12.3–15.4)
GLOBULIN UR ELPH-MCNC: 3.3 GM/DL
GLUCOSE BLDC GLUCOMTR-MCNC: 189 MG/DL (ref 70–130)
GLUCOSE BLDC GLUCOMTR-MCNC: 221 MG/DL (ref 70–130)
GLUCOSE BLDC GLUCOMTR-MCNC: 221 MG/DL (ref 70–130)
GLUCOSE BLDC GLUCOMTR-MCNC: 244 MG/DL (ref 70–130)
GLUCOSE SERPL-MCNC: 149 MG/DL (ref 65–99)
HCT VFR BLD AUTO: 25.9 % (ref 34–46.6)
HGB BLD-MCNC: 8.4 G/DL (ref 12–15.9)
LYMPHOCYTES # BLD MANUAL: 2.17 10*3/MM3 (ref 0.7–3.1)
LYMPHOCYTES NFR BLD MANUAL: 5 % (ref 5–12)
MCH RBC QN AUTO: 26.4 PG (ref 26.6–33)
MCHC RBC AUTO-ENTMCNC: 32.4 G/DL (ref 31.5–35.7)
MCV RBC AUTO: 81.4 FL (ref 79–97)
MONOCYTES # BLD: 0.43 10*3/MM3 (ref 0.1–0.9)
MYELOCYTES NFR BLD MANUAL: 2 % (ref 0–0)
NEUTROPHILS # BLD AUTO: 5.55 10*3/MM3 (ref 1.7–7)
NEUTROPHILS NFR BLD MANUAL: 58 % (ref 42.7–76)
NEUTS BAND NFR BLD MANUAL: 6 % (ref 0–5)
OVALOCYTES BLD QL SMEAR: ABNORMAL
PLAT MORPH BLD: NORMAL
PLATELET # BLD AUTO: 170 10*3/MM3 (ref 140–450)
PMV BLD AUTO: 10.4 FL (ref 6–12)
POTASSIUM SERPL-SCNC: 4 MMOL/L (ref 3.5–5.2)
PROT SERPL-MCNC: 5.6 G/DL (ref 6–8.5)
RBC # BLD AUTO: 3.18 10*6/MM3 (ref 3.77–5.28)
SCHISTOCYTES BLD QL SMEAR: ABNORMAL
SODIUM SERPL-SCNC: 138 MMOL/L (ref 136–145)
TOXIC GRANULATION: ABNORMAL
VARIANT LYMPHS NFR BLD MANUAL: 2 % (ref 0–5)
VARIANT LYMPHS NFR BLD MANUAL: 23 % (ref 19.6–45.3)
WBC NRBC COR # BLD: 8.67 10*3/MM3 (ref 3.4–10.8)

## 2022-09-30 PROCEDURE — 63710000001 INSULIN LISPRO (HUMAN) PER 5 UNITS: Performed by: FAMILY MEDICINE

## 2022-09-30 PROCEDURE — 80053 COMPREHEN METABOLIC PANEL: CPT | Performed by: INTERNAL MEDICINE

## 2022-09-30 PROCEDURE — 63710000001 INSULIN DETEMIR PER 5 UNITS: Performed by: FAMILY MEDICINE

## 2022-09-30 PROCEDURE — 97116 GAIT TRAINING THERAPY: CPT

## 2022-09-30 PROCEDURE — 85007 BL SMEAR W/DIFF WBC COUNT: CPT | Performed by: INTERNAL MEDICINE

## 2022-09-30 PROCEDURE — 25010000002 PIPERACILLIN SOD-TAZOBACTAM PER 1 G: Performed by: FAMILY MEDICINE

## 2022-09-30 PROCEDURE — 85025 COMPLETE CBC W/AUTO DIFF WBC: CPT | Performed by: INTERNAL MEDICINE

## 2022-09-30 PROCEDURE — 82962 GLUCOSE BLOOD TEST: CPT

## 2022-09-30 PROCEDURE — 99233 SBSQ HOSP IP/OBS HIGH 50: CPT | Performed by: INTERNAL MEDICINE

## 2022-09-30 RX ADMIN — ASPIRIN 81 MG: 81 TABLET, COATED ORAL at 07:38

## 2022-09-30 RX ADMIN — GABAPENTIN 300 MG: 300 CAPSULE ORAL at 13:02

## 2022-09-30 RX ADMIN — FOLIC ACID 1 MG: 1 TABLET ORAL at 07:34

## 2022-09-30 RX ADMIN — RIFAXIMIN 550 MG: 550 TABLET ORAL at 21:28

## 2022-09-30 RX ADMIN — RIFAXIMIN 550 MG: 550 TABLET ORAL at 07:31

## 2022-09-30 RX ADMIN — Medication 10 ML: at 07:33

## 2022-09-30 RX ADMIN — ATORVASTATIN CALCIUM 40 MG: 40 TABLET, FILM COATED ORAL at 21:28

## 2022-09-30 RX ADMIN — OXYCODONE 5 MG: 5 TABLET ORAL at 11:21

## 2022-09-30 RX ADMIN — METOPROLOL TARTRATE 50 MG: 50 TABLET, FILM COATED ORAL at 21:28

## 2022-09-30 RX ADMIN — AMITRIPTYLINE HYDROCHLORIDE 50 MG: 50 TABLET, FILM COATED ORAL at 21:28

## 2022-09-30 RX ADMIN — OXYCODONE 5 MG: 5 TABLET ORAL at 05:24

## 2022-09-30 RX ADMIN — GABAPENTIN 300 MG: 300 CAPSULE ORAL at 05:24

## 2022-09-30 RX ADMIN — Medication 10 ML: at 21:29

## 2022-09-30 RX ADMIN — LACTULOSE 20 G: 20 SOLUTION ORAL at 21:28

## 2022-09-30 RX ADMIN — TAZOBACTAM SODIUM AND PIPERACILLIN SODIUM 3.38 G: 375; 3 INJECTION, SOLUTION INTRAVENOUS at 15:50

## 2022-09-30 RX ADMIN — THIAMINE HCL TAB 100 MG 100 MG: 100 TAB at 07:30

## 2022-09-30 RX ADMIN — LACTULOSE 20 G: 20 SOLUTION ORAL at 15:51

## 2022-09-30 RX ADMIN — INSULIN LISPRO 4 UNITS: 100 INJECTION, SOLUTION INTRAVENOUS; SUBCUTANEOUS at 17:42

## 2022-09-30 RX ADMIN — TAZOBACTAM SODIUM AND PIPERACILLIN SODIUM 3.38 G: 375; 3 INJECTION, SOLUTION INTRAVENOUS at 07:32

## 2022-09-30 RX ADMIN — METOPROLOL TARTRATE 50 MG: 50 TABLET, FILM COATED ORAL at 07:31

## 2022-09-30 RX ADMIN — GABAPENTIN 300 MG: 300 CAPSULE ORAL at 21:28

## 2022-09-30 RX ADMIN — OXYCODONE 5 MG: 5 TABLET ORAL at 21:31

## 2022-09-30 RX ADMIN — INSULIN LISPRO 4 UNITS: 100 INJECTION, SOLUTION INTRAVENOUS; SUBCUTANEOUS at 13:02

## 2022-09-30 RX ADMIN — INSULIN DETEMIR 10 UNITS: 100 INJECTION, SOLUTION SUBCUTANEOUS at 21:28

## 2022-09-30 RX ADMIN — AMLODIPINE BESYLATE 5 MG: 5 TABLET ORAL at 07:32

## 2022-10-01 LAB
ALBUMIN SERPL-MCNC: 2.4 G/DL (ref 3.5–5.2)
ALBUMIN/GLOB SERPL: 0.7 G/DL
ALP SERPL-CCNC: 131 U/L (ref 39–117)
ALT SERPL W P-5'-P-CCNC: 21 U/L (ref 1–33)
ANION GAP SERPL CALCULATED.3IONS-SCNC: 10 MMOL/L (ref 5–15)
AST SERPL-CCNC: 56 U/L (ref 1–32)
BACTERIA FLD CULT: NORMAL
BACTERIA UR QL AUTO: ABNORMAL /HPF
BASOPHILS # BLD MANUAL: 0.12 10*3/MM3 (ref 0–0.2)
BASOPHILS NFR BLD MANUAL: 1 % (ref 0–1.5)
BILIRUB SERPL-MCNC: 0.7 MG/DL (ref 0–1.2)
BILIRUB UR QL STRIP: NEGATIVE
BUN SERPL-MCNC: 36 MG/DL (ref 6–20)
BUN/CREAT SERPL: 25 (ref 7–25)
CALCIUM SPEC-SCNC: 8.3 MG/DL (ref 8.6–10.5)
CHLORIDE SERPL-SCNC: 107 MMOL/L (ref 98–107)
CLARITY UR: CLEAR
CO2 SERPL-SCNC: 19 MMOL/L (ref 22–29)
COLOR UR: YELLOW
CREAT SERPL-MCNC: 1.44 MG/DL (ref 0.57–1)
DEPRECATED RDW RBC AUTO: 50.3 FL (ref 37–54)
EGFRCR SERPLBLD CKD-EPI 2021: 42 ML/MIN/1.73
EOSINOPHIL # BLD MANUAL: 0.36 10*3/MM3 (ref 0–0.4)
EOSINOPHIL NFR BLD MANUAL: 3 % (ref 0.3–6.2)
ERYTHROCYTE [DISTWIDTH] IN BLOOD BY AUTOMATED COUNT: 16.7 % (ref 12.3–15.4)
GLOBULIN UR ELPH-MCNC: 3.5 GM/DL
GLUCOSE BLDC GLUCOMTR-MCNC: 162 MG/DL (ref 70–130)
GLUCOSE BLDC GLUCOMTR-MCNC: 163 MG/DL (ref 70–130)
GLUCOSE BLDC GLUCOMTR-MCNC: 255 MG/DL (ref 70–130)
GLUCOSE BLDC GLUCOMTR-MCNC: 310 MG/DL (ref 70–130)
GLUCOSE SERPL-MCNC: 136 MG/DL (ref 65–99)
GLUCOSE UR STRIP-MCNC: NEGATIVE MG/DL
GRAM STN SPEC: NORMAL
HCT VFR BLD AUTO: 27.5 % (ref 34–46.6)
HGB BLD-MCNC: 8.7 G/DL (ref 12–15.9)
HGB UR QL STRIP.AUTO: NEGATIVE
HYALINE CASTS UR QL AUTO: ABNORMAL /LPF
KETONES UR QL STRIP: ABNORMAL
LEUKOCYTE ESTERASE UR QL STRIP.AUTO: ABNORMAL
LYMPHOCYTES # BLD MANUAL: 1.22 10*3/MM3 (ref 0.7–3.1)
LYMPHOCYTES NFR BLD MANUAL: 1 % (ref 5–12)
MCH RBC QN AUTO: 26 PG (ref 26.6–33)
MCHC RBC AUTO-ENTMCNC: 31.6 G/DL (ref 31.5–35.7)
MCV RBC AUTO: 82.1 FL (ref 79–97)
METAMYELOCYTES NFR BLD MANUAL: 2 % (ref 0–0)
MONOCYTES # BLD: 0.12 10*3/MM3 (ref 0.1–0.9)
NEUTROPHILS # BLD AUTO: 10.08 10*3/MM3 (ref 1.7–7)
NEUTROPHILS NFR BLD MANUAL: 76 % (ref 42.7–76)
NEUTS BAND NFR BLD MANUAL: 7 % (ref 0–5)
NITRITE UR QL STRIP: NEGATIVE
OVALOCYTES BLD QL SMEAR: ABNORMAL
PH UR STRIP.AUTO: 5.5 [PH] (ref 5–8)
PLAT MORPH BLD: NORMAL
PLATELET # BLD AUTO: 172 10*3/MM3 (ref 140–450)
PMV BLD AUTO: 10.6 FL (ref 6–12)
POTASSIUM SERPL-SCNC: 4.5 MMOL/L (ref 3.5–5.2)
PROT SERPL-MCNC: 5.9 G/DL (ref 6–8.5)
PROT UR QL STRIP: ABNORMAL
RBC # BLD AUTO: 3.35 10*6/MM3 (ref 3.77–5.28)
RBC # UR STRIP: ABNORMAL /HPF
REF LAB TEST METHOD: ABNORMAL
SCAN SLIDE: NORMAL
SODIUM SERPL-SCNC: 136 MMOL/L (ref 136–145)
SP GR UR STRIP: 1.02 (ref 1–1.03)
SQUAMOUS #/AREA URNS HPF: ABNORMAL /HPF
TOXIC GRANULATION: ABNORMAL
UROBILINOGEN UR QL STRIP: ABNORMAL
VARIANT LYMPHS NFR BLD MANUAL: 10 % (ref 19.6–45.3)
WBC # UR STRIP: ABNORMAL /HPF
WBC NRBC COR # BLD: 12.15 10*3/MM3 (ref 3.4–10.8)

## 2022-10-01 PROCEDURE — 99233 SBSQ HOSP IP/OBS HIGH 50: CPT | Performed by: INTERNAL MEDICINE

## 2022-10-01 PROCEDURE — 80053 COMPREHEN METABOLIC PANEL: CPT | Performed by: INTERNAL MEDICINE

## 2022-10-01 PROCEDURE — 82962 GLUCOSE BLOOD TEST: CPT

## 2022-10-01 PROCEDURE — 85025 COMPLETE CBC W/AUTO DIFF WBC: CPT | Performed by: INTERNAL MEDICINE

## 2022-10-01 PROCEDURE — 63710000001 INSULIN LISPRO (HUMAN) PER 5 UNITS: Performed by: FAMILY MEDICINE

## 2022-10-01 PROCEDURE — 25010000002 PIPERACILLIN SOD-TAZOBACTAM PER 1 G: Performed by: FAMILY MEDICINE

## 2022-10-01 PROCEDURE — 63710000001 INSULIN DETEMIR PER 5 UNITS: Performed by: FAMILY MEDICINE

## 2022-10-01 PROCEDURE — 85007 BL SMEAR W/DIFF WBC COUNT: CPT | Performed by: INTERNAL MEDICINE

## 2022-10-01 PROCEDURE — 81001 URINALYSIS AUTO W/SCOPE: CPT | Performed by: NURSE PRACTITIONER

## 2022-10-01 PROCEDURE — 87086 URINE CULTURE/COLONY COUNT: CPT | Performed by: NURSE PRACTITIONER

## 2022-10-01 RX ORDER — FUROSEMIDE 40 MG/1
40 TABLET ORAL DAILY
Status: DISCONTINUED | OUTPATIENT
Start: 2022-10-01 | End: 2022-10-03

## 2022-10-01 RX ADMIN — TAZOBACTAM SODIUM AND PIPERACILLIN SODIUM 3.38 G: 375; 3 INJECTION, SOLUTION INTRAVENOUS at 17:03

## 2022-10-01 RX ADMIN — INSULIN LISPRO 2 UNITS: 100 INJECTION, SOLUTION INTRAVENOUS; SUBCUTANEOUS at 16:59

## 2022-10-01 RX ADMIN — RIFAXIMIN 550 MG: 550 TABLET ORAL at 21:39

## 2022-10-01 RX ADMIN — INSULIN LISPRO 7 UNITS: 100 INJECTION, SOLUTION INTRAVENOUS; SUBCUTANEOUS at 12:17

## 2022-10-01 RX ADMIN — RIFAXIMIN 550 MG: 550 TABLET ORAL at 09:04

## 2022-10-01 RX ADMIN — GABAPENTIN 300 MG: 300 CAPSULE ORAL at 21:39

## 2022-10-01 RX ADMIN — THIAMINE HCL TAB 100 MG 100 MG: 100 TAB at 09:04

## 2022-10-01 RX ADMIN — FOLIC ACID 1 MG: 1 TABLET ORAL at 09:04

## 2022-10-01 RX ADMIN — TAZOBACTAM SODIUM AND PIPERACILLIN SODIUM 3.38 G: 375; 3 INJECTION, SOLUTION INTRAVENOUS at 01:00

## 2022-10-01 RX ADMIN — GABAPENTIN 300 MG: 300 CAPSULE ORAL at 05:39

## 2022-10-01 RX ADMIN — FUROSEMIDE 40 MG: 40 TABLET ORAL at 12:17

## 2022-10-01 RX ADMIN — METOPROLOL TARTRATE 50 MG: 50 TABLET, FILM COATED ORAL at 09:05

## 2022-10-01 RX ADMIN — AMLODIPINE BESYLATE 5 MG: 5 TABLET ORAL at 09:05

## 2022-10-01 RX ADMIN — TAZOBACTAM SODIUM AND PIPERACILLIN SODIUM 3.38 G: 375; 3 INJECTION, SOLUTION INTRAVENOUS at 23:22

## 2022-10-01 RX ADMIN — INSULIN LISPRO 2 UNITS: 100 INJECTION, SOLUTION INTRAVENOUS; SUBCUTANEOUS at 08:49

## 2022-10-01 RX ADMIN — ASPIRIN 81 MG: 81 TABLET, COATED ORAL at 09:04

## 2022-10-01 RX ADMIN — AMITRIPTYLINE HYDROCHLORIDE 50 MG: 50 TABLET, FILM COATED ORAL at 21:39

## 2022-10-01 RX ADMIN — GABAPENTIN 300 MG: 300 CAPSULE ORAL at 14:31

## 2022-10-01 RX ADMIN — Medication 10 ML: at 21:41

## 2022-10-01 RX ADMIN — LACTULOSE 20 G: 20 SOLUTION ORAL at 21:39

## 2022-10-01 RX ADMIN — LACTULOSE 20 G: 20 SOLUTION ORAL at 09:04

## 2022-10-01 RX ADMIN — METOPROLOL TARTRATE 50 MG: 50 TABLET, FILM COATED ORAL at 21:39

## 2022-10-01 RX ADMIN — OXYCODONE 5 MG: 5 TABLET ORAL at 14:35

## 2022-10-01 RX ADMIN — INSULIN DETEMIR 10 UNITS: 100 INJECTION, SOLUTION SUBCUTANEOUS at 21:39

## 2022-10-01 RX ADMIN — TAZOBACTAM SODIUM AND PIPERACILLIN SODIUM 3.38 G: 375; 3 INJECTION, SOLUTION INTRAVENOUS at 09:01

## 2022-10-01 RX ADMIN — ATORVASTATIN CALCIUM 40 MG: 40 TABLET, FILM COATED ORAL at 21:39

## 2022-10-01 RX ADMIN — OXYCODONE 5 MG: 5 TABLET ORAL at 23:22

## 2022-10-01 NOTE — PROGRESS NOTES
[facility] Medicine Services  PROGRESS NOTE    Patient Name: Kaylie Cruz  : 1965  MRN: [id number]    Date of Admission: 2022  Primary Care Physician: Iesha Harris DO    Subjective   Subjective     CC: f/u AMS    HPI: Pt states that she still has a pretty bad cough. Denies any other issues overnight.     ROS:  Gen- No fevers, chills  CV- No chest pain, palpitations  Resp- + cough, no dyspnea  GI- No N/V/D, abd pain     Objective   Objective     Vital Signs:   Temp:  [97.6 °F (36.4 °C)-98.8 °F (37.1 °C)] 98.2 °F (36.8 °C)  Heart Rate:  [75-81] 77  Resp:  [18-20] 18  BP: (135-170)/(86-98) 138/86     Physical Exam:  Constitutional: No acute distress, awake, alert, sleepy, appears much older than stated age.  HENT: NCAT, mucous membranes moist  Respiratory: Clear to auscultation bilaterally, respiratory effort normal   Cardiovascular: RRR, no murmurs, rubs, or gallops  Gastrointestinal: Positive bowel sounds, soft, NT, mildly distended  Musculoskeletal: No bilateral ankle edema  Psychiatric: flat affect, cooperative  Neurologic: Oriented x 3, strength symmetric in all extremities, Cranial Nerves grossly intact to confrontation, speech clear  Skin: No rashes    Results Reviewed:  LAB RESULTS:      Lab 10/01/22  0430 22  0349 22  0550 22  0426 22  1300 22  1026 22  1025 22  1013   WBC 12.15* 8.67 9.05 12.33*  --   --   --  15.38*   HEMOGLOBIN 8.7* 8.4* 8.6* 8.2*  --   --   --  9.3*   HEMATOCRIT 27.5* 25.9* 26.0* 24.2*  --   --   --  28.1*   PLATELETS 172 170 142 130*  --   --   --  163   NEUTROS ABS 10.08* 5.55  --  11.10*  --   --   --  14.15*   EOS ABS 0.36 0.26  --  0.00  --   --   --  0.00   MCV 82.1 81.4 81.3 79.3  --   --   --  81.0   PROCALCITONIN  --   --   --  5.71*  --  5.02*  --   --    LACTATE  --   --   --   --  1.8  --  2.5*  --    PROTIME  --   --   --  19.9*  --   --   --  19.0*   APTT  --   --   --   --   --   --    --  30.3*         Lab 10/01/22  0430 09/30/22  0349 09/29/22  0550 09/28/22  0426 09/27/22  1026   SODIUM 136 138 139 134* 132*   POTASSIUM 4.5 4.0 3.3* 3.0* 4.1   CHLORIDE 107 109* 108* 105 101   CO2 19.0* 20.0* 20.0* 20.0* 18.0*   ANION GAP 10.0 9.0 11.0 9.0 13.0   BUN 36* 43* 51* 44* 40*   CREATININE 1.44* 1.51* 1.68* 1.42* 1.32*   EGFR 42.0* 39.7* 34.9* 42.7* 46.6*   GLUCOSE 136* 149* 109* 137* 156*   CALCIUM 8.3* 8.1* 8.5* 8.0* 8.5*   MAGNESIUM  --   --   --  2.3  --          Lab 10/01/22  0430 09/30/22 0349 09/28/22  0426 09/27/22  1026   TOTAL PROTEIN 5.9* 5.6* 6.0 7.3   ALBUMIN 2.40* 2.30* 1.70* 2.30*   GLOBULIN 3.5 3.3 4.3 5.0   ALT (SGPT) 21 18 17 24   AST (SGOT) 56* 48* 44* 74*   BILIRUBIN 0.7 0.6 0.9 1.7*   ALK PHOS 131* 111 82 118*   LIPASE  --   --   --  11*         Lab 09/28/22  0426 09/27/22  1026 09/27/22  1013   PROBNP  --  6,712.0*  --    TROPONIN T  --  0.018  --    PROTIME 19.9*  --  19.0*   INR 1.69*  --  1.59*                 Lab 09/28/22  0524   PH, ARTERIAL 7.394   PCO2, ARTERIAL 34.5*   PO2 ART 62.6*   FIO2 21   HCO3 ART 21.0   BASE EXCESS ART -3.4*   CARBOXYHEMOGLOBIN 1.2     Brief Urine Lab Results  (Last result in the past 365 days)      Color   Clarity   Blood   Leuk Est   Nitrite   Protein   CREAT   Urine HCG        10/01/22 0117 Yellow   Clear   Negative   Small (1+)   Negative   30 mg/dL (1+)                 Microbiology Results Abnormal     Procedure Component Value - Date/Time    Body Fluid Culture - Body Fluid, Peritoneum [[id number]] Collected: 09/28/22 1143    Lab Status: Final result Specimen: Body Fluid from Peritoneum Updated: 10/01/22 0729     Body Fluid Culture No growth at 3 days     Gram Stain No WBCs or organisms seen    Blood Culture - Blood, Hand, Right [[id number]]  (Normal) Collected: 09/27/22 1300    Lab Status: Preliminary result Specimen: Blood from Hand, Right Updated: 09/30/22 1316     Blood Culture No growth at 3 days    Blood Culture - Blood, Arm, Right  [[id number]]  (Normal) Collected: 09/27/22 1245    Lab Status: Preliminary result Specimen: Blood from Arm, Right Updated: 09/30/22 1302     Blood Culture No growth at 3 days    Legionella Antigen, Urine - Urine, Urine, Clean Catch [[id number]]  (Normal) Collected: 09/28/22 0643    Lab Status: Final result Specimen: Urine, Clean Catch Updated: 09/28/22 1229     LEGIONELLA ANTIGEN, URINE Negative    S. Pneumo Ag Urine or CSF - Urine, Urine, Clean Catch [[id number]]  (Normal) Collected: 09/28/22 0643    Lab Status: Final result Specimen: Urine, Clean Catch Updated: 09/28/22 1229     Strep Pneumo Ag Negative          SLP FEES - Fiberoptic Endo Eval Swallow    Result Date: 9/29/2022  This procedure was auto-finalized with no dictation required.          I have reviewed the medications:  Scheduled Meds:amitriptyline, 50 mg, Oral, Nightly  amLODIPine, 5 mg, Oral, Daily  aspirin, 81 mg, Oral, Daily  atorvastatin, 40 mg, Oral, Nightly  folic acid, 1 mg, Oral, Daily  gabapentin, 300 mg, Oral, Q8H  insulin detemir, 10 Units, Subcutaneous, Nightly  insulin lispro, 0-9 Units, Subcutaneous, TID AC  lactulose, 20 g, Oral, TID  metoprolol tartrate, 50 mg, Oral, BID  piperacillin-tazobactam, 3.375 g, Intravenous, Q8H  QUEtiapine, 150 mg, Oral, Nightly  rifAXIMin, 550 mg, Oral, Q12H  sodium chloride, 10 mL, Intravenous, Q12H  thiamine, 100 mg, Oral, Daily      Continuous Infusions:   PRN Meds:.•  acetaminophen **OR** acetaminophen **OR** acetaminophen  •  albuterol  •  dextrose  •  dextrose  •  glucagon (human recombinant)  •  ondansetron **OR** ondansetron  •  oxyCODONE  •  potassium chloride **OR** potassium chloride **OR** potassium chloride  •  [COMPLETED] Insert peripheral IV **AND** sodium chloride  •  sodium chloride    Assessment & Plan   Assessment & Plan     Active Hospital Problems    Diagnosis  POA   • Pneumonia of right middle lobe due to infectious organism [J18.9]  Yes   • Sepsis, unspecified organism (HCC) [A41.9]   Unknown   • Tobacco use [Z72.0]  Yes   • Vaginal cancer (HCC) [C52]  Yes   • High grade squamous intraepithelial lesion (HGSIL) on cytologic smear of vagina [R87.623]  Yes   • Type 2 diabetes mellitus with hyperglycemia, with long-term current use of insulin (HCC) [E11.65, Z79.4]  Not Applicable   • Chronic hepatitis C without hepatic coma (HCC) [B18.2]  Yes   • Decompensated hepatic cirrhosis (HCC) [K72.90, K74.60]  Yes   • HFrEF (heart failure with reduced ejection fraction) (HCC) [I50.20]  Yes   • Coronary artery disease involving native coronary artery of native heart without angina pectoris [I25.10]  Yes   • Schizophrenia (HCC) [F20.9]  Yes   • Chronic pain disorder [G89.4]  Yes   • Primary hypertension [I10]  Yes   • Long-term current use of opiate analgesic [Z79.891]  Not Applicable   • Esophageal varices (HCC) [I85.00]  Yes      Resolved Hospital Problems   No resolved problems to display.        Brief Hospital Course to date:  Kaylie Cruz is a 57 y.o. 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    Sepsis, POA  Extensive RLL PNA   -Cultures pending, continue Zosyn D5. Leukocytosis today (but no left shift), so will continue with IV ABX- plan to transition to PO Augmentin tomorrow if WBC normal. Will do total of 10 days of ABX.   -Consulted SLP, patient states she does have trouble swallowing at times- they have signed off     Decompensated cirrhosis with ascites  History of Hepatitis C  Elevated LFTs   -Follows with GI outpatient but missed last appt. Given her AMS/elevated ammonia will resume her lactuolose and add rifaxin for now. Goal is 2-3 BMs daily. Decent BMs in last 24 hours  -Held Lasix and Spironolactone as patient appeared dry on  initial exam. Will restart Lasix today as Cr improving.  If stable tomorrow, plan to restart Spironolactone  -EGD due 04/2023 for EV screening  -- s/p LVP on 9/29 with 2.25L of ascitic fluid removed, does not appear c/w SBP culture NGTD     ANIKET  -- suspect HRS  -SCr 1.3 on arrival. Better today after two doses of Albumin IV.  Monitor with resuming her diuretic.  --Pt has not gotten Midodrine per RN, so d/c'd  -- Plan with Lasix/Spironolactone as noted above  -Repeat labs in AM     Hypokalemia  -- replaced x 1 dose due to ANIKET      Chronic HFrEF   Coronary artery disease involving native coronary artery of native heart without angina pectoris  Primary hypertension  -Echo 3/2022 - EF 49%, global hypokinesis, grade 1 diastolic dysfunction  -[facility] 4/3/2022 - nonobstructive LAD to LAD 60% in mid region  -Pt is supposed to follow with Dr Daniel, has missed appts -Continue ASA 81, metoprolol  -resumed statin as LFTs were only mildly elevated- AST slightly worse today, but still not significantly elevated     Type 2 diabetes mellitus with diabetic polyneuropathy, with long-term current use of insulin  -A1c has improved from 8.5% to 6.6% with medication compliance over the past 3 months  -Has appointment to establish with Endocrinology next month  -Continue Lantus 10 units nightly  -MDSSI for now, adjust as needed   -Decrease Gabapentin to 300mg TID in setting of ANIKET      Recent Gross hematuria  -Seen at  8/23/22 and diagnosed with UTI, treated with ABX   -Per patient the dysuria resolved but still having intermittent hematuria  -Patient has already been referred to Urology per PCP  -Repeat UA unremarkable.  -CT A/P without contrast with unremarkable bladder      Tobacco use  -Encourage smoking cessation  -PCP note says they plan to obtain PFTs outpatient as she has had some wheezing but no formal Dx of COPD  -PRN Albuterol      High grade squamous intraepithelial lesion (HGSIL) on cytologic smear of vagina  Vaginal cancer    -Diagnosed 2010? s/p radiation, surgical resection, and hysterectomy. Previously following with Dr. Roxanne Chaudhry, Gyn-Onc in [place] with [facility].   -She had vaginal pap smear with HGSIL and was lost to follow up.   -PCP has referred to GYN and gyn-onc for further evaluation      Schizophrenia, unspecified type  -Following with [place] for talk therapy  -Decrease Seroquel to 150mg nightly from 300mg due to prolonged QTc   -Continue Elavil      History of Substance abuse  -UDS positive for cocaine, oxycodone and TCA     Chronic Anemia  -H&H stable in review of chart     This patient's problems and plans were partially entered by my partner and updated as appropriate by me 10/01/22.    Expected Discharge Location and Transportation: acute rehab/[facility]   Expected Discharge Date: 10/3    DVT prophylaxis:  Mechanical DVT prophylaxis orders are present.     AM-PAC 6 Clicks Score (PT): 12 (09/30/22 2000)    CODE STATUS:   Code Status and Medical Interventions:   Ordered at: 09/27/22 1402     Level Of Support Discussed With:    Patient     Code Status (Patient has no pulse and is not breathing):    CPR (Attempt to Resuscitate)     Medical Interventions (Patient has pulse or is breathing):    Full Support       Megan Landeros MD  10/01/22

## 2022-10-01 NOTE — PROGRESS NOTES
"                    Clinical Nutrition       Patient Name: Kaylie Cruz  YOB: 1965  MRN: [id number]  Date of Encounter: 10/01/22 17:08 EDT  Admission date: 2022      Reason for Visit   \"Unsure\" unintentional weight loss      EMR  Reviewed   Yes    Height: Height: 160 cm (63\")  Weight: Weight: 52.2 kg (115 lb) (22 0905)  BMI: BMI (Calculated): 20.4    Problem:    Chronic pain disorder    Primary hypertension    Long-term current use of opiate analgesic    Coronary artery disease involving native coronary artery of native heart without angina pectoris    Schizophrenia (HCC)    Esophageal varices (HCC)    Decompensated hepatic cirrhosis (HCC)    HFrEF (heart failure with reduced ejection fraction) (HCC)    Vaginal cancer (HCC)    High grade squamous intraepithelial lesion (HGSIL) on cytologic smear of vagina    Type 2 diabetes mellitus with hyperglycemia, with long-term current use of insulin (HCC)    Chronic hepatitis C without hepatic coma (HCC)    Tobacco use    Pneumonia of right middle lobe due to infectious organism    Sepsis, unspecified organism (HCC)    () FEES - regular diet with thin liquids     Reported/Observed/Food/Nutrition Related - Comments     No recent weight loss noted per documented weight in EMR.  Good appetite during admission; average intake 75% of meals. Patient was in pain at time of visit, did not want to answer any questions. She is not sure if she lost weight. Reports eating ok.        Current Nutrition Prescription     Diet Regular; Consistent Carbohydrate  Orders Placed This Encounter      DIET MESSAGE Please send cheeseburger and fries if possible for dinner      Average Intake from [place]% x 8 meals     Nutrition Diagnosis     Problem No nutrition diagnosis at this time   Etiology    Signs/Symptoms    Status:    Actions     Follow treatment progress, Care plan reviewed, Encourage intake    Monitor Per Protocol      Caity Aguilera RD,   Time " Spent:

## 2022-10-02 ENCOUNTER — APPOINTMENT (OUTPATIENT)
Dept: GENERAL RADIOLOGY | Facility: HOSPITAL | Age: 57
End: 2022-10-02

## 2022-10-02 LAB
ALBUMIN SERPL-MCNC: 2.3 G/DL (ref 3.5–5.2)
ALBUMIN/GLOB SERPL: 0.5 G/DL
ALP SERPL-CCNC: 157 U/L (ref 39–117)
ALT SERPL W P-5'-P-CCNC: 21 U/L (ref 1–33)
ANION GAP SERPL CALCULATED.3IONS-SCNC: 10 MMOL/L (ref 5–15)
AST SERPL-CCNC: 50 U/L (ref 1–32)
BACTERIA SPEC AEROBE CULT: NO GROWTH
BACTERIA SPEC AEROBE CULT: NORMAL
BACTERIA SPEC AEROBE CULT: NORMAL
BASOPHILS # BLD MANUAL: 0 10*3/MM3 (ref 0–0.2)
BASOPHILS NFR BLD MANUAL: 0 % (ref 0–1.5)
BILIRUB SERPL-MCNC: 0.7 MG/DL (ref 0–1.2)
BUN SERPL-MCNC: 30 MG/DL (ref 6–20)
BUN/CREAT SERPL: 22.7 (ref 7–25)
CALCIUM SPEC-SCNC: 8.4 MG/DL (ref 8.6–10.5)
CHLORIDE SERPL-SCNC: 108 MMOL/L (ref 98–107)
CO2 SERPL-SCNC: 19 MMOL/L (ref 22–29)
CREAT SERPL-MCNC: 1.32 MG/DL (ref 0.57–1)
DEPRECATED RDW RBC AUTO: 50.3 FL (ref 37–54)
EGFRCR SERPLBLD CKD-EPI 2021: 46.6 ML/MIN/1.73
EOSINOPHIL # BLD MANUAL: 0.16 10*3/MM3 (ref 0–0.4)
EOSINOPHIL NFR BLD MANUAL: 1 % (ref 0.3–6.2)
ERYTHROCYTE [DISTWIDTH] IN BLOOD BY AUTOMATED COUNT: 16.9 % (ref 12.3–15.4)
GLOBULIN UR ELPH-MCNC: 4.5 GM/DL
GLUCOSE BLDC GLUCOMTR-MCNC: 122 MG/DL (ref 70–130)
GLUCOSE BLDC GLUCOMTR-MCNC: 182 MG/DL (ref 70–130)
GLUCOSE BLDC GLUCOMTR-MCNC: 228 MG/DL (ref 70–130)
GLUCOSE BLDC GLUCOMTR-MCNC: 244 MG/DL (ref 70–130)
GLUCOSE SERPL-MCNC: 203 MG/DL (ref 65–99)
HCT VFR BLD AUTO: 27.7 % (ref 34–46.6)
HGB BLD-MCNC: 8.7 G/DL (ref 12–15.9)
LYMPHOCYTES # BLD MANUAL: 1.3 10*3/MM3 (ref 0.7–3.1)
LYMPHOCYTES NFR BLD MANUAL: 3 % (ref 5–12)
MCH RBC QN AUTO: 26 PG (ref 26.6–33)
MCHC RBC AUTO-ENTMCNC: 31.4 G/DL (ref 31.5–35.7)
MCV RBC AUTO: 82.9 FL (ref 79–97)
METAMYELOCYTES NFR BLD MANUAL: 3 % (ref 0–0)
MONOCYTES # BLD: 0.49 10*3/MM3 (ref 0.1–0.9)
MRSA DNA SPEC QL NAA+PROBE: POSITIVE
NEUTROPHILS # BLD AUTO: 13.85 10*3/MM3 (ref 1.7–7)
NEUTROPHILS NFR BLD MANUAL: 79 % (ref 42.7–76)
NEUTS BAND NFR BLD MANUAL: 6 % (ref 0–5)
NRBC SPEC MANUAL: 0 /100 WBC (ref 0–0.2)
OVALOCYTES BLD QL SMEAR: ABNORMAL
PLAT MORPH BLD: NORMAL
PLATELET # BLD AUTO: 194 10*3/MM3 (ref 140–450)
PMV BLD AUTO: 10.6 FL (ref 6–12)
POTASSIUM SERPL-SCNC: 4.4 MMOL/L (ref 3.5–5.2)
PROCALCITONIN SERPL-MCNC: 0.88 NG/ML (ref 0–0.25)
PROT SERPL-MCNC: 6.8 G/DL (ref 6–8.5)
RBC # BLD AUTO: 3.34 10*6/MM3 (ref 3.77–5.28)
SODIUM SERPL-SCNC: 137 MMOL/L (ref 136–145)
TOXIC GRANULATION: ABNORMAL
VARIANT LYMPHS NFR BLD MANUAL: 1 % (ref 0–5)
VARIANT LYMPHS NFR BLD MANUAL: 7 % (ref 19.6–45.3)
WBC NRBC COR # BLD: 16.29 10*3/MM3 (ref 3.4–10.8)

## 2022-10-02 PROCEDURE — 99233 SBSQ HOSP IP/OBS HIGH 50: CPT | Performed by: INTERNAL MEDICINE

## 2022-10-02 PROCEDURE — 63710000001 INSULIN LISPRO (HUMAN) PER 5 UNITS: Performed by: FAMILY MEDICINE

## 2022-10-02 PROCEDURE — 63710000001 INSULIN DETEMIR PER 5 UNITS: Performed by: INTERNAL MEDICINE

## 2022-10-02 PROCEDURE — 63710000001 ONDANSETRON PER 8 MG: Performed by: FAMILY MEDICINE

## 2022-10-02 PROCEDURE — 84145 PROCALCITONIN (PCT): CPT | Performed by: INTERNAL MEDICINE

## 2022-10-02 PROCEDURE — 82962 GLUCOSE BLOOD TEST: CPT

## 2022-10-02 PROCEDURE — 87641 MR-STAPH DNA AMP PROBE: CPT | Performed by: INTERNAL MEDICINE

## 2022-10-02 PROCEDURE — 85007 BL SMEAR W/DIFF WBC COUNT: CPT | Performed by: INTERNAL MEDICINE

## 2022-10-02 PROCEDURE — 63710000001 INSULIN LISPRO (HUMAN) PER 5 UNITS: Performed by: INTERNAL MEDICINE

## 2022-10-02 PROCEDURE — 25010000002 PIPERACILLIN SOD-TAZOBACTAM PER 1 G: Performed by: FAMILY MEDICINE

## 2022-10-02 PROCEDURE — 85025 COMPLETE CBC W/AUTO DIFF WBC: CPT | Performed by: INTERNAL MEDICINE

## 2022-10-02 PROCEDURE — 80053 COMPREHEN METABOLIC PANEL: CPT | Performed by: INTERNAL MEDICINE

## 2022-10-02 PROCEDURE — 25010000002 VANCOMYCIN PER 500 MG

## 2022-10-02 PROCEDURE — 71045 X-RAY EXAM CHEST 1 VIEW: CPT

## 2022-10-02 RX ORDER — BENZONATATE 100 MG/1
200 CAPSULE ORAL 3 TIMES DAILY PRN
Status: DISCONTINUED | OUTPATIENT
Start: 2022-10-02 | End: 2022-10-12 | Stop reason: HOSPADM

## 2022-10-02 RX ORDER — INSULIN LISPRO 100 [IU]/ML
5 INJECTION, SOLUTION INTRAVENOUS; SUBCUTANEOUS
Status: DISCONTINUED | OUTPATIENT
Start: 2022-10-02 | End: 2022-10-12 | Stop reason: HOSPADM

## 2022-10-02 RX ORDER — VANCOMYCIN HYDROCHLORIDE 1 G/200ML
1000 INJECTION, SOLUTION INTRAVENOUS ONCE
Status: COMPLETED | OUTPATIENT
Start: 2022-10-02 | End: 2022-10-02

## 2022-10-02 RX ORDER — OXYCODONE HYDROCHLORIDE 5 MG/1
10 TABLET ORAL EVERY 4 HOURS PRN
Status: DISPENSED | OUTPATIENT
Start: 2022-10-02 | End: 2022-10-04

## 2022-10-02 RX ADMIN — GABAPENTIN 300 MG: 300 CAPSULE ORAL at 21:53

## 2022-10-02 RX ADMIN — TAZOBACTAM SODIUM AND PIPERACILLIN SODIUM 3.38 G: 375; 3 INJECTION, SOLUTION INTRAVENOUS at 15:59

## 2022-10-02 RX ADMIN — METOPROLOL TARTRATE 50 MG: 50 TABLET, FILM COATED ORAL at 21:53

## 2022-10-02 RX ADMIN — INSULIN LISPRO 5 UNITS: 100 INJECTION, SOLUTION INTRAVENOUS; SUBCUTANEOUS at 16:14

## 2022-10-02 RX ADMIN — INSULIN LISPRO 2 UNITS: 100 INJECTION, SOLUTION INTRAVENOUS; SUBCUTANEOUS at 16:14

## 2022-10-02 RX ADMIN — RIFAXIMIN 550 MG: 550 TABLET ORAL at 21:53

## 2022-10-02 RX ADMIN — VANCOMYCIN HYDROCHLORIDE 1000 MG: 1 INJECTION, SOLUTION INTRAVENOUS at 14:47

## 2022-10-02 RX ADMIN — INSULIN LISPRO 4 UNITS: 100 INJECTION, SOLUTION INTRAVENOUS; SUBCUTANEOUS at 10:32

## 2022-10-02 RX ADMIN — AMITRIPTYLINE HYDROCHLORIDE 50 MG: 50 TABLET, FILM COATED ORAL at 21:53

## 2022-10-02 RX ADMIN — QUETIAPINE FUMARATE 150 MG: 100 TABLET ORAL at 21:53

## 2022-10-02 RX ADMIN — METOPROLOL TARTRATE 50 MG: 50 TABLET, FILM COATED ORAL at 11:52

## 2022-10-02 RX ADMIN — TAZOBACTAM SODIUM AND PIPERACILLIN SODIUM 3.38 G: 375; 3 INJECTION, SOLUTION INTRAVENOUS at 10:30

## 2022-10-02 RX ADMIN — ATORVASTATIN CALCIUM 40 MG: 40 TABLET, FILM COATED ORAL at 21:53

## 2022-10-02 RX ADMIN — Medication 10 ML: at 21:54

## 2022-10-02 RX ADMIN — OXYCODONE 5 MG: 5 TABLET ORAL at 05:20

## 2022-10-02 RX ADMIN — THIAMINE HCL TAB 100 MG 100 MG: 100 TAB at 11:52

## 2022-10-02 RX ADMIN — GABAPENTIN 300 MG: 300 CAPSULE ORAL at 13:37

## 2022-10-02 RX ADMIN — GABAPENTIN 300 MG: 300 CAPSULE ORAL at 05:20

## 2022-10-02 RX ADMIN — Medication 10 ML: at 10:29

## 2022-10-02 RX ADMIN — INSULIN LISPRO 4 UNITS: 100 INJECTION, SOLUTION INTRAVENOUS; SUBCUTANEOUS at 13:32

## 2022-10-02 RX ADMIN — FUROSEMIDE 40 MG: 40 TABLET ORAL at 11:52

## 2022-10-02 RX ADMIN — INSULIN LISPRO 5 UNITS: 100 INJECTION, SOLUTION INTRAVENOUS; SUBCUTANEOUS at 13:32

## 2022-10-02 RX ADMIN — FOLIC ACID 1 MG: 1 TABLET ORAL at 11:52

## 2022-10-02 RX ADMIN — LACTULOSE 20 G: 20 SOLUTION ORAL at 11:52

## 2022-10-02 RX ADMIN — RIFAXIMIN 550 MG: 550 TABLET ORAL at 11:52

## 2022-10-02 RX ADMIN — ONDANSETRON HYDROCHLORIDE 4 MG: 4 TABLET, FILM COATED ORAL at 13:37

## 2022-10-02 RX ADMIN — OXYCODONE 10 MG: 5 TABLET ORAL at 21:53

## 2022-10-02 RX ADMIN — INSULIN DETEMIR 15 UNITS: 100 INJECTION, SOLUTION SUBCUTANEOUS at 21:55

## 2022-10-02 RX ADMIN — ASPIRIN 81 MG: 81 TABLET, COATED ORAL at 11:52

## 2022-10-02 RX ADMIN — AMLODIPINE BESYLATE 5 MG: 5 TABLET ORAL at 11:52

## 2022-10-02 RX ADMIN — OXYCODONE 10 MG: 5 TABLET ORAL at 13:37

## 2022-10-02 NOTE — PROGRESS NOTES
Pharmacy Consult-Vancomycin Dosing  Kaylie Cruz is a  57 y.o. female receiving vancomycin therapy.     Indication: sepsis  Consulting Provider: hospitalist  ID Consult: no    Goal Trough:  15-20    Current Antimicrobial Therapy  Anti-Infectives (From admission, onward)      Ordered     Dose/Rate Route Frequency Start Stop    10/02/22 1022  Pharmacy to dose vancomycin        Ordering Provider: Megan Landeros MD     Does not apply Continuous PRN 10/02/22 1021 10/09/22 1020    09/28/22 0733  riFAXIMin (XIFAXAN) tablet 550 mg        Ordering Provider: Megan Landeros MD    550 mg Oral Every 12 Hours Scheduled 09/28/22 0900 10/07/22 2359    09/27/22 1402  piperacillin-tazobactam (ZOSYN) 3.375 g in iso-osmotic dextrose 50 ml (premix)        Ordering Provider: Nidia Rangel DO    3.375 g  over 4 Hours Intravenous Every 8 Hours 09/27/22 2100 10/04/22 2359    09/27/22 1402  piperacillin-tazobactam (ZOSYN) 3.375 g in iso-osmotic dextrose 50 ml (premix)        Ordering Provider: Nidia Rangel DO    3.375 g  over 30 Minutes Intravenous Once 09/27/22 1500 09/27/22 1627    09/27/22 1057  cefTRIAXone (ROCEPHIN) 2 g/100 mL 0.9% NS IVPB (MBP)        Ordering Provider: Stephenie Martinez PA    2 g  over 30 Minutes Intravenous Once 09/27/22 1059 09/27/22 1340    09/27/22 1057  AZITHROMYCIN 500 MG/250 ML 0.9% NS IVPB (vial-mate)        Ordering Provider: Stephenie Martinez PA    500 mg  over 60 Minutes Intravenous Once 09/27/22 1059 09/27/22 1446            Allergies  Allergies as of 09/27/2022 - Reviewed 09/27/2022   Allergen Reaction Noted    Codeine Hives 03/27/2019    Morphine Hives 03/27/2019    Tagamet [cimetidine] Other (See Comments) 03/27/2019    Toradol [ketorolac tromethamine] Hives 03/27/2019       Labs    Results from last 7 days   Lab Units 10/02/22  0814 10/01/22  0430 09/30/22  0349   BUN mg/dL 30* 36* 43*   CREATININE mg/dL 1.32* 1.44* 1.51*       Results from last 7 days   Lab Units  "10/02/22  0814 10/01/22  0430 09/30/22  0349   WBC 10*3/mm3 16.29* 12.15* 8.67       Evaluation of Dosing     Last Dose Received in the ED/Outside Facility: n/a  Is Patient on Dialysis or Renal Replacement: n/a    Ht - 160 cm (63\")  Wt - 52.2 kg (115 lb)    Estimated Creatinine Clearance: 37.8 mL/min (A) (by C-G formula based on SCr of 1.32 mg/dL (H)).    Intake & Output (last 3 days)         09/29 0701  09/30 0700 09/30 0701  10/01 0700 10/01 0701  10/02 0700 10/02 0701  10/03 0700    P.O. 480 1080 480 360    IV Piggyback 50 150      Total Intake(mL/kg) 530 (10.2) 1230 (23.6) 480 (9.2) 360 (6.9)    Urine (mL/kg/hr) 100 (0.1) 550 (0.4) 0 (0)     Emesis/NG output   97.9     Stool 0 100 0     Total Output 100 650 97.9     Net +430 +580 +382.1 +360            Urine Unmeasured Occurrence 1 x 2 x 3 x 1 x    Stool Unmeasured Occurrence  4 x 9 x             Microbiology and Radiology  Microbiology Results (last 10 days)       Procedure Component Value - Date/Time    Body Fluid Culture - Body Fluid, Peritoneum [[id number]] Collected: 09/28/22 1143    Lab Status: Final result Specimen: Body Fluid from Peritoneum Updated: 10/01/22 0729     Body Fluid Culture No growth at 3 days     Gram Stain No WBCs or organisms seen    S. Pneumo Ag Urine or CSF - Urine, Urine, Clean Catch [[id number]]  (Normal) Collected: 09/28/22 0643    Lab Status: Final result Specimen: Urine, Clean Catch Updated: 09/28/22 1229     Strep Pneumo Ag Negative    Legionella Antigen, Urine - Urine, Urine, Clean Catch [[id number]]  (Normal) Collected: 09/28/22 0643    Lab Status: Final result Specimen: Urine, Clean Catch Updated: 09/28/22 1229     LEGIONELLA ANTIGEN, URINE Negative    Blood Culture - Blood, Hand, Right [[id number]]  (Normal) Collected: 09/27/22 1300    Lab Status: Preliminary result Specimen: Blood from Hand, Right Updated: 10/01/22 1317     Blood Culture No growth at 4 days    Blood Culture - Blood, Arm, Right [[id number]]  (Normal) Collected: " 09/27/22 1245    Lab Status: Preliminary result Specimen: Blood from Arm, Right Updated: 10/01/22 1303     Blood Culture No growth at 4 days            Vancomycin Levels:                        Assessment/Plan    Pharmacy to dose vancomycin for sepsis.  Patient appears to have unstable renal function with a current serum creatinine of 1.32 (from 1.44), will order doses per levels for now until renal function is stable.  Will give a loading dose of 1000mg (19.1mg/kg) and order a trough level with AM labs on 10/3.  Pharmacy will continue to follow and adjust doses as clinically indicated.    Thank you for this consult,  Moose Gonzalez, PharmD  Pharmacy Resident  10/2/2022  10:32 EDT

## 2022-10-02 NOTE — PROGRESS NOTES
[facility] Medicine Services  PROGRESS NOTE    Patient Name: Kaylie Cruz  : 1965  MRN: [id number]    Date of Admission: 2022  Primary Care Physician: Iesha Harris DO    Subjective   Subjective     CC: f/u AMS    HPI: Pt complains of pain with coughing.  States that she isn't getting anything for her cough.     ROS:  Gen- No fevers, chills  CV- No chest pain, palpitations  Resp- + cough, no dyspnea  GI- No N/V/D, abd pain     Objective   Objective     Vital Signs:   Temp:  [96.9 °F (36.1 °C)-99 °F (37.2 °C)] 98 °F (36.7 °C)  Heart Rate:  [70-75] 75  Resp:  [18-20] 18  BP: (122-145)/(77-93) 131/93     Physical Exam:  Constitutional: No acute distress, awake, alert, sleepy, appears much older than stated age.  HENT: NCAT, mucous membranes moist  Respiratory: Clear to auscultation bilaterally, respiratory effort normal   Cardiovascular: RRR, no murmurs, rubs, or gallops  Gastrointestinal: Positive bowel sounds, soft, NT, mildly distended  Musculoskeletal: No bilateral ankle edema  Psychiatric: flat affect, cooperative  Neurologic: Oriented x 3, strength symmetric in all extremities, Cranial Nerves grossly intact to confrontation, speech clear  Skin: No rashes    Results Reviewed:  LAB RESULTS:      Lab 10/02/22  0814 10/01/22  0430 22  0349 22  0550 22  0426 22  1300 22  1026 22  1025 22  1013   WBC 16.29* 12.15* 8.67 9.05 12.33*  --   --   --  15.38*   HEMOGLOBIN 8.7* 8.7* 8.4* 8.6* 8.2*  --   --   --  9.3*   HEMATOCRIT 27.7* 27.5* 25.9* 26.0* 24.2*  --   --   --  28.1*   PLATELETS 194 172 170 142 130*  --   --   --  163   NEUTROS ABS 13.85* 10.08* 5.55  --  11.10*  --   --   --  14.15*   EOS ABS 0.16 0.36 0.26  --  0.00  --   --   --  0.00   MCV 82.9 82.1 81.4 81.3 79.3  --   --   --  81.0   PROCALCITONIN  --   --   --   --  5.71*  --  5.02*  --   --    LACTATE  --   --   --   --   --  1.8  --  2.5*  --    PROTIME  --   --    --   --  19.9*  --   --   --  19.0*   APTT  --   --   --   --   --   --   --   --  30.3*         Lab 10/02/22  0814 10/01/22  0430 09/30/22  0349 09/29/22  0550 09/28/22  0426   SODIUM 137 136 138 139 134*   POTASSIUM 4.4 4.5 4.0 3.3* 3.0*   CHLORIDE 108* 107 109* 108* 105   CO2 19.0* 19.0* 20.0* 20.0* 20.0*   ANION GAP 10.0 10.0 9.0 11.0 9.0   BUN 30* 36* 43* 51* 44*   CREATININE 1.32* 1.44* 1.51* 1.68* 1.42*   EGFR 46.6* 42.0* 39.7* 34.9* 42.7*   GLUCOSE 203* 136* 149* 109* 137*   CALCIUM 8.4* 8.3* 8.1* 8.5* 8.0*   MAGNESIUM  --   --   --   --  2.3         Lab 10/02/22  0814 10/01/22  0430 09/30/22  0349 09/28/22  0426 09/27/22  1026   TOTAL PROTEIN 6.8 5.9* 5.6* 6.0 7.3   ALBUMIN 2.30* 2.40* 2.30* 1.70* 2.30*   GLOBULIN 4.5 3.5 3.3 4.3 5.0   ALT (SGPT) 21 21 18 17 24   AST (SGOT) 50* 56* 48* 44* 74*   BILIRUBIN 0.7 0.7 0.6 0.9 1.7*   ALK PHOS 157* 131* 111 82 118*   LIPASE  --   --   --   --  11*         Lab 09/28/22  0426 09/27/22  1026 09/27/22  1013   PROBNP  --  6,712.0*  --    TROPONIN T  --  0.018  --    PROTIME 19.9*  --  19.0*   INR 1.69*  --  1.59*                 Lab 09/28/22  0524   PH, ARTERIAL 7.394   PCO2, ARTERIAL 34.5*   PO2 ART 62.6*   FIO2 21   HCO3 ART 21.0   BASE EXCESS ART -3.4*   CARBOXYHEMOGLOBIN 1.2     Brief Urine Lab Results  (Last result in the past 365 days)      Color   Clarity   Blood   Leuk Est   Nitrite   Protein   CREAT   Urine HCG        10/01/22 0117 Yellow   Clear   Negative   Small (1+)   Negative   30 mg/dL (1+)                 Microbiology Results Abnormal     Procedure Component Value - Date/Time    Blood Culture - Blood, Hand, Right [[id number]]  (Normal) Collected: 09/27/22 1300    Lab Status: Preliminary result Specimen: Blood from Hand, Right Updated: 10/01/22 1317     Blood Culture No growth at 4 days    Blood Culture - Blood, Arm, Right [[id number]]  (Normal) Collected: 09/27/22 1245    Lab Status: Preliminary result Specimen: Blood from Arm, Right Updated: 10/01/22  1303     Blood Culture No growth at 4 days    Body Fluid Culture - Body Fluid, Peritoneum [[id number]] Collected: 09/28/22 1143    Lab Status: Final result Specimen: Body Fluid from Peritoneum Updated: 10/01/22 0729     Body Fluid Culture No growth at 3 days     Gram Stain No WBCs or organisms seen    Legionella Antigen, Urine - Urine, Urine, Clean Catch [[id number]]  (Normal) Collected: 09/28/22 0643    Lab Status: Final result Specimen: Urine, Clean Catch Updated: 09/28/22 1229     LEGIONELLA ANTIGEN, URINE Negative    S. Pneumo Ag Urine or CSF - Urine, Urine, Clean Catch [[id number]]  (Normal) Collected: 09/28/22 0643    Lab Status: Final result Specimen: Urine, Clean Catch Updated: 09/28/22 1229     Strep Pneumo Ag Negative          No radiology results from the last 24 hrs        I have reviewed the medications:  Scheduled Meds:amitriptyline, 50 mg, Oral, Nightly  amLODIPine, 5 mg, Oral, Daily  aspirin, 81 mg, Oral, Daily  atorvastatin, 40 mg, Oral, Nightly  folic acid, 1 mg, Oral, Daily  furosemide, 40 mg, Oral, Daily  gabapentin, 300 mg, Oral, Q8H  insulin detemir, 10 Units, Subcutaneous, Nightly  insulin lispro, 0-9 Units, Subcutaneous, TID AC  lactulose, 20 g, Oral, TID  metoprolol tartrate, 50 mg, Oral, BID  piperacillin-tazobactam, 3.375 g, Intravenous, Q8H  QUEtiapine, 150 mg, Oral, Nightly  rifAXIMin, 550 mg, Oral, Q12H  sodium chloride, 10 mL, Intravenous, Q12H  thiamine, 100 mg, Oral, Daily      Continuous Infusions:Pharmacy to dose vancomycin,       PRN Meds:.•  acetaminophen **OR** acetaminophen **OR** acetaminophen  •  albuterol  •  dextrose  •  dextrose  •  glucagon (human recombinant)  •  ondansetron **OR** ondansetron  •  oxyCODONE  •  Pharmacy to dose vancomycin  •  potassium chloride **OR** potassium chloride **OR** potassium chloride  •  [COMPLETED] Insert peripheral IV **AND** sodium chloride  •  sodium chloride    Assessment & Plan   Assessment & Plan     Active Hospital Problems    Diagnosis   POA   • Pneumonia of right middle lobe due to infectious organism [J18.9]  Yes   • Sepsis, unspecified organism (HCC) [A41.9]  Unknown   • Tobacco use [Z72.0]  Yes   • Vaginal cancer (HCC) [C52]  Yes   • High grade squamous intraepithelial lesion (HGSIL) on cytologic smear of vagina [R87.623]  Yes   • Type 2 diabetes mellitus with hyperglycemia, with long-term current use of insulin (HCC) [E11.65, Z79.4]  Not Applicable   • Chronic hepatitis C without hepatic coma (HCC) [B18.2]  Yes   • Decompensated hepatic cirrhosis (HCC) [K72.90, K74.60]  Yes   • HFrEF (heart failure with reduced ejection fraction) (HCC) [I50.20]  Yes   • Coronary artery disease involving native coronary artery of native heart without angina pectoris [I25.10]  Yes   • Schizophrenia (HCC) [F20.9]  Yes   • Chronic pain disorder [G89.4]  Yes   • Primary hypertension [I10]  Yes   • Long-term current use of opiate analgesic [Z79.891]  Not Applicable   • Esophageal varices (HCC) [I85.00]  Yes      Resolved Hospital Problems   No resolved problems to display.        Brief Hospital Course to date:  Kaylie Cruz is a 57 y.o. 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    Sepsis, POA  Extensive RLL PNA   -Cultures pending, continue Zosyn D6. Leukocytosis worse today- had resolved then was elevated again yesterday and worse again today  -- Send MRSA PCR  -- repeat CXR   -- add on Vanc for now  -- UA from 10/1 shows pyuria and small LE.  UCx PENDING.  No prior UCx with growth per our records, but will add Vanc in case she has an Vanc-susceptible Enterococcus UTI (which would explain her worsening white count).    -- repeat procal as well   -Consulted SLP, patient states she does have trouble  swallowing at times- they have signed off     Decompensated cirrhosis with ascites  History of Hepatitis C  Elevated LFTs   -Follows with GI outpatient but missed last appt. Given her AMS/elevated ammonia will resume her lactulose and added rifaxin for now. Goal is 2-3 BMs daily. Decent BMs in last 24 hours  -Held Lasix and Spironolactone as patient appeared dry on initial exam. Restarted Lasix- Cr stable.  Plan to restart Spironolactone likely upon d/c  -EGD due 04/2023 for EV screening  -- s/p LVP on 9/29 with 2.25L of ascitic fluid removed, does not appear c/w SBP culture NGTD     ANIKET  -- suspect HRS  -SCr 1.3 on arrival. Went up to 1.68. Better today after two doses of Albumin IV.  Monitor with resuming her diuretic.  --Pt has not gotten Midodrine per RN, so d/c'd  -- Plan with Lasix/Spironolactone as noted above  -Repeat labs in AM     Hypokalemia  -- replaced x 1 dose due to ANIKET      Chronic HFrEF   Coronary artery disease involving native coronary artery of native heart without angina pectoris  Primary hypertension  -Echo 3/2022 - EF 49%, global hypokinesis, grade 1 diastolic dysfunction  -LHC 4/3/2022 - nonobstructive LAD to LAD 60% in mid region  -Pt is supposed to follow with Dr Daniel, has missed appts -Continue ASA 81, metoprolol  -resumed statin as LFTs were only mildly elevated     Type 2 diabetes mellitus with diabetic polyneuropathy, with long-term current use of insulin  -A1c has improved from 8.5% to 6.6% with medication compliance over the past 3 months  -Has appointment to establish with Endocrinology next month  -Continue Lantus 10 units nightly  -MDSSI for now, adjust as needed   -Decrease Gabapentin to 300mg TID in setting of ANIKET   -- on chronic Percocet 10mg and has only been getting 5mg PO here. Did request her pain medication today, so will increase back to home dose.      Recent Gross hematuria  -Seen at  8/23/22 and diagnosed with UTI, treated with ABX   -Per patient the dysuria resolved  but still having intermittent hematuria  -Patient has already been referred to Urology per PCP  -Repeat UA unremarkable on admission, however, UA from 10/1 as noted above. ABX as noted above  -CT A/P without contrast with unremarkable bladder      Tobacco use  -Encourage smoking cessation  -PCP note says they plan to obtain PFTs outpatient as she has had some wheezing but no formal Dx of COPD  -PRN Albuterol      High grade squamous intraepithelial lesion (HGSIL) on cytologic smear of vagina  Vaginal cancer   -Diagnosed 2010? s/p radiation, surgical resection, and hysterectomy. Previously following with Dr. Roxanne Chaudhry, Gyn-Onc in [place] with [facility].   -She had vaginal pap smear with HGSIL and was lost to follow up.   -PCP has referred to GYN and gyn-onc for further evaluation      Schizophrenia, unspecified type  -Following with [place] for talk therapy  -Decrease Seroquel to 150mg nightly from 300mg due to prolonged QTc   -Continue Elavil      History of Substance abuse  -UDS positive for cocaine, oxycodone and TCA     Chronic Anemia  -H&H stable in review of chart     This patient's problems and plans were partially entered by my partner and updated as appropriate by me 10/02/22.    Expected Discharge Location and Transportation: acute rehab/[facility]   Expected Discharge Date: 10/3    DVT prophylaxis:  Mechanical DVT prophylaxis orders are present.     AM-PAC 6 Clicks Score (PT): 12 (10/01/22 2000)    CODE STATUS:   Code Status and Medical Interventions:   Ordered at: 09/27/22 1402     Level Of Support Discussed With:    Patient     Code Status (Patient has no pulse and is not breathing):    CPR (Attempt to Resuscitate)     Medical Interventions (Patient has pulse or is breathing):    Full Support       Megan Landeros MD  10/02/22

## 2022-10-03 ENCOUNTER — APPOINTMENT (OUTPATIENT)
Dept: GENERAL RADIOLOGY | Facility: HOSPITAL | Age: 57
End: 2022-10-03

## 2022-10-03 ENCOUNTER — APPOINTMENT (OUTPATIENT)
Dept: CT IMAGING | Facility: HOSPITAL | Age: 57
End: 2022-10-03

## 2022-10-03 LAB
ALBUMIN SERPL-MCNC: 2.1 G/DL (ref 3.5–5.2)
ALBUMIN/GLOB SERPL: 0.5 G/DL
ALP SERPL-CCNC: 153 U/L (ref 39–117)
ALT SERPL W P-5'-P-CCNC: 17 U/L (ref 1–33)
AMMONIA BLD-SCNC: 29 UMOL/L (ref 11–51)
ANION GAP SERPL CALCULATED.3IONS-SCNC: 11 MMOL/L (ref 5–15)
ARTERIAL PATENCY WRIST A: ABNORMAL
AST SERPL-CCNC: 49 U/L (ref 1–32)
ATMOSPHERIC PRESS: ABNORMAL MM[HG]
B PARAPERT DNA SPEC QL NAA+PROBE: NOT DETECTED
B PERT DNA SPEC QL NAA+PROBE: NOT DETECTED
BASE EXCESS BLDA CALC-SCNC: -2.7 MMOL/L (ref 0–2)
BASOPHILS # BLD AUTO: 0.11 10*3/MM3 (ref 0–0.2)
BASOPHILS NFR BLD AUTO: 0.6 % (ref 0–1.5)
BDY SITE: ABNORMAL
BILIRUB SERPL-MCNC: 0.7 MG/DL (ref 0–1.2)
BODY TEMPERATURE: 37 C
BUN SERPL-MCNC: 30 MG/DL (ref 6–20)
BUN/CREAT SERPL: 19.4 (ref 7–25)
C PNEUM DNA NPH QL NAA+NON-PROBE: NOT DETECTED
CALCIUM SPEC-SCNC: 8.2 MG/DL (ref 8.6–10.5)
CHLORIDE SERPL-SCNC: 103 MMOL/L (ref 98–107)
CO2 BLDA-SCNC: 22.4 MMOL/L (ref 22–33)
CO2 SERPL-SCNC: 19 MMOL/L (ref 22–29)
COHGB MFR BLD: 1.3 % (ref 0–2)
CREAT SERPL-MCNC: 1.55 MG/DL (ref 0.57–1)
DEPRECATED RDW RBC AUTO: 49 FL (ref 37–54)
EGFRCR SERPLBLD CKD-EPI 2021: 38.4 ML/MIN/1.73
EOSINOPHIL # BLD AUTO: 0.35 10*3/MM3 (ref 0–0.4)
EOSINOPHIL NFR BLD AUTO: 1.8 % (ref 0.3–6.2)
EPAP: 0
ERYTHROCYTE [DISTWIDTH] IN BLOOD BY AUTOMATED COUNT: 16.9 % (ref 12.3–15.4)
FLUAV SUBTYP SPEC NAA+PROBE: NOT DETECTED
FLUBV RNA ISLT QL NAA+PROBE: NOT DETECTED
GLOBULIN UR ELPH-MCNC: 4.5 GM/DL
GLUCOSE BLDC GLUCOMTR-MCNC: 137 MG/DL (ref 70–130)
GLUCOSE BLDC GLUCOMTR-MCNC: 138 MG/DL (ref 70–130)
GLUCOSE BLDC GLUCOMTR-MCNC: 170 MG/DL (ref 70–130)
GLUCOSE BLDC GLUCOMTR-MCNC: 218 MG/DL (ref 70–130)
GLUCOSE BLDC GLUCOMTR-MCNC: 377 MG/DL (ref 70–130)
GLUCOSE SERPL-MCNC: 123 MG/DL (ref 65–99)
HADV DNA SPEC NAA+PROBE: NOT DETECTED
HCO3 BLDA-SCNC: 21.4 MMOL/L (ref 20–26)
HCOV 229E RNA SPEC QL NAA+PROBE: NOT DETECTED
HCOV HKU1 RNA SPEC QL NAA+PROBE: NOT DETECTED
HCOV NL63 RNA SPEC QL NAA+PROBE: NOT DETECTED
HCOV OC43 RNA SPEC QL NAA+PROBE: NOT DETECTED
HCT VFR BLD AUTO: 27.4 % (ref 34–46.6)
HCT VFR BLD CALC: 26.5 % (ref 38–51)
HGB BLD-MCNC: 8.9 G/DL (ref 12–15.9)
HGB BLDA-MCNC: 8.6 G/DL (ref 14–18)
HMPV RNA NPH QL NAA+NON-PROBE: NOT DETECTED
HPIV1 RNA ISLT QL NAA+PROBE: NOT DETECTED
HPIV2 RNA SPEC QL NAA+PROBE: NOT DETECTED
HPIV3 RNA NPH QL NAA+PROBE: NOT DETECTED
HPIV4 P GENE NPH QL NAA+PROBE: NOT DETECTED
IMM GRANULOCYTES # BLD AUTO: 0.83 10*3/MM3 (ref 0–0.05)
IMM GRANULOCYTES NFR BLD AUTO: 4.3 % (ref 0–0.5)
INHALED O2 CONCENTRATION: 40 %
IPAP: 0
LYMPHOCYTES # BLD AUTO: 1.55 10*3/MM3 (ref 0.7–3.1)
LYMPHOCYTES NFR BLD AUTO: 7.9 % (ref 19.6–45.3)
M PNEUMO IGG SER IA-ACNC: NOT DETECTED
MCH RBC QN AUTO: 26.4 PG (ref 26.6–33)
MCHC RBC AUTO-ENTMCNC: 32.5 G/DL (ref 31.5–35.7)
MCV RBC AUTO: 81.3 FL (ref 79–97)
METHGB BLD QL: 0.1 % (ref 0–1.5)
MODALITY: ABNORMAL
MONOCYTES # BLD AUTO: 0.71 10*3/MM3 (ref 0.1–0.9)
MONOCYTES NFR BLD AUTO: 3.6 % (ref 5–12)
NEUTROPHILS NFR BLD AUTO: 15.96 10*3/MM3 (ref 1.7–7)
NEUTROPHILS NFR BLD AUTO: 81.8 % (ref 42.7–76)
NOTE: ABNORMAL
NRBC BLD AUTO-RTO: 0 /100 WBC (ref 0–0.2)
OXYHGB MFR BLDV: 86.1 % (ref 94–99)
PAW @ PEAK INSP FLOW SETTING VENT: 0 CMH2O
PCO2 BLDA: 33.1 MM HG (ref 35–45)
PCO2 TEMP ADJ BLD: 33.1 MM HG (ref 35–45)
PH BLDA: 7.42 PH UNITS (ref 7.35–7.45)
PH, TEMP CORRECTED: 7.42 PH UNITS
PLATELET # BLD AUTO: 176 10*3/MM3 (ref 140–450)
PMV BLD AUTO: 11.5 FL (ref 6–12)
PO2 BLDA: 53.7 MM HG (ref 83–108)
PO2 TEMP ADJ BLD: 53.7 MM HG (ref 83–108)
POTASSIUM SERPL-SCNC: 4.2 MMOL/L (ref 3.5–5.2)
PROT SERPL-MCNC: 6.6 G/DL (ref 6–8.5)
RBC # BLD AUTO: 3.37 10*6/MM3 (ref 3.77–5.28)
RHINOVIRUS RNA SPEC NAA+PROBE: NOT DETECTED
RSV RNA NPH QL NAA+NON-PROBE: NOT DETECTED
SARS-COV-2 RNA NPH QL NAA+NON-PROBE: NOT DETECTED
SODIUM SERPL-SCNC: 133 MMOL/L (ref 136–145)
TOTAL RATE: 0 BREATHS/MINUTE
VANCOMYCIN TROUGH SERPL-MCNC: 11.5 MCG/ML (ref 5–20)
WBC NRBC COR # BLD: 19.51 10*3/MM3 (ref 3.4–10.8)

## 2022-10-03 PROCEDURE — 82375 ASSAY CARBOXYHB QUANT: CPT

## 2022-10-03 PROCEDURE — 82140 ASSAY OF AMMONIA: CPT | Performed by: FAMILY MEDICINE

## 2022-10-03 PROCEDURE — 63710000001 INSULIN DETEMIR PER 5 UNITS: Performed by: INTERNAL MEDICINE

## 2022-10-03 PROCEDURE — 80053 COMPREHEN METABOLIC PANEL: CPT | Performed by: INTERNAL MEDICINE

## 2022-10-03 PROCEDURE — 82805 BLOOD GASES W/O2 SATURATION: CPT

## 2022-10-03 PROCEDURE — 99233 SBSQ HOSP IP/OBS HIGH 50: CPT | Performed by: FAMILY MEDICINE

## 2022-10-03 PROCEDURE — 82962 GLUCOSE BLOOD TEST: CPT

## 2022-10-03 PROCEDURE — 97110 THERAPEUTIC EXERCISES: CPT

## 2022-10-03 PROCEDURE — 36600 WITHDRAWAL OF ARTERIAL BLOOD: CPT

## 2022-10-03 PROCEDURE — 25010000002 FUROSEMIDE PER 20 MG: Performed by: FAMILY MEDICINE

## 2022-10-03 PROCEDURE — 94799 UNLISTED PULMONARY SVC/PX: CPT

## 2022-10-03 PROCEDURE — 87449 NOS EACH ORGANISM AG IA: CPT | Performed by: INTERNAL MEDICINE

## 2022-10-03 PROCEDURE — 99222 1ST HOSP IP/OBS MODERATE 55: CPT | Performed by: INTERNAL MEDICINE

## 2022-10-03 PROCEDURE — 94640 AIRWAY INHALATION TREATMENT: CPT

## 2022-10-03 PROCEDURE — 80202 ASSAY OF VANCOMYCIN: CPT

## 2022-10-03 PROCEDURE — 25010000002 LINEZOLID 600 MG/300ML SOLUTION: Performed by: INTERNAL MEDICINE

## 2022-10-03 PROCEDURE — 0202U NFCT DS 22 TRGT SARS-COV-2: CPT | Performed by: INTERNAL MEDICINE

## 2022-10-03 PROCEDURE — 85025 COMPLETE CBC W/AUTO DIFF WBC: CPT | Performed by: INTERNAL MEDICINE

## 2022-10-03 PROCEDURE — 94761 N-INVAS EAR/PLS OXIMETRY MLT: CPT

## 2022-10-03 PROCEDURE — 83050 HGB METHEMOGLOBIN QUAN: CPT

## 2022-10-03 PROCEDURE — 87385 HISTOPLASMA CAPSUL AG IA: CPT | Performed by: INTERNAL MEDICINE

## 2022-10-03 PROCEDURE — 25010000002 PIPERACILLIN SOD-TAZOBACTAM PER 1 G: Performed by: FAMILY MEDICINE

## 2022-10-03 PROCEDURE — 71045 X-RAY EXAM CHEST 1 VIEW: CPT

## 2022-10-03 RX ORDER — LINEZOLID 2 MG/ML
600 INJECTION, SOLUTION INTRAVENOUS EVERY 12 HOURS
Status: DISCONTINUED | OUTPATIENT
Start: 2022-10-03 | End: 2022-10-09

## 2022-10-03 RX ORDER — LINEZOLID 600 MG/1
600 TABLET, FILM COATED ORAL 2 TIMES DAILY
Status: DISCONTINUED | OUTPATIENT
Start: 2022-10-03 | End: 2022-10-03

## 2022-10-03 RX ORDER — FUROSEMIDE 10 MG/ML
40 INJECTION INTRAMUSCULAR; INTRAVENOUS DAILY
Status: DISCONTINUED | OUTPATIENT
Start: 2022-10-03 | End: 2022-10-04

## 2022-10-03 RX ORDER — IPRATROPIUM BROMIDE AND ALBUTEROL SULFATE 2.5; .5 MG/3ML; MG/3ML
3 SOLUTION RESPIRATORY (INHALATION)
Status: DISCONTINUED | OUTPATIENT
Start: 2022-10-03 | End: 2022-10-07

## 2022-10-03 RX ADMIN — IPRATROPIUM BROMIDE AND ALBUTEROL SULFATE 3 ML: .5; 3 SOLUTION RESPIRATORY (INHALATION) at 15:51

## 2022-10-03 RX ADMIN — TAZOBACTAM SODIUM AND PIPERACILLIN SODIUM 3.38 G: 375; 3 INJECTION, SOLUTION INTRAVENOUS at 15:39

## 2022-10-03 RX ADMIN — TAZOBACTAM SODIUM AND PIPERACILLIN SODIUM 3.38 G: 375; 3 INJECTION, SOLUTION INTRAVENOUS at 00:40

## 2022-10-03 RX ADMIN — LINEZOLID 600 MG: 2 INJECTION, SOLUTION INTRAVENOUS at 23:09

## 2022-10-03 RX ADMIN — METOPROLOL TARTRATE 50 MG: 50 TABLET, FILM COATED ORAL at 20:54

## 2022-10-03 RX ADMIN — GABAPENTIN 300 MG: 300 CAPSULE ORAL at 05:35

## 2022-10-03 RX ADMIN — Medication 10 ML: at 22:01

## 2022-10-03 RX ADMIN — TAZOBACTAM SODIUM AND PIPERACILLIN SODIUM 3.38 G: 375; 3 INJECTION, SOLUTION INTRAVENOUS at 08:17

## 2022-10-03 RX ADMIN — LACTULOSE 20 G: 20 SOLUTION ORAL at 20:54

## 2022-10-03 RX ADMIN — ALBUTEROL SULFATE 2.5 MG: 2.5 SOLUTION RESPIRATORY (INHALATION) at 02:59

## 2022-10-03 RX ADMIN — Medication 10 ML: at 08:18

## 2022-10-03 RX ADMIN — ACETAMINOPHEN 650 MG: 325 TABLET, FILM COATED ORAL at 20:54

## 2022-10-03 RX ADMIN — OXYCODONE 10 MG: 5 TABLET ORAL at 05:35

## 2022-10-03 RX ADMIN — GUAIFENESIN 200 MG: 200 SOLUTION ORAL at 20:59

## 2022-10-03 RX ADMIN — ALBUTEROL SULFATE 2.5 MG: 2.5 SOLUTION RESPIRATORY (INHALATION) at 09:04

## 2022-10-03 RX ADMIN — INSULIN DETEMIR 15 UNITS: 100 INJECTION, SOLUTION SUBCUTANEOUS at 20:56

## 2022-10-03 RX ADMIN — LINEZOLID 600 MG: 2 INJECTION, SOLUTION INTRAVENOUS at 12:15

## 2022-10-03 RX ADMIN — IPRATROPIUM BROMIDE AND ALBUTEROL SULFATE 3 ML: .5; 3 SOLUTION RESPIRATORY (INHALATION) at 20:30

## 2022-10-03 RX ADMIN — LACTULOSE 20 G: 20 SOLUTION ORAL at 15:37

## 2022-10-03 RX ADMIN — RIFAXIMIN 550 MG: 550 TABLET ORAL at 20:54

## 2022-10-03 RX ADMIN — ATORVASTATIN CALCIUM 40 MG: 40 TABLET, FILM COATED ORAL at 20:54

## 2022-10-03 RX ADMIN — FUROSEMIDE 40 MG: 10 INJECTION, SOLUTION INTRAMUSCULAR; INTRAVENOUS at 08:58

## 2022-10-03 NOTE — NURSING NOTE
Writer walked in the room with OT to see if she was agreeable for OT. Pt was able to tell me that she was thirsty and had a pain level 8/10. Pt was able to take a sip of water and use some mouth moisturizer as well as take a few sips of her lactulose. With pt. Current state and unarrousable to stimulus earlier today, writer will reassess status to see if patient would be able to tolerate pain medication. Will continue to monitor

## 2022-10-03 NOTE — CONSULTS
INFECTIOUS DISEASE CONSULT/INITIAL HOSPITAL VISIT    Kaylie Cruz  1965  [id number]    Date of consult: 10/3/2022    Admit date: 9/27/2022    Requesting Provider: No ref. provider found  Evaluating physician: Hugo Castillo MD  Reason for Consultation: Sepsis, leukocytosis, cirrhosis, hepatitis C, substance use, right lower lobe pneumonia  Chief Complaint:       Subjective   History of present illness:  Patient is a  57 y.o.  Yr old female with a history of substance use, hepatitis C, cirrhosis, cervical cancer, vaginal squamous cell cancer 2009, schizophrenia, diabetes mellitus type 2, congestive heart failure with ejection fraction 49%, recent urinary tract infection treated at the [facility], who developed increasing shortness of breath on 9/25/2022 and was admitted to [facility] on 9/27/2022 with radiographic findings consistent with right lower lobe pneumonia.  The patient was placed on piperacillin tazobactam.  The patient is a poor historian.  Creatinine 1.55, sodium 133, potassium 4.2, AST 49, alkaline phosphatase 153, bilirubin normal, vancomycin trough 11.5, WBC 19.51, hemoglobin 8.9, platelet count 176, MRSA PCR swab screen +10/2, procalcitonin 0.88, urinalysis 6-12 WBCs but urine culture from 10/1 negative, ascites with 208 WBCs with negative culture, 9/28, Legionella and streptococcal urine antigen 9/28 negative.  Ammonia 64.  Lactic acid 2.5 on admission.  Blood cultures x2 from 9/27 negative.  Chest x-ray 10/3 with bilateral pneumonia diffuse right greater than left.  CT scan of the abdomen and pelvis 9/27 with diffuse right lower lobe pneumonia and findings consistent with colitis at the hepatic flexure.  I was consulted on 10/3/2022 for further evaluation and treatment.  Patient was initially placed on piperacillin tazobactam, then vancomycin was added on 10/2/2022.  The patient denies ill contacts, significant travel history, zoonotic exposures, TB, or HIV.   Minimal sputum production.  White blood cell count has increased from 15-19,000.    Past Medical History:   Diagnosis Date   • Anemia    • Cancer (HCC)     cervical   • Depression    • Hyperlipidemia    • Hypertension    • Infectious viral hepatitis    • Myocardial infarction (HCC)    • Substance abuse (HCC)        Past Surgical History:   Procedure Laterality Date   • BLADDER SURGERY     • CARDIAC CATHETERIZATION      4/3/2022   • CHOLECYSTECTOMY     • COLON RESECTION      7/19/2017, descending and transverse colon   • COLON SURGERY     • COLONOSCOPY      7/19/2017   • ENDOSCOPY      EGD 4/1/22   • HYSTERECTOMY     • TRANSESOPHAGEAL ECHOCARDIOGRAM (AVINASH)      Global hyokinesis, 49% EF, LVH       Pediatric History   Patient Parents   • Not on file     Other Topics Concern   • Not on file   Social History Narrative   • Not on file       family history is not on file.    Allergies   Allergen Reactions   • Codeine Hives   • Morphine Hives   • Tagamet [Cimetidine] Other (See Comments)     DISCOLORATION OF SKIN   • Toradol [Ketorolac Tromethamine] Hives         There is no immunization history on file for this patient.    Medication:    Current Facility-Administered Medications:   •  acetaminophen (TYLENOL) tablet 650 mg, 650 mg, Oral, Q4H PRN **OR** acetaminophen (TYLENOL) 160 MG/5ML solution 650 mg, 650 mg, Oral, Q4H PRN **OR** acetaminophen (TYLENOL) suppository 650 mg, 650 mg, Rectal, Q4H PRN, Nidia Rangel DO  •  albuterol (PROVENTIL) nebulizer solution 0.083% 2.5 mg/3mL, 2.5 mg, Nebulization, Q6H PRN, Nidia Rangel DO, 2.5 mg at 10/03/22 0904  •  amitriptyline (ELAVIL) tablet 50 mg, 50 mg, Oral, Nightly, Nidia Rangel DO, 50 mg at 10/02/22 2153  •  amLODIPine (NORVASC) tablet 5 mg, 5 mg, Oral, Daily, Nidia Rangel DO, 5 mg at 10/02/22 1152  •  aspirin EC tablet 81 mg, 81 mg, Oral, Daily, Nidia Rangel DO, 81 mg at 10/02/22 1152  •  atorvastatin (LIPITOR) tablet 40 mg, 40 mg, Oral,  Nightly, Megan Landeros MD, 40 mg at 10/02/22 2153  •  benzonatate (TESSALON) capsule 200 mg, 200 mg, Oral, TID PRN, Megan Landeros MD  •  dextrose (D50W) (25 g/50 mL) IV injection 25 g, 25 g, Intravenous, Q15 Min PRN, Nidia Rangel, DO  •  dextrose (GLUTOSE) oral gel 15 g, 15 g, Oral, Q15 Min PRN, Nidia Rangel, DO  •  folic acid (FOLVITE) tablet 1 mg, 1 mg, Oral, Daily, Nidia Rangel DO, 1 mg at 10/02/22 1152  •  furosemide (LASIX) injection 40 mg, 40 mg, Intravenous, Daily, Nidia Rangel DO, 40 mg at 10/03/22 0858  •  glucagon (human recombinant) (GLUCAGEN DIAGNOSTIC) injection 1 mg, 1 mg, Intramuscular, Q15 Min PRN, Nidia Rangel DO  •  guaiFENesin (ROBITUSSIN) 100 MG/5ML oral solution 200 mg, 200 mg, Oral, Q4H PRN, Megan Landeros MD  •  insulin detemir (LEVEMIR) injection 15 Units, 15 Units, Subcutaneous, Nightly, Megan Landeros MD, 15 Units at 10/02/22 2155  •  Insulin Lispro (humaLOG) injection 0-9 Units, 0-9 Units, Subcutaneous, TID AC, Nidia Rangel DO, 2 Units at 10/02/22 1614  •  Insulin Lispro (humaLOG) injection 5 Units, 5 Units, Subcutaneous, TID With Meals, Megan Landeros MD, 5 Units at 10/02/22 1614  •  lactulose (CHRONULAC) 10 GM/15ML solution 20 g, 20 g, Oral, TID, Henrietta Ramos II, DO, 20 g at 10/02/22 1152  •  metoprolol tartrate (LOPRESSOR) tablet 50 mg, 50 mg, Oral, BID, Nidia Rangel DO, 50 mg at 10/02/22 2153  •  ondansetron (ZOFRAN) tablet 4 mg, 4 mg, Oral, Q6H PRN, 4 mg at 10/02/22 1337 **OR** ondansetron (ZOFRAN) injection 4 mg, 4 mg, Intravenous, Q6H PRN, Nidia Rangel, DO  •  oxyCODONE (ROXICODONE) immediate release tablet 10 mg, 10 mg, Oral, Q4H PRN, Megan Landeros MD, 10 mg at 10/03/22 0535  •  Pharmacy to dose vancomycin, , Does not apply, Continuous PRN, Megan Landeros MD  •  Pharmacy to dose vancomycin, , Does not apply, Continuous PRN, Nidia Rangel, DO  •   piperacillin-tazobactam (ZOSYN) 3.375 g in iso-osmotic dextrose 50 ml (premix), 3.375 g, Intravenous, Q8H, Nidia Rangel, , 3.375 g at 10/03/22 0817  •  potassium chloride (MICRO-K) CR capsule 40 mEq, 40 mEq, Oral, PRN, 40 mEq at 09/29/22 2143 **OR** potassium chloride (KLOR-CON) packet 40 mEq, 40 mEq, Oral, PRN **OR** potassium chloride 10 mEq in 100 mL IVPB, 10 mEq, Intravenous, Q1H PRN, Henrietta Ramos II, DO, Last Rate: 100 mL/hr at 09/29/22 1152, 10 mEq at 09/29/22 1152  •  riFAXIMin (XIFAXAN) tablet 550 mg, 550 mg, Oral, Q12H, Megan Landeros MD, 550 mg at 10/02/22 2153  •  [COMPLETED] Insert peripheral IV, , , Once **AND** sodium chloride 0.9 % flush 10 mL, 10 mL, Intravenous, PRN, Stephenie Martinez PA  •  sodium chloride 0.9 % flush 10 mL, 10 mL, Intravenous, Q12H, Nidia Rangel DO, 10 mL at 10/03/22 0818  •  sodium chloride 0.9 % flush 10 mL, 10 mL, Intravenous, PRN, Nidia Rangel DO  •  thiamine (VITAMIN B-1) tablet 100 mg, 100 mg, Oral, Daily, Nidia Rangel DO, 100 mg at 10/02/22 1152  •  vancomycin (dosing per levels), , Does not apply, Daily, Malgorzata Casiano, PharmD  •  vancomycin in dextrose 5% 150 mL (VANCOCIN) IVPB 750 mg, 750 mg, Intravenous, Once, Malgorzata Casiano, PharmD    Please refer to the medical record for a full medication list    Review of Systems:    Constitutional-- No Fever, chills or sweats.  Appetite good, and no malaise. No fatigue.  HEENT-- No new vision, hearing or throat complaints.  No epistaxis or oral sores.  Denies odynophagia or dysphagia.  No odynophagia or dysphagia. No headache, photophobia or neck stiffness.  CV-- No chest pain, palpitation or syncope  Resp--some SOB/minimally productive cough/no hemoptysis  GI- No nausea, vomiting, or diarrhea.  No hematochezia, melena, or hematemesis. Denies jaundice or chronic liver disease.  -- No dysuria, hematuria, or flank pain.  Denies hesitancy, urgency or flank pain.  Lymph- no swollen lymph nodes in  "neck/axilla or groin.   Heme- No active bruising or bleeding; no Hx of DVT or PE.  MS-- no swelling or pain in the bones or joints of arms/legs.  No new back pain.  Neuro-- No acute focal weakness or numbness in the arms or legs.  No seizures.  Skin--No rashes or lesions    Physical Exam:   Vital Signs   Temp:  [98.2 °F (36.8 °C)-98.6 °F (37 °C)] 98.2 °F (36.8 °C)  Heart Rate:  [81-90] 90  Resp:  [18] 18  BP: (123-156)/() 143/80    Temp  Min: 98.2 °F (36.8 °C)  Max: 98.6 °F (37 °C)  BP  Min: 123/76  Max: 156/102  Pulse  Min: 81  Max: 90  Resp  Min: 18  Max: 18  SpO2  Min: 84 %  Max: 94 %    Blood pressure 143/80, pulse 90, temperature 98.2 °F (36.8 °C), temperature source Axillary, resp. rate 18, height 160 cm (63\"), weight 52.2 kg (115 lb), SpO2 94 %.  GENERAL: Awake and alert, in moderate distress. Appears older than stated age.  HEENT:  Normocephalic, atraumatic.  Oropharynx without thrush. Dentition in good repair. No cervical adenopathy. No neck masses.  Ears externally normal, Nose externally normal.  EYES: PERRL. No conjunctival injection. No icterus. EOM full.  LYMPHATICS: No lymphadenopathy of the neck or axillary or inguinal regions.   HEART: No murmur, gallop, or pericardial friction rub. Reg rate rhythm, No JVD at 45 degrees.  LUNGS: Bilateral rales. No respiratory distress, no use of accessory muscles.  ABDOMEN: Soft, nontender, nondistended. No appreciable HSM.  Bowel sounds normal.  GENITAL: No external lesions, breasts without masses, back straight, no CVAT, rectal external without lesions.   SKIN: Warm and dry without cutaneous eruptions.  No nodules.    PSYCHIATRIC: Mental status some confusion.  EXT:  No cellulitic change.  NEURO: Oriented to name, CN 2 to 12 intact, DTR 1 + and symmetric, sensory intact to LT upper and lower extremitiy, motor 5/5 upper and lower extremity, cerebellar and gait not tested.      Results Review:   I reviewed the patient's new clinical results.  I reviewed the " patient's new imaging results and agree with the interpretation.  I reviewed the patient's other test results and agree with the interpretation    Results from last 7 days   Lab Units 10/03/22  0736 10/02/22  0814 10/01/22  0430   WBC 10*3/mm3 19.51* 16.29* 12.15*   HEMOGLOBIN g/dL 8.9* 8.7* 8.7*   HEMATOCRIT % 27.4* 27.7* 27.5*   PLATELETS 10*3/mm3 176 194 172     Results from last 7 days   Lab Units 10/03/22  0736   SODIUM mmol/L 133*   POTASSIUM mmol/L 4.2   CHLORIDE mmol/L 103   CO2 mmol/L 19.0*   BUN mg/dL 30*   CREATININE mg/dL 1.55*   GLUCOSE mg/dL 123*   CALCIUM mg/dL 8.2*     Results from last 7 days   Lab Units 10/03/22  0736   ALK PHOS U/L 153*   BILIRUBIN mg/dL 0.7   ALT (SGPT) U/L 17   AST (SGOT) U/L 49*             Results from last 7 days   Lab Units 10/03/22  0736   VANCOMYCIN TR mcg/mL 11.50     Results from last 7 days   Lab Units 09/27/22  1300   LACTATE mmol/L 1.8     Estimated Creatinine Clearance: 32.2 mL/min (A) (by C-G formula based on SCr of 1.55 mg/dL (H)).     Procalitonin Results:      Lab 10/02/22  0814 09/28/22  0426 09/27/22  1026   PROCALCITONIN 0.88* 5.71* 5.02*      Brief Urine Lab Results  (Last result in the past 365 days)      Color   Clarity   Blood   Leuk Est   Nitrite   Protein   CREAT   Urine HCG        10/01/22 0117 Yellow   Clear   Negative   Small (1+)   Negative   30 mg/dL (1+)                Site   Date Value Ref Range Status   10/03/2022 Right Brachial  Final     Tico's Test   Date Value Ref Range Status   10/03/2022 N/A  Final     pH, Arterial   Date Value Ref Range Status   10/03/2022 7.418 7.350 - 7.450 pH units Final     pCO2, Arterial   Date Value Ref Range Status   10/03/2022 33.1 (L) 35.0 - 45.0 mm Hg Final     Comment:     84 Value below reference range     pO2, Arterial   Date Value Ref Range Status   10/03/2022 53.7 (L) 83.0 - 108.0 mm Hg Final     Comment:     84 Value below reference range     HCO3, Arterial   Date Value Ref Range Status   10/03/2022 21.4  20.0 - 26.0 mmol/L Final     Base Excess, Arterial   Date Value Ref Range Status   10/03/2022 -2.7 (L) 0.0 - 2.0 mmol/L Final     Hemoglobin, Blood Gas   Date Value Ref Range Status   10/03/2022 8.6 (L) 14 - 18 g/dL Final     Comment:     84 Value below reference range     Hematocrit, Blood Gas   Date Value Ref Range Status   10/03/2022 26.5 (L) 38.0 - 51.0 % Final     Oxyhemoglobin   Date Value Ref Range Status   10/03/2022 86.1 (L) 94 - 99 % Final     Comment:     84 Value below reference range     Methemoglobin   Date Value Ref Range Status   10/03/2022 0.10 0.00 - 1.50 % Final     Carboxyhemoglobin   Date Value Ref Range Status   10/03/2022 1.3 0 - 2 % Final     CO2 Content   Date Value Ref Range Status   10/03/2022 22.4 22 - 33 mmol/L Final     Barometric Pressure for Blood Gas   Date Value Ref Range Status   10/03/2022   Final     Comment:     N/A     Modality   Date Value Ref Range Status   10/03/2022 Nasal Cannula  Final     FIO2   Date Value Ref Range Status   10/03/2022 40 % Final        Microbiology:  Blood Culture   Date Value Ref Range Status   09/27/2022 No growth at 5 days  Final     No results found for: BCIDPCR, CXREFLEX, CSFCX, CULTURETIS  No results found for: CULTURES, HSVCX, URCX  No results found for: EYECULTURE, GCCX, HSVCULTURE, LABHSV  No results found for: LEGIONELLA, MRSACX, MUMPSCX, MYCOPLASCX  No results found for: NOCARDIACX, STOOLCX  Urine Culture   Date Value Ref Range Status   10/01/2022 No growth  Final     No results found for: VIRALCULTU, WOUNDCX     Blood Culture   Date Value Ref Range Status   09/27/2022 No growth at 5 days  Final     Body Fluid Culture   Date Value Ref Range Status   09/28/2022 No growth at 3 days  Final     Urine Culture   Date Value Ref Range Status   10/01/2022 No growth  Final   (    MRSA swab screen + 10/2/2022.  Radiology:  Imaging Results (Last 72 Hours)     Procedure Component Value Units Date/Time    XR Chest 1 View [[id number]] Collected: 10/03/22 0845      Updated: 10/03/22 0904    Narrative:      DATE OF EXAM: 10/03/2022 8:23 AM     PROCEDURE: XR CHEST 1 VIEW-     INDICATIONS: increased O2 requirement; J18.9-Pneumonia, unspecified  organism; R10.9-Unspecified abdominal pain; K74.60-Unspecified cirrhosis  of liver; R18.8-Other ascites; N17.9-Acute kidney failure, unspecified;  I50.9-Heart failure, unspecified; A41.9-Sepsis, unspecified organism;  R13.10-Dysphagia, unspecified     COMPARISON: 10/02/2022     TECHNIQUE: Single radiographic AP view of the chest was obtained.     FINDINGS:  Heart is normal in size. There is diffuse and extensive pulmonary  interstitial disease, remains extensive throughout the right lung, and  appears to be increasing in the left upper and midlung, relatively  sparing the left lung base. No densely consolidated lung effusion or  pneumothorax is seen. Heart is normal in size. Vasculature is obscured  bilaterally.        Impression:      Bilateral pneumonia, diffuse and relatively stable on the right, mildly  increased in the left upper and midlung. No evidence of pneumothorax.          XR Chest 1 View [[id number]] Collected: 10/02/22 1131     Updated: 10/02/22 1136    Narrative:      DATE OF EXAM: 10/2/2022 10:52 AM     PROCEDURE: XR CHEST 1 VW-     INDICATIONS: PNA, worsening leukocytosis; J18.9-Pneumonia, unspecified  organism; R10.9-Unspecified abdominal pain; K74.60-Unspecified cirrhosis  of liver; R18.8-Other ascites; N17.9-Acute kidney failure, unspecified;  I50.9-Heart failure, unspecified; A41.9-Sepsis, unspecified organism;  R13.10-Dysphagia, unspecified     COMPARISON: 9/27/2022     TECHNIQUE: Single radiographic AP view of the chest was obtained.     FINDINGS:  Redemonstrated multifocal airspace disease, somewhat more confluent in  the right middle lobe, concerning for worsening pneumonia. There is no  enlarging effusion or distinct pneumothorax. Unchanged heart and  mediastinal contours.        Impression:      Redemonstrated  multifocal airspace disease, somewhat more confluent in  the right middle lobe, concerning for worsening pneumonia. There is no  enlarging effusion or distinct pneumothorax.      This report was finalized on 10/2/2022 11:33 AM by Corona Segovia.             IMPRESSION:     1.  Right greater than left pneumonia initially seen on CT scan from 9/27/2022, and chest x-ray showing bilateral infiltrates, some of which may be fluid.  Was on reasonable treatment with piperacillin tazobactam on admission, but also had MRSA positive screen which may indicate an infection with MRSA as the cause of her sputum.  Not producing significant amounts.  Less likely atypical mycobacteria or fungal disease.  Negative Legionella and streptococcal urine antigen.  Was not tested for COVID-19.  2.  Leukocytosis, neutrophilic worse.  Did not receive steroids.  May be related to above issues.  3.  Encephalopathy, toxic and metabolic, as well as elevated ammonia level with cirrhosis and possible hepatic encephalopathy.  4.  Acute hypoxic respiratory failure increasing to 40 L/min on 10/3/2022.  Related to above issues.  5.  Acute renal failure, creatinine 1.55, may be worse with vancomycin.  6.  Hyponatremia 133.  7.  Hypocalcemia 8.2.  8.  Cirrhosis, possibly related to substance use, hepatitis C, versus other.  Advanced findings for portal hypertension with ascites.  9.  Hepatitis C, treatment status unknown.  10.  Elevated alkaline phosphatase 153.  11.  Anemia, chronic disease related to above issues.  12.  Schizophrenia.  13.  Substance use, status unclear.    RECOMMENDATIONS:    1.  Diagnostically, continue to follow patient's physical exam, CBC, CMP, CRP, respiratory viral PCR, histoplasma urine antigen, blastomycosis urine antigen, CD4 cell count, HIV, hepatitis C PCR, acute hepatitis panel, and sputum culture if available.  2.  Therapeutically, continue piperacillin tazobactam, and change vancomycin to linezolid 600 mg p.o. twice  daily for MRSA pneumonia coverage, duration to be determined.  There is a small chance of a drug interaction between linezolid and amitriptyline with a serotonin syndrome.  Benefit greater than risk.  3.  Oxygen support therapy.  40 L/min on 10/3/2022.  4.  If oxygen requirements remain very high, consider pulmonary consult for possible further evaluation including bronchoscopy.    I discussed the patient's findings and my recommendations with patient and nursing staff    Thank you for asking me to see Kaylie Cruz.  Our group would be pleased to follow this patient over the course of their hospitalization and assist with outpatient antimicrobial therapy, as indicated.  Further recommendations depend on the results of the cultures and clinical course.    Hugo Castillo MD  10/3/2022

## 2022-10-03 NOTE — PLAN OF CARE
Goal Outcome Evaluation:  Plan of Care Reviewed With: patient        Progress: no change  Outcome Evaluation: VSS. Pt slept well. O2 increased to 3LNC. Pain controlled with oral meds. Will continue to monitor.

## 2022-10-03 NOTE — CONSULTS
Pulmonary New Patient Evaluation     REFERRING PHYSICIAN:  Dr. Rangel    Subjective       Chief Complaint   Patient presents with   • Back Pain            SUBJECTIVE     Ms. Cruz is a 56yo F with a history of vaginal squamous cell carcinoma in 2009, hysterectomy, HTN, HLD, ongoing substance abuse, HCV and cirrhosis, CAD, T2DM, schizophrenia, and HFrEF who presented to the [facility] ED on 9/27 with cough, subjective fever, shortness of breath, abdominal pain and diarrhea for several days. On CXR, she was noted to have an extensive RLL pneumonia.     She was admitted to Hospital Medicine and started on Zosyn. Speech therapy was consulted and did not note dysphagia and has signed off.     On 10/2, she was noted to have worsening leukocytosis despite 6 days of Zosyn. Vancomycin was added. Lasix was initially held but has since been restarted.     On the morning of 10/3, she was noted to be less responsive with worsening oxygen requirements. Chest xray was performed and showed bilateral pneumonia which was relatively stable on the right and mildly increased in the left upper and midlung.       PMH: She  has a past medical history of Anemia, Cancer (HCC), Depression, Hyperlipidemia, Hypertension, Infectious viral hepatitis, Myocardial infarction (HCC), and Substance abuse (HCC).   PSxH: She  has a past surgical history that includes Hysterectomy; Bladder surgery; Colon surgery; Colectomy; Colonoscopy; Cardiac catheterization; Esophagogastroduodenoscopy; Cholecystectomy; and transesophageal echocardiogram (malathi).      Medications:     Current Facility-Administered Medications:   •  acetaminophen (TYLENOL) tablet 650 mg, 650 mg, Oral, Q4H PRN **OR** acetaminophen (TYLENOL) 160 MG/5ML solution 650 mg, 650 mg, Oral, Q4H PRN **OR** acetaminophen (TYLENOL) suppository 650 mg, 650 mg, Rectal, Q4H PRN, Nidia Rangel, DO  •  albuterol (PROVENTIL) nebulizer solution 0.083% 2.5 mg/3mL, 2.5 mg, Nebulization, Q6H PRN, Juan Carlos  Nidia ONTIVEROS DO, 2.5 mg at 10/03/22 0904  •  amLODIPine (NORVASC) tablet 5 mg, 5 mg, Oral, Daily, Nidia Rangel DO, 5 mg at 10/02/22 1152  •  aspirin EC tablet 81 mg, 81 mg, Oral, Daily, Nidia Rangel DO, 81 mg at 10/02/22 1152  •  atorvastatin (LIPITOR) tablet 40 mg, 40 mg, Oral, Nightly, Megan Landeros MD, 40 mg at 10/02/22 2153  •  benzonatate (TESSALON) capsule 200 mg, 200 mg, Oral, TID PRN, Megan Landeros MD  •  dextrose (D50W) (25 g/50 mL) IV injection 25 g, 25 g, Intravenous, Q15 Min PRN, Nidia Rangel DO  •  dextrose (GLUTOSE) oral gel 15 g, 15 g, Oral, Q15 Min PRN, Nidia Rangel DO  •  folic acid (FOLVITE) tablet 1 mg, 1 mg, Oral, Daily, Nidia Rangel DO, 1 mg at 10/02/22 1152  •  furosemide (LASIX) injection 40 mg, 40 mg, Intravenous, Daily, Nidia Rangel DO, 40 mg at 10/03/22 0858  •  glucagon (human recombinant) (GLUCAGEN DIAGNOSTIC) injection 1 mg, 1 mg, Intramuscular, Q15 Min PRN, Nidia Rangel DO  •  guaiFENesin (ROBITUSSIN) 100 MG/5ML oral solution 200 mg, 200 mg, Oral, Q4H PRN, Megan Landeros MD  •  insulin detemir (LEVEMIR) injection 15 Units, 15 Units, Subcutaneous, Nightly, Megan Landeros MD, 15 Units at 10/02/22 2155  •  Insulin Lispro (humaLOG) injection 0-9 Units, 0-9 Units, Subcutaneous, TID AC, Nidia Rangel DO, 2 Units at 10/02/22 1614  •  Insulin Lispro (humaLOG) injection 5 Units, 5 Units, Subcutaneous, TID With Meals, Megan Landeros MD, 5 Units at 10/02/22 1614  •  ipratropium-albuterol (DUO-NEB) nebulizer solution 3 mL, 3 mL, Nebulization, 4x Daily - RT, Nidia Rangel, DO  •  lactulose (CHRONULAC) 10 GM/15ML solution 20 g, 20 g, Oral, TID, Henrietta Ramos II, DO, 20 g at 10/02/22 1152  •  Linezolid (ZYVOX) 600 mg 300 mL, 600 mg, Intravenous, Q12H, Hugo Castillo MD, Last Rate: 300 mL/hr at 10/03/22 1215, 600 mg at 10/03/22 1215  •  metoprolol tartrate (LOPRESSOR) tablet 50 mg, 50 mg, Oral,  BID, Nidia Rangel, DO, 50 mg at 10/02/22 2153  •  ondansetron (ZOFRAN) tablet 4 mg, 4 mg, Oral, Q6H PRN, 4 mg at 10/02/22 1337 **OR** ondansetron (ZOFRAN) injection 4 mg, 4 mg, Intravenous, Q6H PRN, Nidia Rangel, DO  •  oxyCODONE (ROXICODONE) immediate release tablet 10 mg, 10 mg, Oral, Q4H PRN, Megan Landeros MD, 10 mg at 10/03/22 0535  •  piperacillin-tazobactam (ZOSYN) 3.375 g in iso-osmotic dextrose 50 ml (premix), 3.375 g, Intravenous, Q8H, Nidia Rangel DO, 3.375 g at 10/03/22 0817  •  potassium chloride (MICRO-K) CR capsule 40 mEq, 40 mEq, Oral, PRN, 40 mEq at 09/29/22 2143 **OR** potassium chloride (KLOR-CON) packet 40 mEq, 40 mEq, Oral, PRN **OR** potassium chloride 10 mEq in 100 mL IVPB, 10 mEq, Intravenous, Q1H PRN, Henrietta Ramos II, DO, Last Rate: 100 mL/hr at 09/29/22 1152, 10 mEq at 09/29/22 1152  •  riFAXIMin (XIFAXAN) tablet 550 mg, 550 mg, Oral, Q12H, Megan Landeros MD, 550 mg at 10/02/22 2153  •  [COMPLETED] Insert peripheral IV, , , Once **AND** sodium chloride 0.9 % flush 10 mL, 10 mL, Intravenous, PRN, Stephenie Martinez PA  •  sodium chloride 0.9 % flush 10 mL, 10 mL, Intravenous, Q12H, Nidia Rangel DO, 10 mL at 10/03/22 0818  •  sodium chloride 0.9 % flush 10 mL, 10 mL, Intravenous, PRN, Nidia Rangel DO  •  thiamine (VITAMIN B-1) tablet 100 mg, 100 mg, Oral, Daily, Nidia Rangel DO, 100 mg at 10/02/22 1152    Allergies: She is allergic to codeine, morphine, tagamet [cimetidine], and toradol [ketorolac tromethamine].   FH: Her family history is not on file.   SH: She  reports that she has been smoking cigarettes. She has been smoking about 0.50 packs per day. She has never used smokeless tobacco. She reports previous drug use. Drugs: Marijuana and Cocaine(coke). She reports that she does not drink alcohol.     The patient's relevant past medical, surgical and social history were reviewed and updated in Epic as appropriate.    ROS (14):    Review of Systems   Unable to perform ROS: Mental status change           Objective   OBJECTIVE     Physical Examination   Vitals:    10/03/22 0730 10/03/22 0904 10/03/22 0906 10/03/22 1135   BP: 143/80   142/82   BP Location: Left arm   Right arm   Patient Position: Lying   Sitting   Pulse: 86 90  89   Resp: 18   18   Temp: 98.2 °F (36.8 °C)   98.2 °F (36.8 °C)   TempSrc: Axillary   Axillary   SpO2: (!) 84% (!) 88% 94% 92%   Weight:       Height:           Body mass index is 20.37 kg/m².    Physical Examination    Constitutional:  No acute distress. Cachectic female.   Sleeping in bed on HFNC.    Neck:  Normal range of motion. Neck supple.   No JVD present. No tracheal deviation present.  No thyromegaly present.    Cardiovascular: Normal rate, regular and rhythm. Normal heart sounds.  No murmurs, gallop or rub.  No peripheral edema.   Respiratory: No respiratory distress. Normal respiratory effort.  Diminished bilaterally. No wheezing.     Abdominal:  Soft. Distended.    Extremities: No digital cyanosis. No clubbing.   Lymphadenopathy: None.   Skin: Skin is warm and dry. No rashes, lesions or ulcers noted.    Neurological:   Awakens to deep stimulation and is oriented to person and place. Difficulty staying awake.    Psychiatric:  Unable to assess.      Diagnostic Data    Imaging Results (Last 24 Hours)     Procedure Component Value Units Date/Time    XR Chest 1 View [[id number]] Collected: 10/03/22 0845     Updated: 10/03/22 0904    Narrative:      DATE OF EXAM: 10/03/2022 8:23 AM     PROCEDURE: XR CHEST 1 VIEW-     INDICATIONS: increased O2 requirement; J18.9-Pneumonia, unspecified  organism; R10.9-Unspecified abdominal pain; K74.60-Unspecified cirrhosis  of liver; R18.8-Other ascites; N17.9-Acute kidney failure, unspecified;  I50.9-Heart failure, unspecified; A41.9-Sepsis, unspecified organism;  R13.10-Dysphagia, unspecified     COMPARISON: 10/02/2022     TECHNIQUE: Single radiographic AP view of the chest was  obtained.     FINDINGS:  Heart is normal in size. There is diffuse and extensive pulmonary  interstitial disease, remains extensive throughout the right lung, and  appears to be increasing in the left upper and midlung, relatively  sparing the left lung base. No densely consolidated lung effusion or  pneumothorax is seen. Heart is normal in size. Vasculature is obscured  bilaterally.        Impression:      Bilateral pneumonia, diffuse and relatively stable on the right, mildly  increased in the left upper and midlung. No evidence of pneumothorax.                        Assessment & Plan   ASSESSMENT / PLAN     Assessment:  Ms. Cruz is a 58yo F who was admitted for RLL pneumonia and has continued to worsen despite antibiotic therapy. She was initially on room air and has now progressed to HFNC.     Active Hospital Problems    Diagnosis    • Pneumonia of right middle lobe due to infectious organism    • Sepsis, unspecified organism (HCC)    • Tobacco use    • Vaginal cancer (HCC)    • High grade squamous intraepithelial lesion (HGSIL) on cytologic smear of vagina    • Type 2 diabetes mellitus with hyperglycemia, with long-term current use of insulin (HCC)    • Chronic hepatitis C without hepatic coma (HCC)    • Decompensated hepatic cirrhosis (HCC)    • HFrEF (heart failure with reduced ejection fraction) (HCC)    • Coronary artery disease involving native coronary artery of native heart without angina pectoris    • Schizophrenia (HCC)    • Chronic pain disorder    • Primary hypertension    • Long-term current use of opiate analgesic    • Esophageal varices (HCC)         Plan:  1. Continue HFNC. May trial Bipap as tolerated. If oxygenation worsens despite Bipap, she will need to be transferred to ICU for intubation with mechanical ventilation.   2. Hold sedating agents.   3. ID managing current antibiotic therapy.   4. Check a CT scan of the chest for further evaluation of non-responsive pneumonia.   5. At this  point, she is on too much oxygen to tolerate bronchoscopy. If she requires intubation, bronchoscopy could be performed at that time.   6. Continue diuretics as tolerated.   7. AM CXR      I discussed the diagnosis, evaluation, and  treatment options with the patient and/or appropriate family members.    Thank you very much for allowing me to participate in the care of your patient.      Sissy Hayes, DO  Pulmonary and Critical Care Medicine

## 2022-10-03 NOTE — NURSING NOTE
Rounding on the patient this morning patient was able to respond to voice. Nurse came back into do assessment and give medication this morning. Pt. Could not keep eyes open longer than a few seconds. Pt could not hold a conversation or respond to questions.  Notified. Pt demand for oxygen went from room air to 5L of oxygen. Stat xray, ABGs and lasix given. Will continue to monitor.

## 2022-10-03 NOTE — PROGRESS NOTES
[facility] Medicine Services  PROGRESS NOTE    Patient Name: Kaylie Cruz  : 1965  MRN: [id number]    Date of Admission: 2022  Primary Care Physician: Iesha Harris DO    Subjective   Subjective     CC: f/u PNA    HPI: Pt seen and examined. Less responsive this AM and can only keep eyes open for brief period. Evaluated with RN, imaging, ABG ordered. PNA worsening. Pt now requiring HFNC.     ROS:  UTO reliable ROS, only tells me she has pain in her back and abdomen     Objective   Objective     Vital Signs:   Temp:  [98.2 °F (36.8 °C)-98.6 °F (37 °C)] 98.2 °F (36.8 °C)  Heart Rate:  [81-90] 90  Resp:  [18] 18  BP: (123-156)/() 143/80  Flow (L/min):  [1-40] 40     Physical Exam:  Constitutional: Lethargic this morning, opens eyes and tells me her name but then goes back to sleep  HENT: NCAT, mucous membranes moist  Respiratory: Coarse breath sounds bilaterally with exp wehezes, respiratory effort normal HFNC  Cardiovascular: RRR, no murmurs, rubs, or gallops  Gastrointestinal: Positive bowel sounds, soft, nontender, nondistended  Musculoskeletal: No bilateral ankle edema  Psychiatric: Unable to evaluate due to lethargy   Neurologic: No focal deficits but lethargic  Skin: No rashes to exposed skin     Results Reviewed:  LAB RESULTS:      Lab 10/03/22  0736 10/02/22  0814 10/01/22  0430 22  0349 22  0550 22  0426 22  1300 22  1026 22  1025 22  1013   WBC 19.51* 16.29* 12.15* 8.67 9.05 12.33*  --   --   --  15.38*   HEMOGLOBIN 8.9* 8.7* 8.7* 8.4* 8.6* 8.2*  --   --   --  9.3*   HEMATOCRIT 27.4* 27.7* 27.5* 25.9* 26.0* 24.2*  --   --   --  28.1*   PLATELETS 176 194 172 170 142 130*  --   --   --  163   NEUTROS ABS 15.96* 13.85* 10.08* 5.55  --  11.10*  --   --   --  14.15*   IMMATURE GRANS (ABS) 0.83*  --   --   --   --   --   --   --   --   --    LYMPHS ABS 1.55  --   --   --   --   --   --   --   --   --    MONOS ABS 0.71   --   --   --   --   --   --   --   --   --    EOS ABS 0.35 0.16 0.36 0.26  --  0.00  --   --   --  0.00   MCV 81.3 82.9 82.1 81.4 81.3 79.3  --   --   --  81.0   PROCALCITONIN  --  0.88*  --   --   --  5.71*  --  5.02*  --   --    LACTATE  --   --   --   --   --   --  1.8  --  2.5*  --    PROTIME  --   --   --   --   --  19.9*  --   --   --  19.0*   APTT  --   --   --   --   --   --   --   --   --  30.3*         Lab 10/03/22  0736 10/02/22  0814 10/01/22  0430 09/30/22  0349 09/29/22  0550 09/28/22  0426   SODIUM 133* 137 136 138 139 134*   POTASSIUM 4.2 4.4 4.5 4.0 3.3* 3.0*   CHLORIDE 103 108* 107 109* 108* 105   CO2 19.0* 19.0* 19.0* 20.0* 20.0* 20.0*   ANION GAP 11.0 10.0 10.0 9.0 11.0 9.0   BUN 30* 30* 36* 43* 51* 44*   CREATININE 1.55* 1.32* 1.44* 1.51* 1.68* 1.42*   EGFR 38.4* 46.6* 42.0* 39.7* 34.9* 42.7*   GLUCOSE 123* 203* 136* 149* 109* 137*   CALCIUM 8.2* 8.4* 8.3* 8.1* 8.5* 8.0*   MAGNESIUM  --   --   --   --   --  2.3         Lab 10/03/22  0736 10/02/22  0814 10/01/22  0430 09/30/22  0349 09/28/22  0426 09/27/22  1026   TOTAL PROTEIN 6.6 6.8 5.9* 5.6* 6.0 7.3   ALBUMIN 2.10* 2.30* 2.40* 2.30* 1.70* 2.30*   GLOBULIN 4.5 4.5 3.5 3.3 4.3 5.0   ALT (SGPT) 17 21 21 18 17 24   AST (SGOT) 49* 50* 56* 48* 44* 74*   BILIRUBIN 0.7 0.7 0.7 0.6 0.9 1.7*   ALK PHOS 153* 157* 131* 111 82 118*   LIPASE  --   --   --   --   --  11*         Lab 09/28/22  0426 09/27/22  1026 09/27/22  1013   PROBNP  --  6,712.0*  --    TROPONIN T  --  0.018  --    PROTIME 19.9*  --  19.0*   INR 1.69*  --  1.59*                 Lab 10/03/22  0840 09/28/22  0524   PH, ARTERIAL 7.418 7.394   PCO2, ARTERIAL 33.1* 34.5*   PO2 ART 53.7* 62.6*   FIO2 40 21   HCO3 ART 21.4 21.0   BASE EXCESS ART -2.7* -3.4*   CARBOXYHEMOGLOBIN 1.3 1.2     Brief Urine Lab Results  (Last result in the past 365 days)      Color   Clarity   Blood   Leuk Est   Nitrite   Protein   CREAT   Urine HCG        10/01/22 0117 Yellow   Clear   Negative   Small (1+)    Negative   30 mg/dL (1+)                 Microbiology Results Abnormal     Procedure Component Value - Date/Time    Blood Culture - Blood, Hand, Right [[id number]]  (Normal) Collected: 09/27/22 1300    Lab Status: Final result Specimen: Blood from Hand, Right Updated: 10/02/22 1317     Blood Culture No growth at 5 days    Blood Culture - Blood, Arm, Right [[id number]]  (Normal) Collected: 09/27/22 1245    Lab Status: Final result Specimen: Blood from Arm, Right Updated: 10/02/22 1303     Blood Culture No growth at 5 days    Urine Culture - Urine, Urine, Random Void [[id number]]  (Normal) Collected: 10/01/22 0117    Lab Status: Final result Specimen: Urine, Random Void Updated: 10/02/22 1056     Urine Culture No growth    Body Fluid Culture - Body Fluid, Peritoneum [[id number]] Collected: 09/28/22 1143    Lab Status: Final result Specimen: Body Fluid from Peritoneum Updated: 10/01/22 0729     Body Fluid Culture No growth at 3 days     Gram Stain No WBCs or organisms seen    Legionella Antigen, Urine - Urine, Urine, Clean Catch [[id number]]  (Normal) Collected: 09/28/22 0643    Lab Status: Final result Specimen: Urine, Clean Catch Updated: 09/28/22 1229     LEGIONELLA ANTIGEN, URINE Negative    S. Pneumo Ag Urine or CSF - Urine, Urine, Clean Catch [[id number]]  (Normal) Collected: 09/28/22 0643    Lab Status: Final result Specimen: Urine, Clean Catch Updated: 09/28/22 1229     Strep Pneumo Ag Negative          XR Chest 1 View    Result Date: 10/3/2022  DATE OF EXAM: 10/03/2022 8:23 AM  PROCEDURE: XR CHEST 1 VIEW-  INDICATIONS: increased O2 requirement; J18.9-Pneumonia, unspecified organism; R10.9-Unspecified abdominal pain; K74.60-Unspecified cirrhosis of liver; R18.8-Other ascites; N17.9-Acute kidney failure, unspecified; I50.9-Heart failure, unspecified; A41.9-Sepsis, unspecified organism; R13.10-Dysphagia, unspecified  COMPARISON: 10/02/2022  TECHNIQUE: Single radiographic AP view of the chest was obtained.  FINDINGS:  Heart is normal in size. There is diffuse and extensive pulmonary interstitial disease, remains extensive throughout the right lung, and appears to be increasing in the left upper and midlung, relatively sparing the left lung base. No densely consolidated lung effusion or pneumothorax is seen. Heart is normal in size. Vasculature is obscured bilaterally.      Impression: Bilateral pneumonia, diffuse and relatively stable on the right, mildly increased in the left upper and midlung. No evidence of pneumothorax.       XR Chest 1 View    Result Date: 10/2/2022  DATE OF EXAM: 10/2/2022 10:52 AM  PROCEDURE: XR CHEST 1 VW-  INDICATIONS: PNA, worsening leukocytosis; J18.9-Pneumonia, unspecified organism; R10.9-Unspecified abdominal pain; K74.60-Unspecified cirrhosis of liver; R18.8-Other ascites; N17.9-Acute kidney failure, unspecified; I50.9-Heart failure, unspecified; A41.9-Sepsis, unspecified organism; R13.10-Dysphagia, unspecified  COMPARISON: 9/27/2022  TECHNIQUE: Single radiographic AP view of the chest was obtained.  FINDINGS: Redemonstrated multifocal airspace disease, somewhat more confluent in the right middle lobe, concerning for worsening pneumonia. There is no enlarging effusion or distinct pneumothorax. Unchanged heart and mediastinal contours.      Impression: Redemonstrated multifocal airspace disease, somewhat more confluent in the right middle lobe, concerning for worsening pneumonia. There is no enlarging effusion or distinct pneumothorax.  This report was finalized on 10/2/2022 11:33 AM by Corona Segovia.            I have reviewed the medications:  Scheduled Meds:amitriptyline, 50 mg, Oral, Nightly  amLODIPine, 5 mg, Oral, Daily  aspirin, 81 mg, Oral, Daily  atorvastatin, 40 mg, Oral, Nightly  folic acid, 1 mg, Oral, Daily  furosemide, 40 mg, Intravenous, Daily  insulin detemir, 15 Units, Subcutaneous, Nightly  insulin lispro, 0-9 Units, Subcutaneous, TID AC  Insulin Lispro, 5 Units, Subcutaneous,  TID With Meals  lactulose, 20 g, Oral, TID  linezolid, 600 mg, Oral, BID  metoprolol tartrate, 50 mg, Oral, BID  piperacillin-tazobactam, 3.375 g, Intravenous, Q8H  rifAXIMin, 550 mg, Oral, Q12H  sodium chloride, 10 mL, Intravenous, Q12H  thiamine, 100 mg, Oral, Daily      Continuous Infusions:   PRN Meds:.•  acetaminophen **OR** acetaminophen **OR** acetaminophen  •  albuterol  •  benzonatate  •  dextrose  •  dextrose  •  glucagon (human recombinant)  •  guaiFENesin  •  ondansetron **OR** ondansetron  •  oxyCODONE  •  potassium chloride **OR** potassium chloride **OR** potassium chloride  •  [COMPLETED] Insert peripheral IV **AND** sodium chloride  •  sodium chloride    Assessment & Plan   Assessment & Plan     Active Hospital Problems    Diagnosis  POA   • Pneumonia of right middle lobe due to infectious organism [J18.9]  Yes   • Sepsis, unspecified organism (HCC) [A41.9]  Unknown   • Tobacco use [Z72.0]  Yes   • Vaginal cancer (HCC) [C52]  Yes   • High grade squamous intraepithelial lesion (HGSIL) on cytologic smear of vagina [R87.623]  Yes   • Type 2 diabetes mellitus with hyperglycemia, with long-term current use of insulin (HCC) [E11.65, Z79.4]  Not Applicable   • Chronic hepatitis C without hepatic coma (HCC) [B18.2]  Yes   • Decompensated hepatic cirrhosis (HCC) [K72.90, K74.60]  Yes   • HFrEF (heart failure with reduced ejection fraction) (HCC) [I50.20]  Yes   • Coronary artery disease involving native coronary artery of native heart without angina pectoris [I25.10]  Yes   • Schizophrenia (HCC) [F20.9]  Yes   • Chronic pain disorder [G89.4]  Yes   • Primary hypertension [I10]  Yes   • Long-term current use of opiate analgesic [Z79.891]  Not Applicable   • Esophageal varices (HCC) [I85.00]  Yes      Resolved Hospital Problems   No resolved problems to display.        Brief Hospital Course to date:  Kaylie Cruz is a 57 y.o. 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    This patient's problems and plans were partially entered by my partner and updated as appropriate by me 10/03/22.  All problems are new to me today     Sepsis, POA  Bilateral PNA  Worsening Leukocytosis   +MRSA PCR   -Pt now requiring HFNC, will ask Pulmonary to see   -CXR this AM with worsening PNA on L side   -ID consulted, Zyvox added, continue Zosyn   -Cryptococcal antigen, Fungal Alina, Fungitell B-D glucan, Histoplasma pending  -Sputum cx pending  -Respiratory PCR ordered   -Add scheduled DuoNebs   -SLP has seen, S/P FEES 9/29 and functional oral and pharyngeal phase, signed off      Decompensated cirrhosis with ascites  History of Hepatitis C  Elevated LFTs   -Follows with GI outpatient but missed last appt.   -EGD due 04/2023 for EV screening  -S/P LVP on 9/29 with 2.25L of ascitic fluid removed, does not appear c/w SBP culture NGTD  -Hepatitis panel ordered per ID  -Continue Lactulose (Ammonia normal this AM), Continue Xifaxan    -Obtain KUB in AM, may need repeat paracentesis      Likely CKD  --Cr stable  --Continue Lasix, changed to IV this AM due to lethargy     Hypokalemia  -- Resolved      Chronic HFrEF   Coronary artery disease involving native coronary artery of native heart without angina pectoris  Primary hypertension  -Echo 3/2022 - EF 49%, global hypokinesis, grade 1 diastolic dysfunction  -LHC 4/3/2022 - nonobstructive LAD to LAD 60% in mid region  -Pt is supposed to follow with Dr Daniel, has missed appts -Continue ASA 81, metoprolol  -Continue statin      Type 2 diabetes mellitus with diabetic polyneuropathy, with long-term current use of insulin  -A1c has improved from 8.5% to 6.6% with medication compliance over the past 3 months  -Has appointment to establish  with Endocrinology next month  -Continue Lantus 15 units nightly  -Continue Humalog 5 units TID meals   -Continue MDSSI  -HOLD Gabapentin due to lethargy      Recent Gross hematuria  -Seen at  8/23/22 and diagnosed with UTI, treated with ABX   -Per patient the dysuria resolved but still having intermittent hematuria  -Patient has already been referred to Urology per PCP  -Repeat UA unremarkable on admission, however, UA from 10/1 as noted above. ABX as noted above  -CT A/P without contrast with unremarkable bladder      Tobacco use  -Encourage smoking cessation  -PCP note says they plan to obtain PFTs outpatient as she has had some wheezing but no formal Dx of COPD  -PRN Albuterol   -Add scheduled DuoNebs      High grade squamous intraepithelial lesion (HGSIL) on cytologic smear of vagina  Vaginal cancer   -Diagnosed 2010? s/p radiation, surgical resection, and hysterectomy. Previously following with Dr. Roxanne Chaudhry, Gyn-Onc in [place] with [facility].   -She had vaginal pap smear with HGSIL and was lost to follow up.   -PCP has referred to GYN and gyn-onc for further evaluation      Schizophrenia, unspecified type  -Following with [facility] for talk therapy  -HOLD SEROQUEL AND ELAVIL due to lethargy      History of Substance abuse  -UDS positive for cocaine, oxycodone and TCA     Chronic Anemia  -H&H stable in review of chart     This patient's problems and plans were partially entered by my partner and updated as appropriate by me 10/03/22.    Expected Discharge Location and Transportation: acute rehab/[facility]   Expected Discharge Date: 10/7    DVT prophylaxis:  Mechanical DVT prophylaxis orders are present.     AM-PAC 6 Clicks Score (PT): 12 (10/01/22 2000)    CODE STATUS:   Code Status and Medical Interventions:   Ordered at: 09/27/22 1402     Level Of Support Discussed With:    Patient     Code Status (Patient has no pulse and is not breathing):    CPR (Attempt to Resuscitate)     Medical  Interventions (Patient has pulse or is breathing):    Full Support       Nidia Rangel,   10/03/22

## 2022-10-03 NOTE — CASE MANAGEMENT/SOCIAL WORK
Continued Stay Note  [facility]     Patient Name: Kaylie Cruz  MRN: [id number]  Today's Date: 10/3/2022    Admit Date: 9/27/2022    Plan: IRF   Discharge Plan     Row Name 10/03/22 1416       Plan    Plan Comments Bilat PNA.  Worsening leukocytosis.  Currently requiring 40% high-flow O2.  ID and Pulm consulted.  Cont IV Vanc and PO Zyvox.  CM will cont to follow hospital progress and assist with DCP as appropriate.    Final Discharge Disposition Code 30 - still a patient               Discharge Codes    No documentation.               Expected Discharge Date and Time     Expected Discharge Date Expected Discharge Time    Oct 1, 2022             Luiza Redding RN

## 2022-10-03 NOTE — PLAN OF CARE
Problem: Fall Injury Risk  Goal: Absence of Fall and Fall-Related Injury  Outcome: Ongoing, Progressing  Intervention: Identify and Manage Contributors  Recent Flowsheet Documentation  Taken 10/3/2022 0818 by Guadalupe Warner RN  Medication Review/Management: medications reviewed  Intervention: Promote Injury-Free Environment  Recent Flowsheet Documentation  Taken 10/3/2022 0818 by Guadalupe Warner RN  Safety Promotion/Fall Prevention: safety round/check completed     Problem: Adult Inpatient Plan of Care  Goal: Plan of Care Review  Outcome: Ongoing, Progressing  Goal: Patient-Specific Goal (Individualized)  Outcome: Ongoing, Progressing  Goal: Absence of Hospital-Acquired Illness or Injury  Outcome: Ongoing, Progressing  Intervention: Identify and Manage Fall Risk  Recent Flowsheet Documentation  Taken 10/3/2022 0818 by Guadalupe Warner RN  Safety Promotion/Fall Prevention: safety round/check completed  Intervention: Prevent Skin Injury  Recent Flowsheet Documentation  Taken 10/3/2022 0818 by Guadalupe Warner RN  Body Position: position changed independently  Skin Protection: adhesive use limited  Intervention: Prevent and Manage VTE (Venous Thromboembolism) Risk  Recent Flowsheet Documentation  Taken 10/3/2022 0818 by Guadalupe Warner RN  Activity Management: activity adjusted per tolerance  Range of Motion: active ROM (range of motion) encouraged  Goal: Optimal Comfort and Wellbeing  Outcome: Ongoing, Progressing  Goal: Readiness for Transition of Care  Outcome: Ongoing, Progressing     Problem: Skin Injury Risk Increased  Goal: Skin Health and Integrity  Outcome: Ongoing, Progressing  Intervention: Optimize Skin Protection  Recent Flowsheet Documentation  Taken 10/3/2022 0818 by Guadalupe Warner RN  Pressure Reduction Techniques: frequent weight shift encouraged  Head of Bed (HOB) Positioning: HOB at 30-45 degrees  Pressure Reduction Devices: alternating pressure pump (ADD)  Skin Protection: adhesive use  limited   Goal Outcome Evaluation:

## 2022-10-03 NOTE — THERAPY TREATMENT NOTE
Patient Name: Kaylie Cruz  : 1965    MRN: [id number]                              Today's Date: 10/3/2022       Admit Date: 2022    Visit Dx:     ICD-10-CM ICD-9-CM   1. Pneumonia of right middle lobe due to infectious organism  J18.9 486   2. Right sided abdominal pain  R10.9 789.09   3. Cirrhosis of liver with ascites, unspecified hepatic cirrhosis type (HCC)  K74.60 571.5    R18.8    4. ANIKET (acute kidney injury) (HCC)  N17.9 584.9   5. Acute on chronic congestive heart failure, unspecified heart failure type (HCC)  I50.9 428.0   6. Sepsis, due to unspecified organism, unspecified whether acute organ dysfunction present (HCC)  A41.9 038.9     995.91   7. Dysphagia, unspecified type  R13.10 787.20     Patient Active Problem List   Diagnosis   • Chronic pain disorder   • Primary hypertension   • Long-term current use of opiate analgesic   • Lumbosacral spondylosis without myelopathy   • Migraine   • Coronary artery disease involving native coronary artery of native heart without angina pectoris   • Schizophrenia (HCC)   • Esophageal varices (HCC)   • Decompensated hepatic cirrhosis (HCC)   • HFrEF (heart failure with reduced ejection fraction) (HCC)   • Vaginal cancer (HCC)   • High grade squamous intraepithelial lesion (HGSIL) on cytologic smear of vagina   • Type 2 diabetes mellitus with hyperglycemia, with long-term current use of insulin (HCC)   • Chronic hepatitis C without hepatic coma (HCC)   • Tobacco use   • Pneumonia of right middle lobe due to infectious organism   • Sepsis, unspecified organism (HCC)     Past Medical History:   Diagnosis Date   • Anemia    • Cancer (HCC)     cervical   • Depression    • Hyperlipidemia    • Hypertension    • Infectious viral hepatitis    • Myocardial infarction (HCC)    • Substance abuse (HCC)      Past Surgical History:   Procedure Laterality Date   • BLADDER SURGERY     • CARDIAC CATHETERIZATION      4/3/2022   • CHOLECYSTECTOMY     • COLON RESECTION       7/19/2017, descending and transverse colon   • COLON SURGERY     • COLONOSCOPY      7/19/2017   • ENDOSCOPY      EGD 4/1/22   • HYSTERECTOMY     • TRANSESOPHAGEAL ECHOCARDIOGRAM (AVINASH)      Global hyokinesis, 49% EF, LVH      General Information     Row Name 10/03/22 1540          OT Time and Intention    Document Type therapy note (daily note)  -KF     Mode of Treatment occupational therapy  -     Row Name 10/03/22 1540          General Information    Patient Profile Reviewed yes  -KF     Existing Precautions/Restrictions fall;oxygen therapy device and L/min  HFNC  -KF     Barriers to Rehab medically complex;previous functional deficit;cognitive status  -     Row Name 10/03/22 1540          Cognition    Orientation Status (Cognition) oriented to;person;place;disoriented to;situation;time;verbal cues/prompts needed for orientation  -     Row Name 10/03/22 1540          Safety Issues, Functional Mobility    Safety Issues Affecting Function (Mobility) insight into deficits/self-awareness;awareness of need for assistance;safety precaution awareness  -KF     Impairments Affecting Function (Mobility) balance;cognition;endurance/activity tolerance;strength;pain  -KF     Cognitive Impairments, Mobility Safety/Performance awareness, need for assistance;attention;insight into deficits/self-awareness  -KF     Comment, Safety Issues/Impairments (Mobility) decreased alertness, able to maintain attention with tactile and verbal cues throughout  -KF           User Key  (r) = Recorded By, (t) = Taken By, (c) = Cosigned By    Initials Name Provider Type    KF Neyda Cagle, OT Occupational Therapist                 Mobility/ADL's     Row Name 10/03/22 1541          Bed Mobility    Comment, (Bed Mobility) deferred 2/2 lethargy this date  -     Row Name 10/03/22 1541          Transfers    Bed-Chair [place] (Transfers) unable to assess  -     Row Name 10/03/22 1541          Activities of Daily Living    BADL  Assessment/Intervention grooming  -     Row Name 10/03/22 1541          Grooming Assessment/Training    [place] (Grooming) oral care regimen;wash face, hands;minimum assist (75% patient effort)  -     Position (Grooming) sitting up in bed  -KF     Comment, (Grooming) mouth moisturizer provided and setup, Pt requires gravity eliminated assist for bringing washcloth to face via B hand incorporation  -KF           User Key  (r) = Recorded By, (t) = Taken By, (c) = Cosigned By    Initials Name Provider Type    Neyda Garza, OT Occupational Therapist               Obj/Interventions     Row Name 10/03/22 1542          Shoulder (Therapeutic Exercise)    Shoulder (Therapeutic Exercise) AAROM (active assistive range of motion)  -     Shoulder AAROM (Therapeutic Exercise) bilateral;flexion;extension;horizontal aBduction/aDduction;5 repetitions;3 repetitions  2 sets 3 reps 2nd set  -     Row Name 10/03/22 1542          Elbow/Forearm (Therapeutic Exercise)    Elbow/Forearm (Therapeutic Exercise) AAROM (active assistive range of motion)  -KF     Elbow/Forearm AAROM (Therapeutic Exercise) bilateral;flexion;extension;supine;5 repetitions  -     Row Name 10/03/22 1542          Motor Skills    Therapeutic Exercise shoulder;elbow/forearm  -           User Key  (r) = Recorded By, (t) = Taken By, (c) = Cosigned By    Initials Name Provider Type    Neyda Garza, OT Occupational Therapist               Goals/Plan    No documentation.                Clinical Impression     Row Name 10/03/22 1544          Pain Assessment    Pretreatment Pain Rating 8/10  -KF     Posttreatment Pain Rating 8/10  -KF     Pain Location generalized  -KF     Pain Location - abdomen  -KF     Pre/Posttreatment Pain Comment RN notified and Pt tolerated  -     Pain Intervention(s) Repositioned  -     Row Name 10/03/22 1544          Plan of Care Review    Plan of Care Reviewed With patient  -KF     Progress declining  -      Outcome Evaluation Pt tolerated 5 reps then 3 reps BUE ther ex AROM/AAROM, depA repositioning in bed, modA grooming with hand over hand assist sitting upright in bed, cont IPOT per POC pending progress  -     Row Name 10/03/22 1544          Therapy Assessment/Plan (OT)    Rehab Potential (OT) fair, will monitor progress closely  -     Criteria for Skilled Therapeutic Interventions Met (OT) yes;meets criteria;skilled treatment is necessary  -     Therapy Frequency (OT) daily  -KF     Row Name 10/03/22 1544          Therapy Plan Review/Discharge Plan (OT)    Anticipated Discharge Disposition (OT) [facility] nursing facility  -     Row Name 10/03/22 1545          Vital Signs    Pre Systolic BP Rehab 142  RN cleared VSS  -KF     Pre Treatment Diastolic BP 82  -KF     Pre SpO2 (%) 93  -KF     O2 Delivery Pre Treatment hi-flow  -KF     Intra SpO2 (%) 93  -KF     O2 Delivery Intra Treatment hi-flow  -KF     Post SpO2 (%) 94  -KF     O2 Delivery Post Treatment hi-flow  -KF     Pre Patient Position Supine  -KF     Intra Patient Position Supine  -KF     Post Patient Position Supine  -KF     Row Name 10/03/22 1545          Positioning and Restraints    Pre-Treatment Position in bed  -KF     Post Treatment Position bed  -KF     In Bed notified nsg;exit alarm on;supine;encouraged to call for assist;call light within reach;with nsg;fowlers;heels elevated  -KF           User Key  (r) = Recorded By, (t) = Taken By, (c) = Cosigned By    Initials Name Provider Type    KF Neyda Cagle, OT Occupational Therapist               Outcome Measures     Row Name 10/03/22 0954          How much help from another is currently needed...    Putting on and taking off regular lower body clothing? 1  -KF     Bathing (including washing, rinsing, and drying) 2  -KF     Toileting (which includes using toilet bed pan or urinal) 1  -KF     Putting on and taking off regular upper body clothing 2  -KF     Taking care of personal grooming  (such as brushing teeth) 2  -KF     Eating meals 2  -KF     AM-PAC 6 Clicks Score (OT) 10  -KF     Row Name 10/03/22 1546          Functional Assessment    Outcome Measure Options AM-PAC 6 Clicks Daily Activity (OT)  -KF           User Key  (r) = Recorded By, (t) = Taken By, (c) = Cosigned By    Initials Name Provider Type    KF Neyda Cagel, OT Occupational Therapist                Occupational Therapy Education                 Title: PT OT SLP Therapies (In Progress)     Topic: Occupational Therapy (In Progress)     Point: ADL training (In Progress)     Description:   Instruct learner(s) on proper safety adaptation and remediation techniques during self care or transfers.   Instruct in proper use of assistive devices.              Learning Progress Summary           Patient Acceptance, E,TB,D, NR by KF at 10/3/2022 1546    Acceptance, E, VU by CARO at 9/29/2022 1421    Comment: Reason for OT consult    Acceptance, E, NR,NL by MR at 9/28/2022 0837   Family Acceptance, E, VU by CARO at 9/29/2022 1421    Comment: Reason for OT consult                   Point: Home exercise program (In Progress)     Description:   Instruct learner(s) on appropriate technique for monitoring, assisting and/or progressing therapeutic exercises/activities.              Learning Progress Summary           Patient Acceptance, E,TB,D, NR by KF at 10/3/2022 1546    Acceptance, E, NR,NL by MR at 9/28/2022 0837                   Point: Precautions (In Progress)     Description:   Instruct learner(s) on prescribed precautions during self-care and functional transfers.              Learning Progress Summary           Patient Acceptance, E,TB,D, NR by KF at 10/3/2022 1546    Acceptance, E, NR,NL by MR at 9/28/2022 0837                   Point: Body mechanics (In Progress)     Description:   Instruct learner(s) on proper positioning and spine alignment during self-care, functional mobility activities and/or exercises.              Learning  Progress Summary           Patient Acceptance, E,TB,D, NR by KF at 10/3/2022 1546    Acceptance, E, NR,NL by MR at 9/28/2022 0837                               User Key     Initials Effective Dates Name Provider Type Discipline     06/16/21 -  Neyda Cagle, OT Occupational Therapist OT    CARO 06/16/21 -  Sabrina Garcia, OT Occupational Therapist OT    MR 09/22/22 -  Guadalupe Garcia, OT Occupational Therapist OT              OT Recommendation and Plan  Therapy Frequency (OT): daily  Plan of Care Review  Plan of Care Reviewed With: patient  Progress: declining  Outcome Evaluation: Pt tolerated 5 reps then 3 reps BUE ther ex AROM/AAROM, depA repositioning in bed, modA grooming with hand over hand assist sitting upright in bed, cont IPOT per POC pending progress     Time Calculation:    Time Calculation- OT     Row Name 10/03/22 1522             Time Calculation- OT    OT Start Time 1522  -KF      OT Received On 10/03/22  -KF              Timed Charges    62741 - OT Therapeutic Exercise Minutes 8  -KF      23418 - OT Therapeutic Activity Minutes 4  -KF      93780 - OT Self Care/Mgmt Minutes 2  -KF              Total Minutes    Timed Charges Total Minutes 14  -KF       Total Minutes 14  -KF            User Key  (r) = Recorded By, (t) = Taken By, (c) = Cosigned By    Initials Name Provider Type     Neyda Cagle OT Occupational Therapist              Therapy Charges for Today     Code Description Service Date Service Provider Modifiers Qty    [id number] HC OT THER PROC EA 15 MIN 10/3/2022 Neyda Cagle OT GO 1               Neyda Cagle OT  10/3/2022

## 2022-10-03 NOTE — PROGRESS NOTES
Pharmacy Consult-Vancomycin Dosing  Kaylie Cruz is a  57 y.o. female receiving vancomycin therapy.     Indication: sepsis  Consulting Provider: hospitalist  ID Consult: no    Goal Trough:  15-20    Current Antimicrobial Therapy  Anti-Infectives (From admission, onward)      Ordered     Dose/Rate Route Frequency Start Stop    10/03/22 0831  vancomycin in dextrose 5% 150 mL (VANCOCIN) IVPB 750 mg        Ordering Provider: Malgorzata Casiano PharmD    750 mg Intravenous Once 10/03/22 1500      10/03/22 0831  vancomycin (dosing per levels)        Ordering Provider: Malgorzata Casiano PharmD     Does not apply Daily 10/03/22 0930 10/06/22 0859    10/03/22 0742  Pharmacy to dose vancomycin        Ordering Provider: Nidia Rangel DO     Does not apply Continuous PRN 10/03/22 0741 10/06/22 0740    10/02/22 1034  vancomycin (VANCOCIN) 1000 mg/200 mL dextrose 5% IVPB        Ordering Provider: Moose Gonzalez RPH    1,000 mg  over 60 Minutes Intravenous Once 10/02/22 1130 10/02/22 1547    10/02/22 1022  Pharmacy to dose vancomycin        Ordering Provider: Megan Landeros MD     Does not apply Continuous PRN 10/02/22 1021 10/09/22 1020    09/28/22 0733  riFAXIMin (XIFAXAN) tablet 550 mg        Ordering Provider: Megan Landeros MD    550 mg Oral Every 12 Hours Scheduled 09/28/22 0900 10/07/22 2359    09/27/22 1402  piperacillin-tazobactam (ZOSYN) 3.375 g in iso-osmotic dextrose 50 ml (premix)        Ordering Provider: Nidia Rangel DO    3.375 g  over 4 Hours Intravenous Every 8 Hours 09/27/22 2100 10/04/22 2359    09/27/22 1402  piperacillin-tazobactam (ZOSYN) 3.375 g in iso-osmotic dextrose 50 ml (premix)        Ordering Provider: Nidia Rangel DO    3.375 g  over 30 Minutes Intravenous Once 09/27/22 1500 09/27/22 1627    09/27/22 1057  cefTRIAXone (ROCEPHIN) 2 g/100 mL 0.9% NS IVPB (MBP)        Ordering Provider: Stephenie Martinez PA    2 g  over 30 Minutes Intravenous Once 09/27/22 4348  "09/27/22 1340    09/27/22 1057  AZITHROMYCIN 500 MG/250 ML 0.9% NS IVPB (vial-mate)        Ordering Provider: Stephenie Martinez PA    500 mg  over 60 Minutes Intravenous Once 09/27/22 1059 09/27/22 1446            Allergies  Allergies as of 09/27/2022 - Reviewed 09/27/2022   Allergen Reaction Noted    Codeine Hives 03/27/2019    Morphine Hives 03/27/2019    Tagamet [cimetidine] Other (See Comments) 03/27/2019    Toradol [ketorolac tromethamine] Hives 03/27/2019       Labs    Results from last 7 days   Lab Units 10/03/22  0736 10/02/22  0814 10/01/22  0430   BUN mg/dL 30* 30* 36*   CREATININE mg/dL 1.55* 1.32* 1.44*       Results from last 7 days   Lab Units 10/03/22  0736 10/02/22  0814 10/01/22  0430   WBC 10*3/mm3 19.51* 16.29* 12.15*       Evaluation of Dosing     Last Dose Received in the ED/Outside Facility: n/a  Is Patient on Dialysis or Renal Replacement: n/a    Ht - 160 cm (63\")  Wt - 52.2 kg (115 lb)    Estimated Creatinine Clearance: 32.2 mL/min (A) (by C-G formula based on SCr of 1.55 mg/dL (H)).    Intake & Output (last 3 days)         09/30 0701  10/01 0700 10/01 0701  10/02 0700 10/02 0701  10/03 0700 10/03 0701  10/04 0700    P.O. 1385 312 9141     IV Piggyback 150  50     Total Intake(mL/kg) 1230 (23.6) 480 (9.2) 2210 (42.3)     Urine (mL/kg/hr) 550 (0.4) 0 (0) 2050 (1.6)     Emesis/NG output  97.9      Stool 100 0      Total Output 650 97.9 2050     Net +580 +382.1 +160             Urine Unmeasured Occurrence 2 x 3 x 1 x     Stool Unmeasured Occurrence 4 x 9 x              Microbiology and Radiology  Microbiology Results (last 10 days)       Procedure Component Value - Date/Time    MRSA Screen, PCR (Inpatient) - Swab, Nares [[id number]]  (Abnormal) Collected: 10/02/22 1150    Lab Status: Final result Specimen: Swab from Nares Updated: 10/02/22 1352     MRSA PCR Positive    Narrative:      The negative predictive value of this diagnostic test is high and should only be used to consider de-escalating " anti-MRSA therapy. A positive result may indicate colonization with MRSA and must be correlated clinically.    Urine Culture - Urine, Urine, Random Void [[id number]]  (Normal) Collected: 10/01/22 0117    Lab Status: Final result Specimen: Urine, Random Void Updated: 10/02/22 1056     Urine Culture No growth    Body Fluid Culture - Body Fluid, Peritoneum [[id number]] Collected: 09/28/22 1143    Lab Status: Final result Specimen: Body Fluid from Peritoneum Updated: 10/01/22 0729     Body Fluid Culture No growth at 3 days     Gram Stain No WBCs or organisms seen    S. Pneumo Ag Urine or CSF - Urine, Urine, Clean Catch [[id number]]  (Normal) Collected: 09/28/22 0643    Lab Status: Final result Specimen: Urine, Clean Catch Updated: 09/28/22 1229     Strep Pneumo Ag Negative    Legionella Antigen, Urine - Urine, Urine, Clean Catch [[id number]]  (Normal) Collected: 09/28/22 0643    Lab Status: Final result Specimen: Urine, Clean Catch Updated: 09/28/22 1229     LEGIONELLA ANTIGEN, URINE Negative    Blood Culture - Blood, Hand, Right [[id number]]  (Normal) Collected: 09/27/22 1300    Lab Status: Final result Specimen: Blood from Hand, Right Updated: 10/02/22 1317     Blood Culture No growth at 5 days    Blood Culture - Blood, Arm, Right [[id number]]  (Normal) Collected: 09/27/22 1245    Lab Status: Final result Specimen: Blood from Arm, Right Updated: 10/02/22 1303     Blood Culture No growth at 5 days            Vancomycin Levels:                Results from last 7 days   Lab Units 10/03/22  0736   VANCOMYCIN TR mcg/mL 11.50         Assessment/Plan    1. Pharmacy dosing vancomycin for PNA; MRSA PCR (+)  2. Patient has received a loading dose of 1000 mg and due to renal function was started on dosing by levels  3. Goal AUC is 400-600  4. Vancomycin random ordered and drawn appropriately and is slightly subtherapeutic at 11.8 mcg/mL (~17 hr level). Will give on time dose of vancomycin 750mg and continue dosing by levels. Scr  increased overnight to 1.55, from 1.32  5. Vancomycin random ordered for 10/4. Will monitor renal function, culture and sensitivities, and clinical status and adjust regimen as needed. Pharmacy will continue to follow.    Morales WileyD, BCPS   10/3/2022  08:46 EDT

## 2022-10-03 NOTE — PLAN OF CARE
Goal Outcome Evaluation:  Plan of Care Reviewed With: patient        Progress: declining  Outcome Evaluation: Pt tolerated 5 reps then 3 reps BUE ther ex AROM/AAROM, depA repositioning in bed, modA grooming with hand over hand assist sitting upright in bed, cont IPOT per POC pending progress

## 2022-10-04 ENCOUNTER — APPOINTMENT (OUTPATIENT)
Dept: GENERAL RADIOLOGY | Facility: HOSPITAL | Age: 57
End: 2022-10-04

## 2022-10-04 LAB
ALBUMIN SERPL-MCNC: 2.1 G/DL (ref 3.5–5.2)
ALBUMIN/GLOB SERPL: 0.6 G/DL
ALP SERPL-CCNC: 147 U/L (ref 39–117)
ALT SERPL W P-5'-P-CCNC: 14 U/L (ref 1–33)
ANION GAP SERPL CALCULATED.3IONS-SCNC: 11 MMOL/L (ref 5–15)
AST SERPL-CCNC: 43 U/L (ref 1–32)
BASOPHILS # BLD AUTO: 0.1 10*3/MM3 (ref 0–0.2)
BASOPHILS NFR BLD AUTO: 0.6 % (ref 0–1.5)
BILIRUB SERPL-MCNC: 1 MG/DL (ref 0–1.2)
BUN SERPL-MCNC: 34 MG/DL (ref 6–20)
BUN/CREAT SERPL: 19.9 (ref 7–25)
CALCIUM SPEC-SCNC: 7.8 MG/DL (ref 8.6–10.5)
CHLORIDE SERPL-SCNC: 102 MMOL/L (ref 98–107)
CO2 SERPL-SCNC: 19 MMOL/L (ref 22–29)
CREAT SERPL-MCNC: 1.71 MG/DL (ref 0.57–1)
CRYPTOC AG CSF QL: NEGATIVE
D-LACTATE SERPL-SCNC: 1.4 MMOL/L (ref 0.5–2)
DEPRECATED RDW RBC AUTO: 50.2 FL (ref 37–54)
EGFRCR SERPLBLD CKD-EPI 2021: 34.2 ML/MIN/1.73
EOSINOPHIL # BLD AUTO: 0.39 10*3/MM3 (ref 0–0.4)
EOSINOPHIL NFR BLD AUTO: 2.2 % (ref 0.3–6.2)
ERYTHROCYTE [DISTWIDTH] IN BLOOD BY AUTOMATED COUNT: 17.1 % (ref 12.3–15.4)
GLOBULIN UR ELPH-MCNC: 3.6 GM/DL
GLUCOSE BLDC GLUCOMTR-MCNC: 139 MG/DL (ref 70–130)
GLUCOSE BLDC GLUCOMTR-MCNC: 158 MG/DL (ref 70–130)
GLUCOSE BLDC GLUCOMTR-MCNC: 223 MG/DL (ref 70–130)
GLUCOSE SERPL-MCNC: 159 MG/DL (ref 65–99)
HAV IGM SERPL QL IA: ABNORMAL
HBV CORE IGM SERPL QL IA: ABNORMAL
HBV SURFACE AG SERPL QL IA: ABNORMAL
HCT VFR BLD AUTO: 23.9 % (ref 34–46.6)
HCV AB SER DONR QL: REACTIVE
HGB BLD-MCNC: 7.6 G/DL (ref 12–15.9)
IMM GRANULOCYTES # BLD AUTO: 0.41 10*3/MM3 (ref 0–0.05)
IMM GRANULOCYTES NFR BLD AUTO: 2.3 % (ref 0–0.5)
LYMPHOCYTES # BLD AUTO: 1.2 10*3/MM3 (ref 0.7–3.1)
LYMPHOCYTES NFR BLD AUTO: 6.8 % (ref 19.6–45.3)
MCH RBC QN AUTO: 26.2 PG (ref 26.6–33)
MCHC RBC AUTO-ENTMCNC: 31.8 G/DL (ref 31.5–35.7)
MCV RBC AUTO: 82.4 FL (ref 79–97)
MONOCYTES # BLD AUTO: 0.65 10*3/MM3 (ref 0.1–0.9)
MONOCYTES NFR BLD AUTO: 3.7 % (ref 5–12)
NEUTROPHILS NFR BLD AUTO: 14.84 10*3/MM3 (ref 1.7–7)
NEUTROPHILS NFR BLD AUTO: 84.4 % (ref 42.7–76)
NRBC BLD AUTO-RTO: 0 /100 WBC (ref 0–0.2)
PLATELET # BLD AUTO: 162 10*3/MM3 (ref 140–450)
PMV BLD AUTO: 11.6 FL (ref 6–12)
POTASSIUM SERPL-SCNC: 3.9 MMOL/L (ref 3.5–5.2)
PROCALCITONIN SERPL-MCNC: 1.42 NG/ML (ref 0–0.25)
PROT SERPL-MCNC: 5.7 G/DL (ref 6–8.5)
RBC # BLD AUTO: 2.9 10*6/MM3 (ref 3.77–5.28)
SODIUM SERPL-SCNC: 132 MMOL/L (ref 136–145)
WBC NRBC COR # BLD: 17.59 10*3/MM3 (ref 3.4–10.8)

## 2022-10-04 PROCEDURE — 80053 COMPREHEN METABOLIC PANEL: CPT | Performed by: INTERNAL MEDICINE

## 2022-10-04 PROCEDURE — 74018 RADEX ABDOMEN 1 VIEW: CPT

## 2022-10-04 PROCEDURE — 94799 UNLISTED PULMONARY SVC/PX: CPT

## 2022-10-04 PROCEDURE — 25010000002 PIPERACILLIN SOD-TAZOBACTAM PER 1 G: Performed by: FAMILY MEDICINE

## 2022-10-04 PROCEDURE — 63710000001 INSULIN DETEMIR PER 5 UNITS: Performed by: INTERNAL MEDICINE

## 2022-10-04 PROCEDURE — 85025 COMPLETE CBC W/AUTO DIFF WBC: CPT | Performed by: INTERNAL MEDICINE

## 2022-10-04 PROCEDURE — 83605 ASSAY OF LACTIC ACID: CPT | Performed by: INTERNAL MEDICINE

## 2022-10-04 PROCEDURE — 80074 ACUTE HEPATITIS PANEL: CPT | Performed by: INTERNAL MEDICINE

## 2022-10-04 PROCEDURE — 86606 ASPERGILLUS ANTIBODY: CPT | Performed by: INTERNAL MEDICINE

## 2022-10-04 PROCEDURE — 63710000001 INSULIN LISPRO (HUMAN) PER 5 UNITS: Performed by: FAMILY MEDICINE

## 2022-10-04 PROCEDURE — 63710000001 ONDANSETRON PER 8 MG: Performed by: FAMILY MEDICINE

## 2022-10-04 PROCEDURE — 87449 NOS EACH ORGANISM AG IA: CPT | Performed by: INTERNAL MEDICINE

## 2022-10-04 PROCEDURE — 63710000001 INSULIN LISPRO (HUMAN) PER 5 UNITS: Performed by: INTERNAL MEDICINE

## 2022-10-04 PROCEDURE — 84145 PROCALCITONIN (PCT): CPT | Performed by: INTERNAL MEDICINE

## 2022-10-04 PROCEDURE — 25010000002 LINEZOLID 600 MG/300ML SOLUTION: Performed by: INTERNAL MEDICINE

## 2022-10-04 PROCEDURE — 82962 GLUCOSE BLOOD TEST: CPT

## 2022-10-04 PROCEDURE — 86612 BLASTOMYCES ANTIBODY: CPT | Performed by: INTERNAL MEDICINE

## 2022-10-04 PROCEDURE — 87517 HEPATITIS B DNA QUANT: CPT | Performed by: INTERNAL MEDICINE

## 2022-10-04 PROCEDURE — 25010000002 FUROSEMIDE PER 20 MG: Performed by: FAMILY MEDICINE

## 2022-10-04 PROCEDURE — 71045 X-RAY EXAM CHEST 1 VIEW: CPT

## 2022-10-04 PROCEDURE — 99232 SBSQ HOSP IP/OBS MODERATE 35: CPT | Performed by: INTERNAL MEDICINE

## 2022-10-04 PROCEDURE — 99232 SBSQ HOSP IP/OBS MODERATE 35: CPT | Performed by: FAMILY MEDICINE

## 2022-10-04 PROCEDURE — 87899 AGENT NOS ASSAY W/OPTIC: CPT | Performed by: INTERNAL MEDICINE

## 2022-10-04 RX ORDER — LIDOCAINE 50 MG/G
1 PATCH TOPICAL
Status: DISCONTINUED | OUTPATIENT
Start: 2022-10-04 | End: 2022-10-12 | Stop reason: HOSPADM

## 2022-10-04 RX ORDER — CASTOR OIL AND BALSAM, PERU 788; 87 MG/G; MG/G
1 OINTMENT TOPICAL EVERY 12 HOURS SCHEDULED
Status: DISCONTINUED | OUTPATIENT
Start: 2022-10-04 | End: 2022-10-12 | Stop reason: HOSPADM

## 2022-10-04 RX ADMIN — Medication 10 ML: at 20:34

## 2022-10-04 RX ADMIN — TAZOBACTAM SODIUM AND PIPERACILLIN SODIUM 3.38 G: 375; 3 INJECTION, SOLUTION INTRAVENOUS at 08:18

## 2022-10-04 RX ADMIN — METOPROLOL TARTRATE 50 MG: 50 TABLET, FILM COATED ORAL at 20:33

## 2022-10-04 RX ADMIN — ACETAMINOPHEN 650 MG: 325 TABLET, FILM COATED ORAL at 11:59

## 2022-10-04 RX ADMIN — RIFAXIMIN 550 MG: 550 TABLET ORAL at 20:33

## 2022-10-04 RX ADMIN — FOLIC ACID 1 MG: 1 TABLET ORAL at 08:15

## 2022-10-04 RX ADMIN — INSULIN DETEMIR 15 UNITS: 100 INJECTION, SOLUTION SUBCUTANEOUS at 20:34

## 2022-10-04 RX ADMIN — ONDANSETRON HYDROCHLORIDE 4 MG: 4 TABLET, FILM COATED ORAL at 08:15

## 2022-10-04 RX ADMIN — IPRATROPIUM BROMIDE AND ALBUTEROL SULFATE 3 ML: .5; 3 SOLUTION RESPIRATORY (INHALATION) at 12:37

## 2022-10-04 RX ADMIN — LINEZOLID 600 MG: 2 INJECTION, SOLUTION INTRAVENOUS at 12:00

## 2022-10-04 RX ADMIN — TAZOBACTAM SODIUM AND PIPERACILLIN SODIUM 3.38 G: 375; 3 INJECTION, SOLUTION INTRAVENOUS at 00:35

## 2022-10-04 RX ADMIN — LACTULOSE 20 G: 20 SOLUTION ORAL at 08:14

## 2022-10-04 RX ADMIN — ATORVASTATIN CALCIUM 40 MG: 40 TABLET, FILM COATED ORAL at 20:33

## 2022-10-04 RX ADMIN — Medication 10 ML: at 08:18

## 2022-10-04 RX ADMIN — METOPROLOL TARTRATE 50 MG: 50 TABLET, FILM COATED ORAL at 08:15

## 2022-10-04 RX ADMIN — FUROSEMIDE 40 MG: 10 INJECTION, SOLUTION INTRAMUSCULAR; INTRAVENOUS at 08:15

## 2022-10-04 RX ADMIN — AMLODIPINE BESYLATE 5 MG: 5 TABLET ORAL at 08:15

## 2022-10-04 RX ADMIN — IPRATROPIUM BROMIDE AND ALBUTEROL SULFATE 3 ML: .5; 3 SOLUTION RESPIRATORY (INHALATION) at 15:40

## 2022-10-04 RX ADMIN — TAZOBACTAM SODIUM AND PIPERACILLIN SODIUM 3.38 G: 375; 3 INJECTION, SOLUTION INTRAVENOUS at 17:03

## 2022-10-04 RX ADMIN — THIAMINE HCL TAB 100 MG 100 MG: 100 TAB at 08:15

## 2022-10-04 RX ADMIN — INSULIN LISPRO 4 UNITS: 100 INJECTION, SOLUTION INTRAVENOUS; SUBCUTANEOUS at 17:03

## 2022-10-04 RX ADMIN — ACETAMINOPHEN 650 MG: 325 TABLET, FILM COATED ORAL at 04:18

## 2022-10-04 RX ADMIN — ACETAMINOPHEN 650 MG: 325 TABLET, FILM COATED ORAL at 20:33

## 2022-10-04 RX ADMIN — RIFAXIMIN 550 MG: 550 TABLET ORAL at 08:15

## 2022-10-04 RX ADMIN — INSULIN LISPRO 5 UNITS: 100 INJECTION, SOLUTION INTRAVENOUS; SUBCUTANEOUS at 17:03

## 2022-10-04 RX ADMIN — ASPIRIN 81 MG: 81 TABLET, COATED ORAL at 08:15

## 2022-10-04 RX ADMIN — CASTOR OIL AND BALSAM, PERU 1 APPLICATION: 788; 87 OINTMENT TOPICAL at 20:34

## 2022-10-04 RX ADMIN — LACTULOSE 20 G: 20 SOLUTION ORAL at 20:34

## 2022-10-04 RX ADMIN — LIDOCAINE 1 PATCH: 50 PATCH CUTANEOUS at 12:00

## 2022-10-04 NOTE — PLAN OF CARE
Problem: Fall Injury Risk  Goal: Absence of Fall and Fall-Related Injury  Intervention: Identify and Manage Contributors  Recent Flowsheet Documentation  Taken 10/3/2022 2000 by Annelise Peter RN  Medication Review/Management: medications reviewed  Intervention: Promote Injury-Free Environment  Recent Flowsheet Documentation  Taken 10/4/2022 0400 by Annelise Peter RN  Safety Promotion/Fall Prevention:   activity supervised   assistive device/personal items within reach   clutter free environment maintained   fall prevention program maintained   lighting adjusted   nonskid shoes/slippers when out of bed   room organization consistent   safety round/check completed  Taken 10/4/2022 0200 by Annelise Peter RN  Safety Promotion/Fall Prevention:   activity supervised   assistive device/personal items within reach   clutter free environment maintained   fall prevention program maintained   lighting adjusted   nonskid shoes/slippers when out of bed   room organization consistent   safety round/check completed  Taken 10/4/2022 0000 by Annelise Peter, RN  Safety Promotion/Fall Prevention:   activity supervised   assistive device/personal items within reach   clutter free environment maintained   fall prevention program maintained   lighting adjusted   nonskid shoes/slippers when out of bed   room organization consistent   safety round/check completed  Taken 10/3/2022 2200 by Annelise Peter, RN  Safety Promotion/Fall Prevention:   activity supervised   assistive device/personal items within reach   clutter free environment maintained   fall prevention program maintained   lighting adjusted   nonskid shoes/slippers when out of bed   room organization consistent   safety round/check completed  Taken 10/3/2022 2000 by Annelise Peter, RN  Safety Promotion/Fall Prevention:   activity supervised   assistive device/personal items within reach   clutter free environment maintained   fall prevention program  maintained   lighting adjusted   nonskid shoes/slippers when out of bed   room organization consistent   safety round/check completed     Problem: Adult Inpatient Plan of Care  Goal: Absence of Hospital-Acquired Illness or Injury  Intervention: Identify and Manage Fall Risk  Recent Flowsheet Documentation  Taken 10/4/2022 0400 by Annelise Peter RN  Safety Promotion/Fall Prevention:   activity supervised   assistive device/personal items within reach   clutter free environment maintained   fall prevention program maintained   lighting adjusted   nonskid shoes/slippers when out of bed   room organization consistent   safety round/check completed  Taken 10/4/2022 0200 by Annelise Peter, RN  Safety Promotion/Fall Prevention:   activity supervised   assistive device/personal items within reach   clutter free environment maintained   fall prevention program maintained   lighting adjusted   nonskid shoes/slippers when out of bed   room organization consistent   safety round/check completed  Taken 10/4/2022 0000 by Annelise Peter, RN  Safety Promotion/Fall Prevention:   activity supervised   assistive device/personal items within reach   clutter free environment maintained   fall prevention program maintained   lighting adjusted   nonskid shoes/slippers when out of bed   room organization consistent   safety round/check completed  Taken 10/3/2022 2200 by Annelise Peter, RN  Safety Promotion/Fall Prevention:   activity supervised   assistive device/personal items within reach   clutter free environment maintained   fall prevention program maintained   lighting adjusted   nonskid shoes/slippers when out of bed   room organization consistent   safety round/check completed  Taken 10/3/2022 2000 by Annelise Peter, RN  Safety Promotion/Fall Prevention:   activity supervised   assistive device/personal items within reach   clutter free environment maintained   fall prevention program maintained   lighting adjusted    nonskid shoes/slippers when out of bed   room organization consistent   safety round/check completed  Intervention: Prevent Skin Injury  Recent Flowsheet Documentation  Taken 10/4/2022 0400 by Annelise Peter RN  Body Position: weight shifting  Skin Protection:   adhesive use limited   tubing/devices free from skin contact   skin-to-skin areas padded   skin-to-device areas padded   incontinence pads utilized  Taken 10/4/2022 0200 by Annelise Peter RN  Body Position: position changed independently  Skin Protection:   adhesive use limited   tubing/devices free from skin contact   skin-to-device areas padded   skin-to-skin areas padded   incontinence pads utilized  Taken 10/4/2022 0000 by Annelise Peter RN  Body Position:   position changed independently   neutral head position   neutral body alignment   legs elevated  Skin Protection:   adhesive use limited   tubing/devices free from skin contact   skin-to-skin areas padded   skin-to-device areas padded   incontinence pads utilized  Taken 10/3/2022 2200 by Annelise Peter RN  Body Position: patient/family refused  Skin Protection:   adhesive use limited   tubing/devices free from skin contact   skin-to-skin areas padded   skin-to-device areas padded   incontinence pads utilized  Taken 10/3/2022 2000 by Annelise Peter RN  Body Position: (pt refused to turn off back side)   position changed independently   supine   other (see comments)  Skin Protection:   adhesive use limited   tubing/devices free from skin contact   skin-to-skin areas padded   skin-to-device areas padded   incontinence pads utilized  Intervention: Prevent and Manage VTE (Venous Thromboembolism) Risk  Recent Flowsheet Documentation  Taken 10/4/2022 0400 by Annelise Peter RN  Activity Management: activity adjusted per tolerance  Taken 10/4/2022 0200 by Annelise Peter RN  Activity Management: activity adjusted per tolerance  Taken 10/4/2022 0000 by Annelise Peter RN  Activity  Management: activity adjusted per tolerance  Taken 10/3/2022 2200 by Annelise Peter RN  Activity Management: activity adjusted per tolerance  Taken 10/3/2022 2000 by Annelise Peter RN  Activity Management: activity adjusted per tolerance  Intervention: Prevent Infection  Recent Flowsheet Documentation  Taken 10/4/2022 0400 by Annelise Peter RN  Infection Prevention:   environmental surveillance performed   hand hygiene promoted   single patient room provided   visitors restricted/screened   rest/sleep promoted   personal protective equipment utilized   equipment surfaces disinfected  Taken 10/4/2022 0200 by Annelise Peter RN  Infection Prevention:   environmental surveillance performed   equipment surfaces disinfected   hand hygiene promoted   personal protective equipment utilized   rest/sleep promoted   single patient room provided   visitors restricted/screened  Taken 10/4/2022 0000 by Annelise Peter RN  Infection Prevention:   environmental surveillance performed   equipment surfaces disinfected   hand hygiene promoted   personal protective equipment utilized   rest/sleep promoted   single patient room provided   visitors restricted/screened  Taken 10/3/2022 2200 by Annelise Peter RN  Infection Prevention:   environmental surveillance performed   equipment surfaces disinfected   hand hygiene promoted   personal protective equipment utilized   rest/sleep promoted   single patient room provided   visitors restricted/screened  Taken 10/3/2022 2000 by Annelise Peter RN  Infection Prevention:   environmental surveillance performed   equipment surfaces disinfected   personal protective equipment utilized   rest/sleep promoted   single patient room provided   visitors restricted/screened   hand hygiene promoted  Goal: Optimal Comfort and Wellbeing  Intervention: Provide Person-Centered Care  Recent Flowsheet Documentation  Taken 10/3/2022 2000 by Annelise Peter RN  Trust Relationship/Rapport:    care explained   choices provided   emotional support provided   thoughts/feelings acknowledged   reassurance provided   questions encouraged     Problem: Skin Injury Risk Increased  Goal: Skin Health and Integrity  Intervention: Optimize Skin Protection  Recent Flowsheet Documentation  Taken 10/4/2022 0400 by Annelise Peter RN  Pressure Reduction Techniques:   frequent weight shift encouraged   weight shift assistance provided   pressure points protected   positioned off wounds   heels elevated off bed  Head of Bed (HOB) Positioning: HOB at 30 degrees  Pressure Reduction Devices:   pressure-redistributing mattress utilized   positioning supports utilized   heel offloading device utilized  Skin Protection:   adhesive use limited   tubing/devices free from skin contact   skin-to-skin areas padded   skin-to-device areas padded   incontinence pads utilized  Taken 10/4/2022 0200 by Annelise Peter RN  Pressure Reduction Techniques:   frequent weight shift encouraged   weight shift assistance provided   pressure points protected   positioned off wounds   heels elevated off bed  Head of Bed (HOB) Positioning: HOB at 20-30 degrees  Pressure Reduction Devices:   positioning supports utilized   heel offloading device utilized   pressure-redistributing mattress utilized  Skin Protection:   adhesive use limited   tubing/devices free from skin contact   skin-to-device areas padded   skin-to-skin areas padded   incontinence pads utilized  Taken 10/4/2022 0000 by Annelise Peter RN  Pressure Reduction Techniques:   frequent weight shift encouraged   weight shift assistance provided   pressure points protected   positioned off wounds   heels elevated off bed  Head of Bed (HOB) Positioning: HOB at 30 degrees  Pressure Reduction Devices:   pressure-redistributing mattress utilized   positioning supports utilized   heel offloading device utilized  Skin Protection:   adhesive use limited   tubing/devices free from skin  contact   skin-to-skin areas padded   skin-to-device areas padded   incontinence pads utilized  Taken 10/3/2022 2200 by Annelise Peter, RN  Pressure Reduction Techniques:   frequent weight shift encouraged   weight shift assistance provided   pressure points protected   positioned off wounds   heels elevated off bed  Head of Bed (HOB) Positioning: HOB at 30 degrees  Pressure Reduction Devices:   pressure-redistributing mattress utilized   positioning supports utilized   heel offloading device utilized  Skin Protection:   adhesive use limited   tubing/devices free from skin contact   skin-to-skin areas padded   skin-to-device areas padded   incontinence pads utilized  Taken 10/3/2022 2000 by Annelise Peter, RN  Pressure Reduction Techniques:   frequent weight shift encouraged   weight shift assistance provided   pressure points protected   positioned off wounds   heels elevated off bed  Head of Bed (HOB) Positioning: HOB at 20-30 degrees  Pressure Reduction Devices:   pressure-redistributing mattress utilized   positioning supports utilized   heel offloading device utilized  Skin Protection:   adhesive use limited   tubing/devices free from skin contact   skin-to-skin areas padded   skin-to-device areas padded   incontinence pads utilized   Goal Outcome Evaluation:

## 2022-10-04 NOTE — NURSING NOTE
Pt. More alert today, and has been taking her lactulose. AxOx4. Pt. Incontinent of bowl and bladder with soft stools x8 this shift. Pt requested a pull up, but educated on skin breakdown. Pt re-educated on importance of using the call light when having to go to the bathroom d/t incontinence so we could put her on the bed pan or try and get her up to the commode. According to chart 10/2 she was able to get up the commode. Pt. Is more agreeable to cares today. Oxygen being titrated by respiratory. Bed in lowest position, call light within reach, will continue to monitor.

## 2022-10-04 NOTE — PLAN OF CARE
Problem: Fall Injury Risk  Goal: Absence of Fall and Fall-Related Injury  Outcome: Ongoing, Progressing  Intervention: Identify and Manage Contributors  Recent Flowsheet Documentation  Taken 10/4/2022 0815 by Guadalupe Warner RN  Medication Review/Management: medications reviewed  Intervention: Promote Injury-Free Environment  Recent Flowsheet Documentation  Taken 10/4/2022 0815 by Guadalupe Warner RN  Safety Promotion/Fall Prevention: activity supervised     Problem: Adult Inpatient Plan of Care  Goal: Plan of Care Review  Outcome: Ongoing, Progressing  Goal: Patient-Specific Goal (Individualized)  Outcome: Ongoing, Progressing  Goal: Absence of Hospital-Acquired Illness or Injury  Outcome: Ongoing, Progressing  Intervention: Identify and Manage Fall Risk  Recent Flowsheet Documentation  Taken 10/4/2022 0815 by Guadalupe Warner RN  Safety Promotion/Fall Prevention: activity supervised  Intervention: Prevent Skin Injury  Recent Flowsheet Documentation  Taken 10/4/2022 0815 by Guadalupe Warner RN  Body Position: position changed independently  Skin Protection: adhesive use limited  Intervention: Prevent and Manage VTE (Venous Thromboembolism) Risk  Recent Flowsheet Documentation  Taken 10/4/2022 0815 by Guadalupe Warner RN  Activity Management: activity adjusted per tolerance  Goal: Optimal Comfort and Wellbeing  Outcome: Ongoing, Progressing  Goal: Readiness for Transition of Care  Outcome: Ongoing, Progressing     Problem: Skin Injury Risk Increased  Goal: Skin Health and Integrity  Outcome: Ongoing, Progressing  Intervention: Optimize Skin Protection  Recent Flowsheet Documentation  Taken 10/4/2022 0815 by Guadalupe Warner RN  Pressure Reduction Techniques: frequent weight shift encouraged  Head of Bed (HOB) Positioning: HOB at 20-30 degrees  Pressure Reduction Devices: alternating pressure pump (ADD)  Skin Protection: adhesive use limited   Goal Outcome Evaluation:

## 2022-10-04 NOTE — PAYOR COMM NOTE
"Annette Sandoval, MALIA  Utilization Management  P:[phone]  F:[phone]  [facility] # [id number]  Kisha Cruz (57 y.o. Female)             Date of Birth   1965    Social Security Number       Address   [address] [place] [address] [address] [place] [address]    Home Phone   [phone]    MRN   [id number]       Taoist   Religion    Marital Status   Single                            Admission Date   9/27/22    Admission Type   Emergency    Admitting Provider   Nidia Rangel DO    Attending Provider   Nidia Rangel DO    Department, Room/Bed   [facility] 5G, S552/1       Discharge Date       Discharge Disposition       Discharge Destination                               Attending Provider: Nidia Rangel DO    Allergies: Codeine, Morphine, Tagamet [Cimetidine], Toradol [Ketorolac Tromethamine]    Isolation: None   Infection: MRSA (10/02/22)   Code Status: CPR   Advance Care Planning Activity    Ht: 160 cm (63\")   Wt: 52.2 kg (115 lb)    Admission Cmt: None   Principal Problem: None                Active Insurance as of 9/27/2022     Primary Coverage     Payor Plan Insurance Group Employer/Plan Group    [facility] [id number]     Payor Plan Address Payor Plan Phone Number Payor Plan Fax Number Effective Dates    [address] [address]   1/1/2021 - None Entered    [facility] [address]       Subscriber Name Subscriber Birth Date Member ID       KISHA CRUZ 1965 [id number]                 Emergency Contacts      (Rel.) Home Phone Work Phone Mobile Phone    Karenalfonzoitzel -- -- [phone]              Current Facility-Administered Medications   Medication Dose Route Frequency Provider Last Rate Last Admin   • acetaminophen (TYLENOL) tablet 650 mg  650 mg Oral Q4H PRN Nidia Rangel DO   650 mg at 10/04/22 1159    Or   • acetaminophen (TYLENOL) 160 MG/5ML solution 650 mg  650 mg Oral Q4H PRN Nidia Rangel DO        " Or   • acetaminophen (TYLENOL) suppository 650 mg  650 mg Rectal Q4H PRN Nidia Rangel, DO       • albuterol (PROVENTIL) nebulizer solution 0.083% 2.5 mg/3mL  2.5 mg Nebulization Q6H PRN Nidia Rangel DO   2.5 mg at 10/03/22 0904   • amLODIPine (NORVASC) tablet 5 mg  5 mg Oral Daily Nidia Rangel DO   5 mg at 10/04/22 0815   • aspirin EC tablet 81 mg  81 mg Oral Daily Nidia Rangel DO   81 mg at 10/04/22 0815   • atorvastatin (LIPITOR) tablet 40 mg  40 mg Oral Nightly Megan Landeros MD   40 mg at 10/03/22 2054   • benzonatate (TESSALON) capsule 200 mg  200 mg Oral TID PRN Megan Landeros MD       • castor oil-balsam peru (VENELEX) ointment 1 application  1 application Topical Q12H Nidia Rangel DO       • dextrose (D50W) (25 g/50 mL) IV injection 25 g  25 g Intravenous Q15 Min PRN Nidia Rangel DO       • dextrose (GLUTOSE) oral gel 15 g  15 g Oral Q15 Min PRN Nidia Rangel DO       • folic acid (FOLVITE) tablet 1 mg  1 mg Oral Daily Nidia Rangel DO   1 mg at 10/04/22 0815   • glucagon (human recombinant) (GLUCAGEN DIAGNOSTIC) injection 1 mg  1 mg Intramuscular Q15 Min PRN iNdia Rangel DO       • guaiFENesin (ROBITUSSIN) 100 MG/5ML oral solution 200 mg  200 mg Oral Q4H PRN Megan Landeros MD   200 mg at 10/03/22 2059   • insulin detemir (LEVEMIR) injection 15 Units  15 Units Subcutaneous Nightly Megan Landeros MD   15 Units at 10/03/22 2056   • Insulin Lispro (humaLOG) injection 0-9 Units  0-9 Units Subcutaneous TID AC Nidia Rangel DO   2 Units at 10/02/22 1614   • Insulin Lispro (humaLOG) injection 5 Units  5 Units Subcutaneous TID With Meals Megan Landeros MD   5 Units at 10/02/22 1614   • ipratropium-albuterol (DUO-NEB) nebulizer solution 3 mL  3 mL Nebulization 4x Daily - RT Nidia Rangel, DO   3 mL at 10/04/22 1540   • lactulose (CHRONULAC) 10 GM/15ML solution 20 g  20 g Oral TID Henrietta Ramos II, DO   20  g at 10/04/22 0814   • lidocaine (LIDODERM) 5 % 1 patch  1 patch Transdermal Q24H Nidia Rangel DO   1 patch at 10/04/22 1200   • Linezolid (ZYVOX) 600 mg 300 mL  600 mg Intravenous Q12H Hugo Castillo  mL/hr at 10/04/22 1200 600 mg at 10/04/22 1200   • metoprolol tartrate (LOPRESSOR) tablet 50 mg  50 mg Oral BID Nidia Rangel, DO   50 mg at 10/04/22 0815   • ondansetron (ZOFRAN) tablet 4 mg  4 mg Oral Q6H PRN Nidia Rangel DO   4 mg at 10/04/22 0815    Or   • ondansetron (ZOFRAN) injection 4 mg  4 mg Intravenous Q6H PRN Nidia Rangel DO       • piperacillin-tazobactam (ZOSYN) 3.375 g in iso-osmotic dextrose 50 ml (premix)  3.375 g Intravenous Q8H Nidia Rangel, DO   3.375 g at 10/04/22 0818   • potassium chloride (MICRO-K) CR capsule 40 mEq  40 mEq Oral PRN Richard, Henrietta M II, DO   40 mEq at 09/29/22 2143    Or   • potassium chloride (KLOR-CON) packet 40 mEq  40 mEq Oral PRN Richard, Henrietta M II, DO        Or   • potassium chloride 10 mEq in 100 mL IVPB  10 mEq Intravenous Q1H PRN Richard, Henrietta M II,  mL/hr at 09/29/22 1152 10 mEq at 09/29/22 1152   • riFAXIMin (XIFAXAN) tablet 550 mg  550 mg Oral Q12H Megan Landeros MD   550 mg at 10/04/22 0815   • sodium chloride 0.9 % flush 10 mL  10 mL Intravenous PRN Stephenie Martinez PA       • sodium chloride 0.9 % flush 10 mL  10 mL Intravenous Q12H Nidia Rangel, DO   10 mL at 10/04/22 0818   • sodium chloride 0.9 % flush 10 mL  10 mL Intravenous PRN Nidia Rangel DO       • thiamine (VITAMIN B-1) tablet 100 mg  100 mg Oral Daily Nidia Rangel DO   100 mg at 10/04/22 0815     Lab Results (last 48 hours)     Procedure Component Value Units Date/Time    Cryptococcal Antigen [[id number]]  (Normal) Collected: 10/04/22 0537    Specimen: Blood Updated: 10/04/22 1026     Crypto Ag Negative    POC Glucose Once [[id number]]  (Abnormal) Collected: 10/04/22 0733    Specimen: Blood Updated: 10/04/22 0736     Glucose  139 mg/dL      Comment: Meter: [id number] : [id number] Marcell Young       Comprehensive Metabolic Panel [[id number]]  (Abnormal) Collected: 10/04/22 0537    Specimen: Blood Updated: 10/04/22 0658     Glucose 159 mg/dL      BUN 34 mg/dL      Creatinine 1.71 mg/dL      Sodium 132 mmol/L      Potassium 3.9 mmol/L      Chloride 102 mmol/L      CO2 19.0 mmol/L      Calcium 7.8 mg/dL      Total Protein 5.7 g/dL      Albumin 2.10 g/dL      ALT (SGPT) 14 U/L      AST (SGOT) 43 U/L      Alkaline Phosphatase 147 U/L      Total Bilirubin 1.0 mg/dL      Globulin 3.6 gm/dL      Comment: Calculated Result        A/G Ratio 0.6 g/dL      BUN/Creatinine Ratio 19.9     Anion Gap 11.0 mmol/L      eGFR 34.2 mL/min/1.73      Comment: National Kidney Foundation and American Society of Nephrology (ASN) Task Force recommended calculation based on the Chronic Kidney Disease Epidemiology Collaboration (CKD-EPI) equation refit without adjustment for race.       Narrative:      GFR Normal >60  Chronic Kidney Disease <60  Kidney Failure <15      Hepatitis Panel, Acute [[id number]]  (Abnormal) Collected: 10/04/22 0537    Specimen: Blood Updated: 10/04/22 0658     Hepatitis B Surface Ag Non-Reactive     Hep A IgM Non-Reactive     Hep B C IgM Non-Reactive     Hepatitis C Ab Reactive    Narrative:      Results may be falsely decreased if patient taking Biotin.     Procalcitonin [[id number]]  (Abnormal) Collected: 10/04/22 0537    Specimen: Blood Updated: 10/04/22 0652     Procalcitonin 1.42 ng/mL     Narrative:      As a Marker for Sepsis (Non-Neonates):    1. <0.5 ng/mL represents a low risk of severe sepsis and/or septic shock.  2. >2 ng/mL represents a high risk of severe sepsis and/or septic shock.    As a Marker for Lower Respiratory Tract Infections that require antibiotic therapy:    PCT on Admission    Antibiotic Therapy       6-12 Hrs later    >0.5                Strongly Recommended  >0.25 - <0.5        Recommended   0.1 - 0.25           "Discouraged              Remeasure/reassess PCT  <0.1                Strongly Discouraged     Remeasure/reassess PCT    As 28 day mortality risk marker: \"Change in Procalcitonin Result\" (>80% or <=80%) if Day 0 (or Day 1) and Day 4 values are available. Refer to [email or web address]-pct-[email or web address]    Change in PCT <=80%  A decrease of PCT levels below or equal to 80% defines a positive change in PCT test result representing a higher risk for 28-day all-cause mortality of patients diagnosed with severe sepsis for septic shock.    Change in PCT >80%  A decrease of PCT levels of more than 80% defines a negative change in PCT result representing a lower risk for 28-day all-cause mortality of patients diagnosed with severe sepsis or septic shock.       Lactic Acid, Plasma [[id number]]  (Normal) Collected: 10/04/22 0537    Specimen: Blood Updated: 10/04/22 0651     Lactate 1.4 mmol/L      Comment: Falsely depressed results may occur on samples drawn from patients receiving N-Acetylcysteine (NAC) or Metamizole.       CBC & Differential [[id number]]  (Abnormal) Collected: 10/04/22 0537    Specimen: Blood Updated: 10/04/22 0609    Narrative:      The following orders were created for panel order CBC & Differential.  Procedure                               Abnormality         Status                     ---------                               -----------         ------                     CBC Auto Differential[[id number]]        Abnormal            Final result                 Please view results for these tests on the individual orders.    CBC Auto Differential [[id number]]  (Abnormal) Collected: 10/04/22 0537    Specimen: Blood Updated: 10/04/22 0609     WBC 17.59 10*3/mm3      RBC 2.90 10*6/mm3      Hemoglobin 7.6 g/dL      Hematocrit 23.9 %      MCV 82.4 fL      MCH 26.2 pg      MCHC 31.8 g/dL      RDW 17.1 %      RDW-SD 50.2 fl      MPV 11.6 fL      Platelets 162 10*3/mm3      Neutrophil % 84.4 %      Lymphocyte % 6.8 %      " Monocyte % 3.7 %      Eosinophil % 2.2 %      Basophil % 0.6 %      Immature Grans % 2.3 %      Neutrophils, Absolute 14.84 10*3/mm3      Lymphocytes, Absolute 1.20 10*3/mm3      Monocytes, Absolute 0.65 10*3/mm3      Eosinophils, Absolute 0.39 10*3/mm3      Basophils, Absolute 0.10 10*3/mm3      Immature Grans, Absolute 0.41 10*3/mm3      nRBC 0.0 /100 WBC     Fungitell B-D Glucan [[id number]] Collected: 10/04/22 0537    Specimen: Blood Updated: 10/04/22 0600    Fungal Antibodies, Quantitative Double Immunodiffusion [[id number]] Collected: 10/04/22 0537    Specimen: Blood Updated: 10/04/22 0600    Hepatitis B DNA, Quantitative, PCR [[id number]] Collected: 10/04/22 0537    Specimen: Blood Updated: 10/04/22 0559    POC Glucose Once [[id number]]  (Abnormal) Collected: 10/03/22 1934    Specimen: Blood Updated: 10/03/22 1937     Glucose 377 mg/dL      Comment: Meter: [id number] : [id number] [facility]       POC Glucose Once [[id number]]  (Abnormal) Collected: 10/03/22 1625    Specimen: Blood Updated: 10/03/22 1628     Glucose 170 mg/dL      Comment: Meter: [id number] : [id number] [facility]       Respiratory Panel PCR w/COVID-19(SARS-CoV-2) MALIK/STANLEY/AIXA/PAD/COR/MAD/HEATHER In-House, NP Swab in [facility]TM/[facility], 3-4 HR TAT - Swab, Nasopharynx [[id number]]  (Normal) Collected: 10/03/22 1124    Specimen: Swab from Nasopharynx Updated: 10/03/22 1237     ADENOVIRUS, PCR Not Detected     Coronavirus 229E Not Detected     Coronavirus HKU1 Not Detected     Coronavirus NL63 Not Detected     Coronavirus OC43 Not Detected     COVID19 Not Detected     Human Metapneumovirus Not Detected     Human Rhinovirus/Enterovirus Not Detected     Influenza A PCR Not Detected     Influenza B PCR Not Detected     Parainfluenza Virus 1 Not Detected     Parainfluenza Virus 2 Not Detected     Parainfluenza Virus 3 Not Detected     Parainfluenza Virus 4 Not Detected     RSV, PCR Not Detected     Bordetella pertussis pcr Not Detected     Bordetella  parapertussis PCR Not Detected     Chlamydophila pneumoniae PCR Not Detected     Mycoplasma pneumo by PCR Not Detected    Narrative:      In the setting of a positive respiratory panel with a viral infection PLUS a negative procalcitonin without other underlying concern for bacterial infection, consider observing off antibiotics or discontinuation of antibiotics and continue supportive care. If the respiratory panel is positive for atypical bacterial infection (Bordetella pertussis, Chlamydophila pneumoniae, or Mycoplasma pneumoniae), consider antibiotic de-escalation to target atypical bacterial infection.    Blastomyces Antigen - Urine, Urinary Bladder [[id number]] Collected: 10/03/22 1122    Specimen: Urine from Urinary Bladder Updated: 10/03/22 1203    Histoplasma Ag Ur - Urine, Urinary Bladder [[id number]] Collected: 10/03/22 1122    Specimen: Urine from Urinary Bladder Updated: 10/03/22 1139    POC Glucose Once [[id number]]  (Abnormal) Collected: 10/03/22 1125    Specimen: Blood Updated: 10/03/22 1127     Glucose 138 mg/dL      Comment: Meter: [id number] : [id number] [facility]       Ammonia [[id number]]  (Normal) Collected: 10/03/22 0755    Specimen: Blood Updated: 10/03/22 0845     Ammonia 29 umol/L     Blood Gas, Arterial With Co-Ox [[id number]]  (Abnormal) Collected: 10/03/22 0840    Specimen: Arterial Blood Updated: 10/03/22 0840     Site Right Brachial     Tico's Test N/A     pH, Arterial 7.418 pH units      pCO2, Arterial 33.1 mm Hg      Comment: 84 Value below reference range        pO2, Arterial 53.7 mm Hg      Comment: 84 Value below reference range        HCO3, Arterial 21.4 mmol/L      Base Excess, Arterial -2.7 mmol/L      Hemoglobin, Blood Gas 8.6 g/dL      Comment: 84 Value below reference range        Hematocrit, Blood Gas 26.5 %      Oxyhemoglobin 86.1 %      Comment: 84 Value below reference range        Methemoglobin 0.10 %      Carboxyhemoglobin 1.3 %      CO2 Content 22.4 mmol/L       Temperature 37.0 C      Barometric Pressure for Blood Gas --     Comment: N/A        Modality Nasal Cannula     FIO2 40 %      Rate 0 Breaths/minute      PIP 0 cmH2O      Comment: Meter: [id number]     :  [id number]        IPAP 0     EPAP 0     Note --     pH, Temp Corrected 7.418 pH Units      pCO2, Temperature Corrected 33.1 mm Hg      pO2, Temperature Corrected 53.7 mm Hg     Comprehensive Metabolic Panel [[id number]]  (Abnormal) Collected: 10/03/22 0736    Specimen: Blood Updated: 10/03/22 0822     Glucose 123 mg/dL      BUN 30 mg/dL      Creatinine 1.55 mg/dL      Sodium 133 mmol/L      Potassium 4.2 mmol/L      Comment: Slight hemolysis detected by analyzer. Results may be affected.        Chloride 103 mmol/L      CO2 19.0 mmol/L      Calcium 8.2 mg/dL      Total Protein 6.6 g/dL      Albumin 2.10 g/dL      ALT (SGPT) 17 U/L      AST (SGOT) 49 U/L      Alkaline Phosphatase 153 U/L      Total Bilirubin 0.7 mg/dL      Globulin 4.5 gm/dL      Comment: Calculated Result        A/G Ratio 0.5 g/dL      BUN/Creatinine Ratio 19.4     Anion Gap 11.0 mmol/L      eGFR 38.4 mL/min/1.73      Comment: National Kidney Foundation and American Society of Nephrology (ASN) Task Force recommended calculation based on the Chronic Kidney Disease Epidemiology Collaboration (CKD-EPI) equation refit without adjustment for race.       Narrative:      GFR Normal >60  Chronic Kidney Disease <60  Kidney Failure <15      Vancomycin, Trough [[id number]]  (Normal) Collected: 10/03/22 0736    Specimen: Blood Updated: 10/03/22 0821     Vancomycin Trough 11.50 mcg/mL     Narrative:      Therapeutic Ranges for Vancomycin    Vancomycin Random   5.0-40.0 mcg/mL  Vancomycin Trough   5.0-20.0 mcg/mL  Vancomycin Peak     20.0-40.0 mcg/mL    CBC & Differential [[id number]]  (Abnormal) Collected: 10/03/22 0736    Specimen: Blood Updated: 10/03/22 0751    Narrative:      The following orders were created for panel order CBC &  Differential.  Procedure                               Abnormality         Status                     ---------                               -----------         ------                     CBC Auto Differential[[id number]]        Abnormal            Final result                 Please view results for these tests on the individual orders.    CBC Auto Differential [[id number]]  (Abnormal) Collected: 10/03/22 0736    Specimen: Blood Updated: 10/03/22 0751     WBC 19.51 10*3/mm3      RBC 3.37 10*6/mm3      Hemoglobin 8.9 g/dL      Hematocrit 27.4 %      MCV 81.3 fL      MCH 26.4 pg      MCHC 32.5 g/dL      RDW 16.9 %      RDW-SD 49.0 fl      MPV 11.5 fL      Platelets 176 10*3/mm3      Neutrophil % 81.8 %      Lymphocyte % 7.9 %      Monocyte % 3.6 %      Eosinophil % 1.8 %      Basophil % 0.6 %      Immature Grans % 4.3 %      Neutrophils, Absolute 15.96 10*3/mm3      Lymphocytes, Absolute 1.55 10*3/mm3      Monocytes, Absolute 0.71 10*3/mm3      Eosinophils, Absolute 0.35 10*3/mm3      Basophils, Absolute 0.11 10*3/mm3      Immature Grans, Absolute 0.83 10*3/mm3      nRBC 0.0 /100 WBC     POC Glucose Once [[id number]]  (Abnormal) Collected: 10/03/22 0724    Specimen: Blood Updated: 10/03/22 0726     Glucose 218 mg/dL      Comment: Meter: [id number] : [id number] [employer]       POC Glucose Once [[id number]]  (Abnormal) Collected: 10/03/22 0229    Specimen: Blood Updated: 10/03/22 0230     Glucose 137 mg/dL      Comment: Meter: [id number] : [id number] Tere Landeros       POC Glucose Once [[id number]]  (Normal) Collected: 10/02/22 2117    Specimen: Blood Updated: 10/02/22 2118     Glucose 122 mg/dL      Comment: Meter: [id number] : [id number] Tere Landeros       POC Glucose Once [[id number]]  (Abnormal) Collected: 10/02/22 1610    Specimen: Blood Updated: 10/02/22 1612     Glucose 182 mg/dL      Comment: Meter: [id number] : [id number] [facility]             Imaging Results (Last 24 Hours)      Procedure Component Value Units Date/Time    XR Chest 1 View [[id number]] Collected: 10/03/22 0845     Updated: 10/04/22 0813    Narrative:      DATE OF EXAM: 10/03/2022 8:23 AM     PROCEDURE: XR CHEST 1 VIEW-     INDICATIONS: increased O2 requirement; J18.9-Pneumonia, unspecified  organism; R10.9-Unspecified abdominal pain; K74.60-Unspecified cirrhosis  of liver; R18.8-Other ascites; N17.9-Acute kidney failure, unspecified;  I50.9-Heart failure, unspecified; A41.9-Sepsis, unspecified organism;  R13.10-Dysphagia, unspecified     COMPARISON: 10/02/2022     TECHNIQUE: Single radiographic AP view of the chest was obtained.     FINDINGS:  Heart is normal in size. There is diffuse and extensive pulmonary  interstitial disease, remains extensive throughout the right lung, and  appears to be increasing in the left upper and midlung, relatively  sparing the left lung base. No densely consolidated lung effusion or  pneumothorax is seen. Heart is normal in size. Vasculature is obscured  bilaterally.        Impression:      Bilateral pneumonia, diffuse and relatively stable on the right, mildly  increased in the left upper and midlung. No evidence of pneumothorax.     This report was finalized on 10/4/2022 8:10 AM by Dr. Noé Larry MD.       XR Abdomen KUB [[id number]] Collected: 10/04/22 0806     Updated: 10/04/22 0813    Narrative:      DATE OF EXAM: 10/4/2022 4:20 AM     PROCEDURE: XR ABDOMEN KUB-     INDICATIONS: abd distention, ascites; J18.9-Pneumonia, unspecified  organism; R10.9-Unspecified abdominal pain; K74.60-Unspecified cirrhosis  of liver; R18.8-Other ascites; N17.9-Acute kidney failure, unspecified;  I50.9-Heart failure, unspecified; A41.9-Sepsis, unspecified organism;  R13.10-Dysphagia, unspecified     COMPARISON: No comparisons available.     TECHNIQUE: Single radiographic view of the abdomen was obtained.     FINDINGS:  Nonobstructive, nonspecific bowel gas pattern. There is centralization  of the bowel loops  with lateralization of the properitoneal fat stripe,  compatible with ascites. Cholecystectomy clips are noted. A couple  surgical clips are seen in the left mid abdomen. Single surgical clip  and surgical sutures are noted in the lower midline pelvis. No acute  osseous findings.       Impression:      Nonobstructive, nonspecific bowel gas pattern.      Ascites.     This report was finalized on 10/4/2022 8:10 AM by Skip Seaman MD.       XR Chest 1 View [[id number]] Collected: 10/04/22 0728     Updated: 10/04/22 0733    Narrative:         DATE OF EXAM:   10/4/2022 4:17 AM     PROCEDURE:   XR CHEST 1 VW-     INDICATIONS:   F/u pneumonia; J18.9-Pneumonia, unspecified organism; R10.9-Unspecified  abdominal pain; K74.60-Unspecified cirrhosis of liver; R18.8-Other  ascites; N17.9-Acute kidney failure, unspecified; I50.9-Heart failure,  unspecified; A41.9-Sepsis, unspecified organism; R13.10-Dysphagia,  unspecified     COMPARISON:  10/03/2022     TECHNIQUE:   Portable chest radiograph.     FINDINGS:   Heart size within normal limits. No pneumothorax. No significant pleural  effusion. No change in diffuse mixed interstitial and airspace disease  throughout the lungs most concerning for pneumonia. The osseous  structures are intact.       Impression:      No change in diffuse mixed interstitial and airspace disease throughout  the lungs most concerning for multifocal pneumonia.     This report was finalized on 10/4/2022 7:29 AM by Pool Floyd MD.           Operative/Procedure Notes (last 24 hours)  Notes from 10/03/22 1559 through 10/04/22 1559   No notes of this type exist for this encounter.            Physician Progress Notes (last 24 hours)      Case, Sissy VAZQUEZ DO at 10/04/22 1239          INPATIENT PULMONARY SERVICE  PROGRESS NOTE     Hospital LOS: 7 days    Ms. 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 Remains on HFNC.        The patient's relevant past medical, surgical and social history were reviewed and updated in Epic as appropriate.      ROS:   Constitutional: Negative for fever.   Respiratory: Positive for dyspnea.   Cardiovascular: Negative for chest pain.   Gastrointestinal: Negative for  nausea, vomiting and diarrhea.     Objective   O     Vitals  Temp  Min: 97.1 °F (36.2 °C)  Max: 98.6 °F (37 °C)  BP  Min: 119/66  Max: 125/72  Pulse  Min: 70  Max: 96  Resp  Min: 18  Max: 18  SpO2  Min: 89 %  Max: 96 % Flow (L/min)  Min: 40  Max: 40    I/O 24 HR (7:00 AM-6:59AM):  Intake/Output       10/03/22 0700 - 10/04/22 0659 10/04/22 0700 - 10/05/22 0659    Intake (ml) 350 240    Output (ml) 900 --    Net (ml) -550 240          Medications (Drips):       Physical Examination    Telemetry: Normal sinus rhythm.    Constitutional:  No acute distress.  Conversant. Sitting in bed on nasal cannula.    Cardiovascular: Regular rate and rhythm.  No murmurs, rub or gallop.   Respiratory: Normal symmetric chest expansion.  Normal respiratory effort.  Clear to ascultation.   Abdominal:  Soft. No masses.   Non-tender. No distension.   No hepatosplenomegaly.   Extremities: No  digital cyanosis or clubbing.  No peripheral edema.   Neurological:   Alert and Oriented to person, place, and time.   Moves all extremities.            Results from last 7 days   Lab Units 10/04/22  0537 10/03/22  0736 10/02/22  0814   WBC 10*3/mm3 17.59* 19.51* 16.29*   HEMOGLOBIN g/dL 7.6* 8.9* 8.7*   MCV fL 82.4 81.3 82.9   PLATELETS 10*3/mm3 162 176 194     Results from last 7 days   Lab Units 10/04/22  0537 10/03/22  0736 10/02/22  0814 09/29/22  0550 09/28/22  0426   SODIUM mmol/L 132* 133* 137   < > 134*   POTASSIUM mmol/L 3.9 4.2 4.4   < > 3.0*   CO2 mmol/L 19.0* 19.0* 19.0*   < > 20.0*   CREATININE mg/dL 1.71* 1.55* 1.32*   < > 1.42*   MAGNESIUM mg/dL  --   --   --   --  2.3    < > = values in this interval not displayed.     Serum creatinine: 1.71 mg/dL (H) 10/04/22 0537  Estimated creatinine clearance: 29.2 mL/min (A)  Results from last 7 days   Lab Units 10/04/22  0537 10/03/22  0736 10/02/22  0814   ALK PHOS U/L 147* 153* 157*   BILIRUBIN mg/dL 1.0 0.7 0.7   ALT (SGPT) U/L 14 17 21   AST (SGOT) U/L 43* 49* 50*       Results from last 7 days   Lab Units 10/03/22  0840 09/28/22  0524   PH, ARTERIAL pH units 7.418 7.394   PCO2, ARTERIAL mm Hg 33.1* 34.5*   PO2 ART mm Hg 53.7* 62.6*   FIO2 % 40 21       Images:     Imaging Results (Last 24 Hours)     Procedure Component Value Units Date/Time    XR Chest 1 View [[id number]] Collected: 10/03/22 0845     Updated: 10/04/22 0813    Narrative:      DATE OF EXAM: 10/03/2022 8:23 AM     PROCEDURE: XR CHEST 1 VIEW-     INDICATIONS: increased O2 requirement; J18.9-Pneumonia, unspecified  organism; R10.9-Unspecified abdominal pain; K74.60-Unspecified cirrhosis  of liver; R18.8-Other ascites; N17.9-Acute kidney failure, unspecified;  I50.9-Heart failure, unspecified; A41.9-Sepsis, unspecified organism;  R13.10-Dysphagia, unspecified     COMPARISON: 10/02/2022     TECHNIQUE: Single radiographic AP view of the chest was obtained.     FINDINGS:  Heart is normal in size.  There is diffuse and extensive pulmonary  interstitial disease, remains extensive throughout the right lung, and  appears to be increasing in the left upper and midlung, relatively  sparing the left lung base. No densely consolidated lung effusion or  pneumothorax is seen. Heart is normal in size. Vasculature is obscured  bilaterally.        Impression:      Bilateral pneumonia, diffuse and relatively stable on the right, mildly  increased in the left upper and midlung. No evidence of pneumothorax.     This report was finalized on 10/4/2022 8:10 AM by Dr. Noé Larry MD.       XR Abdomen KUB [[id number]] Collected: 10/04/22 0806     Updated: 10/04/22 0813    Narrative:      DATE OF EXAM: 10/4/2022 4:20 AM     PROCEDURE: XR ABDOMEN KUB-     INDICATIONS: abd distention, ascites; J18.9-Pneumonia, unspecified  organism; R10.9-Unspecified abdominal pain; K74.60-Unspecified cirrhosis  of liver; R18.8-Other ascites; N17.9-Acute kidney failure, unspecified;  I50.9-Heart failure, unspecified; A41.9-Sepsis, unspecified organism;  R13.10-Dysphagia, unspecified     COMPARISON: No comparisons available.     TECHNIQUE: Single radiographic view of the abdomen was obtained.     FINDINGS:  Nonobstructive, nonspecific bowel gas pattern. There is centralization  of the bowel loops with lateralization of the properitoneal fat stripe,  compatible with ascites. Cholecystectomy clips are noted. A couple  surgical clips are seen in the left mid abdomen. Single surgical clip  and surgical sutures are noted in the lower midline pelvis. No acute  osseous findings.       Impression:      Nonobstructive, nonspecific bowel gas pattern.      Ascites.     This report was finalized on 10/4/2022 8:10 AM by Skip Seaman MD.       XR Chest 1 View [[id number]] Collected: 10/04/22 0728     Updated: 10/04/22 0733    Narrative:         DATE OF EXAM:   10/4/2022 4:17 AM     PROCEDURE:   XR CHEST 1 VW-     INDICATIONS:   F/u pneumonia; J18.9-Pneumonia,  "unspecified organism; R10.9-Unspecified  abdominal pain; K74.60-Unspecified cirrhosis of liver; R18.8-Other  ascites; N17.9-Acute kidney failure, unspecified; I50.9-Heart failure,  unspecified; A41.9-Sepsis, unspecified organism; R13.10-Dysphagia,  unspecified     COMPARISON:  10/03/2022     TECHNIQUE:   Portable chest radiograph.     FINDINGS:   Heart size within normal limits. No pneumothorax. No significant pleural  effusion. No change in diffuse mixed interstitial and airspace disease  throughout the lungs most concerning for pneumonia. The osseous  structures are intact.       Impression:      No change in diffuse mixed interstitial and airspace disease throughout  the lungs most concerning for multifocal pneumonia.     This report was finalized on 10/4/2022 7:29 AM by Pool Floyd MD.             I reviewed the patient's new clinical results.  I reviewed the patient's new imaging results and agree with the interpretation.    Assessment & Plan   A / P     Ms. Cruz is a 58yo F who was admitted for RLL pneumonia and has continued to worsen despite antibiotic therapy. She was initially on room air and has now progressed to HFNC. ID is following and she is being treated for pneumonia. She has refused to have a CT scan of the chest performed as she states they \"make her sick.\"    Diet Regular; Consistent Carbohydrate  Code Status and Medical Interventions:   Ordered at: 09/27/22 1402     Level Of Support Discussed With:    Patient     Code Status (Patient has no pulse and is not breathing):    CPR (Attempt to Resuscitate)     Medical Interventions (Patient has pulse or is breathing):    Full Support       Active Hospital Problems    Diagnosis    • Pneumonia of right middle lobe due to infectious organism    • Sepsis, unspecified organism (HCC)    • Tobacco use    • Vaginal cancer (HCC)    • High grade squamous intraepithelial lesion (HGSIL) on cytologic smear of vagina    • Type 2 diabetes mellitus with " hyperglycemia, with long-term current use of insulin (HCC)    • Chronic hepatitis C without hepatic coma (HCC)    • Decompensated hepatic cirrhosis (HCC)    • HFrEF (heart failure with reduced ejection fraction) (HCC)    • Coronary artery disease involving native coronary artery of native heart without angina pectoris    • Schizophrenia (HCC)    • Chronic pain disorder    • Primary hypertension    • Long-term current use of opiate analgesic    • Esophageal varices (HCC)        Assessment / Plan:  1. I have tried to explain that the CT chest is without contrast but she is still refusing to have this performed. Unfortunately, the differential remains broad without any further imaging.   2. Wean HFNC as tolerated. If worsens, will need Bipap. If unable to tolerate Bipap will need intubation.   3. Antibiotics per ID  4. Diuretics as tolerated.   5. Unable to perform bronchoscopy safely while O2 requirements remain high.     We will follow along peripherally as it is difficult to make any further recommendations when the patient refuses to have further testing. Consider Palliative Care Consult as she should not remain a Full Code if she refuses medical interventions which may improve her respiratory failure.     I discussed the patient's findings and my recommendations with patient    Time:  I spent 25 minutes, face to face, with the patient or on the jacob coordinating care with other health care providers.   I spent > 50% percent of this time, counseling and discussing diagnostic testing .     Sissy Hayes DO  Pulmonary and Critical Care Medicine              Electronically signed by Sissy Hayes DO at 10/04/22 1244     Nidia Rangel DO at 10/04/22 1107              [facility] Medicine Services  PROGRESS NOTE    Patient Name: Kaylie Cruz  : 1965  MRN: [id number]    Date of Admission: 2022  Primary Care Physician: Iesha Harris DO    Subjective   Subjective      CC: f/u PNA    HPI: Pt seen and examined. Much more awake and alert today. Tells me her side over her ribs hurts. Remains on HFNC. Denies SOB.     ROS:  Gen- No fevers, chills  CV- No chest pain, palpitations  Resp- No cough, dyspnea  GI- No N/V/D, abd pain  MSK: Rib pain     Objective   Objective     Vital Signs:   Temp:  [97.1 °F (36.2 °C)-98.6 °F (37 °C)] 97.2 °F (36.2 °C)  Heart Rate:  [70-96] 74  Resp:  [18] 18  BP: (119-142)/(66-82) 125/72  Flow (L/min):  [40] 40     Physical Exam:  Constitutional: awake, alert, NAD this AM, chronically ill appearing   HENT: NCAT, mucous membranes moist  Respiratory: Coarse breath sounds bilaterally, respiratory effort normal HFNC  Cardiovascular: RRR, no murmurs, rubs, or gallops  Gastrointestinal: Positive bowel sounds, soft, nontender, nondistended  Musculoskeletal: No bilateral ankle edema  Psychiatric: Flat but cooperative   Neurologic: No focal deficits , speech clear   Skin: No rashes to exposed skin     Results Reviewed:  LAB RESULTS:      Lab 10/04/22  0537 10/03/22  0736 10/02/22  0814 10/01/22  0430 09/30/22  0349 09/29/22  0550 09/28/22  0426 09/27/22  1300   0000   WBC 17.59* 19.51* 16.29* 12.15* 8.67   < > 12.33*  --   --    HEMOGLOBIN 7.6* 8.9* 8.7* 8.7* 8.4*   < > 8.2*  --   --    HEMATOCRIT 23.9* 27.4* 27.7* 27.5* 25.9*   < > 24.2*  --   --    PLATELETS 162 176 194 172 170   < > 130*  --   --    NEUTROS ABS 14.84* 15.96* 13.85* 10.08* 5.55  --  11.10*  --    < >   IMMATURE GRANS (ABS) 0.41* 0.83*  --   --   --   --   --   --   --    LYMPHS ABS 1.20 1.55  --   --   --   --   --   --   --    MONOS ABS 0.65 0.71  --   --   --   --   --   --   --    EOS ABS 0.39 0.35 0.16 0.36 0.26  --  0.00  --    < >   MCV 82.4 81.3 82.9 82.1 81.4   < > 79.3  --   --    PROCALCITONIN 1.42*  --  0.88*  --   --   --  5.71*  --   --    LACTATE 1.4  --   --   --   --   --   --  1.8  --    PROTIME  --   --   --   --   --   --  19.9*  --   --     < > = values in this interval not  displayed.         Lab 10/04/22  0537 10/03/22  0736 10/02/22  0814 10/01/22  0430 09/30/22  0349 09/29/22  0550 09/28/22  0426   SODIUM 132* 133* 137 136 138   < > 134*   POTASSIUM 3.9 4.2 4.4 4.5 4.0   < > 3.0*   CHLORIDE 102 103 108* 107 109*   < > 105   CO2 19.0* 19.0* 19.0* 19.0* 20.0*   < > 20.0*   ANION GAP 11.0 11.0 10.0 10.0 9.0   < > 9.0   BUN 34* 30* 30* 36* 43*   < > 44*   CREATININE 1.71* 1.55* 1.32* 1.44* 1.51*   < > 1.42*   EGFR 34.2* 38.4* 46.6* 42.0* 39.7*   < > 42.7*   GLUCOSE 159* 123* 203* 136* 149*   < > 137*   CALCIUM 7.8* 8.2* 8.4* 8.3* 8.1*   < > 8.0*   MAGNESIUM  --   --   --   --   --   --  2.3    < > = values in this interval not displayed.         Lab 10/04/22  0537 10/03/22  0736 10/02/22  0814 10/01/22  0430 09/30/22  0349   TOTAL PROTEIN 5.7* 6.6 6.8 5.9* 5.6*   ALBUMIN 2.10* 2.10* 2.30* 2.40* 2.30*   GLOBULIN 3.6 4.5 4.5 3.5 3.3   ALT (SGPT) 14 17 21 21 18   AST (SGOT) 43* 49* 50* 56* 48*   BILIRUBIN 1.0 0.7 0.7 0.7 0.6   ALK PHOS 147* 153* 157* 131* 111         Lab 09/28/22  0426   PROTIME 19.9*   INR 1.69*                 Lab 10/03/22  0840 09/28/22  0524   PH, ARTERIAL 7.418 7.394   PCO2, ARTERIAL 33.1* 34.5*   PO2 ART 53.7* 62.6*   FIO2 40 21   HCO3 ART 21.4 21.0   BASE EXCESS ART -2.7* -3.4*   CARBOXYHEMOGLOBIN 1.3 1.2     Brief Urine Lab Results  (Last result in the past 365 days)      Color   Clarity   Blood   Leuk Est   Nitrite   Protein   CREAT   Urine HCG        10/01/22 0117 Yellow   Clear   Negative   Small (1+)   Negative   30 mg/dL (1+)                 Microbiology Results Abnormal     Procedure Component Value - Date/Time    Respiratory Panel PCR w/COVID-19(SARS-CoV-2) MALIK/STANLEY/AIXA/PAD/COR/MAD/HEATHER In-House, NP Swab in UTM/VTM, 3-4 HR TAT - Swab, Nasopharynx [[id number]]  (Normal) Collected: 10/03/22 1124    Lab Status: Final result Specimen: Swab from Nasopharynx Updated: 10/03/22 1237     ADENOVIRUS, PCR Not Detected     Coronavirus 229E Not Detected     Coronavirus HKU1  Not Detected     Coronavirus NL63 Not Detected     Coronavirus OC43 Not Detected     COVID19 Not Detected     Human Metapneumovirus Not Detected     Human Rhinovirus/Enterovirus Not Detected     Influenza A PCR Not Detected     Influenza B PCR Not Detected     Parainfluenza Virus 1 Not Detected     Parainfluenza Virus 2 Not Detected     Parainfluenza Virus 3 Not Detected     Parainfluenza Virus 4 Not Detected     RSV, PCR Not Detected     Bordetella pertussis pcr Not Detected     Bordetella parapertussis PCR Not Detected     Chlamydophila pneumoniae PCR Not Detected     Mycoplasma pneumo by PCR Not Detected    Narrative:      In the setting of a positive respiratory panel with a viral infection PLUS a negative procalcitonin without other underlying concern for bacterial infection, consider observing off antibiotics or discontinuation of antibiotics and continue supportive care. If the respiratory panel is positive for atypical bacterial infection (Bordetella pertussis, Chlamydophila pneumoniae, or Mycoplasma pneumoniae), consider antibiotic de-escalation to target atypical bacterial infection.    Blood Culture - Blood, Hand, Right [[id number]]  (Normal) Collected: 09/27/22 1300    Lab Status: Final result Specimen: Blood from Hand, Right Updated: 10/02/22 1317     Blood Culture No growth at 5 days    Blood Culture - Blood, Arm, Right [[id number]]  (Normal) Collected: 09/27/22 1245    Lab Status: Final result Specimen: Blood from Arm, Right Updated: 10/02/22 1303     Blood Culture No growth at 5 days    Urine Culture - Urine, Urine, Random Void [[id number]]  (Normal) Collected: 10/01/22 0117    Lab Status: Final result Specimen: Urine, Random Void Updated: 10/02/22 1056     Urine Culture No growth    Body Fluid Culture - Body Fluid, Peritoneum [[id number]] Collected: 09/28/22 1143    Lab Status: Final result Specimen: Body Fluid from Peritoneum Updated: 10/01/22 0729     Body Fluid Culture No growth at 3 days     Gram  Stain No WBCs or organisms seen    Legionella Antigen, Urine - Urine, Urine, Clean Catch [[id number]]  (Normal) Collected: 09/28/22 0643    Lab Status: Final result Specimen: Urine, Clean Catch Updated: 09/28/22 1229     LEGIONELLA ANTIGEN, URINE Negative    S. Pneumo Ag Urine or CSF - Urine, Urine, Clean Catch [[id number]]  (Normal) Collected: 09/28/22 0643    Lab Status: Final result Specimen: Urine, Clean Catch Updated: 09/28/22 1229     Strep Pneumo Ag Negative          XR Chest 1 View    Result Date: 10/4/2022  DATE OF EXAM: 10/03/2022 8:23 AM  PROCEDURE: XR CHEST 1 VIEW-  INDICATIONS: increased O2 requirement; J18.9-Pneumonia, unspecified organism; R10.9-Unspecified abdominal pain; K74.60-Unspecified cirrhosis of liver; R18.8-Other ascites; N17.9-Acute kidney failure, unspecified; I50.9-Heart failure, unspecified; A41.9-Sepsis, unspecified organism; R13.10-Dysphagia, unspecified  COMPARISON: 10/02/2022  TECHNIQUE: Single radiographic AP view of the chest was obtained.  FINDINGS: Heart is normal in size. There is diffuse and extensive pulmonary interstitial disease, remains extensive throughout the right lung, and appears to be increasing in the left upper and midlung, relatively sparing the left lung base. No densely consolidated lung effusion or pneumothorax is seen. Heart is normal in size. Vasculature is obscured bilaterally.      Impression: Bilateral pneumonia, diffuse and relatively stable on the right, mildly increased in the left upper and midlung. No evidence of pneumothorax.  This report was finalized on 10/4/2022 8:10 AM by Dr. Noé Larry MD.      XR Chest 1 View    Result Date: 10/4/2022   DATE OF EXAM: 10/4/2022 4:17 AM  PROCEDURE: XR CHEST 1 VW-  INDICATIONS: F/u pneumonia; J18.9-Pneumonia, unspecified organism; R10.9-Unspecified abdominal pain; K74.60-Unspecified cirrhosis of liver; R18.8-Other ascites; N17.9-Acute kidney failure, unspecified; I50.9-Heart failure, unspecified; A41.9-Sepsis,  unspecified organism; R13.10-Dysphagia, unspecified  COMPARISON: 10/03/2022  TECHNIQUE: Portable chest radiograph.  FINDINGS: Heart size within normal limits. No pneumothorax. No significant pleural effusion. No change in diffuse mixed interstitial and airspace disease throughout the lungs most concerning for pneumonia. The osseous structures are intact.      Impression: No change in diffuse mixed interstitial and airspace disease throughout the lungs most concerning for multifocal pneumonia.  This report was finalized on 10/4/2022 7:29 AM by Pool Floyd MD.      XR Chest 1 View    Result Date: 10/2/2022  DATE OF EXAM: 10/2/2022 10:52 AM  PROCEDURE: XR CHEST 1 VW-  INDICATIONS: PNA, worsening leukocytosis; J18.9-Pneumonia, unspecified organism; R10.9-Unspecified abdominal pain; K74.60-Unspecified cirrhosis of liver; R18.8-Other ascites; N17.9-Acute kidney failure, unspecified; I50.9-Heart failure, unspecified; A41.9-Sepsis, unspecified organism; R13.10-Dysphagia, unspecified  COMPARISON: 9/27/2022  TECHNIQUE: Single radiographic AP view of the chest was obtained.  FINDINGS: Redemonstrated multifocal airspace disease, somewhat more confluent in the right middle lobe, concerning for worsening pneumonia. There is no enlarging effusion or distinct pneumothorax. Unchanged heart and mediastinal contours.      Impression: Redemonstrated multifocal airspace disease, somewhat more confluent in the right middle lobe, concerning for worsening pneumonia. There is no enlarging effusion or distinct pneumothorax.  This report was finalized on 10/2/2022 11:33 AM by Corona Segovia.      XR Abdomen KUB    Result Date: 10/4/2022  DATE OF EXAM: 10/4/2022 4:20 AM  PROCEDURE: XR ABDOMEN KUB-  INDICATIONS: abd distention, ascites; J18.9-Pneumonia, unspecified organism; R10.9-Unspecified abdominal pain; K74.60-Unspecified cirrhosis of liver; R18.8-Other ascites; N17.9-Acute kidney failure, unspecified; I50.9-Heart failure, unspecified;  A41.9-Sepsis, unspecified organism; R13.10-Dysphagia, unspecified  COMPARISON: No comparisons available.  TECHNIQUE: Single radiographic view of the abdomen was obtained.  FINDINGS: Nonobstructive, nonspecific bowel gas pattern. There is centralization of the bowel loops with lateralization of the properitoneal fat stripe, compatible with ascites. Cholecystectomy clips are noted. A couple surgical clips are seen in the left mid abdomen. Single surgical clip and surgical sutures are noted in the lower midline pelvis. No acute osseous findings.      Impression: Nonobstructive, nonspecific bowel gas pattern.  Ascites.  This report was finalized on 10/4/2022 8:10 AM by Skip Seaman MD.            I have reviewed the medications:  Scheduled Meds:amLODIPine, 5 mg, Oral, Daily  aspirin, 81 mg, Oral, Daily  atorvastatin, 40 mg, Oral, Nightly  folic acid, 1 mg, Oral, Daily  furosemide, 40 mg, Intravenous, Daily  insulin detemir, 15 Units, Subcutaneous, Nightly  insulin lispro, 0-9 Units, Subcutaneous, TID AC  Insulin Lispro, 5 Units, Subcutaneous, TID With Meals  ipratropium-albuterol, 3 mL, Nebulization, 4x Daily - RT  lactulose, 20 g, Oral, TID  Linezolid, 600 mg, Intravenous, Q12H  metoprolol tartrate, 50 mg, Oral, BID  piperacillin-tazobactam, 3.375 g, Intravenous, Q8H  rifAXIMin, 550 mg, Oral, Q12H  sodium chloride, 10 mL, Intravenous, Q12H  thiamine, 100 mg, Oral, Daily      Continuous Infusions:   PRN Meds:.•  acetaminophen **OR** acetaminophen **OR** acetaminophen  •  albuterol  •  benzonatate  •  dextrose  •  dextrose  •  glucagon (human recombinant)  •  guaiFENesin  •  ondansetron **OR** ondansetron  •  oxyCODONE  •  potassium chloride **OR** potassium chloride **OR** potassium chloride  •  [COMPLETED] Insert peripheral IV **AND** sodium chloride  •  sodium chloride    Assessment & Plan   Assessment & Plan     Active Hospital Problems    Diagnosis  POA   • Pneumonia of right middle lobe due to infectious  organism [J18.9]  Yes   • Sepsis, unspecified organism (HCC) [A41.9]  Unknown   • Tobacco use [Z72.0]  Yes   • Vaginal cancer (HCC) [C52]  Yes   • High grade squamous intraepithelial lesion (HGSIL) on cytologic smear of vagina [R87.623]  Yes   • Type 2 diabetes mellitus with hyperglycemia, with long-term current use of insulin (HCC) [E11.65, Z79.4]  Not Applicable   • Chronic hepatitis C without hepatic coma (HCC) [B18.2]  Yes   • Decompensated hepatic cirrhosis (HCC) [K72.90, K74.60]  Yes   • HFrEF (heart failure with reduced ejection fraction) (HCC) [I50.20]  Yes   • Coronary artery disease involving native coronary artery of native heart without angina pectoris [I25.10]  Yes   • Schizophrenia (HCC) [F20.9]  Yes   • Chronic pain disorder [G89.4]  Yes   • Primary hypertension [I10]  Yes   • Long-term current use of opiate analgesic [Z79.891]  Not Applicable   • Esophageal varices (HCC) [I85.00]  Yes      Resolved Hospital Problems   No resolved problems to display.        Brief Hospital Course to date:  Kaylie Cruz is a 57 y.o.  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    This patient's problems and plans were partially entered by my partner and updated as appropriate by me 10/04/22.  All problems are new to me today     Sepsis, POA  Bilateral PNA  Leukocytosis   +MRSA PCR   -Remains on HFNC, wean as tolerated   -ID following, continue Zosyn and Zyvox   - Cryptococcal antigen negative  - Fungal Alina, Fungitell B-D glucan, Histoplasma pending  - Sputum cx pending  - Respiratory PCR negative  - Continue scheduled DuoNebs   - Pt refused to have CT scan chest done   -SLP has seen, S/P FEES 9/29 and functional oral and pharyngeal phase, signed off    - Requiring too much O2 for Bronchoscopy currently      Decompensated cirrhosis with ascites  History of Hepatitis C  Elevated LFTs   -Follows with GI outpatient but missed last appt.   -EGD due 04/2023 for EV screening  -S/P LVP on 9/29 with 2.25L of ascitic fluid removed, does not appear c/w SBP culture NGTD  -Hepatitis panel + Hep C ab  -Continue Lactulose, Continue Xifaxan    -KUB ok this AM      Likely CKD  --Cr further elevated today  --Hold further doses of Lasix   --Repeat BMP in AM, if further elevated, may need some gentle IVF     Hypokalemia  -- Resolved      Chronic HFrEF   Coronary artery disease involving native coronary artery of native heart without angina pectoris  Primary hypertension  -Echo 3/2022 - EF 49%, global hypokinesis, grade 1 diastolic dysfunction  -LHC 4/3/2022 - nonobstructive LAD to LAD 60% in mid region  -Pt is supposed to follow with Dr Daniel, has missed appts   -Continue ASA 81, metoprolol  -Continue statin      Type 2 diabetes mellitus with diabetic polyneuropathy, with long-term current use of insulin  -A1c has improved from 8.5% to 6.6% with medication compliance over the past 3 months  -Has appointment to establish with Endocrinology next month  -Continue Lantus 15 units nightly  -Continue Humalog 5 units TID meals   -Continue MDSSI  -Continue to HOLD Gabapentin      Recent Gross hematuria  -Seen at  8/23/22 and diagnosed with UTI, treated with ABX   -Per patient the dysuria resolved but still having intermittent hematuria  -Patient has already been referred to Urology per PCP  -Repeat UA unremarkable on admission, however, UA from 10/1 as noted above. ABX as noted above  -CT A/P without contrast with unremarkable bladder      Tobacco use  -Encourage smoking cessation  -PCP note says they plan to obtain PFTs outpatient as she has had some wheezing but no formal Dx of COPD  -PRN Albuterol   -Continue scheduled DuoNebs      High grade squamous intraepithelial lesion (HGSIL) on  cytologic smear of vagina  Vaginal cancer   -Diagnosed 2010? s/p radiation, surgical resection, and hysterectomy. Previously following with Dr. Roxanne Chaudhry, Gyn-Onc in [place] with [facility].   -She had vaginal pap smear with HGSIL and was lost to follow up.   -PCP has referred to GYN and gyn-onc for further evaluation      Schizophrenia, unspecified type  -Following with [place] for talk therapy  -Continue to HOLD SEROQUEL AND ELAVIL      Substance abuse  -UDS positive for cocaine, oxycodone and TCA     Chronic Anemia  -H&H stable in review of chart     R rib pain  -Likely from PNA  -Trial of Lidoderm patch     This patient's problems and plans were partially entered by my partner and updated as appropriate by me 10/04/22.    Expected Discharge Location and Transportation: acute rehab/[facility]   Expected Discharge Date: 10/9    DVT prophylaxis:  Mechanical DVT prophylaxis orders are present.     AM-PAC 6 Clicks Score (PT): 12 (10/03/22 2000)    CODE STATUS:   Code Status and Medical Interventions:   Ordered at: 09/27/22 1402     Level Of Support Discussed With:    Patient     Code Status (Patient has no pulse and is not breathing):    CPR (Attempt to Resuscitate)     Medical Interventions (Patient has pulse or is breathing):    Full Support       Nidia Rangel DO  10/04/22              Electronically signed by Nidia Rangel DO at 10/04/22 1115     Hugo Castillo MD at 10/04/22 0905          INFECTIOUS DISEASE Progress Note    Kaylie Render  1965  [id number]    Consult: 10/3/22  Admit date: 9/27/2022    Requesting Provider: No ref. provider found  Evaluating physician: Hugo Castillo MD  Reason for Consultation: Sepsis, leukocytosis, cirrhosis, hepatitis C, substance use, right lower lobe pneumonia  Chief Complaint:       Subjective   History of present illness:  Patient is a  57 y.o.  Yr old female with a history of substance use, hepatitis C, cirrhosis, cervical cancer,  vaginal squamous cell cancer 2009, schizophrenia, diabetes mellitus type 2, congestive heart failure with ejection fraction 49%, recent urinary tract infection treated at the [facility], who developed increasing shortness of breath on 9/25/2022 and was admitted to [facility] on 9/27/2022 with radiographic findings consistent with right lower lobe pneumonia.  The patient was placed on piperacillin tazobactam.  The patient is a poor historian.  Creatinine 1.55, sodium 133, potassium 4.2, AST 49, alkaline phosphatase 153, bilirubin normal, vancomycin trough 11.5, WBC 19.51, hemoglobin 8.9, platelet count 176, MRSA PCR swab screen +10/2, procalcitonin 0.88, urinalysis 6-12 WBCs but urine culture from 10/1 negative, ascites with 208 WBCs with negative culture, 9/28, Legionella and streptococcal urine antigen 9/28 negative.  Ammonia 64.  Lactic acid 2.5 on admission.  Blood cultures x2 from 9/27 negative.  Chest x-ray 10/3 with bilateral pneumonia diffuse right greater than left.  CT scan of the abdomen and pelvis 9/27 with diffuse right lower lobe pneumonia and findings consistent with colitis at the hepatic flexure.  I was consulted on 10/3/2022 for further evaluation and treatment.  Patient was initially placed on piperacillin tazobactam, then vancomycin was added on 10/2/2022.  The patient denies ill contacts, significant travel history, zoonotic exposures, TB, or HIV.  Minimal sputum production.  White blood cell count has increased from 15-19,000.     10/4/2022 history reviewed.  Patient poor historian.  Tolerating linezolid and piperacillin tazobactam for pneumonia with positive MRSA screen.  Still on high flow oxygen at 40 L/min.  Oxygen concentration 55%.  Not coughing much.  Says she is breathing better.    Past Medical History:   Diagnosis Date   • Anemia    • Cancer (HCC)     cervical   • Depression    • Hyperlipidemia    • Hypertension    • Infectious viral hepatitis    • Myocardial  infarction (HCC)    • Substance abuse (HCC)        Past Surgical History:   Procedure Laterality Date   • BLADDER SURGERY     • CARDIAC CATHETERIZATION      4/3/2022   • CHOLECYSTECTOMY     • COLON RESECTION      7/19/2017, descending and transverse colon   • COLON SURGERY     • COLONOSCOPY      7/19/2017   • ENDOSCOPY      EGD 4/1/22   • HYSTERECTOMY     • TRANSESOPHAGEAL ECHOCARDIOGRAM (AVINASH)      Global hyokinesis, 49% EF, LVH       Pediatric History   Patient Parents   • Not on file     Other Topics Concern   • Not on file   Social History Narrative   • Not on file       family history is not on file.    Allergies   Allergen Reactions   • Codeine Hives   • Morphine Hives   • Tagamet [Cimetidine] Other (See Comments)     DISCOLORATION OF SKIN   • Toradol [Ketorolac Tromethamine] Hives         There is no immunization history on file for this patient.    Medication:    Current Facility-Administered Medications:   •  acetaminophen (TYLENOL) tablet 650 mg, 650 mg, Oral, Q4H PRN, 650 mg at 10/04/22 0418 **OR** acetaminophen (TYLENOL) 160 MG/5ML solution 650 mg, 650 mg, Oral, Q4H PRN **OR** acetaminophen (TYLENOL) suppository 650 mg, 650 mg, Rectal, Q4H PRN, Nidia Rangel DO  •  albuterol (PROVENTIL) nebulizer solution 0.083% 2.5 mg/3mL, 2.5 mg, Nebulization, Q6H PRN, Nidia Rangel DO, 2.5 mg at 10/03/22 0904  •  amLODIPine (NORVASC) tablet 5 mg, 5 mg, Oral, Daily, Nidia Rangel DO, 5 mg at 10/04/22 0815  •  aspirin EC tablet 81 mg, 81 mg, Oral, Daily, Nidia Rangel DO, 81 mg at 10/04/22 0815  •  atorvastatin (LIPITOR) tablet 40 mg, 40 mg, Oral, Nightly, Megan Landeros MD, 40 mg at 10/03/22 2054  •  benzonatate (TESSALON) capsule 200 mg, 200 mg, Oral, TID PRN, Megan Landeros MD  •  dextrose (D50W) (25 g/50 mL) IV injection 25 g, 25 g, Intravenous, Q15 Min PRN, Rangel, Nidia D, DO  •  dextrose (GLUTOSE) oral gel 15 g, 15 g, Oral, Q15 Min PRN, Nidia Rangel, DO  •  folic  acid (FOLVITE) tablet 1 mg, 1 mg, Oral, Daily, Nidia Rangel DO, 1 mg at 10/04/22 0815  •  furosemide (LASIX) injection 40 mg, 40 mg, Intravenous, Daily, Nidia Rangel DO, 40 mg at 10/04/22 0815  •  glucagon (human recombinant) (GLUCAGEN DIAGNOSTIC) injection 1 mg, 1 mg, Intramuscular, Q15 Min PRN, Nidia Rangel DO  •  guaiFENesin (ROBITUSSIN) 100 MG/5ML oral solution 200 mg, 200 mg, Oral, Q4H PRN, Megan Landeros MD, 200 mg at 10/03/22 2059  •  insulin detemir (LEVEMIR) injection 15 Units, 15 Units, Subcutaneous, Nightly, Megan Landeros MD, 15 Units at 10/03/22 2056  •  Insulin Lispro (humaLOG) injection 0-9 Units, 0-9 Units, Subcutaneous, TID AC, Nidia Rangel DO, 2 Units at 10/02/22 1614  •  Insulin Lispro (humaLOG) injection 5 Units, 5 Units, Subcutaneous, TID With Meals, Megan Landeros MD, 5 Units at 10/02/22 1614  •  ipratropium-albuterol (DUO-NEB) nebulizer solution 3 mL, 3 mL, Nebulization, 4x Daily - RT, Nidia Rangel DO, 3 mL at 10/03/22 2030  •  lactulose (CHRONULAC) 10 GM/15ML solution 20 g, 20 g, Oral, TID, Henrietta Ramos II, DO, 20 g at 10/04/22 0814  •  Linezolid (ZYVOX) 600 mg 300 mL, 600 mg, Intravenous, Q12H, Hugo Castillo MD, Last Rate: 300 mL/hr at 10/03/22 2309, 600 mg at 10/03/22 2309  •  metoprolol tartrate (LOPRESSOR) tablet 50 mg, 50 mg, Oral, BID, Nidia Rangel DO, 50 mg at 10/04/22 0815  •  ondansetron (ZOFRAN) tablet 4 mg, 4 mg, Oral, Q6H PRN, 4 mg at 10/04/22 0815 **OR** ondansetron (ZOFRAN) injection 4 mg, 4 mg, Intravenous, Q6H PRN, Nidia Rangel DO  •  oxyCODONE (ROXICODONE) immediate release tablet 10 mg, 10 mg, Oral, Q4H PRN, Megan Landeros MD, 10 mg at 10/03/22 0535  •  piperacillin-tazobactam (ZOSYN) 3.375 g in iso-osmotic dextrose 50 ml (premix), 3.375 g, Intravenous, Q8H, Nidia Rangel DO, 3.375 g at 10/04/22 0818  •  potassium chloride (MICRO-K) CR capsule 40 mEq, 40 mEq, Oral, PRN, 40 mEq at  "09/29/22 2143 **OR** potassium chloride (KLOR-CON) packet 40 mEq, 40 mEq, Oral, PRN **OR** potassium chloride 10 mEq in 100 mL IVPB, 10 mEq, Intravenous, Q1H PRN, Henrietta Ramos II, DO, Last Rate: 100 mL/hr at 09/29/22 1152, 10 mEq at 09/29/22 1152  •  riFAXIMin (XIFAXAN) tablet 550 mg, 550 mg, Oral, Q12H, Megan Landeros MD, 550 mg at 10/04/22 0815  •  [COMPLETED] Insert peripheral IV, , , Once **AND** sodium chloride 0.9 % flush 10 mL, 10 mL, Intravenous, PRN, Stephenie Martinez PA  •  sodium chloride 0.9 % flush 10 mL, 10 mL, Intravenous, Q12H, Nidia Rangel, DO, 10 mL at 10/04/22 0818  •  sodium chloride 0.9 % flush 10 mL, 10 mL, Intravenous, PRN, Nidia Rangel, DO  •  thiamine (VITAMIN B-1) tablet 100 mg, 100 mg, Oral, Daily, Nidia Rangel, DO, 100 mg at 10/04/22 0815    Please refer to the medical record for a full medication list    Review of Systems:      ROS difficult to obtain secondary to mental status    Physical Exam:   Vital Signs   Temp:  [97.1 °F (36.2 °C)-98.6 °F (37 °C)] 97.2 °F (36.2 °C)  Heart Rate:  [70-96] 74  Resp:  [18] 18  BP: (119-142)/(66-82) 125/72    Temp  Min: 97.1 °F (36.2 °C)  Max: 98.6 °F (37 °C)  BP  Min: 119/66  Max: 142/82  Pulse  Min: 70  Max: 96  Resp  Min: 18  Max: 18  SpO2  Min: 89 %  Max: 96 %    Blood pressure 125/72, pulse 74, temperature 97.2 °F (36.2 °C), temperature source Oral, resp. rate 18, height 160 cm (63\"), weight 52.2 kg (115 lb), SpO2 92 %.  GENERAL: Awake and alert, in moderate distress. Appears older than stated age.  Cachectic.  HEENT:  Normocephalic, atraumatic.  Oropharynx without thrush. Dentition in good repair. No cervical adenopathy. No neck masses.  Ears externally normal, Nose externally normal.  EYES: No conjunctival injection. No icterus. EOM full.  LYMPHATICS: No lymphadenopathy of the neck or axillary or inguinal regions.   HEART: No murmur, gallop, or pericardial friction rub. Reg rate rhythm, No JVD at 45 degrees.  LUNGS: " Bilateral rales, no rhonchi. No respiratory distress, no use of accessory muscles.  ABDOMEN: Soft, nontender, nondistended. No appreciable HSM.  Bowel sounds normal.  SKIN: Warm and dry without cutaneous eruptions.  No nodules.    PSYCHIATRIC: Mental status some confusion.  EXT:  No cellulitic change.  NEURO: Oriented to name, nonfocal.      Results Review:   I reviewed the patient's new clinical results.  I reviewed the patient's new imaging results and agree with the interpretation.  I reviewed the patient's other test results and agree with the interpretation    Results from last 7 days   Lab Units 10/04/22  0537 10/03/22  0736 10/02/22  0814   WBC 10*3/mm3 17.59* 19.51* 16.29*   HEMOGLOBIN g/dL 7.6* 8.9* 8.7*   HEMATOCRIT % 23.9* 27.4* 27.7*   PLATELETS 10*3/mm3 162 176 194     Results from last 7 days   Lab Units 10/04/22  0537   SODIUM mmol/L 132*   POTASSIUM mmol/L 3.9   CHLORIDE mmol/L 102   CO2 mmol/L 19.0*   BUN mg/dL 34*   CREATININE mg/dL 1.71*   GLUCOSE mg/dL 159*   CALCIUM mg/dL 7.8*     Results from last 7 days   Lab Units 10/04/22  0537   ALK PHOS U/L 147*   BILIRUBIN mg/dL 1.0   ALT (SGPT) U/L 14   AST (SGOT) U/L 43*             Results from last 7 days   Lab Units 10/03/22  0736   VANCOMYCIN TR mcg/mL 11.50     Results from last 7 days   Lab Units 10/04/22  0537   LACTATE mmol/L 1.4     Estimated Creatinine Clearance: 29.2 mL/min (A) (by C-G formula based on SCr of 1.71 mg/dL (H)).     Procalitonin Results:      Lab 10/04/22  0537 10/02/22  0814 09/28/22  0426 09/27/22  1026   PROCALCITONIN 1.42* 0.88* 5.71* 5.02*      Brief Urine Lab Results  (Last result in the past 365 days)        Color   Clarity   Blood   Leuk Est   Nitrite   Protein   CREAT   Urine HCG        10/01/22 0117 Yellow   Clear   Negative   Small (1+)   Negative   30 mg/dL (1+)                  Site   Date Value Ref Range Status   10/03/2022 Right Brachial  Final     Tico's Test   Date Value Ref Range Status   10/03/2022 N/A  Final      pH, Arterial   Date Value Ref Range Status   10/03/2022 7.418 7.350 - 7.450 pH units Final     pCO2, Arterial   Date Value Ref Range Status   10/03/2022 33.1 (L) 35.0 - 45.0 mm Hg Final     Comment:     84 Value below reference range     pO2, Arterial   Date Value Ref Range Status   10/03/2022 53.7 (L) 83.0 - 108.0 mm Hg Final     Comment:     84 Value below reference range     HCO3, Arterial   Date Value Ref Range Status   10/03/2022 21.4 20.0 - 26.0 mmol/L Final     Base Excess, Arterial   Date Value Ref Range Status   10/03/2022 -2.7 (L) 0.0 - 2.0 mmol/L Final     Hemoglobin, Blood Gas   Date Value Ref Range Status   10/03/2022 8.6 (L) 14 - 18 g/dL Final     Comment:     84 Value below reference range     Hematocrit, Blood Gas   Date Value Ref Range Status   10/03/2022 26.5 (L) 38.0 - 51.0 % Final     Oxyhemoglobin   Date Value Ref Range Status   10/03/2022 86.1 (L) 94 - 99 % Final     Comment:     84 Value below reference range     Methemoglobin   Date Value Ref Range Status   10/03/2022 0.10 0.00 - 1.50 % Final     Carboxyhemoglobin   Date Value Ref Range Status   10/03/2022 1.3 0 - 2 % Final     CO2 Content   Date Value Ref Range Status   10/03/2022 22.4 22 - 33 mmol/L Final     Barometric Pressure for Blood Gas   Date Value Ref Range Status   10/03/2022   Final     Comment:     N/A     Modality   Date Value Ref Range Status   10/03/2022 Nasal Cannula  Final     FIO2   Date Value Ref Range Status   10/03/2022 40 % Final        Microbiology:  Blood Culture   Date Value Ref Range Status   09/27/2022 No growth at 5 days  Final     No results found for: BCIDPCR, CXREFLEX, CSFCX, CULTURETIS  No results found for: CULTURES, HSVCX, URCX  No results found for: EYECULTURE, GCCX, HSVCULTURE, LABHSV  No results found for: LEGIONELLA, MRSACX, MUMPSCX, MYCOPLASCX  No results found for: NOCARDIACX, STOOLCX  Urine Culture   Date Value Ref Range Status   10/01/2022 No growth  Final     No results found for: VIRALCULTU,  WOUNDCX     Blood Culture   Date Value Ref Range Status   09/27/2022 No growth at 5 days  Final     Body Fluid Culture   Date Value Ref Range Status   09/28/2022 No growth at 3 days  Final     Urine Culture   Date Value Ref Range Status   10/01/2022 No growth  Final   (    MRSA swab screen + 10/2/2022.  Radiology:  Imaging Results (Last 72 Hours)       Procedure Component Value Units Date/Time    XR Chest 1 View [[id number]] Collected: 10/03/22 0845     Updated: 10/04/22 0813    Narrative:      DATE OF EXAM: 10/03/2022 8:23 AM     PROCEDURE: XR CHEST 1 VIEW-     INDICATIONS: increased O2 requirement; J18.9-Pneumonia, unspecified  organism; R10.9-Unspecified abdominal pain; K74.60-Unspecified cirrhosis  of liver; R18.8-Other ascites; N17.9-Acute kidney failure, unspecified;  I50.9-Heart failure, unspecified; A41.9-Sepsis, unspecified organism;  R13.10-Dysphagia, unspecified     COMPARISON: 10/02/2022     TECHNIQUE: Single radiographic AP view of the chest was obtained.     FINDINGS:  Heart is normal in size. There is diffuse and extensive pulmonary  interstitial disease, remains extensive throughout the right lung, and  appears to be increasing in the left upper and midlung, relatively  sparing the left lung base. No densely consolidated lung effusion or  pneumothorax is seen. Heart is normal in size. Vasculature is obscured  bilaterally.        Impression:      Bilateral pneumonia, diffuse and relatively stable on the right, mildly  increased in the left upper and midlung. No evidence of pneumothorax.     This report was finalized on 10/4/2022 8:10 AM by Dr. Noé Larry MD.       XR Abdomen KUB [[id number]] Collected: 10/04/22 0806     Updated: 10/04/22 0813    Narrative:      DATE OF EXAM: 10/4/2022 4:20 AM     PROCEDURE: XR ABDOMEN KUB-     INDICATIONS: abd distention, ascites; J18.9-Pneumonia, unspecified  organism; R10.9-Unspecified abdominal pain; K74.60-Unspecified cirrhosis  of liver; R18.8-Other ascites;  N17.9-Acute kidney failure, unspecified;  I50.9-Heart failure, unspecified; A41.9-Sepsis, unspecified organism;  R13.10-Dysphagia, unspecified     COMPARISON: No comparisons available.     TECHNIQUE: Single radiographic view of the abdomen was obtained.     FINDINGS:  Nonobstructive, nonspecific bowel gas pattern. There is centralization  of the bowel loops with lateralization of the properitoneal fat stripe,  compatible with ascites. Cholecystectomy clips are noted. A couple  surgical clips are seen in the left mid abdomen. Single surgical clip  and surgical sutures are noted in the lower midline pelvis. No acute  osseous findings.       Impression:      Nonobstructive, nonspecific bowel gas pattern.      Ascites.     This report was finalized on 10/4/2022 8:10 AM by Skip Seaman MD.       XR Chest 1 View [[id number]] Collected: 10/04/22 0728     Updated: 10/04/22 0733    Narrative:         DATE OF EXAM:   10/4/2022 4:17 AM     PROCEDURE:   XR CHEST 1 VW-     INDICATIONS:   F/u pneumonia; J18.9-Pneumonia, unspecified organism; R10.9-Unspecified  abdominal pain; K74.60-Unspecified cirrhosis of liver; R18.8-Other  ascites; N17.9-Acute kidney failure, unspecified; I50.9-Heart failure,  unspecified; A41.9-Sepsis, unspecified organism; R13.10-Dysphagia,  unspecified     COMPARISON:  10/03/2022     TECHNIQUE:   Portable chest radiograph.     FINDINGS:   Heart size within normal limits. No pneumothorax. No significant pleural  effusion. No change in diffuse mixed interstitial and airspace disease  throughout the lungs most concerning for pneumonia. The osseous  structures are intact.       Impression:      No change in diffuse mixed interstitial and airspace disease throughout  the lungs most concerning for multifocal pneumonia.     This report was finalized on 10/4/2022 7:29 AM by Pool Floyd MD.       XR Chest 1 View [[id number]] Collected: 10/02/22 1131     Updated: 10/02/22 1136    Narrative:      DATE OF EXAM:  10/2/2022 10:52 AM     PROCEDURE: XR CHEST 1 VW-     INDICATIONS: PNA, worsening leukocytosis; J18.9-Pneumonia, unspecified  organism; R10.9-Unspecified abdominal pain; K74.60-Unspecified cirrhosis  of liver; R18.8-Other ascites; N17.9-Acute kidney failure, unspecified;  I50.9-Heart failure, unspecified; A41.9-Sepsis, unspecified organism;  R13.10-Dysphagia, unspecified     COMPARISON: 9/27/2022     TECHNIQUE: Single radiographic AP view of the chest was obtained.     FINDINGS:  Redemonstrated multifocal airspace disease, somewhat more confluent in  the right middle lobe, concerning for worsening pneumonia. There is no  enlarging effusion or distinct pneumothorax. Unchanged heart and  mediastinal contours.        Impression:      Redemonstrated multifocal airspace disease, somewhat more confluent in  the right middle lobe, concerning for worsening pneumonia. There is no  enlarging effusion or distinct pneumothorax.      This report was finalized on 10/2/2022 11:33 AM by Corona Segovia.               IMPRESSION:     1.  Right greater than left pneumonia initially seen on CT scan from 9/27/2022, and chest x-ray showing bilateral infiltrates, some of which may be fluid.  Was on reasonable treatment with piperacillin tazobactam on admission, but also had MRSA positive screen which may indicate an infection with MRSA as the cause of her sputum.  Not producing significant amounts.  Less likely atypical mycobacteria or fungal disease.  Negative Legionella and streptococcal urine antigen.  Respiratory viral PCR -10/3.  2.  Leukocytosis, neutrophilic worse.  Did not receive steroids.  May be related to above issues.  3.  Encephalopathy, toxic and metabolic, as well as elevated ammonia level with cirrhosis and possible hepatic encephalopathy.  4.  Acute hypoxic respiratory failure increasing to 40 L/min on 10/3/2022.  Related to above issues.  5.  Acute renal failure, creatinine 1.55, may be worse with vancomycin.   Creatinine 1.71 on 10/4, worse.  Vancomycin was stopped 10/3.  6.  Hyponatremia 132, worse.  7.  Hypocalcemia 7.8, worse.  8.  Cirrhosis, possibly related to substance use, hepatitis C, versus other.  Advanced findings for portal hypertension with ascites.  9.  Hepatitis C, treatment status unknown.  Acute hepatitis panel 10/4/2022 positive for hep C, negative for other.  10.  Elevated alkaline phosphatase 147.  11.  Anemia, chronic disease related to above issues.  Worse.  Also could be related to linezolid.  12.  Schizophrenia.  13.  Substance use, status unclear.    Plan:    1.  Diagnostically, continue to follow patient's physical exam, CBC, CMP, CRP.  2 ill to be bronchoscoped.  2.  Therapeutically, continue piperacillin tazobactam, and changed vancomycin to linezolid 600 mg p.o. twice daily for MRSA pneumonia coverage (on 10/3), duration to be determined.  There is a small chance of a drug interaction between linezolid and amitriptyline with a serotonin syndrome.  Benefit greater than risk.  Patient probably is really aspirating.  3.  Oxygen support therapy.  40 L/min on 10/3/2022.  4.  Pulmonary following, and high risk for intubation if worsens.    I discussed the patient's findings and my recommendations with patient and nursing staff    Our group would be pleased to follow this patient over the course of their hospitalization and assist with outpatient antimicrobial therapy, as indicated.  Further recommendations depend on the results of the cultures and clinical course.    Hugo Castillo MD  10/4/2022    Electronically signed by Hugo Castillo MD at 10/04/22 0937

## 2022-10-04 NOTE — PROGRESS NOTES
[facility] Medicine Services  PROGRESS NOTE    Patient Name: Kaylie Cruz  : 1965  MRN: [id number]    Date of Admission: 2022  Primary Care Physician: Iesha Harris DO    Subjective   Subjective     CC: f/u PNA    HPI: Pt seen and examined. Much more awake and alert today. Tells me her side over her ribs hurts. Remains on HFNC. Denies SOB.     ROS:  Gen- No fevers, chills  CV- No chest pain, palpitations  Resp- No cough, dyspnea  GI- No N/V/D, abd pain  MSK: Rib pain     Objective   Objective     Vital Signs:   Temp:  [97.1 °F (36.2 °C)-98.6 °F (37 °C)] 97.2 °F (36.2 °C)  Heart Rate:  [70-96] 74  Resp:  [18] 18  BP: (119-142)/(66-82) 125/72  Flow (L/min):  [40] 40     Physical Exam:  Constitutional: awake, alert, NAD this AM, chronically ill appearing   HENT: NCAT, mucous membranes moist  Respiratory: Coarse breath sounds bilaterally, respiratory effort normal HFNC  Cardiovascular: RRR, no murmurs, rubs, or gallops  Gastrointestinal: Positive bowel sounds, soft, nontender, nondistended  Musculoskeletal: No bilateral ankle edema  Psychiatric: Flat but cooperative   Neurologic: No focal deficits , speech clear   Skin: No rashes to exposed skin     Results Reviewed:  LAB RESULTS:      Lab 10/04/22  0537 10/03/22  0736 10/02/22  0814 10/01/22  0430 22  0349 22  0550 22  0426 22  1300   0000   WBC 17.59* 19.51* 16.29* 12.15* 8.67   < > 12.33*  --   --    HEMOGLOBIN 7.6* 8.9* 8.7* 8.7* 8.4*   < > 8.2*  --   --    HEMATOCRIT 23.9* 27.4* 27.7* 27.5* 25.9*   < > 24.2*  --   --    PLATELETS 162 176 194 172 170   < > 130*  --   --    NEUTROS ABS 14.84* 15.96* 13.85* 10.08* 5.55  --  11.10*  --    < >   IMMATURE GRANS (ABS) 0.41* 0.83*  --   --   --   --   --   --   --    LYMPHS ABS 1.20 1.55  --   --   --   --   --   --   --    MONOS ABS 0.65 0.71  --   --   --   --   --   --   --    EOS ABS 0.39 0.35 0.16 0.36 0.26  --  0.00  --    < >   MCV 82.4 81.3  82.9 82.1 81.4   < > 79.3  --   --    PROCALCITONIN 1.42*  --  0.88*  --   --   --  5.71*  --   --    LACTATE 1.4  --   --   --   --   --   --  1.8  --    PROTIME  --   --   --   --   --   --  19.9*  --   --     < > = values in this interval not displayed.         Lab 10/04/22  0537 10/03/22  0736 10/02/22  0814 10/01/22  0430 09/30/22  0349 09/29/22  0550 09/28/22  0426   SODIUM 132* 133* 137 136 138   < > 134*   POTASSIUM 3.9 4.2 4.4 4.5 4.0   < > 3.0*   CHLORIDE 102 103 108* 107 109*   < > 105   CO2 19.0* 19.0* 19.0* 19.0* 20.0*   < > 20.0*   ANION GAP 11.0 11.0 10.0 10.0 9.0   < > 9.0   BUN 34* 30* 30* 36* 43*   < > 44*   CREATININE 1.71* 1.55* 1.32* 1.44* 1.51*   < > 1.42*   EGFR 34.2* 38.4* 46.6* 42.0* 39.7*   < > 42.7*   GLUCOSE 159* 123* 203* 136* 149*   < > 137*   CALCIUM 7.8* 8.2* 8.4* 8.3* 8.1*   < > 8.0*   MAGNESIUM  --   --   --   --   --   --  2.3    < > = values in this interval not displayed.         Lab 10/04/22  0537 10/03/22  0736 10/02/22  0814 10/01/22  0430 09/30/22  0349   TOTAL PROTEIN 5.7* 6.6 6.8 5.9* 5.6*   ALBUMIN 2.10* 2.10* 2.30* 2.40* 2.30*   GLOBULIN 3.6 4.5 4.5 3.5 3.3   ALT (SGPT) 14 17 21 21 18   AST (SGOT) 43* 49* 50* 56* 48*   BILIRUBIN 1.0 0.7 0.7 0.7 0.6   ALK PHOS 147* 153* 157* 131* 111         Lab 09/28/22  0426   PROTIME 19.9*   INR 1.69*                 Lab 10/03/22  0840 09/28/22  0524   PH, ARTERIAL 7.418 7.394   PCO2, ARTERIAL 33.1* 34.5*   PO2 ART 53.7* 62.6*   FIO2 40 21   HCO3 ART 21.4 21.0   BASE EXCESS ART -2.7* -3.4*   CARBOXYHEMOGLOBIN 1.3 1.2     Brief Urine Lab Results  (Last result in the past 365 days)      Color   Clarity   Blood   Leuk Est   Nitrite   Protein   CREAT   Urine HCG        10/01/22 0117 Yellow   Clear   Negative   Small (1+)   Negative   30 mg/dL (1+)                 Microbiology Results Abnormal     Procedure Component Value - Date/Time    Respiratory Panel PCR w/COVID-19(SARS-CoV-2) MALIK/STANLEY/AIXA/PAD/COR/MAD/HEATHER In-House, NP Swab in [facility],  3-4 HR TAT - Swab, Nasopharynx [[id number]]  (Normal) Collected: 10/03/22 1124    Lab Status: Final result Specimen: Swab from Nasopharynx Updated: 10/03/22 1237     ADENOVIRUS, PCR Not Detected     Coronavirus 229E Not Detected     Coronavirus HKU1 Not Detected     Coronavirus NL63 Not Detected     Coronavirus OC43 Not Detected     COVID19 Not Detected     Human Metapneumovirus Not Detected     Human Rhinovirus/Enterovirus Not Detected     Influenza A PCR Not Detected     Influenza B PCR Not Detected     Parainfluenza Virus 1 Not Detected     Parainfluenza Virus 2 Not Detected     Parainfluenza Virus 3 Not Detected     Parainfluenza Virus 4 Not Detected     RSV, PCR Not Detected     Bordetella pertussis pcr Not Detected     Bordetella parapertussis PCR Not Detected     Chlamydophila pneumoniae PCR Not Detected     Mycoplasma pneumo by PCR Not Detected    Narrative:      In the setting of a positive respiratory panel with a viral infection PLUS a negative procalcitonin without other underlying concern for bacterial infection, consider observing off antibiotics or discontinuation of antibiotics and continue supportive care. If the respiratory panel is positive for atypical bacterial infection (Bordetella pertussis, Chlamydophila pneumoniae, or Mycoplasma pneumoniae), consider antibiotic de-escalation to target atypical bacterial infection.    Blood Culture - Blood, Hand, Right [[id number]]  (Normal) Collected: 09/27/22 1300    Lab Status: Final result Specimen: Blood from Hand, Right Updated: 10/02/22 1317     Blood Culture No growth at 5 days    Blood Culture - Blood, Arm, Right [[id number]]  (Normal) Collected: 09/27/22 1245    Lab Status: Final result Specimen: Blood from Arm, Right Updated: 10/02/22 1303     Blood Culture No growth at 5 days    Urine Culture - Urine, Urine, Random Void [[id number]]  (Normal) Collected: 10/01/22 0117    Lab Status: Final result Specimen: Urine, Random Void Updated: 10/02/22 1056      Urine Culture No growth    Body Fluid Culture - Body Fluid, Peritoneum [[id number]] Collected: 09/28/22 1143    Lab Status: Final result Specimen: Body Fluid from Peritoneum Updated: 10/01/22 0729     Body Fluid Culture No growth at 3 days     Gram Stain No WBCs or organisms seen    Legionella Antigen, Urine - Urine, Urine, Clean Catch [[id number]]  (Normal) Collected: 09/28/22 0643    Lab Status: Final result Specimen: Urine, Clean Catch Updated: 09/28/22 1229     LEGIONELLA ANTIGEN, URINE Negative    S. Pneumo Ag Urine or CSF - Urine, Urine, Clean Catch [[id number]]  (Normal) Collected: 09/28/22 0643    Lab Status: Final result Specimen: Urine, Clean Catch Updated: 09/28/22 1229     Strep Pneumo Ag Negative          XR Chest 1 View    Result Date: 10/4/2022  DATE OF EXAM: 10/03/2022 8:23 AM  PROCEDURE: XR CHEST 1 VIEW-  INDICATIONS: increased O2 requirement; J18.9-Pneumonia, unspecified organism; R10.9-Unspecified abdominal pain; K74.60-Unspecified cirrhosis of liver; R18.8-Other ascites; N17.9-Acute kidney failure, unspecified; I50.9-Heart failure, unspecified; A41.9-Sepsis, unspecified organism; R13.10-Dysphagia, unspecified  COMPARISON: 10/02/2022  TECHNIQUE: Single radiographic AP view of the chest was obtained.  FINDINGS: Heart is normal in size. There is diffuse and extensive pulmonary interstitial disease, remains extensive throughout the right lung, and appears to be increasing in the left upper and midlung, relatively sparing the left lung base. No densely consolidated lung effusion or pneumothorax is seen. Heart is normal in size. Vasculature is obscured bilaterally.      Impression: Bilateral pneumonia, diffuse and relatively stable on the right, mildly increased in the left upper and midlung. No evidence of pneumothorax.  This report was finalized on 10/4/2022 8:10 AM by Dr. Noé Larry MD.      XR Chest 1 View    Result Date: 10/4/2022   DATE OF EXAM: 10/4/2022 4:17 AM  PROCEDURE: XR CHEST 1 VW-   INDICATIONS: F/u pneumonia; J18.9-Pneumonia, unspecified organism; R10.9-Unspecified abdominal pain; K74.60-Unspecified cirrhosis of liver; R18.8-Other ascites; N17.9-Acute kidney failure, unspecified; I50.9-Heart failure, unspecified; A41.9-Sepsis, unspecified organism; R13.10-Dysphagia, unspecified  COMPARISON: 10/03/2022  TECHNIQUE: Portable chest radiograph.  FINDINGS: Heart size within normal limits. No pneumothorax. No significant pleural effusion. No change in diffuse mixed interstitial and airspace disease throughout the lungs most concerning for pneumonia. The osseous structures are intact.      Impression: No change in diffuse mixed interstitial and airspace disease throughout the lungs most concerning for multifocal pneumonia.  This report was finalized on 10/4/2022 7:29 AM by Pool Floyd MD.      XR Chest 1 View    Result Date: 10/2/2022  DATE OF EXAM: 10/2/2022 10:52 AM  PROCEDURE: XR CHEST 1 VW-  INDICATIONS: PNA, worsening leukocytosis; J18.9-Pneumonia, unspecified organism; R10.9-Unspecified abdominal pain; K74.60-Unspecified cirrhosis of liver; R18.8-Other ascites; N17.9-Acute kidney failure, unspecified; I50.9-Heart failure, unspecified; A41.9-Sepsis, unspecified organism; R13.10-Dysphagia, unspecified  COMPARISON: 9/27/2022  TECHNIQUE: Single radiographic AP view of the chest was obtained.  FINDINGS: Redemonstrated multifocal airspace disease, somewhat more confluent in the right middle lobe, concerning for worsening pneumonia. There is no enlarging effusion or distinct pneumothorax. Unchanged heart and mediastinal contours.      Impression: Redemonstrated multifocal airspace disease, somewhat more confluent in the right middle lobe, concerning for worsening pneumonia. There is no enlarging effusion or distinct pneumothorax.  This report was finalized on 10/2/2022 11:33 AM by Corona Segovia.      XR Abdomen KUB    Result Date: 10/4/2022  DATE OF EXAM: 10/4/2022 4:20 AM  PROCEDURE: XR ABDOMEN  KUB-  INDICATIONS: abd distention, ascites; J18.9-Pneumonia, unspecified organism; R10.9-Unspecified abdominal pain; K74.60-Unspecified cirrhosis of liver; R18.8-Other ascites; N17.9-Acute kidney failure, unspecified; I50.9-Heart failure, unspecified; A41.9-Sepsis, unspecified organism; R13.10-Dysphagia, unspecified  COMPARISON: No comparisons available.  TECHNIQUE: Single radiographic view of the abdomen was obtained.  FINDINGS: Nonobstructive, nonspecific bowel gas pattern. There is centralization of the bowel loops with lateralization of the properitoneal fat stripe, compatible with ascites. Cholecystectomy clips are noted. A couple surgical clips are seen in the left mid abdomen. Single surgical clip and surgical sutures are noted in the lower midline pelvis. No acute osseous findings.      Impression: Nonobstructive, nonspecific bowel gas pattern.  Ascites.  This report was finalized on 10/4/2022 8:10 AM by Skip Seaman MD.            I have reviewed the medications:  Scheduled Meds:amLODIPine, 5 mg, Oral, Daily  aspirin, 81 mg, Oral, Daily  atorvastatin, 40 mg, Oral, Nightly  folic acid, 1 mg, Oral, Daily  furosemide, 40 mg, Intravenous, Daily  insulin detemir, 15 Units, Subcutaneous, Nightly  insulin lispro, 0-9 Units, Subcutaneous, TID AC  Insulin Lispro, 5 Units, Subcutaneous, TID With Meals  ipratropium-albuterol, 3 mL, Nebulization, 4x Daily - RT  lactulose, 20 g, Oral, TID  Linezolid, 600 mg, Intravenous, Q12H  metoprolol tartrate, 50 mg, Oral, BID  piperacillin-tazobactam, 3.375 g, Intravenous, Q8H  rifAXIMin, 550 mg, Oral, Q12H  sodium chloride, 10 mL, Intravenous, Q12H  thiamine, 100 mg, Oral, Daily      Continuous Infusions:   PRN Meds:.•  acetaminophen **OR** acetaminophen **OR** acetaminophen  •  albuterol  •  benzonatate  •  dextrose  •  dextrose  •  glucagon (human recombinant)  •  guaiFENesin  •  ondansetron **OR** ondansetron  •  oxyCODONE  •  potassium chloride **OR** potassium chloride  **OR** potassium chloride  •  [COMPLETED] Insert peripheral IV **AND** sodium chloride  •  sodium chloride    Assessment & Plan   Assessment & Plan     Active Hospital Problems    Diagnosis  POA   • Pneumonia of right middle lobe due to infectious organism [J18.9]  Yes   • Sepsis, unspecified organism (HCC) [A41.9]  Unknown   • Tobacco use [Z72.0]  Yes   • Vaginal cancer (HCC) [C52]  Yes   • High grade squamous intraepithelial lesion (HGSIL) on cytologic smear of vagina [R87.623]  Yes   • Type 2 diabetes mellitus with hyperglycemia, with long-term current use of insulin (HCC) [E11.65, Z79.4]  Not Applicable   • Chronic hepatitis C without hepatic coma (HCC) [B18.2]  Yes   • Decompensated hepatic cirrhosis (HCC) [K72.90, K74.60]  Yes   • HFrEF (heart failure with reduced ejection fraction) (HCC) [I50.20]  Yes   • Coronary artery disease involving native coronary artery of native heart without angina pectoris [I25.10]  Yes   • Schizophrenia (HCC) [F20.9]  Yes   • Chronic pain disorder [G89.4]  Yes   • Primary hypertension [I10]  Yes   • Long-term current use of opiate analgesic [Z79.891]  Not Applicable   • Esophageal varices (HCC) [I85.00]  Yes      Resolved Hospital Problems   No resolved problems to display.        Brief Hospital Course to date:  Kaylie Cruz is a 57 y.o. 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    This patient's problems and plans were partially entered by my partner and updated as appropriate by me 10/04/22.  All problems are new to me today     Sepsis, POA  Bilateral PNA  Leukocytosis   +MRSA PCR   -Remains on HFNC, wean as tolerated   -ID following, continue Zosyn and Zyvox   - Cryptococcal antigen negative  - Fungal Alina,  Fungitell B-D glucan, Histoplasma pending  - Sputum cx pending  - Respiratory PCR negative  - Continue scheduled DuoNebs   - Pt refused to have CT scan chest done   -SLP has seen, S/P FEES 9/29 and functional oral and pharyngeal phase, signed off   - Requiring too much O2 for Bronchoscopy currently      Decompensated cirrhosis with ascites  History of Hepatitis C  Elevated LFTs   -Follows with GI outpatient but missed last appt.   -EGD due 04/2023 for EV screening  -S/P LVP on 9/29 with 2.25L of ascitic fluid removed, does not appear c/w SBP culture NGTD  -Hepatitis panel + Hep C ab  -Continue Lactulose, Continue Xifaxan    -KUB ok this AM      Likely CKD  --Cr further elevated today  --Hold further doses of Lasix   --Repeat BMP in AM, if further elevated, may need some gentle IVF     Hypokalemia  -- Resolved      Chronic HFrEF   Coronary artery disease involving native coronary artery of native heart without angina pectoris  Primary hypertension  -Echo 3/2022 - EF 49%, global hypokinesis, grade 1 diastolic dysfunction  -C 4/3/2022 - nonobstructive LAD to LAD 60% in mid region  -Pt is supposed to follow with Dr Daniel, has missed appts   -Continue ASA 81, metoprolol  -Continue statin      Type 2 diabetes mellitus with diabetic polyneuropathy, with long-term current use of insulin  -A1c has improved from 8.5% to 6.6% with medication compliance over the past 3 months  -Has appointment to establish with Endocrinology next month  -Continue Lantus 15 units nightly  -Continue Humalog 5 units TID meals   -Continue MDSSI  -Continue to HOLD Gabapentin      Recent Gross hematuria  -Seen at  8/23/22 and diagnosed with UTI, treated with ABX   -Per patient the dysuria resolved but still having intermittent hematuria  -Patient has already been referred to Urology per PCP  -Repeat UA unremarkable on admission, however, UA from 10/1 as noted above. ABX as noted above  -CT A/P without contrast with unremarkable bladder       Tobacco use  -Encourage smoking cessation  -PCP note says they plan to obtain PFTs outpatient as she has had some wheezing but no formal Dx of COPD  -PRN Albuterol   -Continue scheduled DuoNebs      High grade squamous intraepithelial lesion (HGSIL) on cytologic smear of vagina  Vaginal cancer   -Diagnosed 2010? s/p radiation, surgical resection, and hysterectomy. Previously following with Dr. Roxanne Chaudhry, Gyn-Onc in [place] with [facility].   -She had vaginal pap smear with HGSIL and was lost to follow up.   -PCP has referred to GYN and gyn-onc for further evaluation      Schizophrenia, unspecified type  -Following with [place] for talk therapy  -Continue to HOLD SEROQUEL AND ELAVIL      Substance abuse  -UDS positive for cocaine, oxycodone and TCA     Chronic Anemia  -H&H stable in review of chart     R rib pain  -Likely from PNA  -Trial of Lidoderm patch     This patient's problems and plans were partially entered by my partner and updated as appropriate by me 10/04/22.    Expected Discharge Location and Transportation: acute rehab/[facility]   Expected Discharge Date: 10/9    DVT prophylaxis:  Mechanical DVT prophylaxis orders are present.     AM-PAC 6 Clicks Score (PT): 12 (10/03/22 2000)    CODE STATUS:   Code Status and Medical Interventions:   Ordered at: 09/27/22 1402     Level Of Support Discussed With:    Patient     Code Status (Patient has no pulse and is not breathing):    CPR (Attempt to Resuscitate)     Medical Interventions (Patient has pulse or is breathing):    Full Support       Nidia Rangel, DO  10/04/22

## 2022-10-04 NOTE — PROGRESS NOTES
INFECTIOUS DISEASE Progress Note    Kaylie Render  1965  [id number]    Consult: 10/3/22  Admit date: 9/27/2022    Requesting Provider: No ref. provider found  Evaluating physician: Hugo Castillo MD  Reason for Consultation: Sepsis, leukocytosis, cirrhosis, hepatitis C, substance use, right lower lobe pneumonia  Chief Complaint:       Subjective   History of present illness:  Patient is a  57 y.o.  Yr old female with a history of substance use, hepatitis C, cirrhosis, cervical cancer, vaginal squamous cell cancer 2009, schizophrenia, diabetes mellitus type 2, congestive heart failure with ejection fraction 49%, recent urinary tract infection treated at the [facility], who developed increasing shortness of breath on 9/25/2022 and was admitted to [facility] on 9/27/2022 with radiographic findings consistent with right lower lobe pneumonia.  The patient was placed on piperacillin tazobactam.  The patient is a poor historian.  Creatinine 1.55, sodium 133, potassium 4.2, AST 49, alkaline phosphatase 153, bilirubin normal, vancomycin trough 11.5, WBC 19.51, hemoglobin 8.9, platelet count 176, MRSA PCR swab screen +10/2, procalcitonin 0.88, urinalysis 6-12 WBCs but urine culture from 10/1 negative, ascites with 208 WBCs with negative culture, 9/28, Legionella and streptococcal urine antigen 9/28 negative.  Ammonia 64.  Lactic acid 2.5 on admission.  Blood cultures x2 from 9/27 negative.  Chest x-ray 10/3 with bilateral pneumonia diffuse right greater than left.  CT scan of the abdomen and pelvis 9/27 with diffuse right lower lobe pneumonia and findings consistent with colitis at the hepatic flexure.  I was consulted on 10/3/2022 for further evaluation and treatment.  Patient was initially placed on piperacillin tazobactam, then vancomycin was added on 10/2/2022.  The patient denies ill contacts, significant travel history, zoonotic exposures, TB, or HIV.  Minimal sputum production.   White blood cell count has increased from 15-19,000.     10/4/2022 history reviewed.  Patient poor historian.  Tolerating linezolid and piperacillin tazobactam for pneumonia with positive MRSA screen.  Still on high flow oxygen at 40 L/min.  Oxygen concentration 55%.  Not coughing much.  Says she is breathing better.    Past Medical History:   Diagnosis Date    Anemia     Cancer (HCC)     cervical    Depression     Hyperlipidemia     Hypertension     Infectious viral hepatitis     Myocardial infarction (HCC)     Substance abuse (HCC)        Past Surgical History:   Procedure Laterality Date    BLADDER SURGERY      CARDIAC CATHETERIZATION      4/3/2022    CHOLECYSTECTOMY      COLON RESECTION      7/19/2017, descending and transverse colon    COLON SURGERY      COLONOSCOPY      7/19/2017    ENDOSCOPY      EGD 4/1/22    HYSTERECTOMY      TRANSESOPHAGEAL ECHOCARDIOGRAM (AVINASH)      Global hyokinesis, 49% EF, LVH       Pediatric History   Patient Parents    Not on file     Other Topics Concern    Not on file   Social History Narrative    Not on file       family history is not on file.    Allergies   Allergen Reactions    Codeine Hives    Morphine Hives    Tagamet [Cimetidine] Other (See Comments)     DISCOLORATION OF SKIN    Toradol [Ketorolac Tromethamine] Hives         There is no immunization history on file for this patient.    Medication:    Current Facility-Administered Medications:     acetaminophen (TYLENOL) tablet 650 mg, 650 mg, Oral, Q4H PRN, 650 mg at 10/04/22 0418 **OR** acetaminophen (TYLENOL) 160 MG/5ML solution 650 mg, 650 mg, Oral, Q4H PRN **OR** acetaminophen (TYLENOL) suppository 650 mg, 650 mg, Rectal, Q4H PRN, Nidia Rangel DO    albuterol (PROVENTIL) nebulizer solution 0.083% 2.5 mg/3mL, 2.5 mg, Nebulization, Q6H PRN, Nidia Rangel DO, 2.5 mg at 10/03/22 0904    amLODIPine (NORVASC) tablet 5 mg, 5 mg, Oral, Daily, Nidia Rangel DO, 5 mg at 10/04/22 0815    aspirin EC tablet 81 mg,  81 mg, Oral, Daily, Nidia Rangel DO, 81 mg at 10/04/22 0815    atorvastatin (LIPITOR) tablet 40 mg, 40 mg, Oral, Nightly, Megan Landeros MD, 40 mg at 10/03/22 2054    benzonatate (TESSALON) capsule 200 mg, 200 mg, Oral, TID PRN, Megan Landeros MD    dextrose (D50W) (25 g/50 mL) IV injection 25 g, 25 g, Intravenous, Q15 Min PRN, Nidia Rangel DO    dextrose (GLUTOSE) oral gel 15 g, 15 g, Oral, Q15 Min PRN, Nidia Rangel DO    folic acid (FOLVITE) tablet 1 mg, 1 mg, Oral, Daily, Nidia Rangel DO, 1 mg at 10/04/22 0815    furosemide (LASIX) injection 40 mg, 40 mg, Intravenous, Daily, Nidia Rangel DO, 40 mg at 10/04/22 0815    glucagon (human recombinant) (GLUCAGEN DIAGNOSTIC) injection 1 mg, 1 mg, Intramuscular, Q15 Min PRN, Nidia Rangel DO    guaiFENesin (ROBITUSSIN) 100 MG/5ML oral solution 200 mg, 200 mg, Oral, Q4H PRN, Megan Landeros MD, 200 mg at 10/03/22 2059    insulin detemir (LEVEMIR) injection 15 Units, 15 Units, Subcutaneous, Nightly, Megan Landeros MD, 15 Units at 10/03/22 2056    Insulin Lispro (humaLOG) injection 0-9 Units, 0-9 Units, Subcutaneous, TID AC, Nidia Rangel DO, 2 Units at 10/02/22 1614    Insulin Lispro (humaLOG) injection 5 Units, 5 Units, Subcutaneous, TID With Meals, Megan Landeros MD, 5 Units at 10/02/22 1614    ipratropium-albuterol (DUO-NEB) nebulizer solution 3 mL, 3 mL, Nebulization, 4x Daily - RT, Nidia Rangel DO, 3 mL at 10/03/22 2030    lactulose (CHRONULAC) 10 GM/15ML solution 20 g, 20 g, Oral, TID, Henrietta Ramos II, DO, 20 g at 10/04/22 0814    Linezolid (ZYVOX) 600 mg 300 mL, 600 mg, Intravenous, Q12H, Hugo Castillo MD, Last Rate: 300 mL/hr at 10/03/22 2309, 600 mg at 10/03/22 2309    metoprolol tartrate (LOPRESSOR) tablet 50 mg, 50 mg, Oral, BID, Nidia Rangel DO, 50 mg at 10/04/22 0815    ondansetron (ZOFRAN) tablet 4 mg, 4 mg, Oral, Q6H PRN, 4 mg at 10/04/22 0815 **OR**  "ondansetron (ZOFRAN) injection 4 mg, 4 mg, Intravenous, Q6H PRN, Nidia Rangel DO    oxyCODONE (ROXICODONE) immediate release tablet 10 mg, 10 mg, Oral, Q4H PRN, Megan Landeros MD, 10 mg at 10/03/22 0535    piperacillin-tazobactam (ZOSYN) 3.375 g in iso-osmotic dextrose 50 ml (premix), 3.375 g, Intravenous, Q8H, Nidia Rangel DO, 3.375 g at 10/04/22 0818    potassium chloride (MICRO-K) CR capsule 40 mEq, 40 mEq, Oral, PRN, 40 mEq at 09/29/22 2143 **OR** potassium chloride (KLOR-CON) packet 40 mEq, 40 mEq, Oral, PRN **OR** potassium chloride 10 mEq in 100 mL IVPB, 10 mEq, Intravenous, Q1H PRN, Henrietta Ramos II, DO, Last Rate: 100 mL/hr at 09/29/22 1152, 10 mEq at 09/29/22 1152    riFAXIMin (XIFAXAN) tablet 550 mg, 550 mg, Oral, Q12H, Megan Landeros MD, 550 mg at 10/04/22 0815    [COMPLETED] Insert peripheral IV, , , Once **AND** sodium chloride 0.9 % flush 10 mL, 10 mL, Intravenous, PRN, Stephenie Martinez PA    sodium chloride 0.9 % flush 10 mL, 10 mL, Intravenous, Q12H, Nidia Rangel DO, 10 mL at 10/04/22 0818    sodium chloride 0.9 % flush 10 mL, 10 mL, Intravenous, PRN, Nidia Rangel DO    thiamine (VITAMIN B-1) tablet 100 mg, 100 mg, Oral, Daily, Nidia Rangel DO, 100 mg at 10/04/22 0815    Please refer to the medical record for a full medication list    Review of Systems:      ROS difficult to obtain secondary to mental status    Physical Exam:   Vital Signs   Temp:  [97.1 °F (36.2 °C)-98.6 °F (37 °C)] 97.2 °F (36.2 °C)  Heart Rate:  [70-96] 74  Resp:  [18] 18  BP: (119-142)/(66-82) 125/72    Temp  Min: 97.1 °F (36.2 °C)  Max: 98.6 °F (37 °C)  BP  Min: 119/66  Max: 142/82  Pulse  Min: 70  Max: 96  Resp  Min: 18  Max: 18  SpO2  Min: 89 %  Max: 96 %    Blood pressure 125/72, pulse 74, temperature 97.2 °F (36.2 °C), temperature source Oral, resp. rate 18, height 160 cm (63\"), weight 52.2 kg (115 lb), SpO2 92 %.  GENERAL: Awake and alert, in moderate distress. Appears " older than stated age.  Cachectic.  HEENT:  Normocephalic, atraumatic.  Oropharynx without thrush. Dentition in good repair. No cervical adenopathy. No neck masses.  Ears externally normal, Nose externally normal.  EYES: No conjunctival injection. No icterus. EOM full.  LYMPHATICS: No lymphadenopathy of the neck or axillary or inguinal regions.   HEART: No murmur, gallop, or pericardial friction rub. Reg rate rhythm, No JVD at 45 degrees.  LUNGS: Bilateral rales, no rhonchi. No respiratory distress, no use of accessory muscles.  ABDOMEN: Soft, nontender, nondistended. No appreciable HSM.  Bowel sounds normal.  SKIN: Warm and dry without cutaneous eruptions.  No nodules.    PSYCHIATRIC: Mental status some confusion.  EXT:  No cellulitic change.  NEURO: Oriented to name, nonfocal.      Results Review:   I reviewed the patient's new clinical results.  I reviewed the patient's new imaging results and agree with the interpretation.  I reviewed the patient's other test results and agree with the interpretation    Results from last 7 days   Lab Units 10/04/22  0537 10/03/22  0736 10/02/22  0814   WBC 10*3/mm3 17.59* 19.51* 16.29*   HEMOGLOBIN g/dL 7.6* 8.9* 8.7*   HEMATOCRIT % 23.9* 27.4* 27.7*   PLATELETS 10*3/mm3 162 176 194     Results from last 7 days   Lab Units 10/04/22  0537   SODIUM mmol/L 132*   POTASSIUM mmol/L 3.9   CHLORIDE mmol/L 102   CO2 mmol/L 19.0*   BUN mg/dL 34*   CREATININE mg/dL 1.71*   GLUCOSE mg/dL 159*   CALCIUM mg/dL 7.8*     Results from last 7 days   Lab Units 10/04/22  0537   ALK PHOS U/L 147*   BILIRUBIN mg/dL 1.0   ALT (SGPT) U/L 14   AST (SGOT) U/L 43*             Results from last 7 days   Lab Units 10/03/22  0736   VANCOMYCIN TR mcg/mL 11.50     Results from last 7 days   Lab Units 10/04/22  0537   LACTATE mmol/L 1.4     Estimated Creatinine Clearance: 29.2 mL/min (A) (by C-G formula based on SCr of 1.71 mg/dL (H)).     Procalitonin Results:      Lab 10/04/22  0537 10/02/22  0814  09/28/22  0426 09/27/22  1026   PROCALCITONIN 1.42* 0.88* 5.71* 5.02*      Brief Urine Lab Results  (Last result in the past 365 days)        Color   Clarity   Blood   Leuk Est   Nitrite   Protein   CREAT   Urine HCG        10/01/22 0117 Yellow   Clear   Negative   Small (1+)   Negative   30 mg/dL (1+)                  Site   Date Value Ref Range Status   10/03/2022 Right Brachial  Final     Tico's Test   Date Value Ref Range Status   10/03/2022 N/A  Final     pH, Arterial   Date Value Ref Range Status   10/03/2022 7.418 7.350 - 7.450 pH units Final     pCO2, Arterial   Date Value Ref Range Status   10/03/2022 33.1 (L) 35.0 - 45.0 mm Hg Final     Comment:     84 Value below reference range     pO2, Arterial   Date Value Ref Range Status   10/03/2022 53.7 (L) 83.0 - 108.0 mm Hg Final     Comment:     84 Value below reference range     HCO3, Arterial   Date Value Ref Range Status   10/03/2022 21.4 20.0 - 26.0 mmol/L Final     Base Excess, Arterial   Date Value Ref Range Status   10/03/2022 -2.7 (L) 0.0 - 2.0 mmol/L Final     Hemoglobin, Blood Gas   Date Value Ref Range Status   10/03/2022 8.6 (L) 14 - 18 g/dL Final     Comment:     84 Value below reference range     Hematocrit, Blood Gas   Date Value Ref Range Status   10/03/2022 26.5 (L) 38.0 - 51.0 % Final     Oxyhemoglobin   Date Value Ref Range Status   10/03/2022 86.1 (L) 94 - 99 % Final     Comment:     84 Value below reference range     Methemoglobin   Date Value Ref Range Status   10/03/2022 0.10 0.00 - 1.50 % Final     Carboxyhemoglobin   Date Value Ref Range Status   10/03/2022 1.3 0 - 2 % Final     CO2 Content   Date Value Ref Range Status   10/03/2022 22.4 22 - 33 mmol/L Final     Barometric Pressure for Blood Gas   Date Value Ref Range Status   10/03/2022   Final     Comment:     N/A     Modality   Date Value Ref Range Status   10/03/2022 Nasal Cannula  Final     FIO2   Date Value Ref Range Status   10/03/2022 40 % Final        Microbiology:  Blood  Culture   Date Value Ref Range Status   09/27/2022 No growth at 5 days  Final     No results found for: BCIDPCR, CXREFLEX, CSFCX, CULTURETIS  No results found for: CULTURES, HSVCX, URCX  No results found for: EYECULTURE, GCCX, HSVCULTURE, LABHSV  No results found for: LEGIONELLA, MRSACX, MUMPSCX, MYCOPLASCX  No results found for: NOCARDIACX, STOOLCX  Urine Culture   Date Value Ref Range Status   10/01/2022 No growth  Final     No results found for: VIRALCULTU, WOUNDCX     Blood Culture   Date Value Ref Range Status   09/27/2022 No growth at 5 days  Final     Body Fluid Culture   Date Value Ref Range Status   09/28/2022 No growth at 3 days  Final     Urine Culture   Date Value Ref Range Status   10/01/2022 No growth  Final   (    MRSA swab screen + 10/2/2022.  Radiology:  Imaging Results (Last 72 Hours)       Procedure Component Value Units Date/Time    XR Chest 1 View [[id number]] Collected: 10/03/22 0845     Updated: 10/04/22 0813    Narrative:      DATE OF EXAM: 10/03/2022 8:23 AM     PROCEDURE: XR CHEST 1 VIEW-     INDICATIONS: increased O2 requirement; J18.9-Pneumonia, unspecified  organism; R10.9-Unspecified abdominal pain; K74.60-Unspecified cirrhosis  of liver; R18.8-Other ascites; N17.9-Acute kidney failure, unspecified;  I50.9-Heart failure, unspecified; A41.9-Sepsis, unspecified organism;  R13.10-Dysphagia, unspecified     COMPARISON: 10/02/2022     TECHNIQUE: Single radiographic AP view of the chest was obtained.     FINDINGS:  Heart is normal in size. There is diffuse and extensive pulmonary  interstitial disease, remains extensive throughout the right lung, and  appears to be increasing in the left upper and midlung, relatively  sparing the left lung base. No densely consolidated lung effusion or  pneumothorax is seen. Heart is normal in size. Vasculature is obscured  bilaterally.        Impression:      Bilateral pneumonia, diffuse and relatively stable on the right, mildly  increased in the left upper  and midlung. No evidence of pneumothorax.     This report was finalized on 10/4/2022 8:10 AM by Dr. Noé Larry MD.       XR Abdomen KUB [[id number]] Collected: 10/04/22 0806     Updated: 10/04/22 0813    Narrative:      DATE OF EXAM: 10/4/2022 4:20 AM     PROCEDURE: XR ABDOMEN KUB-     INDICATIONS: abd distention, ascites; J18.9-Pneumonia, unspecified  organism; R10.9-Unspecified abdominal pain; K74.60-Unspecified cirrhosis  of liver; R18.8-Other ascites; N17.9-Acute kidney failure, unspecified;  I50.9-Heart failure, unspecified; A41.9-Sepsis, unspecified organism;  R13.10-Dysphagia, unspecified     COMPARISON: No comparisons available.     TECHNIQUE: Single radiographic view of the abdomen was obtained.     FINDINGS:  Nonobstructive, nonspecific bowel gas pattern. There is centralization  of the bowel loops with lateralization of the properitoneal fat stripe,  compatible with ascites. Cholecystectomy clips are noted. A couple  surgical clips are seen in the left mid abdomen. Single surgical clip  and surgical sutures are noted in the lower midline pelvis. No acute  osseous findings.       Impression:      Nonobstructive, nonspecific bowel gas pattern.      Ascites.     This report was finalized on 10/4/2022 8:10 AM by Skip Seaman MD.       XR Chest 1 View [[id number]] Collected: 10/04/22 0728     Updated: 10/04/22 0733    Narrative:         DATE OF EXAM:   10/4/2022 4:17 AM     PROCEDURE:   XR CHEST 1 VW-     INDICATIONS:   F/u pneumonia; J18.9-Pneumonia, unspecified organism; R10.9-Unspecified  abdominal pain; K74.60-Unspecified cirrhosis of liver; R18.8-Other  ascites; N17.9-Acute kidney failure, unspecified; I50.9-Heart failure,  unspecified; A41.9-Sepsis, unspecified organism; R13.10-Dysphagia,  unspecified     COMPARISON:  10/03/2022     TECHNIQUE:   Portable chest radiograph.     FINDINGS:   Heart size within normal limits. No pneumothorax. No significant pleural  effusion. No change in diffuse mixed  interstitial and airspace disease  throughout the lungs most concerning for pneumonia. The osseous  structures are intact.       Impression:      No change in diffuse mixed interstitial and airspace disease throughout  the lungs most concerning for multifocal pneumonia.     This report was finalized on 10/4/2022 7:29 AM by Pool Floyd MD.       XR Chest 1 View [[id number]] Collected: 10/02/22 1131     Updated: 10/02/22 1136    Narrative:      DATE OF EXAM: 10/2/2022 10:52 AM     PROCEDURE: XR CHEST 1 VW-     INDICATIONS: PNA, worsening leukocytosis; J18.9-Pneumonia, unspecified  organism; R10.9-Unspecified abdominal pain; K74.60-Unspecified cirrhosis  of liver; R18.8-Other ascites; N17.9-Acute kidney failure, unspecified;  I50.9-Heart failure, unspecified; A41.9-Sepsis, unspecified organism;  R13.10-Dysphagia, unspecified     COMPARISON: 9/27/2022     TECHNIQUE: Single radiographic AP view of the chest was obtained.     FINDINGS:  Redemonstrated multifocal airspace disease, somewhat more confluent in  the right middle lobe, concerning for worsening pneumonia. There is no  enlarging effusion or distinct pneumothorax. Unchanged heart and  mediastinal contours.        Impression:      Redemonstrated multifocal airspace disease, somewhat more confluent in  the right middle lobe, concerning for worsening pneumonia. There is no  enlarging effusion or distinct pneumothorax.      This report was finalized on 10/2/2022 11:33 AM by Corona Segovia.               IMPRESSION:     1.  Right greater than left pneumonia initially seen on CT scan from 9/27/2022, and chest x-ray showing bilateral infiltrates, some of which may be fluid.  Was on reasonable treatment with piperacillin tazobactam on admission, but also had MRSA positive screen which may indicate an infection with MRSA as the cause of her sputum.  Not producing significant amounts.  Less likely atypical mycobacteria or fungal disease.  Negative Legionella and  streptococcal urine antigen.  Respiratory viral PCR -10/3.  2.  Leukocytosis, neutrophilic worse.  Did not receive steroids.  May be related to above issues.  3.  Encephalopathy, toxic and metabolic, as well as elevated ammonia level with cirrhosis and possible hepatic encephalopathy.  4.  Acute hypoxic respiratory failure increasing to 40 L/min on 10/3/2022.  Related to above issues.  5.  Acute renal failure, creatinine 1.55, may be worse with vancomycin.  Creatinine 1.71 on 10/4, worse.  Vancomycin was stopped 10/3.  6.  Hyponatremia 132, worse.  7.  Hypocalcemia 7.8, worse.  8.  Cirrhosis, possibly related to substance use, hepatitis C, versus other.  Advanced findings for portal hypertension with ascites.  9.  Hepatitis C, treatment status unknown.  Acute hepatitis panel 10/4/2022 positive for hep C, negative for other.  10.  Elevated alkaline phosphatase 147.  11.  Anemia, chronic disease related to above issues.  Worse.  Also could be related to linezolid.  12.  Schizophrenia.  13.  Substance use, status unclear.    Plan:    1.  Diagnostically, continue to follow patient's physical exam, CBC, CMP, CRP.  2 ill to be bronchoscoped.  2.  Therapeutically, continue piperacillin tazobactam, and changed vancomycin to linezolid 600 mg p.o. twice daily for MRSA pneumonia coverage (on 10/3), duration to be determined.  There is a small chance of a drug interaction between linezolid and amitriptyline with a serotonin syndrome.  Benefit greater than risk.  Patient probably is really aspirating.  3.  Oxygen support therapy.  40 L/min on 10/3/2022.  4.  Pulmonary following, and high risk for intubation if worsens.    I discussed the patient's findings and my recommendations with patient and nursing staff    Our group would be pleased to follow this patient over the course of their hospitalization and assist with outpatient antimicrobial therapy, as indicated.  Further recommendations depend on the results of the cultures  and clinical course.    Hugo Castillo MD  10/4/2022

## 2022-10-04 NOTE — PROGRESS NOTES
INPATIENT PULMONARY SERVICE  PROGRESS NOTE     Hospital LOS: 7 days    Ms. Kaylie Cruz, is followed for a Chief Complaint of:  Chief Complaint   Patient presents with   • Back Pain       Chronic pain disorder    Primary hypertension    Long-term current use of opiate analgesic    Coronary artery disease involving native coronary artery of native heart without angina pectoris    Schizophrenia (HCC)    Esophageal varices (HCC)    Decompensated hepatic cirrhosis (HCC)    HFrEF (heart failure with reduced ejection fraction) (HCC)    Vaginal cancer (HCC)    High grade squamous intraepithelial lesion (HGSIL) on cytologic smear of vagina    Type 2 diabetes mellitus with hyperglycemia, with long-term current use of insulin (HCC)    Chronic hepatitis C without hepatic coma (HCC)    Tobacco use    Pneumonia of right middle lobe due to infectious organism    Sepsis, unspecified organism (HCC)      Subjective   S     Interval History:  Patient refusing to have CT scan performed. Remains on HFNC.        The patient's relevant past medical, surgical and social history were reviewed and updated in Epic as appropriate.      ROS:   Constitutional: Negative for fever.   Respiratory: Positive for dyspnea.   Cardiovascular: Negative for chest pain.   Gastrointestinal: Negative for  nausea, vomiting and diarrhea.     Objective   O     Vitals  Temp  Min: 97.1 °F (36.2 °C)  Max: 98.6 °F (37 °C)  BP  Min: 119/66  Max: 125/72  Pulse  Min: 70  Max: 96  Resp  Min: 18  Max: 18  SpO2  Min: 89 %  Max: 96 % Flow (L/min)  Min: 40  Max: 40    I/O 24 HR (7:00 AM-6:59AM):  Intake/Output       10/03/22 0700 - 10/04/22 0659 10/04/22 0700 - 10/05/22 0659    Intake (ml) 350 240    Output (ml) 900 --    Net (ml) -550 240          Medications (Drips):       Physical Examination    Telemetry: Normal sinus rhythm.    Constitutional:  No acute distress.  Conversant. Sitting in bed on nasal cannula.    Cardiovascular: Regular rate and rhythm.  No  murmurs, rub or gallop.   Respiratory: Normal symmetric chest expansion.  Normal respiratory effort.  Clear to ascultation.   Abdominal:  Soft. No masses.   Non-tender. No distension.   No hepatosplenomegaly.   Extremities: No digital cyanosis or clubbing.  No peripheral edema.   Neurological:   Alert and Oriented to person, place, and time.   Moves all extremities.            Results from last 7 days   Lab Units 10/04/22  0537 10/03/22  0736 10/02/22  0814   WBC 10*3/mm3 17.59* 19.51* 16.29*   HEMOGLOBIN g/dL 7.6* 8.9* 8.7*   MCV fL 82.4 81.3 82.9   PLATELETS 10*3/mm3 162 176 194     Results from last 7 days   Lab Units 10/04/22  0537 10/03/22  0736 10/02/22  0814 09/29/22  0550 09/28/22  0426   SODIUM mmol/L 132* 133* 137   < > 134*   POTASSIUM mmol/L 3.9 4.2 4.4   < > 3.0*   CO2 mmol/L 19.0* 19.0* 19.0*   < > 20.0*   CREATININE mg/dL 1.71* 1.55* 1.32*   < > 1.42*   MAGNESIUM mg/dL  --   --   --   --  2.3    < > = values in this interval not displayed.     Serum creatinine: 1.71 mg/dL (H) 10/04/22 0537  Estimated creatinine clearance: 29.2 mL/min (A)  Results from last 7 days   Lab Units 10/04/22  0537 10/03/22  0736 10/02/22  0814   ALK PHOS U/L 147* 153* 157*   BILIRUBIN mg/dL 1.0 0.7 0.7   ALT (SGPT) U/L 14 17 21   AST (SGOT) U/L 43* 49* 50*       Results from last 7 days   Lab Units 10/03/22  0840 09/28/22  0524   PH, ARTERIAL pH units 7.418 7.394   PCO2, ARTERIAL mm Hg 33.1* 34.5*   PO2 ART mm Hg 53.7* 62.6*   FIO2 % 40 21       Images:     Imaging Results (Last 24 Hours)     Procedure Component Value Units Date/Time    XR Chest 1 View [[id number]] Collected: 10/03/22 0845     Updated: 10/04/22 0813    Narrative:      DATE OF EXAM: 10/03/2022 8:23 AM     PROCEDURE: XR CHEST 1 VIEW-     INDICATIONS: increased O2 requirement; J18.9-Pneumonia, unspecified  organism; R10.9-Unspecified abdominal pain; K74.60-Unspecified cirrhosis  of liver; R18.8-Other ascites; N17.9-Acute kidney failure, unspecified;  I50.9-Heart  failure, unspecified; A41.9-Sepsis, unspecified organism;  R13.10-Dysphagia, unspecified     COMPARISON: 10/02/2022     TECHNIQUE: Single radiographic AP view of the chest was obtained.     FINDINGS:  Heart is normal in size. There is diffuse and extensive pulmonary  interstitial disease, remains extensive throughout the right lung, and  appears to be increasing in the left upper and midlung, relatively  sparing the left lung base. No densely consolidated lung effusion or  pneumothorax is seen. Heart is normal in size. Vasculature is obscured  bilaterally.        Impression:      Bilateral pneumonia, diffuse and relatively stable on the right, mildly  increased in the left upper and midlung. No evidence of pneumothorax.     This report was finalized on 10/4/2022 8:10 AM by Dr. Noé Larry MD.       XR Abdomen KUB [[id number]] Collected: 10/04/22 0806     Updated: 10/04/22 0813    Narrative:      DATE OF EXAM: 10/4/2022 4:20 AM     PROCEDURE: XR ABDOMEN KUB-     INDICATIONS: abd distention, ascites; J18.9-Pneumonia, unspecified  organism; R10.9-Unspecified abdominal pain; K74.60-Unspecified cirrhosis  of liver; R18.8-Other ascites; N17.9-Acute kidney failure, unspecified;  I50.9-Heart failure, unspecified; A41.9-Sepsis, unspecified organism;  R13.10-Dysphagia, unspecified     COMPARISON: No comparisons available.     TECHNIQUE: Single radiographic view of the abdomen was obtained.     FINDINGS:  Nonobstructive, nonspecific bowel gas pattern. There is centralization  of the bowel loops with lateralization of the properitoneal fat stripe,  compatible with ascites. Cholecystectomy clips are noted. A couple  surgical clips are seen in the left mid abdomen. Single surgical clip  and surgical sutures are noted in the lower midline pelvis. No acute  osseous findings.       Impression:      Nonobstructive, nonspecific bowel gas pattern.      Ascites.     This report was finalized on 10/4/2022 8:10 AM by Skip Seaman MD.     "   XR Chest 1 View [[id number]] Collected: 10/04/22 0728     Updated: 10/04/22 0733    Narrative:         DATE OF EXAM:   10/4/2022 4:17 AM     PROCEDURE:   XR CHEST 1 VW-     INDICATIONS:   F/u pneumonia; J18.9-Pneumonia, unspecified organism; R10.9-Unspecified  abdominal pain; K74.60-Unspecified cirrhosis of liver; R18.8-Other  ascites; N17.9-Acute kidney failure, unspecified; I50.9-Heart failure,  unspecified; A41.9-Sepsis, unspecified organism; R13.10-Dysphagia,  unspecified     COMPARISON:  10/03/2022     TECHNIQUE:   Portable chest radiograph.     FINDINGS:   Heart size within normal limits. No pneumothorax. No significant pleural  effusion. No change in diffuse mixed interstitial and airspace disease  throughout the lungs most concerning for pneumonia. The osseous  structures are intact.       Impression:      No change in diffuse mixed interstitial and airspace disease throughout  the lungs most concerning for multifocal pneumonia.     This report was finalized on 10/4/2022 7:29 AM by Pool Floyd MD.             I reviewed the patient's new clinical results.  I reviewed the patient's new imaging results and agree with the interpretation.    Assessment & Plan   A / P     Ms. Cruz is a 56yo F who was admitted for RLL pneumonia and has continued to worsen despite antibiotic therapy. She was initially on room air and has now progressed to HFNC. ID is following and she is being treated for pneumonia. She has refused to have a CT scan of the chest performed as she states they \"make her sick.\"    Diet Regular; Consistent Carbohydrate  Code Status and Medical Interventions:   Ordered at: 09/27/22 1402     Level Of Support Discussed With:    Patient     Code Status (Patient has no pulse and is not breathing):    CPR (Attempt to Resuscitate)     Medical Interventions (Patient has pulse or is breathing):    Full Support       Active Hospital Problems    Diagnosis    • Pneumonia of right middle lobe due to infectious " organism    • Sepsis, unspecified organism (HCC)    • Tobacco use    • Vaginal cancer (HCC)    • High grade squamous intraepithelial lesion (HGSIL) on cytologic smear of vagina    • Type 2 diabetes mellitus with hyperglycemia, with long-term current use of insulin (HCC)    • Chronic hepatitis C without hepatic coma (HCC)    • Decompensated hepatic cirrhosis (HCC)    • HFrEF (heart failure with reduced ejection fraction) (HCC)    • Coronary artery disease involving native coronary artery of native heart without angina pectoris    • Schizophrenia (HCC)    • Chronic pain disorder    • Primary hypertension    • Long-term current use of opiate analgesic    • Esophageal varices (HCC)        Assessment / Plan:  1. I have tried to explain that the CT chest is without contrast but she is still refusing to have this performed. Unfortunately, the differential remains broad without any further imaging.   2. Wean HFNC as tolerated. If worsens, will need Bipap. If unable to tolerate Bipap will need intubation.   3. Antibiotics per ID  4. Diuretics as tolerated.   5. Unable to perform bronchoscopy safely while O2 requirements remain high.     We will follow along peripherally as it is difficult to make any further recommendations when the patient refuses to have further testing. Consider Palliative Care Consult as she should not remain a Full Code if she refuses medical interventions which may improve her respiratory failure.     I discussed the patient's findings and my recommendations with patient    Time:  I spent 25 minutes, face to face, with the patient or on the jacob coordinating care with other health care providers.   I spent > 50% percent of this time, counseling and discussing diagnostic testing .     Sissy Hayes, DO  Pulmonary and Critical Care Medicine

## 2022-10-04 NOTE — NURSING NOTE
Woc consulted for: Open area on right intergluteal    Wound/ Skin Assessment: Patient presents with an evolving deep tissue injury on the bilateral sacral left side is open revealing red dermis with maroon underneath.  All of the area is a thicker hyperpigmented skin and there is no blanching.  This is an suspected evolving deep tissue pressure injury and will have second Woc confirm.      Recommendations/ Intervention performed: Woc ordered mist and Venelex.  Cleanse area with normal saline cover open area or cover area with thin layer of Venelex ointment and cover with silicone foam dressing.  Due to fecal incontinence may need to spray periwound with barrier spray let dry and apply silicone foam over top so we will stick better.    Head to toe Ax performed.    Additional skin issues: Per RNs patient is refusing to turn at times.  Instructed RNs to have patient's sign refusal of care sheet.  Please document all times the patient refuses interventions.    Skin interventions in place.    Pressure Injury Protocol (initiate for Oscar Score of 18 or less):   *Maintain good skin care, keep dry, turn q 2 hr, keep heels elevated and offloaded with heel boots.    *Apply z-guard to sacrococcygeal area/ perineal area BID or daily and PRN per incontinent episodes.  *Follow C.A.R.E protocol if medical devices (Bipap, limon, Ng tube, etc) are being used.     Specialty bed: SANTANA mattress ordered    Woc will follow.    Please contact with questions or concerns.

## 2022-10-04 NOTE — PROGRESS NOTES
Clinical Nutrition     Patient Name: Kaylie Cruz  YOB: 1965  MRN: [id number]  Date of Encounter: 10/04/22 12:09 EDT  Admission date: 9/27/2022    Reason for Visit   Follow up    EMR reviewed    Yes    Diet Nutrition Related History     Patient reports a good appetite and intake. Per EMR, patient has had 75% of last three meals. Diet tech offered patient ONS but patient declined at this time. (Patient stated she has diarrhea and is afraid Boost Glucose Control could make it worse.)    Current Nutrition Prescription    Diet Regular; Consistent Carbohydrate    Average Intake from Charting:     Last 3 recorded meals averaged 75%. Since admission on 9/27/2022, patient has averaged 54% of 14 recorded meals.    Actions:    Follow treatment progress, Care plan reviewed, Advise alternate selection, Interview for preferences, Menu provided, Encourage intake, Supplement offered/refused    Monitor Per Protocol    Janeth Clemons,   Time Spent: 20 minutes

## 2022-10-05 ENCOUNTER — APPOINTMENT (OUTPATIENT)
Dept: CT IMAGING | Facility: HOSPITAL | Age: 57
End: 2022-10-05

## 2022-10-05 LAB
ALBUMIN SERPL-MCNC: 2.1 G/DL (ref 3.5–5.2)
ALBUMIN/GLOB SERPL: 0.5 G/DL
ALP SERPL-CCNC: 187 U/L (ref 39–117)
ALT SERPL W P-5'-P-CCNC: 14 U/L (ref 1–33)
ANION GAP SERPL CALCULATED.3IONS-SCNC: 13 MMOL/L (ref 5–15)
AST SERPL-CCNC: 49 U/L (ref 1–32)
BASOPHILS # BLD AUTO: 0.1 10*3/MM3 (ref 0–0.2)
BASOPHILS NFR BLD AUTO: 0.6 % (ref 0–1.5)
BILIRUB SERPL-MCNC: 0.9 MG/DL (ref 0–1.2)
BUN SERPL-MCNC: 30 MG/DL (ref 6–20)
BUN/CREAT SERPL: 19.7 (ref 7–25)
CALCIUM SPEC-SCNC: 7.9 MG/DL (ref 8.6–10.5)
CHLORIDE SERPL-SCNC: 105 MMOL/L (ref 98–107)
CO2 SERPL-SCNC: 18 MMOL/L (ref 22–29)
CREAT SERPL-MCNC: 1.52 MG/DL (ref 0.57–1)
D-LACTATE SERPL-SCNC: 1.8 MMOL/L (ref 0.5–2)
DEPRECATED RDW RBC AUTO: 48.5 FL (ref 37–54)
EGFRCR SERPLBLD CKD-EPI 2021: 39.3 ML/MIN/1.73
EOSINOPHIL # BLD AUTO: 0.35 10*3/MM3 (ref 0–0.4)
EOSINOPHIL NFR BLD AUTO: 2 % (ref 0.3–6.2)
ERYTHROCYTE [DISTWIDTH] IN BLOOD BY AUTOMATED COUNT: 17.1 % (ref 12.3–15.4)
GLOBULIN UR ELPH-MCNC: 4 GM/DL
GLUCOSE BLDC GLUCOMTR-MCNC: 133 MG/DL (ref 70–130)
GLUCOSE BLDC GLUCOMTR-MCNC: 150 MG/DL (ref 70–130)
GLUCOSE BLDC GLUCOMTR-MCNC: 154 MG/DL (ref 70–130)
GLUCOSE BLDC GLUCOMTR-MCNC: 164 MG/DL (ref 70–130)
GLUCOSE SERPL-MCNC: 119 MG/DL (ref 65–99)
HBV DNA SERPL NAA+PROBE-ACNC: NORMAL IU/ML
HBV DNA SERPL NAA+PROBE-LOG IU: NORMAL {LOG_IU}/ML
HCT VFR BLD AUTO: 24.5 % (ref 34–46.6)
HGB BLD-MCNC: 8.2 G/DL (ref 12–15.9)
IMM GRANULOCYTES # BLD AUTO: 0.17 10*3/MM3 (ref 0–0.05)
IMM GRANULOCYTES NFR BLD AUTO: 1 % (ref 0–0.5)
LYMPHOCYTES # BLD AUTO: 1.21 10*3/MM3 (ref 0.7–3.1)
LYMPHOCYTES NFR BLD AUTO: 6.8 % (ref 19.6–45.3)
MCH RBC QN AUTO: 26.7 PG (ref 26.6–33)
MCHC RBC AUTO-ENTMCNC: 33.5 G/DL (ref 31.5–35.7)
MCV RBC AUTO: 79.8 FL (ref 79–97)
MONOCYTES # BLD AUTO: 0.59 10*3/MM3 (ref 0.1–0.9)
MONOCYTES NFR BLD AUTO: 3.3 % (ref 5–12)
NEUTROPHILS NFR BLD AUTO: 15.45 10*3/MM3 (ref 1.7–7)
NEUTROPHILS NFR BLD AUTO: 86.3 % (ref 42.7–76)
NRBC BLD AUTO-RTO: 0 /100 WBC (ref 0–0.2)
PLATELET # BLD AUTO: 202 10*3/MM3 (ref 140–450)
PMV BLD AUTO: 11.1 FL (ref 6–12)
POTASSIUM SERPL-SCNC: 3.8 MMOL/L (ref 3.5–5.2)
PROT SERPL-MCNC: 6.1 G/DL (ref 6–8.5)
RBC # BLD AUTO: 3.07 10*6/MM3 (ref 3.77–5.28)
REF LAB TEST REF RANGE: NORMAL
SODIUM SERPL-SCNC: 136 MMOL/L (ref 136–145)
WBC NRBC COR # BLD: 17.87 10*3/MM3 (ref 3.4–10.8)

## 2022-10-05 PROCEDURE — 80053 COMPREHEN METABOLIC PANEL: CPT | Performed by: INTERNAL MEDICINE

## 2022-10-05 PROCEDURE — 94799 UNLISTED PULMONARY SVC/PX: CPT

## 2022-10-05 PROCEDURE — 63710000001 INSULIN LISPRO (HUMAN) PER 5 UNITS: Performed by: FAMILY MEDICINE

## 2022-10-05 PROCEDURE — 97610 LOW FREQUENCY NON-THERMAL US: CPT

## 2022-10-05 PROCEDURE — 83605 ASSAY OF LACTIC ACID: CPT | Performed by: INTERNAL MEDICINE

## 2022-10-05 PROCEDURE — 97164 PT RE-EVAL EST PLAN CARE: CPT

## 2022-10-05 PROCEDURE — 82962 GLUCOSE BLOOD TEST: CPT

## 2022-10-05 PROCEDURE — 25010000002 PIPERACILLIN SOD-TAZOBACTAM PER 1 G: Performed by: FAMILY MEDICINE

## 2022-10-05 PROCEDURE — 25010000002 LINEZOLID 600 MG/300ML SOLUTION: Performed by: INTERNAL MEDICINE

## 2022-10-05 PROCEDURE — 71250 CT THORAX DX C-: CPT

## 2022-10-05 PROCEDURE — 63710000001 INSULIN DETEMIR PER 5 UNITS: Performed by: INTERNAL MEDICINE

## 2022-10-05 PROCEDURE — 85025 COMPLETE CBC W/AUTO DIFF WBC: CPT | Performed by: INTERNAL MEDICINE

## 2022-10-05 PROCEDURE — 99232 SBSQ HOSP IP/OBS MODERATE 35: CPT | Performed by: FAMILY MEDICINE

## 2022-10-05 PROCEDURE — 63710000001 INSULIN LISPRO (HUMAN) PER 5 UNITS: Performed by: INTERNAL MEDICINE

## 2022-10-05 PROCEDURE — 94664 DEMO&/EVAL PT USE INHALER: CPT

## 2022-10-05 RX ORDER — LOPERAMIDE HYDROCHLORIDE 2 MG/1
2 CAPSULE ORAL ONCE
Status: COMPLETED | OUTPATIENT
Start: 2022-10-05 | End: 2022-10-05

## 2022-10-05 RX ADMIN — AMLODIPINE BESYLATE 5 MG: 5 TABLET ORAL at 09:35

## 2022-10-05 RX ADMIN — CASTOR OIL AND BALSAM, PERU 1 APPLICATION: 788; 87 OINTMENT TOPICAL at 09:35

## 2022-10-05 RX ADMIN — IPRATROPIUM BROMIDE AND ALBUTEROL SULFATE 3 ML: .5; 3 SOLUTION RESPIRATORY (INHALATION) at 19:34

## 2022-10-05 RX ADMIN — LOPERAMIDE HYDROCHLORIDE 2 MG: 2 CAPSULE ORAL at 17:15

## 2022-10-05 RX ADMIN — INSULIN LISPRO 5 UNITS: 100 INJECTION, SOLUTION INTRAVENOUS; SUBCUTANEOUS at 12:03

## 2022-10-05 RX ADMIN — FOLIC ACID 1 MG: 1 TABLET ORAL at 09:35

## 2022-10-05 RX ADMIN — RIFAXIMIN 550 MG: 550 TABLET ORAL at 20:50

## 2022-10-05 RX ADMIN — INSULIN DETEMIR 15 UNITS: 100 INJECTION, SOLUTION SUBCUTANEOUS at 20:50

## 2022-10-05 RX ADMIN — LINEZOLID 600 MG: 2 INJECTION, SOLUTION INTRAVENOUS at 00:22

## 2022-10-05 RX ADMIN — TAZOBACTAM SODIUM AND PIPERACILLIN SODIUM 3.38 G: 375; 3 INJECTION, SOLUTION INTRAVENOUS at 12:04

## 2022-10-05 RX ADMIN — TAZOBACTAM SODIUM AND PIPERACILLIN SODIUM 3.38 G: 375; 3 INJECTION, SOLUTION INTRAVENOUS at 16:27

## 2022-10-05 RX ADMIN — ATORVASTATIN CALCIUM 40 MG: 40 TABLET, FILM COATED ORAL at 20:50

## 2022-10-05 RX ADMIN — RIFAXIMIN 550 MG: 550 TABLET ORAL at 09:35

## 2022-10-05 RX ADMIN — METOPROLOL TARTRATE 50 MG: 50 TABLET, FILM COATED ORAL at 20:50

## 2022-10-05 RX ADMIN — LINEZOLID 600 MG: 2 INJECTION, SOLUTION INTRAVENOUS at 12:04

## 2022-10-05 RX ADMIN — INSULIN LISPRO 5 UNITS: 100 INJECTION, SOLUTION INTRAVENOUS; SUBCUTANEOUS at 17:04

## 2022-10-05 RX ADMIN — LIDOCAINE 1 PATCH: 50 PATCH CUTANEOUS at 09:35

## 2022-10-05 RX ADMIN — METOPROLOL TARTRATE 50 MG: 50 TABLET, FILM COATED ORAL at 09:35

## 2022-10-05 RX ADMIN — IPRATROPIUM BROMIDE AND ALBUTEROL SULFATE 3 ML: .5; 3 SOLUTION RESPIRATORY (INHALATION) at 07:49

## 2022-10-05 RX ADMIN — ASPIRIN 81 MG: 81 TABLET, COATED ORAL at 09:35

## 2022-10-05 RX ADMIN — THIAMINE HCL TAB 100 MG 100 MG: 100 TAB at 09:35

## 2022-10-05 RX ADMIN — IPRATROPIUM BROMIDE AND ALBUTEROL SULFATE 3 ML: .5; 3 SOLUTION RESPIRATORY (INHALATION) at 16:02

## 2022-10-05 RX ADMIN — CASTOR OIL AND BALSAM, PERU 1 APPLICATION: 788; 87 OINTMENT TOPICAL at 20:59

## 2022-10-05 RX ADMIN — IPRATROPIUM BROMIDE AND ALBUTEROL SULFATE 3 ML: .5; 3 SOLUTION RESPIRATORY (INHALATION) at 12:42

## 2022-10-05 RX ADMIN — INSULIN LISPRO 2 UNITS: 100 INJECTION, SOLUTION INTRAVENOUS; SUBCUTANEOUS at 17:04

## 2022-10-05 NOTE — CASE MANAGEMENT/SOCIAL WORK
Continued Stay Note   Rich     Patient Name: Kaylie Cruz  MRN: [id number]  Today's Date: 10/5/2022    Admit Date: 9/27/2022    Plan: IRF   Discharge Plan     Row Name 10/05/22 1414       Plan    Plan Comments Palliative consulted as pt has been refusing some treatment options.  Pulm and ID following.  Cont IV Zosyn and PO Zyvox for now.  O2 has been weaned to 6lpm.  PT wound care providing MIST therapy to sacral wound.  CM will cont to follow hospital course and assist with DCP as appropriate.    Final Discharge Disposition Code 30 - still a patient               Discharge Codes    No documentation.               Expected Discharge Date and Time     Expected Discharge Date Expected Discharge Time    Oct 8, 2022             Luiza Redding RN

## 2022-10-05 NOTE — CONSULTS
Iesha Harris DO  Consulting physician: Juan Carlos    Chief Complaint   Patient presents with   • Back Pain       Reason for consult: [facility]    HPI: Patient is a 57-year-old female brought hospital due to back pain, cough, fever, abdominal pain, as well as diarrhea.  Patient has been found to be septic with most likely colitis.  Also of note the patient urine drug screen tested positive for cocaine.  Patient is also been found to have cirrhosis.  Patient has continued requiring supplemental oxygen, currently on high flow oxygen.  Feels that she is getting somewhat better except for the fact that she continues to have a significant amount of diarrhea.    Pain assesment: Denies    Dyspnea: Positive    N/V: Denies    PPS: 30      Past Medical History:   Diagnosis Date   • Anemia    • Cancer (HCC)     cervical   • Depression    • Hyperlipidemia    • Hypertension    • Infectious viral hepatitis    • Myocardial infarction (HCC)    • Substance abuse (HCC)      Past Surgical History:   Procedure Laterality Date   • BLADDER SURGERY     • CARDIAC CATHETERIZATION      4/3/2022   • CHOLECYSTECTOMY     • COLON RESECTION      7/19/2017, descending and transverse colon   • COLON SURGERY     • COLONOSCOPY      7/19/2017   • ENDOSCOPY      EGD 4/1/22   • HYSTERECTOMY     • TRANSESOPHAGEAL ECHOCARDIOGRAM (AVINASH)      Global hyokinesis, 49% EF, LVH     Current Code Status     Date Active Code Status Order ID Comments User Context       10/5/2022 1229 No CPR (Do Not Attempt to Resuscitate) [id number]  Brock Stallworth DO Inpatient     Advance Care Planning Activity      Questions for Current Code Status     Question Answer    Code Status (Patient has no pulse and is not breathing) No CPR (Do Not Attempt to Resuscitate)    Medical Interventions (Patient has pulse or is breathing) Limited Support    Medical Intervention Limits: NO intubation (DNI)        Current Facility-Administered Medications   Medication Dose Route Frequency Provider  Last Rate Last Admin   • acetaminophen (TYLENOL) tablet 650 mg  650 mg Oral Q4H PRN Nidia Rangel DO   650 mg at 10/04/22 2033    Or   • acetaminophen (TYLENOL) 160 MG/5ML solution 650 mg  650 mg Oral Q4H PRN Nidia Rangel DO        Or   • acetaminophen (TYLENOL) suppository 650 mg  650 mg Rectal Q4H PRN Nidia Rangel DO       • albuterol (PROVENTIL) nebulizer solution 0.083% 2.5 mg/3mL  2.5 mg Nebulization Q6H PRN Nidia Rangel DO   2.5 mg at 10/03/22 0904   • amLODIPine (NORVASC) tablet 5 mg  5 mg Oral Daily Nidia Rangel DO   5 mg at 10/05/22 0935   • aspirin EC tablet 81 mg  81 mg Oral Daily Nidia Rangel DO   81 mg at 10/05/22 0935   • atorvastatin (LIPITOR) tablet 40 mg  40 mg Oral Nightly Megan Landeros MD   40 mg at 10/04/22 2033   • benzonatate (TESSALON) capsule 200 mg  200 mg Oral TID PRN Megan Landeros MD       • castor oil-balsam peru (VENELEX) ointment 1 application  1 application Topical Q12H Nidia Rangel DO   1 application at 10/05/22 0935   • dextrose (D50W) (25 g/50 mL) IV injection 25 g  25 g Intravenous Q15 Min PRN Nidia Rangel DO       • dextrose (GLUTOSE) oral gel 15 g  15 g Oral Q15 Min PRN Nidia Rangel DO       • folic acid (FOLVITE) tablet 1 mg  1 mg Oral Daily Nidia Rangel DO   1 mg at 10/05/22 0935   • glucagon (human recombinant) (GLUCAGEN DIAGNOSTIC) injection 1 mg  1 mg Intramuscular Q15 Min PRN Nidia Rangel DO       • guaiFENesin (ROBITUSSIN) 100 MG/5ML oral solution 200 mg  200 mg Oral Q4H PRN Megan Landeros MD   200 mg at 10/03/22 2059   • insulin detemir (LEVEMIR) injection 15 Units  15 Units Subcutaneous Nightly Megan Landeros MD   15 Units at 10/04/22 2034   • Insulin Lispro (humaLOG) injection 0-9 Units  0-9 Units Subcutaneous TID AC Nidia Rangel DO   4 Units at 10/04/22 1703   • Insulin Lispro (humaLOG) injection 5 Units  5 Units Subcutaneous TID With [employer]  Megan Durbin MD   5 Units at 10/04/22 1703   • ipratropium-albuterol (DUO-NEB) nebulizer solution 3 mL  3 mL Nebulization 4x Daily - RT Nidia Rangel, DO   3 mL at 10/05/22 0749   • lidocaine (LIDODERM) 5 % 1 patch  1 patch Transdermal Q24H Nidia Rangel DO   1 patch at 10/05/22 0935   • Linezolid (ZYVOX) 600 mg 300 mL  600 mg Intravenous Q12H Hugo Castillo  mL/hr at 10/05/22 1204 600 mg at 10/05/22 1204   • metoprolol tartrate (LOPRESSOR) tablet 50 mg  50 mg Oral BID Nidia Rangel DO   50 mg at 10/05/22 0935   • ondansetron (ZOFRAN) tablet 4 mg  4 mg Oral Q6H PRN Nidia Rangel DO   4 mg at 10/04/22 0815    Or   • ondansetron (ZOFRAN) injection 4 mg  4 mg Intravenous Q6H PRN Nidia Rangel DO       • piperacillin-tazobactam (ZOSYN) 3.375 g in iso-osmotic dextrose 50 ml (premix)  3.375 g Intravenous Q8H Nidia Rangel DO   3.375 g at 10/05/22 1204   • potassium chloride (MICRO-K) CR capsule 40 mEq  40 mEq Oral PRN Richard Henrietta M II, DO   40 mEq at 09/29/22 2143    Or   • potassium chloride (KLOR-CON) packet 40 mEq  40 mEq Oral PRN Richard Henrietta M II, DO        Or   • potassium chloride 10 mEq in 100 mL IVPB  10 mEq Intravenous Q1H PRN Richard, Henrietta M II,  mL/hr at 09/29/22 1152 10 mEq at 09/29/22 1152   • riFAXIMin (XIFAXAN) tablet 550 mg  550 mg Oral Q12H Megan Landeros MD   550 mg at 10/05/22 0935   • sodium chloride 0.9 % flush 10 mL  10 mL Intravenous PRN Stephenie Martinez PA       • sodium chloride 0.9 % flush 10 mL  10 mL Intravenous Q12H Nidia Rangel DO   10 mL at 10/04/22 2034   • sodium chloride 0.9 % flush 10 mL  10 mL Intravenous PRN Nidia Rangel DO       • thiamine (VITAMIN B-1) tablet 100 mg  100 mg Oral Daily Nidia Rangel DO   100 mg at 10/05/22 0935        •  acetaminophen **OR** acetaminophen **OR** acetaminophen  •  albuterol  •  benzonatate  •  dextrose  •  dextrose  •  glucagon (human recombinant)  •   "guaiFENesin  •  ondansetron **OR** ondansetron  •  potassium chloride **OR** potassium chloride **OR** potassium chloride  •  [COMPLETED] Insert peripheral IV **AND** sodium chloride  •  sodium chloride  Allergies   Allergen Reactions   • Codeine Hives   • Morphine Hives   • Tagamet [Cimetidine] Other (See Comments)     DISCOLORATION OF SKIN   • Toradol [Ketorolac Tromethamine] Hives     Family History   Problem Relation Age of Onset   • Colon cancer Neg Hx      Social History     Socioeconomic History   • Marital status: Single   Tobacco Use   • Smoking status: Current Every Day Smoker     Packs/day: 0.50     Types: Cigarettes   • Smokeless tobacco: Never Used   Vaping Use   • Vaping Use: Former   • Substances: Flavoring   • Devices: Disposable   Substance and Sexual Activity   • Alcohol use: No   • Drug use: Not Currently     Types: Marijuana, Cocaine(coke)     Comment: from [facility] History April 2022   • Sexual activity: Defer     Review of Systems -all others reviewed and found negative except as mentioned in the HPI      /73 (BP Location: Left arm, Patient Position: Lying)   Pulse 85   Temp 97.3 °F (36.3 °C) (Oral)   Resp 18   Ht 160 cm (63\")   Wt 52.2 kg (115 lb)   SpO2 93%   BMI 20.37 kg/m²     Intake/Output Summary (Last 24 hours) at 10/5/2022 1229  Last data filed at 10/5/2022 0700  Gross per 24 hour   Intake 540 ml   Output --   Net 540 ml     Physical Exam:      General Appearance:    Alert, cooperative, in no acute distress   Head:    Normocephalic, without obvious abnormality, atraumatic   Eyes:            Lids and lashes normal, conjunctivae and sclerae normal, no   icterus, no pallor, corneas clear, PERRLA   Ears:    Ears appear intact with no abnormalities noted   Throat:   No oral lesions, no thrush, oral mucosa moist   Neck:   No adenopathy, supple, trachea midline, no thyromegaly, no     carotid bruit, no JVD   Back:     No kyphosis present, no scoliosis present, no skin " lesions,       erythema or scars, no tenderness to percussion or                   palpation,   range of motion normal   Lungs:     Clear to auscultation,respirations regular, even and                   unlabored    Heart:    Regular rhythm and normal rate, normal S1 and S2, no            murmur, no gallop, no rub, no click   Breast Exam:    Deferred   Abdomen:     Normal bowel sounds, no masses, no organomegaly, soft        non-tender, non-distended, no guarding, no rebound                 tenderness   Genitalia:    Deferred   Extremities:   Moves all extremities well, no edema, no cyanosis, no              redness   Pulses:   Pulses palpable and equal bilaterally   Skin:   No bleeding, bruising or rash   Lymph nodes:   No palpable adenopathy   Neurologic:   Cranial nerves 2 - 12 grossly intact, sensation intact, DTR        present and equal bilaterally       Results from last 7 days   Lab Units 10/05/22  0948   WBC 10*3/mm3 17.87*   HEMOGLOBIN g/dL 8.2*   HEMATOCRIT % 24.5*   PLATELETS 10*3/mm3 202     Results from last 7 days   Lab Units 10/05/22  0405   SODIUM mmol/L 136   POTASSIUM mmol/L 3.8   CHLORIDE mmol/L 105   CO2 mmol/L 18.0*   BUN mg/dL 30*   CREATININE mg/dL 1.52*   CALCIUM mg/dL 7.9*   BILIRUBIN mg/dL 0.9   ALK PHOS U/L 187*   ALT (SGPT) U/L 14   AST (SGOT) U/L 49*   GLUCOSE mg/dL 119*     Results from last 7 days   Lab Units 10/05/22  0405   SODIUM mmol/L 136   POTASSIUM mmol/L 3.8   CHLORIDE mmol/L 105   CO2 mmol/L 18.0*   BUN mg/dL 30*   CREATININE mg/dL 1.52*   GLUCOSE mg/dL 119*   CALCIUM mg/dL 7.9*     Imaging Results (Last 72 Hours)     Procedure Component Value Units Date/Time    CT Chest Without Contrast Diagnostic [[id number]] Collected: 10/05/22 1135     Updated: 10/05/22 1151    Narrative:      DATE OF EXAM: 10/5/2022 11:15 AM     PROCEDURE: CT CHEST WO CONTRAST DIAGNOSTIC-     INDICATIONS: worsening PNA; J18.9-Pneumonia, unspecified organism;  R10.9-Unspecified abdominal pain;  K74.60-Unspecified cirrhosis of liver;  R18.8-Other ascites; N17.9-Acute kidney failure, unspecified;  I50.9-Heart failure, unspecified; A41.9-Sepsis, unspecified organism;  R13.10-Dysphagia, unspecified     COMPARISON: Chest radiograph 10/04/2022     TECHNIQUE: Routine transaxial slices were obtained through the chest  without the administration of intravenous contrast. Reconstructed  coronal and sagittal images were also obtained. Automated exposure  control and iterative construction methods were used.     The radiation dose reduction device was turned on for each scan per the  ALARA (As Low as Reasonably Achievable) protocol.     FINDINGS:  Earlier chest radiograph shows stable diffuse mixed interstitial and  airspace disease throughout the lungs consistent with multifocal  pneumonia.     Today's study [ ] predominantly coarse reticular disease throughout the  lungs, with a generalized groundglass appearance. Findings suggest ARDS,  and can be seen as well with any type of pneumonia in an  immunocompromised patient. Pattern does not particularly suggest  pulmonary edema. There is only a small right pleural effusion. No  pulmonary parenchymal mass is seen. The airways appear normally patent.  There are relatively few areas of actual lung consolidation. No  pneumothorax is seen. Limited mediastinal window images show no apparent  adenopathy, mediastinal mass or pericardial effusion. There is extensive  coronary artery calcification. Included images of the upper abdomen show  at least moderately extensive ascites. Bony structures appear to be  intact.        Impression:         1. Dense diffuse and extensive reticular and groundglass disease  throughout the lungs, most likely representing ARDS. Superimposed  atypical pneumonia, or any pneumonia in an immunocompromised patient  could have this appearance. Much less likely, vasculitis is included in  the differential.  2. Very small right pleural effusion.  3.  Moderately extensive ascites noted.       XR Chest 1 View [[id number]] Collected: 10/03/22 0845     Updated: 10/04/22 0813    Narrative:      DATE OF EXAM: 10/03/2022 8:23 AM     PROCEDURE: XR CHEST 1 VIEW-     INDICATIONS: increased O2 requirement; J18.9-Pneumonia, unspecified  organism; R10.9-Unspecified abdominal pain; K74.60-Unspecified cirrhosis  of liver; R18.8-Other ascites; N17.9-Acute kidney failure, unspecified;  I50.9-Heart failure, unspecified; A41.9-Sepsis, unspecified organism;  R13.10-Dysphagia, unspecified     COMPARISON: 10/02/2022     TECHNIQUE: Single radiographic AP view of the chest was obtained.     FINDINGS:  Heart is normal in size. There is diffuse and extensive pulmonary  interstitial disease, remains extensive throughout the right lung, and  appears to be increasing in the left upper and midlung, relatively  sparing the left lung base. No densely consolidated lung effusion or  pneumothorax is seen. Heart is normal in size. Vasculature is obscured  bilaterally.        Impression:      Bilateral pneumonia, diffuse and relatively stable on the right, mildly  increased in the left upper and midlung. No evidence of pneumothorax.     This report was finalized on 10/4/2022 8:10 AM by Dr. Noé Larry MD.       XR Abdomen KUB [[id number]] Collected: 10/04/22 0806     Updated: 10/04/22 0813    Narrative:      DATE OF EXAM: 10/4/2022 4:20 AM     PROCEDURE: XR ABDOMEN KUB-     INDICATIONS: abd distention, ascites; J18.9-Pneumonia, unspecified  organism; R10.9-Unspecified abdominal pain; K74.60-Unspecified cirrhosis  of liver; R18.8-Other ascites; N17.9-Acute kidney failure, unspecified;  I50.9-Heart failure, unspecified; A41.9-Sepsis, unspecified organism;  R13.10-Dysphagia, unspecified     COMPARISON: No comparisons available.     TECHNIQUE: Single radiographic view of the abdomen was obtained.     FINDINGS:  Nonobstructive, nonspecific bowel gas pattern. There is centralization  of the bowel loops with  lateralization of the properitoneal fat stripe,  compatible with ascites. Cholecystectomy clips are noted. A couple  surgical clips are seen in the left mid abdomen. Single surgical clip  and surgical sutures are noted in the lower midline pelvis. No acute  osseous findings.       Impression:      Nonobstructive, nonspecific bowel gas pattern.      Ascites.     This report was finalized on 10/4/2022 8:10 AM by Skip Seaman MD.       XR Chest 1 View [[id number]] Collected: 10/04/22 0728     Updated: 10/04/22 0733    Narrative:         DATE OF EXAM:   10/4/2022 4:17 AM     PROCEDURE:   XR CHEST 1 VW-     INDICATIONS:   F/u pneumonia; J18.9-Pneumonia, unspecified organism; R10.9-Unspecified  abdominal pain; K74.60-Unspecified cirrhosis of liver; R18.8-Other  ascites; N17.9-Acute kidney failure, unspecified; I50.9-Heart failure,  unspecified; A41.9-Sepsis, unspecified organism; R13.10-Dysphagia,  unspecified     COMPARISON:  10/03/2022     TECHNIQUE:   Portable chest radiograph.     FINDINGS:   Heart size within normal limits. No pneumothorax. No significant pleural  effusion. No change in diffuse mixed interstitial and airspace disease  throughout the lungs most concerning for pneumonia. The osseous  structures are intact.       Impression:      No change in diffuse mixed interstitial and airspace disease throughout  the lungs most concerning for multifocal pneumonia.     This report was finalized on 10/4/2022 7:29 AM by Pool Floyd MD.           Impression: Dyspnea  Sepsis  Colitis  Diarrhea  Debility  Goals of care  Plan: Somewhat of a complicated case given her multiple medical issues.  Reportedly the patient is declining some interventions to help get her better.  I will plan on touching base with the hospitalist to have further discussions about the interventions that are being declined as well as getting their input as far as prognosis, etc.  I did talk to the patient about CODE STATUS patient does agree with  DNR/DNI at this point time.        Brock Stallworth DO  10/05/22  12:29 EDT

## 2022-10-05 NOTE — PROGRESS NOTES
INFECTIOUS DISEASE Progress Note    Kaylie Render  1965  [id number]    Consult: 10/3/22  Admit date: 9/27/2022    Requesting Provider: No ref. provider found  Evaluating physician: Hugo Castillo MD  Reason for Consultation: Sepsis, leukocytosis, cirrhosis, hepatitis C, substance use, right lower lobe pneumonia  Chief Complaint:       Subjective   History of present illness:  Patient is a  57 y.o.  Yr old female with a history of substance use, hepatitis C, cirrhosis, cervical cancer, vaginal squamous cell cancer 2009, schizophrenia, diabetes mellitus type 2, congestive heart failure with ejection fraction 49%, recent urinary tract infection treated at the [facility], who developed increasing shortness of breath on 9/25/2022 and was admitted to [facility] on 9/27/2022 with radiographic findings consistent with right lower lobe pneumonia.  The patient was placed on piperacillin tazobactam.  The patient is a poor historian.  Creatinine 1.55, sodium 133, potassium 4.2, AST 49, alkaline phosphatase 153, bilirubin normal, vancomycin trough 11.5, WBC 19.51, hemoglobin 8.9, platelet count 176, MRSA PCR swab screen +10/2, procalcitonin 0.88, urinalysis 6-12 WBCs but urine culture from 10/1 negative, ascites with 208 WBCs with negative culture, 9/28, Legionella and streptococcal urine antigen 9/28 negative.  Ammonia 64.  Lactic acid 2.5 on admission.  Blood cultures x2 from 9/27 negative.  Chest x-ray 10/3 with bilateral pneumonia diffuse right greater than left.  CT scan of the abdomen and pelvis 9/27 with diffuse right lower lobe pneumonia and findings consistent with colitis at the hepatic flexure.  I was consulted on 10/3/2022 for further evaluation and treatment.  Patient was initially placed on piperacillin tazobactam, then vancomycin was added on 10/2/2022.  The patient denies ill contacts, significant travel history, zoonotic exposures, TB, or HIV.  Minimal sputum production.   White blood cell count has increased from 15-19,000.     10/4/2022 history reviewed.  Patient poor historian.  Tolerating linezolid and piperacillin tazobactam for pneumonia with positive MRSA screen.  Still on high flow oxygen at 40 L/min.  Oxygen concentration 55%.  Not coughing much.  Says she is breathing better.    10/5/2022 history reviewed.  No high fevers or chills.  Continues on linezolid and piperacillin tazobactam for pneumonia.  Breathing slowly improved.    Past Medical History:   Diagnosis Date   • Anemia    • Cancer (HCC)     cervical   • Depression    • Hyperlipidemia    • Hypertension    • Infectious viral hepatitis    • Myocardial infarction (HCC)    • Substance abuse (HCC)        Past Surgical History:   Procedure Laterality Date   • BLADDER SURGERY     • CARDIAC CATHETERIZATION      4/3/2022   • CHOLECYSTECTOMY     • COLON RESECTION      7/19/2017, descending and transverse colon   • COLON SURGERY     • COLONOSCOPY      7/19/2017   • ENDOSCOPY      EGD 4/1/22   • HYSTERECTOMY     • TRANSESOPHAGEAL ECHOCARDIOGRAM (AVINASH)      Global hyokinesis, 49% EF, LVH       Pediatric History   Patient Parents   • Not on file     Other Topics Concern   • Not on file   Social History Narrative   • Not on file       family history is not on file.    Allergies   Allergen Reactions   • Codeine Hives   • Morphine Hives   • Tagamet [Cimetidine] Other (See Comments)     DISCOLORATION OF SKIN   • Toradol [Ketorolac Tromethamine] Hives         There is no immunization history on file for this patient.    Medication:    Current Facility-Administered Medications:   •  acetaminophen (TYLENOL) tablet 650 mg, 650 mg, Oral, Q4H PRN, 650 mg at 10/04/22 2033 **OR** acetaminophen (TYLENOL) 160 MG/5ML solution 650 mg, 650 mg, Oral, Q4H PRN **OR** acetaminophen (TYLENOL) suppository 650 mg, 650 mg, Rectal, Q4H PRN, Nidia Rangel, DO  •  albuterol (PROVENTIL) nebulizer solution 0.083% 2.5 mg/3mL, 2.5 mg, Nebulization, Q6H PRN,  Nidia Rangel DO, 2.5 mg at 10/03/22 0904  •  amLODIPine (NORVASC) tablet 5 mg, 5 mg, Oral, Daily, Nidia Rangel DO, 5 mg at 10/05/22 0935  •  aspirin EC tablet 81 mg, 81 mg, Oral, Daily, Nidia Rangel DO, 81 mg at 10/05/22 0935  •  atorvastatin (LIPITOR) tablet 40 mg, 40 mg, Oral, Nightly, Megan Landeros MD, 40 mg at 10/04/22 2033  •  benzonatate (TESSALON) capsule 200 mg, 200 mg, Oral, TID PRN, Megan Landeros MD  •  castor oil-balsam peru (VENELEX) ointment 1 application, 1 application, Topical, Q12H, Nidia Rangel DO, 1 application at 10/05/22 0935  •  dextrose (D50W) (25 g/50 mL) IV injection 25 g, 25 g, Intravenous, Q15 Min PRN, Nidia Rangel DO  •  dextrose (GLUTOSE) oral gel 15 g, 15 g, Oral, Q15 Min PRN, Nidia Rangel DO  •  folic acid (FOLVITE) tablet 1 mg, 1 mg, Oral, Daily, Nidia Rangel DO, 1 mg at 10/05/22 0935  •  glucagon (human recombinant) (GLUCAGEN DIAGNOSTIC) injection 1 mg, 1 mg, Intramuscular, Q15 Min PRN, Nidia Rangel DO  •  guaiFENesin (ROBITUSSIN) 100 MG/5ML oral solution 200 mg, 200 mg, Oral, Q4H PRN, Megan Landeros MD, 200 mg at 10/03/22 2059  •  insulin detemir (LEVEMIR) injection 15 Units, 15 Units, Subcutaneous, Nightly, Megan Landeros MD, 15 Units at 10/04/22 2034  •  Insulin Lispro (humaLOG) injection 0-9 Units, 0-9 Units, Subcutaneous, TID AC, Nidia Rangel DO, 4 Units at 10/04/22 1703  •  Insulin Lispro (humaLOG) injection 5 Units, 5 Units, Subcutaneous, TID With Meals, Megan Landeros MD, 5 Units at 10/04/22 1703  •  ipratropium-albuterol (DUO-NEB) nebulizer solution 3 mL, 3 mL, Nebulization, 4x Daily - RT, Nidia Rangel DO, 3 mL at 10/05/22 1242  •  lidocaine (LIDODERM) 5 % 1 patch, 1 patch, Transdermal, Q24H, Nidia Rangel DO, 1 patch at 10/05/22 0935  •  Linezolid (ZYVOX) 600 mg 300 mL, 600 mg, Intravenous, Q12H, Hugo Castillo MD, Last Rate: 300 mL/hr at 10/05/22 1204,  600 mg at 10/05/22 1204  •  metoprolol tartrate (LOPRESSOR) tablet 50 mg, 50 mg, Oral, BID, Nidia Rangel, DO, 50 mg at 10/05/22 0935  •  ondansetron (ZOFRAN) tablet 4 mg, 4 mg, Oral, Q6H PRN, 4 mg at 10/04/22 0815 **OR** ondansetron (ZOFRAN) injection 4 mg, 4 mg, Intravenous, Q6H PRN, Nidia Rangel, DO  •  piperacillin-tazobactam (ZOSYN) 3.375 g in iso-osmotic dextrose 50 ml (premix), 3.375 g, Intravenous, Q8H, Nidia Rangel, DO, 3.375 g at 10/05/22 1204  •  potassium chloride (MICRO-K) CR capsule 40 mEq, 40 mEq, Oral, PRN, 40 mEq at 09/29/22 2143 **OR** potassium chloride (KLOR-CON) packet 40 mEq, 40 mEq, Oral, PRN **OR** potassium chloride 10 mEq in 100 mL IVPB, 10 mEq, Intravenous, Q1H PRN, Henrietta Ramos II, DO, Last Rate: 100 mL/hr at 09/29/22 1152, 10 mEq at 09/29/22 1152  •  riFAXIMin (XIFAXAN) tablet 550 mg, 550 mg, Oral, Q12H, Megan Landeros MD, 550 mg at 10/05/22 0935  •  [COMPLETED] Insert peripheral IV, , , Once **AND** sodium chloride 0.9 % flush 10 mL, 10 mL, Intravenous, PRN, Stephenie Martinez PA  •  sodium chloride 0.9 % flush 10 mL, 10 mL, Intravenous, Q12H, Nidia Rangel, DO, 10 mL at 10/04/22 2034  •  sodium chloride 0.9 % flush 10 mL, 10 mL, Intravenous, PRN, Nidia Rangel, DO  •  thiamine (VITAMIN B-1) tablet 100 mg, 100 mg, Oral, Daily, Nidia Rangel, DO, 100 mg at 10/05/22 0935    Please refer to the medical record for a full medication list    Review of Systems:      ROS difficult to obtain secondary to mental status    Physical Exam:   Vital Signs   Temp:  [97.3 °F (36.3 °C)-98.5 °F (36.9 °C)] 97.3 °F (36.3 °C)  Heart Rate:  [75-85] 80  Resp:  [16-18] 18  BP: (134-139)/(72-74) 134/73    Temp  Min: 97.3 °F (36.3 °C)  Max: 98.5 °F (36.9 °C)  BP  Min: 134/73  Max: 139/74  Pulse  Min: 75  Max: 85  Resp  Min: 16  Max: 18  SpO2  Min: 92 %  Max: 98 %    Blood pressure 134/73, pulse 80, temperature 97.3 °F (36.3 °C), temperature source Oral, resp. rate 18,  "height 160 cm (63\"), weight 52.2 kg (115 lb), SpO2 92 %.  GENERAL: Awake and alert, in Mild distress. Appears older than stated age.  Cachectic.  HEENT:  Normocephalic, atraumatic.  Oropharynx without thrush. Dentition in good repair. No cervical adenopathy. No neck masses.  Ears externally normal, Nose externally normal.  EYES: No conjunctival injection. No icterus. EOM full.  LYMPHATICS: No lymphadenopathy of the neck or axillary or inguinal regions.   HEART: No murmur, gallop, or pericardial friction rub. Reg rate rhythm.  LUNGS: Bilateral rales, no rhonchi. No respiratory distress, no use of accessory muscles.  ABDOMEN: Soft, nontender, nondistended. No appreciable HSM.  Bowel sounds normal.  SKIN: Warm and dry without cutaneous eruptions.  No nodules.    PSYCHIATRIC: Mental status some confusion.  EXT:  No cellulitic change.  NEURO: Oriented to name, nonfocal.      Results Review:   I reviewed the patient's new clinical results.  I reviewed the patient's new imaging results and agree with the interpretation.  I reviewed the patient's other test results and agree with the interpretation    Results from last 7 days   Lab Units 10/05/22  0948 10/04/22  0537 10/03/22  0736   WBC 10*3/mm3 17.87* 17.59* 19.51*   HEMOGLOBIN g/dL 8.2* 7.6* 8.9*   HEMATOCRIT % 24.5* 23.9* 27.4*   PLATELETS 10*3/mm3 202 162 176     Results from last 7 days   Lab Units 10/05/22  0405   SODIUM mmol/L 136   POTASSIUM mmol/L 3.8   CHLORIDE mmol/L 105   CO2 mmol/L 18.0*   BUN mg/dL 30*   CREATININE mg/dL 1.52*   GLUCOSE mg/dL 119*   CALCIUM mg/dL 7.9*     Results from last 7 days   Lab Units 10/05/22  0405   ALK PHOS U/L 187*   BILIRUBIN mg/dL 0.9   ALT (SGPT) U/L 14   AST (SGOT) U/L 49*             Results from last 7 days   Lab Units 10/03/22  0736   VANCOMYCIN TR mcg/mL 11.50     Results from last 7 days   Lab Units 10/05/22  0405   LACTATE mmol/L 1.8     Estimated Creatinine Clearance: 32.8 mL/min (A) (by C-G formula based on SCr of 1.52 " mg/dL (H)).     Procalitonin Results:      Lab 10/04/22  0537 10/02/22  0814   PROCALCITONIN 1.42* 0.88*      Brief Urine Lab Results  (Last result in the past 365 days)      Color   Clarity   Blood   Leuk Est   Nitrite   Protein   CREAT   Urine HCG        10/01/22 0117 Yellow   Clear   Negative   Small (1+)   Negative   30 mg/dL (1+)                Site   Date Value Ref Range Status   10/03/2022 Right Brachial  Final     Tico's Test   Date Value Ref Range Status   10/03/2022 N/A  Final     pH, Arterial   Date Value Ref Range Status   10/03/2022 7.418 7.350 - 7.450 pH units Final     pCO2, Arterial   Date Value Ref Range Status   10/03/2022 33.1 (L) 35.0 - 45.0 mm Hg Final     Comment:     84 Value below reference range     pO2, Arterial   Date Value Ref Range Status   10/03/2022 53.7 (L) 83.0 - 108.0 mm Hg Final     Comment:     84 Value below reference range     HCO3, Arterial   Date Value Ref Range Status   10/03/2022 21.4 20.0 - 26.0 mmol/L Final     Base Excess, Arterial   Date Value Ref Range Status   10/03/2022 -2.7 (L) 0.0 - 2.0 mmol/L Final     Hemoglobin, Blood Gas   Date Value Ref Range Status   10/03/2022 8.6 (L) 14 - 18 g/dL Final     Comment:     84 Value below reference range     Hematocrit, Blood Gas   Date Value Ref Range Status   10/03/2022 26.5 (L) 38.0 - 51.0 % Final     Oxyhemoglobin   Date Value Ref Range Status   10/03/2022 86.1 (L) 94 - 99 % Final     Comment:     84 Value below reference range     Methemoglobin   Date Value Ref Range Status   10/03/2022 0.10 0.00 - 1.50 % Final     Carboxyhemoglobin   Date Value Ref Range Status   10/03/2022 1.3 0 - 2 % Final     CO2 Content   Date Value Ref Range Status   10/03/2022 22.4 22 - 33 mmol/L Final     Barometric Pressure for Blood Gas   Date Value Ref Range Status   10/03/2022   Final     Comment:     N/A     Modality   Date Value Ref Range Status   10/03/2022 Nasal Cannula  Final     FIO2   Date Value Ref Range Status   10/03/2022 40 % Final         Microbiology:  Blood Culture   Date Value Ref Range Status   09/27/2022 No growth at 5 days  Final     No results found for: BCIDPCR, CXREFLEX, CSFCX, CULTURETIS  No results found for: CULTURES, HSVCX, URCX  No results found for: EYECULTURE, GCCX, HSVCULTURE, LABHSV  No results found for: LEGIONELLA, MRSACX, MUMPSCX, MYCOPLASCX  No results found for: NOCARDIACX, STOOLCX  Urine Culture   Date Value Ref Range Status   10/01/2022 No growth  Final     No results found for: VIRALCULTU, WOUNDCX     Blood Culture   Date Value Ref Range Status   09/27/2022 No growth at 5 days  Final     Body Fluid Culture   Date Value Ref Range Status   09/28/2022 No growth at 3 days  Final     Urine Culture   Date Value Ref Range Status   10/01/2022 No growth  Final   (    MRSA swab screen + 10/2/2022.  Radiology:  Imaging Results (Last 72 Hours)     Procedure Component Value Units Date/Time    CT Chest Without Contrast Diagnostic [[id number]] Collected: 10/05/22 1135     Updated: 10/05/22 1151    Narrative:      DATE OF EXAM: 10/5/2022 11:15 AM     PROCEDURE: CT CHEST WO CONTRAST DIAGNOSTIC-     INDICATIONS: worsening PNA; J18.9-Pneumonia, unspecified organism;  R10.9-Unspecified abdominal pain; K74.60-Unspecified cirrhosis of liver;  R18.8-Other ascites; N17.9-Acute kidney failure, unspecified;  I50.9-Heart failure, unspecified; A41.9-Sepsis, unspecified organism;  R13.10-Dysphagia, unspecified     COMPARISON: Chest radiograph 10/04/2022     TECHNIQUE: Routine transaxial slices were obtained through the chest  without the administration of intravenous contrast. Reconstructed  coronal and sagittal images were also obtained. Automated exposure  control and iterative construction methods were used.     The radiation dose reduction device was turned on for each scan per the  ALARA (As Low as Reasonably Achievable) protocol.     FINDINGS:  Earlier chest radiograph shows stable diffuse mixed interstitial and  airspace disease throughout the  lungs consistent with multifocal  pneumonia.     Today's study [ ] predominantly coarse reticular disease throughout the  lungs, with a generalized groundglass appearance. Findings suggest ARDS,  and can be seen as well with any type of pneumonia in an  immunocompromised patient. Pattern does not particularly suggest  pulmonary edema. There is only a small right pleural effusion. No  pulmonary parenchymal mass is seen. The airways appear normally patent.  There are relatively few areas of actual lung consolidation. No  pneumothorax is seen. Limited mediastinal window images show no apparent  adenopathy, mediastinal mass or pericardial effusion. There is extensive  coronary artery calcification. Included images of the upper abdomen show  at least moderately extensive ascites. Bony structures appear to be  intact.        Impression:         1. Dense diffuse and extensive reticular and groundglass disease  throughout the lungs, most likely representing ARDS. Superimposed  atypical pneumonia, or any pneumonia in an immunocompromised patient  could have this appearance. Much less likely, vasculitis is included in  the differential.  2. Very small right pleural effusion.  3. Moderately extensive ascites noted.       XR Chest 1 View [[id number]] Collected: 10/03/22 0845     Updated: 10/04/22 0813    Narrative:      DATE OF EXAM: 10/03/2022 8:23 AM     PROCEDURE: XR CHEST 1 VIEW-     INDICATIONS: increased O2 requirement; J18.9-Pneumonia, unspecified  organism; R10.9-Unspecified abdominal pain; K74.60-Unspecified cirrhosis  of liver; R18.8-Other ascites; N17.9-Acute kidney failure, unspecified;  I50.9-Heart failure, unspecified; A41.9-Sepsis, unspecified organism;  R13.10-Dysphagia, unspecified     COMPARISON: 10/02/2022     TECHNIQUE: Single radiographic AP view of the chest was obtained.     FINDINGS:  Heart is normal in size. There is diffuse and extensive pulmonary  interstitial disease, remains extensive throughout the  right lung, and  appears to be increasing in the left upper and midlung, relatively  sparing the left lung base. No densely consolidated lung effusion or  pneumothorax is seen. Heart is normal in size. Vasculature is obscured  bilaterally.        Impression:      Bilateral pneumonia, diffuse and relatively stable on the right, mildly  increased in the left upper and midlung. No evidence of pneumothorax.     This report was finalized on 10/4/2022 8:10 AM by Dr. Noé Larry MD.       XR Abdomen KUB [[id number]] Collected: 10/04/22 0806     Updated: 10/04/22 0813    Narrative:      DATE OF EXAM: 10/4/2022 4:20 AM     PROCEDURE: XR ABDOMEN KUB-     INDICATIONS: abd distention, ascites; J18.9-Pneumonia, unspecified  organism; R10.9-Unspecified abdominal pain; K74.60-Unspecified cirrhosis  of liver; R18.8-Other ascites; N17.9-Acute kidney failure, unspecified;  I50.9-Heart failure, unspecified; A41.9-Sepsis, unspecified organism;  R13.10-Dysphagia, unspecified     COMPARISON: No comparisons available.     TECHNIQUE: Single radiographic view of the abdomen was obtained.     FINDINGS:  Nonobstructive, nonspecific bowel gas pattern. There is centralization  of the bowel loops with lateralization of the properitoneal fat stripe,  compatible with ascites. Cholecystectomy clips are noted. A couple  surgical clips are seen in the left mid abdomen. Single surgical clip  and surgical sutures are noted in the lower midline pelvis. No acute  osseous findings.       Impression:      Nonobstructive, nonspecific bowel gas pattern.      Ascites.     This report was finalized on 10/4/2022 8:10 AM by Skip Seaman MD.       XR Chest 1 View [[id number]] Collected: 10/04/22 0728     Updated: 10/04/22 0733    Narrative:         DATE OF EXAM:   10/4/2022 4:17 AM     PROCEDURE:   XR CHEST 1 VW-     INDICATIONS:   F/u pneumonia; J18.9-Pneumonia, unspecified organism; R10.9-Unspecified  abdominal pain; K74.60-Unspecified cirrhosis of liver;  R18.8-Other  ascites; N17.9-Acute kidney failure, unspecified; I50.9-Heart failure,  unspecified; A41.9-Sepsis, unspecified organism; R13.10-Dysphagia,  unspecified     COMPARISON:  10/03/2022     TECHNIQUE:   Portable chest radiograph.     FINDINGS:   Heart size within normal limits. No pneumothorax. No significant pleural  effusion. No change in diffuse mixed interstitial and airspace disease  throughout the lungs most concerning for pneumonia. The osseous  structures are intact.       Impression:      No change in diffuse mixed interstitial and airspace disease throughout  the lungs most concerning for multifocal pneumonia.     This report was finalized on 10/4/2022 7:29 AM by Pool Floyd MD.             IMPRESSION:     1.  Right greater than left pneumonia initially seen on CT scan from 9/27/2022, and chest x-ray showing bilateral infiltrates, some of which may be fluid.  Was on reasonable treatment with piperacillin tazobactam on admission, but also had MRSA positive screen which may indicate an infection with MRSA as the cause of her sputum.  Not producing significant amounts.  Less likely atypical mycobacteria or fungal disease.  Negative Legionella and streptococcal urine antigen.  Respiratory viral PCR -10/3.  2.  Leukocytosis, neutrophilic Slightly worse.  Did not receive steroids.  May be related to above issues.  3.  Encephalopathy, toxic and metabolic, as well as elevated ammonia level with cirrhosis and possible hepatic encephalopathy.  4.  Acute hypoxic respiratory failure increasing to 40 L/min on 10/3/2022.  Related to above issues.  5.  Acute renal failure, creatinine 1.55, may be worse with vancomycin.  Creatinine 1.71 on 10/4, worse.  Vancomycin was stopped 10/3.  1.52 on 10/5.  6.  Hyponatremia 132, worse.  7.  Hypocalcemia 7.8, worse.  8.  Cirrhosis, possibly related to substance use, hepatitis C, versus other.  Advanced findings for portal hypertension with ascites.  9.  Hepatitis C,  treatment status unknown.  Acute hepatitis panel 10/4/2022 positive for hep C, negative for other.  10.  Elevated alkaline phosphatase 147.  11.  Anemia, chronic disease related to above issues.  Worse.  Also could be related to linezolid.  12.  Schizophrenia.  13.  Substance use, status unclear.    Plan:    1.  Diagnostically, continue to follow patient's physical exam, CBC, CMP, CRP.  2 ill to be bronchoscoped.  2.  Therapeutically, continue piperacillin tazobactam, and changed vancomycin to linezolid 600 mg p.o. twice daily for MRSA pneumonia coverage (on 10/3), duration to be determined.  There is a small chance of a drug interaction between linezolid and amitriptyline with a serotonin syndrome.  Benefit greater than risk.  Patient probably is really aspirating.  3.  Oxygen support therapy.  40 L/min on 10/3/2022.  4.  Pulmonary following, and high risk for intubation if worsens.    I discussed the patient's findings and my recommendations with patient and nursing staff    Our group would be pleased to follow this patient over the course of their hospitalization and assist with outpatient antimicrobial therapy, as indicated.  Further recommendations depend on the results of the cultures and clinical course.    Hugo Castillo MD  10/5/2022

## 2022-10-05 NOTE — NURSING NOTE
Follow-up assessment to confirm pressure injury noted by WOC yesterday.  This is an evolving DTPI.  More open areas than yesterday, see picture below.  Entire bilateral sacrum is darker in color than other skin, very much more of a firm feel via palpation.  This patient quite a bit of pain when touching.     PT wound care had just assessed and performed therapy and applied Venelex and silicone foam dressing.  Wound measurements picture taken by PT wound care.  Wound now measures 6 x 7 x 0.1 cm.  A few more open areas are present, deep maroon to purple nonblanchable firm areas and other pink nonblanchable areas, also suspect some subcu tissue showing.  Fear that this will open up even further and be quite a deep wound.    Upon my assessment, patient was just on a foam mattress no waffle in place at all.  Wound care nurse yesterday attempted to order Low Air Loss but unsure why it was not delivered.  I have ordered a Dolphin bed for this patient.  To be delivered today, please place this patient on bed as soon as possible.

## 2022-10-05 NOTE — THERAPY RE-EVALUATION
Acute Care - Wound/Debridement Initial Evaluation  [facility]     Patient Name: Kaylie Cruz  : 1965  MRN: [id number]  Today's Date: 10/5/2022                Admit Date: 2022    Visit Dx:    ICD-10-CM ICD-9-CM   1. Pneumonia of right middle lobe due to infectious organism  J18.9 486   2. Right sided abdominal pain  R10.9 789.09   3. Cirrhosis of liver with ascites, unspecified hepatic cirrhosis type (HCC)  K74.60 571.5    R18.8    4. ANIKET (acute kidney injury) (HCC)  N17.9 584.9   5. Acute on chronic congestive heart failure, unspecified heart failure type (HCC)  I50.9 428.0   6. Sepsis, due to unspecified organism, unspecified whether acute organ dysfunction present (HCC)  A41.9 038.9     995.91   7. Dysphagia, unspecified type  R13.10 787.20     Sacrum      Patient Active Problem List   Diagnosis   • Chronic pain disorder   • Primary hypertension   • Long-term current use of opiate analgesic   • Lumbosacral spondylosis without myelopathy   • Migraine   • Coronary artery disease involving native coronary artery of native heart without angina pectoris   • Schizophrenia (HCC)   • Esophageal varices (HCC)   • Decompensated hepatic cirrhosis (HCC)   • HFrEF (heart failure with reduced ejection fraction) (HCC)   • Vaginal cancer (HCC)   • High grade squamous intraepithelial lesion (HGSIL) on cytologic smear of vagina   • Type 2 diabetes mellitus with hyperglycemia, with long-term current use of insulin (HCC)   • Chronic hepatitis C without hepatic coma (HCC)   • Tobacco use   • Pneumonia of right middle lobe due to infectious organism   • Sepsis, unspecified organism (HCC)        Past Medical History:   Diagnosis Date   • Anemia    • Cancer (HCC)     cervical   • Depression    • Hyperlipidemia    • Hypertension    • Infectious viral hepatitis    • Myocardial infarction (HCC)    • Substance abuse (HCC)         Past Surgical History:   Procedure Laterality Date   • BLADDER SURGERY     • CARDIAC  CATHETERIZATION      4/3/2022   • CHOLECYSTECTOMY     • COLON RESECTION      7/19/2017, descending and transverse colon   • COLON SURGERY     • COLONOSCOPY      7/19/2017   • ENDOSCOPY      EGD 4/1/22   • HYSTERECTOMY     • TRANSESOPHAGEAL ECHOCARDIOGRAM (AVINASH)      Global hyokinesis, 49% EF, LVH           Wound 10/04/22 1115 Bilateral sacral spine Pressure Injury (Active)   Wound Image   10/05/22 1004   Pressure Injury Stage DTPI 10/05/22 1004   Dressing Appearance open to air 10/05/22 1004   Base moist;pink;maroon/purple 10/05/22 1004   Periwound non-blanchable;dry 10/05/22 1004   Periwound Temperature warm 10/05/22 1004   Periwound Skin Turgor soft 10/05/22 1004   Edges jagged;open 10/05/22 1004   Wound Length (cm) 6 cm 10/05/22 1004   Wound Width (cm) 7 cm 10/05/22 1004   Wound Depth (cm) 0.1 cm 10/05/22 1004   Wound Surface Area (cm^2) 42 cm^2 10/05/22 1004   Wound Volume (cm^3) 4.2 cm^3 10/05/22 1004   Drainage Characteristics/Odor serosanguineous 10/05/22 1004   Drainage Amount scant 10/05/22 1004   Care, Wound cleansed with;soap and water;irrigated with;sterile normal saline;ultrasound therapy, non contact low frequency 10/05/22 1004   Dressing Care dressing applied;low-adherent;foam;border dressing 10/05/22 1004   Periwound Care cleansed with pH balanced cleanser;dry periwound area maintained;barrier ointment applied 10/05/22 1004         WOUND DEBRIDEMENT                     PT Assessment (last 12 hours)     PT Evaluation and Treatment     Row Name 10/05/22 1004          Physical Therapy Time and Intention    Subjective Information complains of;weakness;fatigue;pain  -     Document Type evaluation;wound care  -     Mode of Treatment physical therapy;individual therapy  -     Row Name 10/05/22 1004          General Information    Patient Profile Reviewed yes  -     Patient Observations cooperative;agree to therapy;lethargic  -     Risks Reviewed patient:;increased discomfort  -     Benefits  "Reviewed patient:;increase knowledge;improve skin integrity  -     Barriers to Rehab medically complex  -     Row Name 10/05/22 1004          Pain    Pretreatment Pain Rating 9/10  -LH     Posttreatment Pain Rating 9/10  -     Pain Location generalized  -     Pain Location - buttock  -     Pain Intervention(s) Repositioned  -     Row Name 10/05/22 1004          Cognition    Orientation Status (Cognition) oriented to;person;place;disoriented to;situation;time;verbal cues/prompts needed for orientation  -     Row Name 10/05/22 1004          Wound 10/04/22 1115 Bilateral sacral spine Pressure Injury    Wound - Properties Group Placement Date: 10/04/22  -TG Placement Time: 1115  -TG Present on Hospital Admission: N  -TG Side: Bilateral  -TG Location: sacral spine  -TG Primary Wound Type: Pressure inj  -TG     Wound Image View All Images View Images  -     Pressure Injury Stage DTPI  -     Dressing Appearance open to air  zguard/venelex  -     Base moist;pink;maroon/purple  -     Periwound non-blanchable;dry  -     Periwound Temperature warm  -     Periwound Skin Turgor soft  -     Edges jagged;open  -     Wound Length (cm) 6 cm  nonblanchable area  -     Wound Width (cm) 7 cm  -     Wound Depth (cm) 0.1 cm  -     Wound Surface Area (cm^2) 42 cm^2  -     Wound Volume (cm^3) 4.2 cm^3  -LH     Drainage Characteristics/Odor serosanguineous  -     Drainage Amount scant  -LH     Care, Wound cleansed with;soap and water;irrigated with;sterile normal saline;ultrasound therapy, non contact low frequency  MIST 6min  -LH     Dressing Care dressing applied;low-adherent;foam;border dressing  Venelex, 6\" optifoam  -     Periwound Care cleansed with pH balanced cleanser;dry periwound area maintained;barrier ointment applied  zguard  -     Retired Wound - Properties Group Placement Date: 10/04/22  -TG Placement Time: 1115  -TG Present on Hospital Admission: N  -TG Side: Bilateral  -TG " Location: sacral spine  -TG Primary Wound Type: Pressure inj  -TG     Retired Wound - Properties Group Date first assessed: 10/04/22  -TG Time first assessed: 1115  -TG Present on Hospital Admission: N  -TG Side: Bilateral  -TG Location: sacral spine  -TG Primary Wound Type: Pressure inj  -TG     Row Name 10/05/22 1004          Coping    Observed Emotional State withdrawn;flat  -LH     Verbalized Emotional State acceptance  -     Trust Relationship/Rapport care explained;questions answered  -     Row Name 10/05/22 1004          Plan of Care Review    Plan of Care Reviewed With patient  -     Progress no change  -     Outcome Evaluation PT wound care initial evaluation complete. Pt presents with bilateral sacral area of nonblanchable discoloration consistent with suspected evolving DTPI. Pt with scattered areas of open ulceration. PT performed MIST to help increase localized blood flow, promote cellular activity, and promote optimal wound healing environment. PT plans to f/u with MIST in 2-3 days.  -     Row Name 10/05/22 1004          Positioning and Restraints    Pre-Treatment Position in bed  -     Post Treatment Position bed  -     In Bed side lying left;call light within reach;encouraged to call for assist  -     Row Name 10/05/22 1004          Therapy Assessment/Plan (PT)    Patient/Family Therapy Goals Statement (PT) Wound healing  -     PT Diagnosis (PT) Sacral DTPI  -     Rehab Potential (PT) good, to achieve stated therapy goals  -     Criteria for Skilled Interventions Met (PT) yes;meets criteria;skilled treatment is necessary  -     Row Name 10/05/22 1004          Therapy Plan Review/Discharge Plan (PT)    Therapy Plan Review (PT) evaluation/treatment results reviewed;care plan/treatment goals reviewed;risks/benefits reviewed;current/potential barriers reviewed;participants voiced agreement with care plan;participants included;patient  -     Row Name 10/05/22 1004          Wound  Care Goal 1 (PT)    Wound Care Goal 1 (PT) Reduce sacral area of nonblanchable tissue by 2cm as evidence of wound healing  -     Time Frame (Wound Care Goal 1, PT) long term goal (LTG);10 days  -           User Key  (r) = Recorded By, (t) = Taken By, (c) = Cosigned By    Initials Name Provider Type    TG Miki Quach, RN Registered Nurse     Marshall Clark, PT Physical Therapist              Physical Therapy Education                 Title: PT OT SLP Therapies (In Progress)     Topic: Physical Therapy (In Progress)     Point: Mobility training (In Progress)     Learning Progress Summary           Patient Acceptance, E, NR by FW at 9/30/2022 1340      Show all documentation for this point (1)                 Point: Home exercise program (In Progress)     Learning Progress Summary           Patient Acceptance, E,D, NR by DM at 9/28/2022 1707                   Point: Body mechanics (In Progress)     Learning Progress Summary           Patient Acceptance, E, NR by FW at 9/30/2022 1340      Show all documentation for this point (1)                 Point: Precautions (In Progress)     Learning Progress Summary           Patient Acceptance, E, NR by FW at 9/30/2022 1340      Show all documentation for this point (1)                             User Key     Initials Effective Dates Name Provider Type Discipline     06/16/21 -  Deepthi Chen PT Physical Therapist PT    FW 05/05/22 -  Chuck Moreno PT Physical Therapist PT                Recommendation and Plan  Anticipated Discharge Disposition (PT): skilled nursing facility  Planned Therapy Interventions (PT): wound care, patient/family education  Therapy Frequency (PT): daily  Plan of Care Reviewed With: patient   Progress: no change       Progress: no change  Outcome Evaluation: PT wound care initial evaluation complete. Pt presents with bilateral sacral area of nonblanchable discoloration consistent with suspected evolving DTPI. Pt with scattered  areas of open ulceration. PT performed MIST to help increase localized blood flow, promote cellular activity, and promote optimal wound healing environment. PT plans to f/u with MIST in 2-3 days.  Plan of Care Reviewed With: patient            Time Calculation   PT Charges     Row Name 10/05/22 1050             Time Calculation    Start Time 1004  -LH      PT Goal Re-Cert Due Date 10/15/22  -              Untimed Charges    PT Eval/Re-eval Minutes 30  -LH      Wound Care 00941 NLFU Mist  -LH      08540-NUTT Mist 15  -LH              Total Minutes    Untimed Charges Total Minutes 45  -LH       Total Minutes 45  -LH            User Key  (r) = Recorded By, (t) = Taken By, (c) = Cosigned By    Initials Name Provider Type     Marshall Clark, PT Physical Therapist                  Therapy Charges for Today     Code Description Service Date Service Provider Modifiers Qty    [id number]  PT RE-EVAL ESTABLISHED PLAN 2 10/5/2022 Marshall Clark, PT GP 1    [id number] HC PT NLFU MIST 10/5/2022 Marshall Clark, PT GP 1            PT G-Codes  Outcome Measure Options: AM-PAC 6 Clicks Daily Activity (OT)  AM-PAC 6 Clicks Score (PT): 12  AM-PAC 6 Clicks Score (OT): 10       Marshall Clark PT  10/5/2022

## 2022-10-05 NOTE — PROGRESS NOTES
[facility] Medicine Services  PROGRESS NOTE    Patient Name: Kaylie Cruz  : 1965  MRN: [id number]    Date of Admission: 2022  Primary Care Physician: Iesha Harris DO    Subjective   Subjective     CC: f/u PNA    HPI: Pt seen and examined. Having diarrhea from lactulose. She is agreeable to CT chest today. Has been refusing labs, turning, etc.     ROS:  Gen- No fevers, chills  CV- No chest pain, palpitations  Resp- No cough, dyspnea  GI- No N/V, abd pain, +diarrhea     Objective   Objective     Vital Signs:   Temp:  [97.3 °F (36.3 °C)-98.5 °F (36.9 °C)] 97.3 °F (36.3 °C)  Heart Rate:  [75-85] 85  Resp:  [16-18] 18  BP: (134-139)/(72-74) 134/73  Flow (L/min):  [40-45] 40     Physical Exam:  Constitutional: awake, alert, NAD, chronically ill appearing   HENT: NCAT, mucous membranes moist  Respiratory: Coarse breath sounds bilaterally, respiratory effort normal HFNC  Cardiovascular: RRR, no murmurs, rubs, or gallops  Gastrointestinal: Positive bowel sounds, soft, nontender, nondistended  Musculoskeletal: No bilateral ankle edema  Psychiatric: Flat but cooperative   Neurologic: No focal deficits , speech clear   Skin: No rashes to exposed skin     Results Reviewed:  LAB RESULTS:      Lab 10/05/22  0405 10/04/22  0537 10/03/22  0736 10/02/22  0814 10/01/22  0430 22  0349   WBC  --  17.59* 19.51* 16.29* 12.15* 8.67   HEMOGLOBIN  --  7.6* 8.9* 8.7* 8.7* 8.4*   HEMATOCRIT  --  23.9* 27.4* 27.7* 27.5* 25.9*   PLATELETS  --  162 176 194 172 170   NEUTROS ABS  --  14.84* 15.96* 13.85* 10.08* 5.55   IMMATURE GRANS (ABS)  --  0.41* 0.83*  --   --   --    LYMPHS ABS  --  1.20 1.55  --   --   --    MONOS ABS  --  0.65 0.71  --   --   --    EOS ABS  --  0.39 0.35 0.16 0.36 0.26   MCV  --  82.4 81.3 82.9 82.1 81.4   PROCALCITONIN  --  1.42*  --  0.88*  --   --    LACTATE 1.8 1.4  --   --   --   --          Lab 10/05/22  0405 10/04/22  0537 10/03/22  0736 10/02/22  0814  10/01/22  0430   SODIUM 136 132* 133* 137 136   POTASSIUM 3.8 3.9 4.2 4.4 4.5   CHLORIDE 105 102 103 108* 107   CO2 18.0* 19.0* 19.0* 19.0* 19.0*   ANION GAP 13.0 11.0 11.0 10.0 10.0   BUN 30* 34* 30* 30* 36*   CREATININE 1.52* 1.71* 1.55* 1.32* 1.44*   EGFR 39.3* 34.2* 38.4* 46.6* 42.0*   GLUCOSE 119* 159* 123* 203* 136*   CALCIUM 7.9* 7.8* 8.2* 8.4* 8.3*         Lab 10/05/22  0405 10/04/22  0537 10/03/22  0736 10/02/22  0814 10/01/22  0430   TOTAL PROTEIN 6.1 5.7* 6.6 6.8 5.9*   ALBUMIN 2.10* 2.10* 2.10* 2.30* 2.40*   GLOBULIN 4.0 3.6 4.5 4.5 3.5   ALT (SGPT) 14 14 17 21 21   AST (SGOT) 49* 43* 49* 50* 56*   BILIRUBIN 0.9 1.0 0.7 0.7 0.7   ALK PHOS 187* 147* 153* 157* 131*                     Lab 10/03/22  0840   PH, ARTERIAL 7.418   PCO2, ARTERIAL 33.1*   PO2 ART 53.7*   FIO2 40   HCO3 ART 21.4   BASE EXCESS ART -2.7*   CARBOXYHEMOGLOBIN 1.3     Brief Urine Lab Results  (Last result in the past 365 days)      Color   Clarity   Blood   Leuk Est   Nitrite   Protein   CREAT   Urine HCG        10/01/22 0117 Yellow   Clear   Negative   Small (1+)   Negative   30 mg/dL (1+)                 Microbiology Results Abnormal     Procedure Component Value - Date/Time    Respiratory Panel PCR w/COVID-19(SARS-CoV-2) MALIK/STANLEY/AIXA/PAD/COR/MAD/HEATHER In-House, NP Swab in [facility], 3-4 HR TAT - Swab, Nasopharynx [[id number]]  (Normal) Collected: 10/03/22 1124    Lab Status: Final result Specimen: Swab from Nasopharynx Updated: 10/03/22 1237     ADENOVIRUS, PCR Not Detected     Coronavirus 229E Not Detected     Coronavirus HKU1 Not Detected     Coronavirus NL63 Not Detected     Coronavirus OC43 Not Detected     COVID19 Not Detected     Human Metapneumovirus Not Detected     Human Rhinovirus/Enterovirus Not Detected     Influenza A PCR Not Detected     Influenza B PCR Not Detected     Parainfluenza Virus 1 Not Detected     Parainfluenza Virus 2 Not Detected     Parainfluenza Virus 3 Not Detected     Parainfluenza Virus 4 Not Detected      RSV, PCR Not Detected     Bordetella pertussis pcr Not Detected     Bordetella parapertussis PCR Not Detected     Chlamydophila pneumoniae PCR Not Detected     Mycoplasma pneumo by PCR Not Detected    Narrative:      In the setting of a positive respiratory panel with a viral infection PLUS a negative procalcitonin without other underlying concern for bacterial infection, consider observing off antibiotics or discontinuation of antibiotics and continue supportive care. If the respiratory panel is positive for atypical bacterial infection (Bordetella pertussis, Chlamydophila pneumoniae, or Mycoplasma pneumoniae), consider antibiotic de-escalation to target atypical bacterial infection.    Blood Culture - Blood, Hand, Right [[id number]]  (Normal) Collected: 09/27/22 1300    Lab Status: Final result Specimen: Blood from Hand, Right Updated: 10/02/22 1317     Blood Culture No growth at 5 days    Blood Culture - Blood, Arm, Right [[id number]]  (Normal) Collected: 09/27/22 1245    Lab Status: Final result Specimen: Blood from Arm, Right Updated: 10/02/22 1303     Blood Culture No growth at 5 days    Urine Culture - Urine, Urine, Random Void [[id number]]  (Normal) Collected: 10/01/22 0117    Lab Status: Final result Specimen: Urine, Random Void Updated: 10/02/22 1056     Urine Culture No growth    Body Fluid Culture - Body Fluid, Peritoneum [[id number]] Collected: 09/28/22 1143    Lab Status: Final result Specimen: Body Fluid from Peritoneum Updated: 10/01/22 0729     Body Fluid Culture No growth at 3 days     Gram Stain No WBCs or organisms seen    Legionella Antigen, Urine - Urine, Urine, Clean Catch [[id number]]  (Normal) Collected: 09/28/22 0643    Lab Status: Final result Specimen: Urine, Clean Catch Updated: 09/28/22 1229     LEGIONELLA ANTIGEN, URINE Negative    S. Pneumo Ag Urine or CSF - Urine, Urine, Clean Catch [[id number]]  (Normal) Collected: 09/28/22 0643    Lab Status: Final result Specimen: Urine, Clean Catch  Updated: 09/28/22 1229     Strep Pneumo Ag Negative          XR Chest 1 View    Result Date: 10/4/2022   DATE OF EXAM: 10/4/2022 4:17 AM  PROCEDURE: XR CHEST 1 VW-  INDICATIONS: F/u pneumonia; J18.9-Pneumonia, unspecified organism; R10.9-Unspecified abdominal pain; K74.60-Unspecified cirrhosis of liver; R18.8-Other ascites; N17.9-Acute kidney failure, unspecified; I50.9-Heart failure, unspecified; A41.9-Sepsis, unspecified organism; R13.10-Dysphagia, unspecified  COMPARISON: 10/03/2022  TECHNIQUE: Portable chest radiograph.  FINDINGS: Heart size within normal limits. No pneumothorax. No significant pleural effusion. No change in diffuse mixed interstitial and airspace disease throughout the lungs most concerning for pneumonia. The osseous structures are intact.      Impression: No change in diffuse mixed interstitial and airspace disease throughout the lungs most concerning for multifocal pneumonia.  This report was finalized on 10/4/2022 7:29 AM by Pool Floyd MD.      XR Abdomen KUB    Result Date: 10/4/2022  DATE OF EXAM: 10/4/2022 4:20 AM  PROCEDURE: XR ABDOMEN KUB-  INDICATIONS: abd distention, ascites; J18.9-Pneumonia, unspecified organism; R10.9-Unspecified abdominal pain; K74.60-Unspecified cirrhosis of liver; R18.8-Other ascites; N17.9-Acute kidney failure, unspecified; I50.9-Heart failure, unspecified; A41.9-Sepsis, unspecified organism; R13.10-Dysphagia, unspecified  COMPARISON: No comparisons available.  TECHNIQUE: Single radiographic view of the abdomen was obtained.  FINDINGS: Nonobstructive, nonspecific bowel gas pattern. There is centralization of the bowel loops with lateralization of the properitoneal fat stripe, compatible with ascites. Cholecystectomy clips are noted. A couple surgical clips are seen in the left mid abdomen. Single surgical clip and surgical sutures are noted in the lower midline pelvis. No acute osseous findings.      Impression: Nonobstructive, nonspecific bowel gas pattern.   Ascites.  This report was finalized on 10/4/2022 8:10 AM by Skip Seaman MD.            I have reviewed the medications:  Scheduled Meds:amLODIPine, 5 mg, Oral, Daily  aspirin, 81 mg, Oral, Daily  atorvastatin, 40 mg, Oral, Nightly  castor oil-balsam peru, 1 application, Topical, Q12H  folic acid, 1 mg, Oral, Daily  insulin detemir, 15 Units, Subcutaneous, Nightly  insulin lispro, 0-9 Units, Subcutaneous, TID AC  Insulin Lispro, 5 Units, Subcutaneous, TID With Meals  ipratropium-albuterol, 3 mL, Nebulization, 4x Daily - RT  lidocaine, 1 patch, Transdermal, Q24H  Linezolid, 600 mg, Intravenous, Q12H  metoprolol tartrate, 50 mg, Oral, BID  piperacillin-tazobactam, 3.375 g, Intravenous, Q8H  rifAXIMin, 550 mg, Oral, Q12H  sodium chloride, 10 mL, Intravenous, Q12H  thiamine, 100 mg, Oral, Daily      Continuous Infusions:   PRN Meds:.•  acetaminophen **OR** acetaminophen **OR** acetaminophen  •  albuterol  •  benzonatate  •  dextrose  •  dextrose  •  glucagon (human recombinant)  •  guaiFENesin  •  ondansetron **OR** ondansetron  •  potassium chloride **OR** potassium chloride **OR** potassium chloride  •  [COMPLETED] Insert peripheral IV **AND** sodium chloride  •  sodium chloride    Assessment & Plan   Assessment & Plan     Active Hospital Problems    Diagnosis  POA   • Pneumonia of right middle lobe due to infectious organism [J18.9]  Yes   • Sepsis, unspecified organism (HCC) [A41.9]  Unknown   • Tobacco use [Z72.0]  Yes   • Vaginal cancer (HCC) [C52]  Yes   • High grade squamous intraepithelial lesion (HGSIL) on cytologic smear of vagina [R87.623]  Yes   • Type 2 diabetes mellitus with hyperglycemia, with long-term current use of insulin (HCC) [E11.65, Z79.4]  Not Applicable   • Chronic hepatitis C without hepatic coma (HCC) [B18.2]  Yes   • Decompensated hepatic cirrhosis (HCC) [K72.90, K74.60]  Yes   • HFrEF (heart failure with reduced ejection fraction) (HCC) [I50.20]  Yes   • Coronary artery disease involving  native coronary artery of native heart without angina pectoris [I25.10]  Yes   • Schizophrenia (HCC) [F20.9]  Yes   • Chronic pain disorder [G89.4]  Yes   • Primary hypertension [I10]  Yes   • Long-term current use of opiate analgesic [Z79.891]  Not Applicable   • Esophageal varices (HCC) [I85.00]  Yes      Resolved Hospital Problems   No resolved problems to display.        Brief Hospital Course to date:  Kaylie Cruz is a 57 y.o. 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    This patient's problems and plans were partially entered by my partner and updated as appropriate by me 10/05/22.    Sepsis, POA  Bilateral PNA  Leukocytosis   +MRSA PCR   - Remains on HFNC, wean as tolerated, 40L, 65%  - ID following, continue Zosyn and Zyvox   - Cryptococcal antigen negative  - Fungal Alina, Fungitell B-D glucan, Histoplasma pending  - Sputum cx ordered but patient without productive cough   - Respiratory PCR negative  - Continue scheduled DuoNebs   - Pt had refused to have CT scan chest done but now agreeable, re-ordered   -SLP has seen, S/P FEES 9/29 and functional oral and pharyngeal phase, signed off   - Requiring too much O2 for Bronchoscopy currently      Decompensated cirrhosis with ascites  History of Hepatitis C  Elevated LFTs   -Follows with GI outpatient but missed last appt.   -EGD due 04/2023 for EV screening  -S/P LVP on 9/29 with 2.25L of ascitic fluid removed, does not appear c/w SBP culture NGTD  -Hepatitis panel + Hep C ab  -Continue Xifaxan    -Hold Lactulose today due to diarrhea      Likely CKD  --Cr trending back down  --Continue to hold Lasix today as patient having diarrhea and clinically appears dry     Hypokalemia  -- Resolved       Chronic HFrEF   Coronary artery disease involving native coronary artery of native heart without angina pectoris  Primary hypertension  -Echo 3/2022 - EF 49%, global hypokinesis, grade 1 diastolic dysfunction  -LHC 4/3/2022 - nonobstructive LAD to LAD 60% in mid region  -Pt is supposed to follow with Dr Daniel, has missed appts   -Continue ASA 81, metoprolol  -Continue statin      Type 2 diabetes mellitus with diabetic polyneuropathy, with long-term current use of insulin  -A1c has improved from 8.5% to 6.6% with medication compliance over the past 3 months  -Has appointment to establish with Endocrinology next month  -Continue Lantus 15 units nightly  -Continue Humalog 5 units TID meals   -Continue MDSSI  -Continue to HOLD Gabapentin to avoid lethargy      Recent Gross hematuria  -Seen at  8/23/22 and diagnosed with UTI, treated with ABX   -Per patient the dysuria resolved but still having intermittent hematuria  -Patient has already been referred to Urology per PCP  -Repeat UA unremarkable on admission, however, UA from 10/1 as noted above. ABX as noted above  -CT A/P without contrast with unremarkable bladder      Tobacco use  -Encourage smoking cessation  -PCP note says they plan to obtain PFTs outpatient as she has had some wheezing but no formal Dx of COPD  -PRN Albuterol   -Continue scheduled DuoNebs      High grade squamous intraepithelial lesion (HGSIL) on cytologic smear of vagina  Vaginal cancer   -Diagnosed 2010? s/p radiation, surgical resection, and hysterectomy. Previously following with Dr. Roxanne Chaudhry, Gyn-Onc in [place] with [facility].   -She had vaginal pap smear with HGSIL and was lost to follow up.   -PCP has referred to GYN and gyn-onc for further evaluation      Schizophrenia, unspecified type  -Following with [place] for talk therapy  -Continue to HOLD SEROQUEL AND ELAVIL to avoid lethargy      Substance abuse  -UDS positive for cocaine, oxycodone and TCA     Chronic  Anemia  -H&H stable in review of chart     R rib pain  -Likely from PNA  -Continue Lidoderm patch     *GOC/Limitations   -Will have Palliative see patient today to discuss GOC as patient has been refusing a lot of care    This patient's problems and plans were partially entered by my partner and updated as appropriate by me 10/05/22.    Expected Discharge Location and Transportation: acute rehab/[facility]   Expected Discharge Date: 10/10    DVT prophylaxis:  Mechanical DVT prophylaxis orders are present.     AM-PAC 6 Clicks Score (PT): 12 (10/04/22 2000)    CODE STATUS:   Code Status and Medical Interventions:   Ordered at: 09/27/22 1402     Level Of Support Discussed With:    Patient     Code Status (Patient has no pulse and is not breathing):    CPR (Attempt to Resuscitate)     Medical Interventions (Patient has pulse or is breathing):    Full Support       Nidia Rangel,   10/05/22

## 2022-10-05 NOTE — PLAN OF CARE
Goal Outcome Evaluation:  Plan of Care Reviewed With: patient        Progress: no change  Outcome Evaluation: PT wound care initial evaluation complete. Pt presents with bilateral sacral area of nonblanchable discoloration consistent with suspected evolving DTPI. Pt with scattered areas of open ulceration. PT performed MIST to help increase localized blood flow, promote cellular activity, and promote optimal wound healing environment. PT plans to f/u with MIST in 2-3 days.

## 2022-10-05 NOTE — PLAN OF CARE
Goal Outcome Evaluation:  Plan of Care Reviewed With: patient        Progress: no change  Outcome Evaluation: Palliative consult for GOC/ACP and support to pt and family. Pt denied pain when asked; noted documentation intermittently of back ache. Pt stated she does have dyspnea, worse on exertion, and has a cough that does cause some ache in her chest wall while she is coughing. Pt reported her diarrhea to be her most uncomfortable concern at this time. Dr. Stallworth discussed code status, adjusted per pt's stated preference of DNR/DNI. Palliative following for support in ongoing GOC discussion.    1300 Palliative IDT meeting: JALEEL Collier RN, PN; TAYLOR Stallworth DO; PATRICK Clements, APRN; LINDA Martinez RN, CHPN; FLORES Ingram, [facility], [facility]; PATRICK Livingston RN; DASHAWN Houston, [facility]; SUN Doshi MDiv, [facility]; DASHAWN Chapa RN    Problem: Palliative Care  Goal: Enhanced Quality of Life  Outcome: Ongoing, Progressing  Intervention: Promote Advance Care Planning  Flowsheets (Taken 10/5/2022 1632)  Life Transition/Adjustment: (Code status discussed, changed per pt stated preference)   palliative care discussed   palliative care initiated   other (see comments)  Intervention: Maximize Comfort  Flowsheets (Taken 10/5/2022 1632)  Pain Management Interventions: (pt denied pain when asked by Palliative RN and by Dr. Stallworth) other (see comments)  Intervention: Optimize Function  Flowsheets (Taken 10/5/2022 1632)  Sensory Stimulation Regulation: quiet environment promoted  Fatigue Management:   paced activity encouraged   frequent rest breaks encouraged  Intervention: Optimize Psychosocial Wellbeing  Flowsheets (Taken 10/5/2022 1632)  Supportive Measures:   decision-making supported   positive reinforcement provided   self-responsibility promoted  Spiritual Activities Assistance: (Spiritual Care consult) other (see comments)

## 2022-10-05 NOTE — PLAN OF CARE
Goal Outcome Evaluation:  Plan of Care Reviewed With: patient        Progress: no change     Pt continues on [place] NC at this time.  PRN Tylenol administered for pain and effective at this time.  Pt with incontinent episodes and refusing to turn.

## 2022-10-06 ENCOUNTER — APPOINTMENT (OUTPATIENT)
Dept: GENERAL RADIOLOGY | Facility: HOSPITAL | Age: 57
End: 2022-10-06

## 2022-10-06 LAB
BLASTOMYCES AG UR IA-MCNC: NORMAL NG/ML
GLUCOSE BLDC GLUCOMTR-MCNC: 112 MG/DL (ref 70–130)
GLUCOSE BLDC GLUCOMTR-MCNC: 160 MG/DL (ref 70–130)
GLUCOSE BLDC GLUCOMTR-MCNC: 182 MG/DL (ref 70–130)
GLUCOSE BLDC GLUCOMTR-MCNC: 82 MG/DL (ref 70–130)
H CAPSUL AG UR QL IA: <0.5
SPECIMEN SOURCE: NORMAL

## 2022-10-06 PROCEDURE — 82962 GLUCOSE BLOOD TEST: CPT

## 2022-10-06 PROCEDURE — 94799 UNLISTED PULMONARY SVC/PX: CPT

## 2022-10-06 PROCEDURE — 99232 SBSQ HOSP IP/OBS MODERATE 35: CPT | Performed by: FAMILY MEDICINE

## 2022-10-06 PROCEDURE — 63710000001 ONDANSETRON PER 8 MG: Performed by: FAMILY MEDICINE

## 2022-10-06 PROCEDURE — 71100 X-RAY EXAM RIBS UNI 2 VIEWS: CPT

## 2022-10-06 PROCEDURE — 25010000002 LINEZOLID 600 MG/300ML SOLUTION: Performed by: INTERNAL MEDICINE

## 2022-10-06 PROCEDURE — 99232 SBSQ HOSP IP/OBS MODERATE 35: CPT | Performed by: INTERNAL MEDICINE

## 2022-10-06 PROCEDURE — 97530 THERAPEUTIC ACTIVITIES: CPT

## 2022-10-06 PROCEDURE — 63710000001 INSULIN DETEMIR PER 5 UNITS: Performed by: INTERNAL MEDICINE

## 2022-10-06 PROCEDURE — 25010000002 PIPERACILLIN SOD-TAZOBACTAM PER 1 G: Performed by: FAMILY MEDICINE

## 2022-10-06 PROCEDURE — 63710000001 INSULIN LISPRO (HUMAN) PER 5 UNITS: Performed by: INTERNAL MEDICINE

## 2022-10-06 PROCEDURE — 63710000001 INSULIN LISPRO (HUMAN) PER 5 UNITS: Performed by: FAMILY MEDICINE

## 2022-10-06 PROCEDURE — 97116 GAIT TRAINING THERAPY: CPT | Performed by: PHYSICAL THERAPIST

## 2022-10-06 RX ORDER — PROCHLORPERAZINE EDISYLATE 5 MG/ML
2.5 INJECTION INTRAMUSCULAR; INTRAVENOUS EVERY 6 HOURS PRN
Status: DISCONTINUED | OUTPATIENT
Start: 2022-10-06 | End: 2022-10-12 | Stop reason: HOSPADM

## 2022-10-06 RX ORDER — OXYCODONE HYDROCHLORIDE 5 MG/1
5 TABLET ORAL EVERY 8 HOURS PRN
Status: DISCONTINUED | OUTPATIENT
Start: 2022-10-06 | End: 2022-10-12 | Stop reason: HOSPADM

## 2022-10-06 RX ADMIN — ONDANSETRON HYDROCHLORIDE 4 MG: 4 TABLET, FILM COATED ORAL at 00:19

## 2022-10-06 RX ADMIN — METOPROLOL TARTRATE 50 MG: 50 TABLET, FILM COATED ORAL at 08:50

## 2022-10-06 RX ADMIN — THIAMINE HCL TAB 100 MG 100 MG: 100 TAB at 08:51

## 2022-10-06 RX ADMIN — TAZOBACTAM SODIUM AND PIPERACILLIN SODIUM 3.38 G: 375; 3 INJECTION, SOLUTION INTRAVENOUS at 17:15

## 2022-10-06 RX ADMIN — CASTOR OIL AND BALSAM, PERU 1 APPLICATION: 788; 87 OINTMENT TOPICAL at 08:51

## 2022-10-06 RX ADMIN — CASTOR OIL AND BALSAM, PERU 1 APPLICATION: 788; 87 OINTMENT TOPICAL at 21:19

## 2022-10-06 RX ADMIN — INSULIN LISPRO 2 UNITS: 100 INJECTION, SOLUTION INTRAVENOUS; SUBCUTANEOUS at 08:52

## 2022-10-06 RX ADMIN — RIFAXIMIN 550 MG: 550 TABLET ORAL at 21:18

## 2022-10-06 RX ADMIN — LINEZOLID 600 MG: 2 INJECTION, SOLUTION INTRAVENOUS at 15:30

## 2022-10-06 RX ADMIN — ASPIRIN 81 MG: 81 TABLET, COATED ORAL at 08:50

## 2022-10-06 RX ADMIN — INSULIN LISPRO 5 UNITS: 100 INJECTION, SOLUTION INTRAVENOUS; SUBCUTANEOUS at 08:51

## 2022-10-06 RX ADMIN — INSULIN LISPRO 5 UNITS: 100 INJECTION, SOLUTION INTRAVENOUS; SUBCUTANEOUS at 17:15

## 2022-10-06 RX ADMIN — ATORVASTATIN CALCIUM 40 MG: 40 TABLET, FILM COATED ORAL at 21:18

## 2022-10-06 RX ADMIN — INSULIN LISPRO 2 UNITS: 100 INJECTION, SOLUTION INTRAVENOUS; SUBCUTANEOUS at 17:15

## 2022-10-06 RX ADMIN — FOLIC ACID 1 MG: 1 TABLET ORAL at 08:51

## 2022-10-06 RX ADMIN — TAZOBACTAM SODIUM AND PIPERACILLIN SODIUM 3.38 G: 375; 3 INJECTION, SOLUTION INTRAVENOUS at 03:15

## 2022-10-06 RX ADMIN — METOPROLOL TARTRATE 50 MG: 50 TABLET, FILM COATED ORAL at 21:18

## 2022-10-06 RX ADMIN — INSULIN DETEMIR 15 UNITS: 100 INJECTION, SOLUTION SUBCUTANEOUS at 21:18

## 2022-10-06 RX ADMIN — LIDOCAINE 1 PATCH: 50 PATCH CUTANEOUS at 06:36

## 2022-10-06 RX ADMIN — IPRATROPIUM BROMIDE AND ALBUTEROL SULFATE 3 ML: .5; 3 SOLUTION RESPIRATORY (INHALATION) at 20:20

## 2022-10-06 RX ADMIN — TAZOBACTAM SODIUM AND PIPERACILLIN SODIUM 3.38 G: 375; 3 INJECTION, SOLUTION INTRAVENOUS at 08:52

## 2022-10-06 RX ADMIN — AMLODIPINE BESYLATE 5 MG: 5 TABLET ORAL at 08:50

## 2022-10-06 RX ADMIN — RIFAXIMIN 550 MG: 550 TABLET ORAL at 08:50

## 2022-10-06 RX ADMIN — OXYCODONE 5 MG: 5 TABLET ORAL at 21:18

## 2022-10-06 RX ADMIN — LINEZOLID 600 MG: 2 INJECTION, SOLUTION INTRAVENOUS at 00:27

## 2022-10-06 NOTE — PLAN OF CARE
Goal Outcome Evaluation:  Plan of Care Reviewed With: patient        Progress: improving  Outcome Evaluation: Pt with significant progress today and gave excellent effort.  Pt transferred supine-->sit with SBA, stood with CGA, and ambulated 16' using rw with CGAx1.  Ambulated on 8L O2 - fatigued post gait.  Recommend inpt rehab at d/c.

## 2022-10-06 NOTE — PROGRESS NOTES
INPATIENT PULMONARY SERVICE  PROGRESS NOTE     Hospital LOS: 9 days    Ms. Kaylie Cruz, is followed for a Chief Complaint of:  Chief Complaint   Patient presents with   • Back Pain       Chronic pain disorder    Primary hypertension    Long-term current use of opiate analgesic    Coronary artery disease involving native coronary artery of native heart without angina pectoris    Schizophrenia (HCC)    Esophageal varices (HCC)    Decompensated hepatic cirrhosis (HCC)    HFrEF (heart failure with reduced ejection fraction) (HCC)    Vaginal cancer (HCC)    High grade squamous intraepithelial lesion (HGSIL) on cytologic smear of vagina    Type 2 diabetes mellitus with hyperglycemia, with long-term current use of insulin (HCC)    Chronic hepatitis C without hepatic coma (HCC)    Tobacco use    Pneumonia of right middle lobe due to infectious organism    Sepsis, unspecified organism (HCC)      Subjective   S     Interval History:  On 6L nasal cannula this AM. Found with oxygen off while eating.        The patient's relevant past medical, surgical and social history were reviewed and updated in Epic as appropriate.      ROS:   Constitutional: Negative for fever.   Respiratory: Positive for dyspnea.   Cardiovascular: Negative for chest pain.   Gastrointestinal: Negative for  nausea, vomiting and diarrhea.     Objective   O     Vitals  Temp  Min: 98.5 °F (36.9 °C)  Max: 99.7 °F (37.6 °C)  BP  Min: 126/66  Max: 137/81  Pulse  Min: 82  Max: 95  Resp  Min: 18  Max: 20  SpO2  Min: 89 %  Max: 91 % Flow (L/min)  Min: 6  Max: 6    I/O 24 HR (7:00 AM-6:59AM):  Intake/Output       10/05/22 0700 - 10/06/22 0659 10/06/22 0700 - 10/07/22 0659    Intake (ml) 840 480    Output (ml) 250 --    Net (ml) 590 480          Medications (Drips):       Physical Examination    Telemetry: Normal sinus rhythm.    Constitutional:  No acute distress. Cachectic.   Sitting up in a chair. Not wearing her supplemental O2.    Cardiovascular: Regular  rate and rhythm.  No murmurs, rub or gallop.   Respiratory: Normal symmetric chest expansion.  Normal respiratory effort.  Diminished bilaterally.    Abdominal:  Soft. No masses.   Non-tender. No distension.   No hepatosplenomegaly.   Extremities: No digital cyanosis or clubbing.  No peripheral edema.   Neurological:   Alert and Oriented to person, place, and time.   Moves all extremities.            Results from last 7 days   Lab Units 10/05/22  0948 10/04/22  0537 10/03/22  0736   WBC 10*3/mm3 17.87* 17.59* 19.51*   HEMOGLOBIN g/dL 8.2* 7.6* 8.9*   MCV fL 79.8 82.4 81.3   PLATELETS 10*3/mm3 202 162 176     Results from last 7 days   Lab Units 10/05/22  0405 10/04/22  0537 10/03/22  0736   SODIUM mmol/L 136 132* 133*   POTASSIUM mmol/L 3.8 3.9 4.2   CO2 mmol/L 18.0* 19.0* 19.0*   CREATININE mg/dL 1.52* 1.71* 1.55*     Serum creatinine: 1.52 mg/dL (H) 10/05/22 0405  Estimated creatinine clearance: 32.8 mL/min (A)  Results from last 7 days   Lab Units 10/05/22  0405 10/04/22  0537 10/03/22  0736   ALK PHOS U/L 187* 147* 153*   BILIRUBIN mg/dL 0.9 1.0 0.7   ALT (SGPT) U/L 14 14 17   AST (SGOT) U/L 49* 43* 49*       Results from last 7 days   Lab Units 10/03/22  0840   PH, ARTERIAL pH units 7.418   PCO2, ARTERIAL mm Hg 33.1*   PO2 ART mm Hg 53.7*   FIO2 % 40       Images:     Imaging Results (Last 24 Hours)     Procedure Component Value Units Date/Time    XR Ribs 2 View Right [[id number]] Resulted: 10/06/22 1327     Updated: 10/06/22 1327    CT Chest Without Contrast Diagnostic [[id number]] Collected: 10/05/22 1135     Updated: 10/05/22 2231    Narrative:      DATE OF EXAM: 10/5/2022 11:15 AM     PROCEDURE: CT CHEST WO CONTRAST DIAGNOSTIC-     INDICATIONS: worsening PNA; J18.9-Pneumonia, unspecified organism;  R10.9-Unspecified abdominal pain; K74.60-Unspecified cirrhosis of liver;  R18.8-Other ascites; N17.9-Acute kidney failure, unspecified;  I50.9-Heart failure, unspecified; A41.9-Sepsis, unspecified  organism;  R13.10-Dysphagia, unspecified     COMPARISON: Chest radiograph 10/04/2022     TECHNIQUE: Routine transaxial slices were obtained through the chest  without the administration of intravenous contrast. Reconstructed  coronal and sagittal images were also obtained. Automated exposure  control and iterative construction methods were used.     The radiation dose reduction device was turned on for each scan per the  ALARA (As Low as Reasonably Achievable) protocol.     FINDINGS:  Earlier chest radiograph shows stable diffuse mixed interstitial and  airspace disease throughout the lungs consistent with multifocal  pneumonia.     Today's study shows predominantly coarse reticular disease throughout  the lungs, with a generalized groundglass appearance. Findings suggest  ARDS, and can be seen as well with any type of pneumonia in an  immunocompromised patient. Pattern does not particularly suggest  pulmonary edema. There is only a small right pleural effusion. No  pulmonary parenchymal mass is seen. The airways appear normally patent.  There are relatively few areas of actual lung consolidation. No  pneumothorax is seen. Limited mediastinal window images show no apparent  adenopathy, mediastinal mass or pericardial effusion. There is extensive  coronary artery calcification. Included images of the upper abdomen show  at least moderately extensive ascites. Bony structures appear to be  intact.        Impression:         1. Dense diffuse and extensive reticular and groundglass disease  throughout the lungs, most likely representing ARDS. Superimposed  atypical pneumonia, or any pneumonia in an immunocompromised patient  could have this appearance. Much less likely, vasculitis is included in  the differential.  2. Very small right pleural effusion.  3. Moderately extensive ascites noted.     This report was finalized on 10/5/2022 10:28 PM by Dr. Noé Larry MD.             I reviewed the patient's new clinical  results.  I reviewed the patient's new imaging results and agree with the interpretation.    Assessment & Plan   A / P     Ms. Cruz is a 58yo F who was admitted for RLL pneumonia and has continued to worsen despite antibiotic therapy. She was initially on room air and has now progressed to HFNC. ID is following and she is being treated for pneumonia. CT Chest from 10/5/22 reviewed. There is diffuse extensive reticular groundglass disease throughout both lungs. There is a very small right pleural effusion. The differential includes pneumonia (bacterial vs atypical), organizing pneumonia, pulmonary edema or hemorrhage/vasculitis (unlikley in the absence of hemoptysis). She seems to be clinically improving with antibiotic therapy.     Diet Regular; Consistent Carbohydrate  Code Status and Medical Interventions:   Ordered at: 10/05/22 1229     Medical Intervention Limits:    NO intubation (DNI)     Code Status (Patient has no pulse and is not breathing):    No CPR (Do Not Attempt to Resuscitate)     Medical Interventions (Patient has pulse or is breathing):    Limited Support       Active Hospital Problems    Diagnosis    • Pneumonia of right middle lobe due to infectious organism    • Sepsis, unspecified organism (HCC)    • Tobacco use    • Vaginal cancer (HCC)    • High grade squamous intraepithelial lesion (HGSIL) on cytologic smear of vagina    • Type 2 diabetes mellitus with hyperglycemia, with long-term current use of insulin (HCC)    • Chronic hepatitis C without hepatic coma (HCC)    • Decompensated hepatic cirrhosis (HCC)    • HFrEF (heart failure with reduced ejection fraction) (HCC)    • Coronary artery disease involving native coronary artery of native heart without angina pectoris    • Schizophrenia (HCC)    • Chronic pain disorder    • Primary hypertension    • Long-term current use of opiate analgesic    • Esophageal varices (HCC)        Assessment / Plan:  1. Wean supplemental O2 as tolerated    2. Antibiotics per ID  3. Diuretics as tolerated.   4. Will defer on bronchoscopy as she appears to be clinically improving.   5. Will hold on steroids for now.   6. AM CXR    I discussed the patient's findings and my recommendations with patient    Time:  I spent 25 minutes, face to face, with the patient or on the jacob coordinating care with other health care providers.   I spent > 50% percent of this time, counseling and discussing diagnostic testing .     Sissy RAGLAND Case, DO  Pulmonary and Critical Care Medicine

## 2022-10-06 NOTE — PROGRESS NOTES
Palliative Care Progress Note    Date of Admission: 9/27/2022    Subjective: Patient with no complaints other than diarrhea at this point in time.  Current Code Status     Date Active Code Status Order ID Comments User Context       10/5/2022 1229 No CPR (Do Not Attempt to Resuscitate) [id number]  Brock Stallworth, DO Inpatient     Advance Care Planning Activity      Questions for Current Code Status     Question Answer    Code Status (Patient has no pulse and is not breathing) No CPR (Do Not Attempt to Resuscitate)    Medical Interventions (Patient has pulse or is breathing) Limited Support    Medical Intervention Limits: NO intubation (DNI)        No current facility-administered medications on file prior to encounter.     Current Outpatient Medications on File Prior to Encounter   Medication Sig Dispense Refill   • albuterol sulfate  (90 Base) MCG/ACT inhaler Inhale 2 puffs Every 4 (Four) Hours As Needed for Wheezing. 8 g 1   • amitriptyline (ELAVIL) 50 MG tablet Take 1 tablet by mouth Every Night. 30 tablet 2   • amLODIPine (NORVASC) 5 MG tablet Take 1 tablet by mouth Daily. 30 tablet 2   • aspirin 81 MG EC tablet Take 1 tablet by mouth Daily. 30 tablet 2   • atorvastatin (LIPITOR) 40 MG tablet Take 1 tablet by mouth Daily. 30 tablet 2   • Cholecalciferol 50 MCG (2000 UT) capsule Take 2,000 Units by mouth Daily.     • Easy Touch Lancets 30G misc      • folic acid (FOLVITE) 1 MG tablet Take 1 mg by mouth Daily.     • furosemide (LASIX) 40 MG tablet Take 1 tablet by mouth Daily. 30 tablet 5   • gabapentin (NEURONTIN) 800 MG tablet Take 800 mg by mouth Every 8 (Eight) Hours.     • Insulin Aspart FlexPen 100 UNIT/ML solution pen-injector Inject 5 Units under the skin into the appropriate area as directed 3 (Three) Times a Day Before Meals. 9 mL 5   • Insulin Pen Needle (Pen Needles) 31G X 5 MM misc 1 each 4 (Four) Times a Day Before Meals & at Bedtime. 300 each 2   • lactulose (CHRONULAC) 10 GM/15ML solution  "Take 15 mL by mouth 3 (Three) Times a Day As Needed (constipation). 473 mL 1   • metoprolol tartrate (LOPRESSOR) 50 MG tablet Take 1 tablet by mouth 2 (Two) Times a Day. 60 tablet 2   • OneTouch Verio test strip      • polyethylene glycol (MIRALAX) 17 GM/SCOOP powder TAKE 17 GRAMS BY MOUTH DAILY AS NEEDED (CONSTIPATION). 238 g 1   • potassium chloride (K-DUR,KLOR-CON) 20 MEQ CR tablet TAKE 1 TABLET BY MOUTH DAILY AS NEEDED (WHEN TAKING FUROSEMIDE (LASIX)). 30 tablet 1   • QUEtiapine (SEROquel) 300 MG tablet Take 1 tablet by mouth Every Night. 30 tablet 2   • Sofosbuvir-Velpatasvir 400-100 MG tablet      • spironolactone (Aldactone) 50 MG tablet Take 1 tablet by mouth Daily. 30 tablet 2   • thiamine 100 MG tablet      • insulin glargine (LANTUS, SEMGLEE) 100 UNIT/ML injection Inject 10 Units under the skin into the appropriate area as directed Every Night. 3 mL 2        •  acetaminophen **OR** acetaminophen **OR** acetaminophen  •  albuterol  •  benzonatate  •  dextrose  •  dextrose  •  glucagon (human recombinant)  •  guaiFENesin  •  ondansetron **OR** ondansetron  •  oxyCODONE  •  potassium chloride **OR** potassium chloride **OR** potassium chloride  •  prochlorperazine  •  [COMPLETED] Insert peripheral IV **AND** sodium chloride  •  sodium chloride    Objective: /81   Pulse 82   Temp 98.5 °F (36.9 °C) (Oral)   Resp 20   Ht 160 cm (63\")   Wt 52.2 kg (115 lb)   SpO2 (!) 89%   BMI 20.37 kg/m²      Intake/Output Summary (Last 24 hours) at 10/6/2022 1255  Last data filed at 10/6/2022 1100  Gross per 24 hour   Intake 1080 ml   Output 250 ml   Net 830 ml     Physical Exam:      General Appearance:    Alert, cooperative, frail-appearing   Head:    Normocephalic, without obvious abnormality, atraumatic   Eyes:            Lids and lashes normal, conjunctivae and sclerae normal, no   icterus, no pallor, corneas clear, PERRLA   Ears:    Ears appear intact with no abnormalities noted   Throat:   No oral lesions, " no thrush, oral mucosa moist   Neck:   No adenopathy, supple, trachea midline, no thyromegaly, no     carotid bruit, no JVD   Back:     No kyphosis present, no scoliosis present, no skin lesions,       erythema or scars, no tenderness to percussion or                   palpation,   range of motion normal   Lungs:     Clear to auscultation,respirations regular, even and                   unlabored    Heart:    Regular rhythm and normal rate, normal S1 and S2, no            murmur, no gallop, no rub, no click   Breast Exam:    Deferred   Abdomen:     Normal bowel sounds, no masses, no organomegaly, soft        non-tender, non-distended, no guarding, no rebound                 tenderness   Genitalia:    Deferred   Extremities:   Moves all extremities well, no edema, no cyanosis, no              redness   Pulses:   Pulses palpable and equal bilaterally   Skin:   No bleeding, bruising or rash   Lymph nodes:   No palpable adenopathy   Neurologic:   Cranial nerves 2 - 12 grossly intact, sensation intact, DTR        present and equal bilaterally     Results from last 7 days   Lab Units 10/05/22  0948   WBC 10*3/mm3 17.87*   HEMOGLOBIN g/dL 8.2*   HEMATOCRIT % 24.5*   PLATELETS 10*3/mm3 202     Results from last 7 days   Lab Units 10/05/22  0405   SODIUM mmol/L 136   POTASSIUM mmol/L 3.8   CHLORIDE mmol/L 105   CO2 mmol/L 18.0*   BUN mg/dL 30*   CREATININE mg/dL 1.52*   CALCIUM mg/dL 7.9*   BILIRUBIN mg/dL 0.9   ALK PHOS U/L 187*   ALT (SGPT) U/L 14   AST (SGOT) U/L 49*   GLUCOSE mg/dL 119*       Impression: Dyspnea  Sepsis  Colitis  Diarrhea  Debility  Goals of care  Plan: I will plan on reaching out to the hospitalist to have discussions with him about their thoughts about the patient and what conversations we should be having with the patient.  Seems like a lot of the issues are centered around the fact that the patient is not wanting to cooperate with medical treatment.            Brock Stallworth DO  10/06/22  12:55  EDT

## 2022-10-06 NOTE — THERAPY TREATMENT NOTE
Patient Name: Kaylie Cruz  : 1965    MRN: [id number]                              Today's Date: 10/6/2022       Admit Date: 2022    Visit Dx:     ICD-10-CM ICD-9-CM   1. Pneumonia of right middle lobe due to infectious organism  J18.9 486   2. Right sided abdominal pain  R10.9 789.09   3. Cirrhosis of liver with ascites, unspecified hepatic cirrhosis type (HCC)  K74.60 571.5    R18.8    4. ANIKET (acute kidney injury) (HCC)  N17.9 584.9   5. Acute on chronic congestive heart failure, unspecified heart failure type (HCC)  I50.9 428.0   6. Sepsis, due to unspecified organism, unspecified whether acute organ dysfunction present (HCC)  A41.9 038.9     995.91   7. Dysphagia, unspecified type  R13.10 787.20     Patient Active Problem List   Diagnosis   • Chronic pain disorder   • Primary hypertension   • Long-term current use of opiate analgesic   • Lumbosacral spondylosis without myelopathy   • Migraine   • Coronary artery disease involving native coronary artery of native heart without angina pectoris   • Schizophrenia (HCC)   • Esophageal varices (HCC)   • Decompensated hepatic cirrhosis (HCC)   • HFrEF (heart failure with reduced ejection fraction) (HCC)   • Vaginal cancer (HCC)   • High grade squamous intraepithelial lesion (HGSIL) on cytologic smear of vagina   • Type 2 diabetes mellitus with hyperglycemia, with long-term current use of insulin (HCC)   • Chronic hepatitis C without hepatic coma (HCC)   • Tobacco use   • Pneumonia of right middle lobe due to infectious organism   • Sepsis, unspecified organism (HCC)     Past Medical History:   Diagnosis Date   • Anemia    • Cancer (HCC)     cervical   • Depression    • Hyperlipidemia    • Hypertension    • Infectious viral hepatitis    • Myocardial infarction (HCC)    • Substance abuse (HCC)      Past Surgical History:   Procedure Laterality Date   • BLADDER SURGERY     • CARDIAC CATHETERIZATION      4/3/2022   • CHOLECYSTECTOMY     • COLON RESECTION       7/19/2017, descending and transverse colon   • COLON SURGERY     • COLONOSCOPY      7/19/2017   • ENDOSCOPY      EGD 4/1/22   • HYSTERECTOMY     • TRANSESOPHAGEAL ECHOCARDIOGRAM (AVINASH)      Global hyokinesis, 49% EF, LVH      General Information     Row Name 10/06/22 1107          Physical Therapy Time and Intention    Document Type therapy note (daily note)  -LM     Mode of Treatment physical therapy  -LM     Row Name 10/06/22 1107          General Information    Patient Profile Reviewed yes  -LM     Existing Precautions/Restrictions fall;oxygen therapy device and L/min  -LM     Row Name 10/06/22 1107          Cognition    Orientation Status (Cognition) oriented x 3  -LM     Row Name 10/06/22 1107          Safety Issues, Functional Mobility    Safety Issues Affecting Function (Mobility) insight into deficits/self-awareness;safety precaution awareness;safety precautions follow-through/compliance;sequencing abilities  -LM     Impairments Affecting Function (Mobility) balance;cognition;endurance/activity tolerance;strength;pain  -LM           User Key  (r) = Recorded By, (t) = Taken By, (c) = Cosigned By    Initials Name Provider Type    LM Irma Wing, ARIANE Physical Therapist               Mobility     Row Name 10/06/22 1108          Bed Mobility    Bed Mobility supine-sit  -LM     Supine-Sit [place] (Bed Mobility) standby assist;verbal cues  -LM     Assistive Device (Bed Mobility) bed rails;head of bed elevated  -LM     Row Name 10/06/22 1108          Sit-Stand Transfer    Sit-Stand [place] (Transfers) contact guard;1 person assist;verbal cues  -LM     Assistive Device (Sit-Stand Transfers) walker, front-wheeled  -LM     Comment, (Sit-Stand Transfer) Vc's for hand placement.  -LM     Row Name 10/06/22 1108          Gait/Stairs (Locomotion)    [place] Level (Gait) contact guard;1 person assist;verbal cues  -LM     Assistive Device (Gait) walker, front-wheeled  -LM     Distance in Feet (Gait) 16  -LM      Deviations/Abnormal Patterns (Gait) base of support, narrow;randall decreased;gait speed decreased;stride length decreased  -LM     Bilateral Gait Deviations heel strike decreased  -LM     Comment, (Gait/Stairs) No unsteadiness noted.  Pt very fatigued post gait.  Pt ambulated on 8L O2.  -LM           User Key  (r) = Recorded By, (t) = Taken By, (c) = Cosigned By    Initials Name Provider Type    Irma Hirsch PT Physical Therapist               Obj/Interventions     Row Name 10/06/22 1110          Knee (Therapeutic Exercise)    Knee AROM (Therapeutic Exercise) bilateral;LAQ (long arc quad);sitting;5 repetitions  -LM     Row Name 10/06/22 1110          Ankle (Therapeutic Exercise)    Ankle AROM (Therapeutic Exercise) bilateral;dorsiflexion;plantarflexion;sitting;5 repetitions  -LM     Row Name 10/06/22 1110          Balance    Static Sitting Balance standby assist  -LM     Position, Sitting Balance unsupported;sitting edge of bed  -LM     Static Standing Balance contact guard;1-person assist;verbal cues  -LM     Dynamic Standing Balance contact guard;1-person assist;verbal cues  -LM     Position/Device Used, Standing Balance supported;walker, front-wheeled  -LM           User Key  (r) = Recorded By, (t) = Taken By, (c) = Cosigned By    Initials Name Provider Type    Irma Hirsch PT Physical Therapist               Goals/Plan    No documentation.                Clinical Impression     Row Name 10/06/22 1111          Pain    Pretreatment Pain Rating 8/10  -LM     Posttreatment Pain Rating 8/10  -LM     Pain Location - Side/Orientation Right  -LM     Pain Location generalized  -LM     Pain Location - other (see comments)  R ribs and lower back  -LM     Pain Intervention(s) Repositioned;Ambulation/increased activity  -LM     Row Name 10/06/22 1111          Plan of Care Review    Plan of Care Reviewed With patient  -LM     Progress improving  -LM     Outcome Evaluation Pt with significant progress today and  gave excellent effort.  Pt transferred supine-->sit with SBA, stood with CGA, and ambulated 16' using rw with CGAx1.  Ambulated on 8L O2 - fatigued post gait.  Recommend inpt rehab at d/c.  -LM     Row Name 10/06/22 1111          Vital Signs    Pretreatment Heart Rate (beats/min) 80  -LM     Posttreatment Heart Rate (beats/min) 74  -LM     Pre SpO2 (%) 92  -LM     O2 Delivery Pre Treatment supplemental O2  -LM     Post SpO2 (%) 95  -LM     O2 Delivery Post Treatment supplemental O2  -LM     Pre Patient Position Supine  -LM     Post Patient Position Sitting  -LM     Row Name 10/06/22 1111          Positioning and Restraints    Pre-Treatment Position in bed  -LM     Post Treatment Position chair  -LM     In Chair reclined;call light within reach;encouraged to call for assist;exit alarm on;waffle cushion;on mechanical lift sling;notified nsg  -LM           User Key  (r) = Recorded By, (t) = Taken By, (c) = Cosigned By    Initials Name Provider Type    Irma Hirsch, ARIANE Physical Therapist               Outcome Measures     Row Name 10/06/22 1113          How much help from another person do you currently need...    Turning from your back to your side while in flat bed without using bedrails? 3  -LM     Moving from lying on back to sitting on the side of a flat bed without bedrails? 3  -LM     Moving to and from a bed to a chair (including a wheelchair)? 3  -LM     Standing up from a chair using your arms (e.g., wheelchair, bedside chair)? 3  -LM     Climbing 3-5 steps with a railing? 3  -LM     To walk in hospital room? 3  -LM     AM-PAC 6 Clicks Score (PT) 18  -LM     Highest level of mobility 6 --> Walked 10 steps or more  -LM     Row Name 10/06/22 1113          Functional Assessment    Outcome Measure Options AM-PAC 6 Clicks Basic Mobility (PT)  -LM           User Key  (r) = Recorded By, (t) = Taken By, (c) = Cosigned By    Initials Name Provider Type    Irma Hirsch, ARIANE Physical Therapist                              Physical Therapy Education                 Title: PT OT SLP Therapies (In Progress)     Topic: Physical Therapy (In Progress)     Point: Mobility training (In Progress)     Learning Progress Summary           Patient Acceptance, E, NR by FW at 9/30/2022 1340      Show all documentation for this point (1)                 Point: Home exercise program (In Progress)     Learning Progress Summary           Patient Acceptance, E,D, NR by DM at 9/28/2022 1707                   Point: Body mechanics (In Progress)     Learning Progress Summary           Patient Acceptance, E, NR by FW at 9/30/2022 1340      Show all documentation for this point (1)                 Point: Precautions (In Progress)     Learning Progress Summary           Patient Acceptance, E, NR by FW at 9/30/2022 1340      Show all documentation for this point (1)                             User Key     Initials Effective Dates Name Provider Type Discipline    DM 06/16/21 -  Deepthi Chen, PT Physical Therapist PT    FW 05/05/22 -  Chuck Moreno PT Physical Therapist PT              PT Recommendation and Plan     Plan of Care Reviewed With: patient  Progress: improving  Outcome Evaluation: Pt with significant progress today and gave excellent effort.  Pt transferred supine-->sit with SBA, stood with CGA, and ambulated 16' using rw with CGAx1.  Ambulated on 8L O2 - fatigued post gait.  Recommend inpt rehab at d/c.     Time Calculation:    PT Charges     Row Name 10/06/22 1113             Time Calculation    Start Time 1032  -LM      PT Received On 10/06/22  -LM      PT Goal Re-Cert Due Date 10/15/22  -LM              Timed Charges    40019 - PT Therapeutic Exercise Minutes 1  -LM      61232 - Gait Training Minutes  11  -LM              Total Minutes    Timed Charges Total Minutes 12  -LM       Total Minutes 12  -LM            User Key  (r) = Recorded By, (t) = Taken By, (c) = Cosigned By    Initials Name Provider Type    LM Irma Wing, PT  Physical Therapist              Therapy Charges for Today     Code Description Service Date Service Provider Modifiers Qty    85998734612 HC GAIT TRAINING EA 15 MIN 10/6/2022 Irma Wing, PT GP 1          PT G-Codes  Outcome Measure Options: AM-PAC 6 Clicks Basic Mobility (PT)  AM-PAC 6 Clicks Score (PT): 18  AM-PAC 6 Clicks Score (OT): 10    Irma Wing, PT  10/6/2022

## 2022-10-06 NOTE — PLAN OF CARE
"Patient complained of minor pain beginning of shift. Patient discussed how she had used percocet at home and at the hospital. Neither are shown on the record of at home meds or past medications received in hospital. I offered patient a tylenol and she said she can't take tylenol at all. I educated that percocet has tylenol as well and she voiced understanding. Said she did not want any meds at this time. Patient slept for a couple hours. Patient was then transferred to [facility] at 0400 10/6 due to pressure injury on bottom. Moved with no incident and when everything was settled patient began crying out that the pain was too great and I offered pain medicine and she said \"no I want to go home.\" Empathetic listening was utilized and patient refused pain medicine and asked to make some phone calls. Patient has remained on 6L nasal cannula all night with desats when moving or if oxygen falls off.     Patient request to call doctor for pain medicine. TAMIR Rojo preferred to not give narcotics with high oxygen demand and unable to give nsaids due to creatinine. Lidocaine patch given early per TAMIR Rahman.   Problem: Fall Injury Risk  Goal: Absence of Fall and Fall-Related Injury  Outcome: Ongoing, Progressing  Intervention: Identify and Manage Contributors  Recent Flowsheet Documentation  Taken 10/6/2022 0400 by Anup Chau RN  Medication Review/Management: medications reviewed  Taken 10/6/2022 0000 by Anup hCau RN  Medication Review/Management: medications reviewed  Taken 10/5/2022 2049 by Anup Chau, RN  Medication Review/Management: medications reviewed  Intervention: Promote Injury-Free Environment  Recent Flowsheet Documentation  Taken 10/6/2022 0400 by Anup Chau RN  Safety Promotion/Fall Prevention:  • activity supervised  • assistive device/personal items within reach  • clutter free environment maintained  • nonskid shoes/slippers when out of bed  • room organization consistent  • safety round/check " completed  • toileting scheduled  Taken 10/6/2022 0000 by Anup Chau, RN  Safety Promotion/Fall Prevention:  • activity supervised  • assistive device/personal items within reach  • clutter free environment maintained  • nonskid shoes/slippers when out of bed  • room organization consistent  • safety round/check completed  • toileting scheduled  Taken 10/5/2022 2049 by Anup Chau, RN  Safety Promotion/Fall Prevention:  • activity supervised  • clutter free environment maintained  • assistive device/personal items within reach  • nonskid shoes/slippers when out of bed  • room organization consistent  • safety round/check completed  • toileting scheduled   Goal Outcome Evaluation:

## 2022-10-06 NOTE — THERAPY TREATMENT NOTE
Patient Name: Kaylie Cruz  : 1965    MRN: [id number]                              Today's Date: 10/6/2022       Admit Date: 2022    Visit Dx:     ICD-10-CM ICD-9-CM   1. Pneumonia of right middle lobe due to infectious organism  J18.9 486   2. Right sided abdominal pain  R10.9 789.09   3. Cirrhosis of liver with ascites, unspecified hepatic cirrhosis type (HCC)  K74.60 571.5    R18.8    4. ANIKET (acute kidney injury) (HCC)  N17.9 584.9   5. Acute on chronic congestive heart failure, unspecified heart failure type (HCC)  I50.9 428.0   6. Sepsis, due to unspecified organism, unspecified whether acute organ dysfunction present (HCC)  A41.9 038.9     995.91   7. Dysphagia, unspecified type  R13.10 787.20     Patient Active Problem List   Diagnosis   • Chronic pain disorder   • Primary hypertension   • Long-term current use of opiate analgesic   • Lumbosacral spondylosis without myelopathy   • Migraine   • Coronary artery disease involving native coronary artery of native heart without angina pectoris   • Schizophrenia (HCC)   • Esophageal varices (HCC)   • Decompensated hepatic cirrhosis (HCC)   • HFrEF (heart failure with reduced ejection fraction) (HCC)   • Vaginal cancer (HCC)   • High grade squamous intraepithelial lesion (HGSIL) on cytologic smear of vagina   • Type 2 diabetes mellitus with hyperglycemia, with long-term current use of insulin (HCC)   • Chronic hepatitis C without hepatic coma (HCC)   • Tobacco use   • Pneumonia of right middle lobe due to infectious organism   • Sepsis, unspecified organism (HCC)     Past Medical History:   Diagnosis Date   • Anemia    • Cancer (HCC)     cervical   • Depression    • Hyperlipidemia    • Hypertension    • Infectious viral hepatitis    • Myocardial infarction (HCC)    • Substance abuse (HCC)      Past Surgical History:   Procedure Laterality Date   • BLADDER SURGERY     • CARDIAC CATHETERIZATION      4/3/2022   • CHOLECYSTECTOMY     • COLON RESECTION       7/19/2017, descending and transverse colon   • COLON SURGERY     • COLONOSCOPY      7/19/2017   • ENDOSCOPY      EGD 4/1/22   • HYSTERECTOMY     • TRANSESOPHAGEAL ECHOCARDIOGRAM (AVINASH)      Global hyokinesis, 49% EF, LVH      General Information     Row Name 10/06/22 1033          OT Time and Intention    Document Type therapy note (daily note)  -     Mode of Treatment occupational therapy  -     Row Name 10/06/22 1033          General Information    Patient Profile Reviewed yes  -     Existing Precautions/Restrictions fall;oxygen therapy device and L/min  -     Row Name 10/06/22 1033          Cognition    Orientation Status (Cognition) oriented x 3  -     Row Name 10/06/22 1033          Safety Issues, Functional Mobility    Safety Issues Affecting Function (Mobility) insight into deficits/self-awareness;safety precaution awareness;safety precautions follow-through/compliance;sequencing abilities  -     Impairments Affecting Function (Mobility) balance;cognition;endurance/activity tolerance;strength;pain  -     Cognitive Impairments, Mobility Safety/Performance safety precaution awareness;sequencing abilities;insight into deficits/self-awareness  -           User Key  (r) = Recorded By, (t) = Taken By, (c) = Cosigned By    Initials Name Provider Type     Renae Alford OT Occupational Therapist                 Mobility/ADL's     Row Name 10/06/22 1122          Bed Mobility    Bed Mobility supine-sit  -     Supine-Sit [place] (Bed Mobility) standby assist;verbal cues  -     Assistive Device (Bed Mobility) bed rails;head of bed elevated  -     Row Name 10/06/22 1122          Transfers    Transfers sit-stand transfer  -     Sit-Stand [place] (Transfers) contact guard;1 person assist;verbal cues  -     Row Name 10/06/22 1122          Sit-Stand Transfer    Assistive Device (Sit-Stand Transfers) walker, front-wheeled  -     Row Name 10/06/22 1122          Functional Mobility     Functional Mobility- Ind. Level contact guard assist  -     Functional Mobility- Device walker, front-wheeled  -AC     Functional Mobility-Distance (Feet) 16  -     Row Name 10/06/22 1122          Activities of Daily Living    BADL Assessment/Intervention lower body dressing;grooming  -     Row Name 10/06/22 1122          Lower Body Dressing Assessment/Training    [place] (Lower Body Dressing) don;socks;minimum assist (75% patient effort)  -     Position (Lower Body Dressing) edge of bed sitting  -     Row Name 10/06/22 1122          Grooming Assessment/Training    [place] (Grooming) set up;wash face, hands  -     Position (Grooming) edge of bed sitting  -           User Key  (r) = Recorded By, (t) = Taken By, (c) = Cosigned By    Initials Name Provider Type    AC Renae Alford, OT Occupational Therapist               Obj/Interventions    No documentation.                Goals/Plan    No documentation.                Clinical Impression     Row Name 10/06/22 1123          Pain Assessment    Pretreatment Pain Rating 8/10  -     Posttreatment Pain Rating 8/10  -     Pain Location generalized  -     Pain Location - --  R ribs and lower back  -     Pain Intervention(s) Repositioned;Ambulation/increased activity  -     Row Name 10/06/22 1123          Plan of Care Review    Plan of Care Reviewed With patient  -     Progress improving  -     Outcome Evaluation Pt  demo increased independence with self care and mobiltiy.  Pt setup to wash face, min a to don socks,  SBA supine to sit,  CGA STS,  CGA to ambulate 16 ft with RW.  Pt limited by decreased activity tolerance.  Recommend IP rehab upon d/c.  -     Row Name 10/06/22 1123          Therapy Plan Review/Discharge Plan (OT)    Anticipated Discharge Disposition (OT) inpatient rehabilitation facility  -     Row Name 10/06/22 1123          Vital Signs    Pretreatment Heart Rate (beats/min) 80  -AC     Posttreatment Heart  Rate (beats/min) 74  -AC     Pre SpO2 (%) 92  -AC     O2 Delivery Pre Treatment supplemental O2  -AC     O2 Delivery Intra Treatment supplemental O2  -AC     Post SpO2 (%) 95  -AC     O2 Delivery Post Treatment supplemental O2  -AC     Pre Patient Position Supine  -AC     Post Patient Position Sitting  -AC     Row Name 10/06/22 1123          Positioning and Restraints    Pre-Treatment Position in bed  -AC     Post Treatment Position chair  -AC     In Chair notified nsg;reclined;call light within reach;encouraged to call for assist;exit alarm on;waffle cushion;on mechanical lift sling;heels elevated  -AC           User Key  (r) = Recorded By, (t) = Taken By, (c) = Cosigned By    Initials Name Provider Type    AC Renae Alford, OT Occupational Therapist               Outcome Measures     Row Name 10/06/22 1127          How much help from another is currently needed...    Putting on and taking off regular lower body clothing? 3  -AC     Bathing (including washing, rinsing, and drying) 2  -AC     Toileting (which includes using toilet bed pan or urinal) 2  -AC     Putting on and taking off regular upper body clothing 3  -AC     Taking care of personal grooming (such as brushing teeth) 3  -AC     Eating meals 3  -AC     AM-PAC 6 Clicks Score (OT) 16  -AC     Row Name 10/06/22 1113          How much help from another person do you currently need...    Turning from your back to your side while in flat bed without using bedrails? 3  -LM     Moving from lying on back to sitting on the side of a flat bed without bedrails? 3  -LM     Moving to and from a bed to a chair (including a wheelchair)? 3  -LM     Standing up from a chair using your arms (e.g., wheelchair, bedside chair)? 3  -LM     Climbing 3-5 steps with a railing? 3  -LM     To walk in hospital room? 3  -LM     AM-PAC 6 Clicks Score (PT) 18  -LM     Highest level of mobility 6 --> Walked 10 steps or more  -LM     Row Name 10/06/22 1127 10/06/22 1113        Functional Assessment    Outcome Measure Options AM-PAC 6 Clicks Daily Activity (OT)  -AC AM-PAC 6 Clicks Basic Mobility (PT)  -LM          User Key  (r) = Recorded By, (t) = Taken By, (c) = Cosigned By    Initials Name Provider Type    Renae Gorman, OT Occupational Therapist    LM Irma Wing, ARIANE Physical Therapist                Occupational Therapy Education                 Title: PT OT SLP Therapies (In Progress)     Topic: Occupational Therapy (In Progress)     Point: ADL training (Done)     Description:   Instruct learner(s) on proper safety adaptation and remediation techniques during self care or transfers.   Instruct in proper use of assistive devices.              Learning Progress Summary           Patient Acceptance, E, VU by MAGALI at 10/6/2022 1128   Family Acceptance, E, VU by CARO at 9/29/2022 1421    Comment: Reason for OT consult      Show all documentation for this point (3)                 Point: Home exercise program (In Progress)     Description:   Instruct learner(s) on appropriate technique for monitoring, assisting and/or progressing therapeutic exercises/activities.              Learning Progress Summary           Patient Acceptance, E,TB,D, NR by KF at 10/3/2022 1546      Show all documentation for this point (1)                 Point: Precautions (In Progress)     Description:   Instruct learner(s) on prescribed precautions during self-care and functional transfers.              Learning Progress Summary           Patient Acceptance, E,TB,D, NR by KF at 10/3/2022 1546      Show all documentation for this point (1)                 Point: Body mechanics (In Progress)     Description:   Instruct learner(s) on proper positioning and spine alignment during self-care, functional mobility activities and/or exercises.              Learning Progress Summary           Patient Acceptance, E,TB,D, NR by KF at 10/3/2022 1546      Show all documentation for this point (1)                              User Key     Initials Effective Dates Name Provider Type Discipline    AC 06/16/21 -  Renae Alford, OT Occupational Therapist OT    KF 06/16/21 -  Neyda Cagle, OT Occupational Therapist OT    CARO 06/16/21 -  Sabrina Garcia, OT Occupational Therapist OT              OT Recommendation and Plan     Plan of Care Review  Plan of Care Reviewed With: patient  Progress: improving  Outcome Evaluation: Pt  demo increased independence with self care and mobiltiy.  Pt setup to wash face, min a to don socks,  SBA supine to sit,  CGA STS,  CGA to ambulate 16 ft with RW.  Pt limited by decreased activity tolerance.  Recommend IP rehab upon d/c.     Time Calculation:    Time Calculation- OT     Row Name 10/06/22 1113 10/06/22 1033          Time Calculation- OT    OT Start Time -- 1033  -AC     OT Received On -- 10/06/22  -AC     OT Goal Re-Cert Due Date -- 10/08/22  -AC            Timed Charges    83532 - Gait Training Minutes  11  -LM --     14382 - OT Therapeutic Activity Minutes -- 8  -AC     73488 - OT Self Care/Mgmt Minutes -- 8  -AC            Total Minutes    Timed Charges Total Minutes 11  -LM 16  -AC      Total Minutes 11  -LM 16  -AC           User Key  (r) = Recorded By, (t) = Taken By, (c) = Cosigned By    Initials Name Provider Type    AC Renae Alford, OT Occupational Therapist    LM Irma Wing, PT Physical Therapist              Therapy Charges for Today     Code Description Service Date Service Provider Modifiers Qty    [id number] HC OT THERAPEUTIC ACT EA 15 MIN 10/6/2022 Renae Alford OT GO 1               Renae Alford OT  10/6/2022

## 2022-10-06 NOTE — PLAN OF CARE
Problem: Palliative Care  Goal: Enhanced Quality of Life  Intervention: Optimize Psychosocial Wellbeing  Recent Flowsheet Documentation  Taken 10/6/2022 1200 by Guadalupe Ingram, MSW  Supportive Measures: (symptom assessment)   active listening utilized   verbalization of feelings encouraged   other (see comments)  Visit with patient at bedside, patient sleeping in chair but awakens easily, reports 9/10 back pain with anxiety, denies depression, acknowledges mild queasy feeling but was able to eat some lunch.  Patient receptive but wanting to rest.  Palliative Care continues to follow for support and assist with plan of care.    Palliative IDT:  TAYLOR Stallworth DO; PATRICK Clements APRN; SUN Ingram LCSW, [facility]-; LINDA Martinez RN, CHPN; SUN Doshi MDiv, [facility]; DASHAWN Chapa RN, ESTEVAN

## 2022-10-06 NOTE — PLAN OF CARE
Goal Outcome Evaluation:  Plan of Care Reviewed With: patient        Progress: improving  Outcome Evaluation: Pt  demo increased independence with self care and mobiltiy.  Pt setup to wash face, min a to don socks,  SBA supine to sit,  CGA STS,  CGA to ambulate 16 ft with RW.  Pt limited by decreased activity tolerance.  Recommend IP rehab upon d/c.

## 2022-10-06 NOTE — PROGRESS NOTES
INFECTIOUS DISEASE Progress Note    Kaylie Render  1965  [id number]    Consult: 10/3/22  Admit date: 9/27/2022    Requesting Provider: No ref. provider found  Evaluating physician: Hugo Castillo MD  Reason for Consultation: Sepsis, leukocytosis, cirrhosis, hepatitis C, substance use, right lower lobe pneumonia  Chief Complaint:       Subjective   History of present illness:  Patient is a  57 y.o.  Yr old female with a history of substance use, hepatitis C, cirrhosis, cervical cancer, vaginal squamous cell cancer 2009, schizophrenia, diabetes mellitus type 2, congestive heart failure with ejection fraction 49%, recent urinary tract infection treated at the [facility], who developed increasing shortness of breath on 9/25/2022 and was admitted to [facility] on 9/27/2022 with radiographic findings consistent with right lower lobe pneumonia.  The patient was placed on piperacillin tazobactam.  The patient is a poor historian.  Creatinine 1.55, sodium 133, potassium 4.2, AST 49, alkaline phosphatase 153, bilirubin normal, vancomycin trough 11.5, WBC 19.51, hemoglobin 8.9, platelet count 176, MRSA PCR swab screen +10/2, procalcitonin 0.88, urinalysis 6-12 WBCs but urine culture from 10/1 negative, ascites with 208 WBCs with negative culture, 9/28, Legionella and streptococcal urine antigen 9/28 negative.  Ammonia 64.  Lactic acid 2.5 on admission.  Blood cultures x2 from 9/27 negative.  Chest x-ray 10/3 with bilateral pneumonia diffuse right greater than left.  CT scan of the abdomen and pelvis 9/27 with diffuse right lower lobe pneumonia and findings consistent with colitis at the hepatic flexure.  I was consulted on 10/3/2022 for further evaluation and treatment.  Patient was initially placed on piperacillin tazobactam, then vancomycin was added on 10/2/2022.  The patient denies ill contacts, significant travel history, zoonotic exposures, TB, or HIV.  Minimal sputum production.   White blood cell count has increased from 15-19,000.     10/4/2022 history reviewed.  Patient poor historian.  Tolerating linezolid and piperacillin tazobactam for pneumonia with positive MRSA screen.  Still on high flow oxygen at 40 L/min.  Oxygen concentration 55%.  Not coughing much.  Says she is breathing better.    10/5/2022 history reviewed.  No high fevers or chills.  Continues on linezolid and piperacillin tazobactam for pneumonia.  Breathing slowly improved.    10/6/2022 history reviewed.  Tolerating linezolid and piperacillin tazobactam for sepsis and pneumonia.  No high fevers.  Refusing some medications.  Breathing better.    Past Medical History:   Diagnosis Date    Anemia     Cancer (HCC)     cervical    Depression     Hyperlipidemia     Hypertension     Infectious viral hepatitis     Myocardial infarction (HCC)     Substance abuse (HCC)        Past Surgical History:   Procedure Laterality Date    BLADDER SURGERY      CARDIAC CATHETERIZATION      4/3/2022    CHOLECYSTECTOMY      COLON RESECTION      7/19/2017, descending and transverse colon    COLON SURGERY      COLONOSCOPY      7/19/2017    ENDOSCOPY      EGD 4/1/22    HYSTERECTOMY      TRANSESOPHAGEAL ECHOCARDIOGRAM (AVINASH)      Global hyokinesis, 49% EF, LVH       Pediatric History   Patient Parents    Not on file     Other Topics Concern    Not on file   Social History Narrative    Not on file       family history is not on file.    Allergies   Allergen Reactions    Codeine Hives    Morphine Hives    Tagamet [Cimetidine] Other (See Comments)     DISCOLORATION OF SKIN    Toradol [Ketorolac Tromethamine] Hives         There is no immunization history on file for this patient.    Medication:    Current Facility-Administered Medications:     acetaminophen (TYLENOL) tablet 650 mg, 650 mg, Oral, Q4H PRN, 650 mg at 10/04/22 2033 **OR** acetaminophen (TYLENOL) 160 MG/5ML solution 650 mg, 650 mg, Oral, Q4H PRN **OR** acetaminophen (TYLENOL) suppository 650  mg, 650 mg, Rectal, Q4H PRN, Nidia Rangel DO    albuterol (PROVENTIL) nebulizer solution 0.083% 2.5 mg/3mL, 2.5 mg, Nebulization, Q6H PRN, Nidia Rangel DO, 2.5 mg at 10/03/22 0904    amLODIPine (NORVASC) tablet 5 mg, 5 mg, Oral, Daily, Nidia Rangel DO, 5 mg at 10/06/22 0850    aspirin EC tablet 81 mg, 81 mg, Oral, Daily, Nidia Rangel DO, 81 mg at 10/06/22 0850    atorvastatin (LIPITOR) tablet 40 mg, 40 mg, Oral, Nightly, Megan Landeros MD, 40 mg at 10/05/22 2050    benzonatate (TESSALON) capsule 200 mg, 200 mg, Oral, TID PRN, Megan Landeros MD    castor oil-balsam peru (VENELEX) ointment 1 application, 1 application, Topical, Q12H, Nidia Rangel DO, 1 application at 10/06/22 0851    dextrose (D50W) (25 g/50 mL) IV injection 25 g, 25 g, Intravenous, Q15 Min PRN, Nidia Rangel DO    dextrose (GLUTOSE) oral gel 15 g, 15 g, Oral, Q15 Min PRN, Nidia Rangel DO    folic acid (FOLVITE) tablet 1 mg, 1 mg, Oral, Daily, Nidia Rangel DO, 1 mg at 10/06/22 0851    glucagon (human recombinant) (GLUCAGEN DIAGNOSTIC) injection 1 mg, 1 mg, Intramuscular, Q15 Min PRN, Nidia Rangel DO    guaiFENesin (ROBITUSSIN) 100 MG/5ML oral solution 200 mg, 200 mg, Oral, Q4H PRN, Megan Landeros MD, 200 mg at 10/03/22 2059    insulin detemir (LEVEMIR) injection 15 Units, 15 Units, Subcutaneous, Nightly, Megan Landeros MD, 15 Units at 10/05/22 2050    Insulin Lispro (humaLOG) injection 0-9 Units, 0-9 Units, Subcutaneous, TID AC, Nidia Rangel DO, 2 Units at 10/06/22 0852    Insulin Lispro (humaLOG) injection 5 Units, 5 Units, Subcutaneous, TID With Meals, Megan Landeros MD, 5 Units at 10/06/22 0851    ipratropium-albuterol (DUO-NEB) nebulizer solution 3 mL, 3 mL, Nebulization, 4x Daily - RT, Nidia Rangel DO, 3 mL at 10/05/22 1934    lidocaine (LIDODERM) 5 % 1 patch, 1 patch, Transdermal, Q24H, Nidia Rangel DO, 1 patch at 10/06/22  0636    Linezolid (ZYVOX) 600 mg 300 mL, 600 mg, Intravenous, Q12H, Hugo Castillo MD, Last Rate: 300 mL/hr at 10/06/22 0027, 600 mg at 10/06/22 0027    metoprolol tartrate (LOPRESSOR) tablet 50 mg, 50 mg, Oral, BID, Nidia Rangel, DO, 50 mg at 10/06/22 0850    ondansetron (ZOFRAN) tablet 4 mg, 4 mg, Oral, Q6H PRN, 4 mg at 10/06/22 0019 **OR** ondansetron (ZOFRAN) injection 4 mg, 4 mg, Intravenous, Q6H PRN, Nidia Rangel,     oxyCODONE (ROXICODONE) immediate release tablet 5 mg, 5 mg, Oral, Q8H PRN, Nidia Rangel, DO    piperacillin-tazobactam (ZOSYN) 3.375 g in iso-osmotic dextrose 50 ml (premix), 3.375 g, Intravenous, Q8H, Nidia Rangel, DO, 3.375 g at 10/06/22 0852    potassium chloride (MICRO-K) CR capsule 40 mEq, 40 mEq, Oral, PRN, 40 mEq at 09/29/22 2143 **OR** potassium chloride (KLOR-CON) packet 40 mEq, 40 mEq, Oral, PRN **OR** potassium chloride 10 mEq in 100 mL IVPB, 10 mEq, Intravenous, Q1H PRN, Henrietta Ramos II, DO, Last Rate: 100 mL/hr at 09/29/22 1152, 10 mEq at 09/29/22 1152    prochlorperazine (COMPAZINE) injection 2.5 mg, 2.5 mg, Intravenous, Q6H PRN, Nidia Rangel,     riFAXIMin (XIFAXAN) tablet 550 mg, 550 mg, Oral, Q12H, Megan Landeros MD, 550 mg at 10/06/22 0850    [COMPLETED] Insert peripheral IV, , , Once **AND** sodium chloride 0.9 % flush 10 mL, 10 mL, Intravenous, PRN, Stephenie Martinez PA    sodium chloride 0.9 % flush 10 mL, 10 mL, Intravenous, Q12H, Nidia Rangel DO, 10 mL at 10/04/22 2034    sodium chloride 0.9 % flush 10 mL, 10 mL, Intravenous, PRN, Nidia Rangel DO    thiamine (VITAMIN B-1) tablet 100 mg, 100 mg, Oral, Daily, Nidia Rangel DO, 100 mg at 10/06/22 0851    Please refer to the medical record for a full medication list    Review of Systems:      ROS difficult to obtain secondary to mental status    Physical Exam:   Vital Signs   Temp:  [98.5 °F (36.9 °C)-99.7 °F (37.6 °C)] 98.5 °F (36.9 °C)  Heart Rate:  [80-95]  "82  Resp:  [18-20] 20  BP: (126-137)/(66-84) 137/81    Temp  Min: 98.5 °F (36.9 °C)  Max: 99.7 °F (37.6 °C)  BP  Min: 126/66  Max: 137/81  Pulse  Min: 80  Max: 95  Resp  Min: 18  Max: 20  SpO2  Min: 89 %  Max: 92 %    Blood pressure 137/81, pulse 82, temperature 98.5 °F (36.9 °C), temperature source Oral, resp. rate 20, height 160 cm (63\"), weight 52.2 kg (115 lb), SpO2 (!) 89 %.  GENERAL: Awake and alert, in Mild distress. Appears older than stated age.  Cachectic.  HEENT:  Normocephalic, atraumatic.  Oropharynx without thrush. Dentition in good repair. No cervical adenopathy. No neck masses.  Ears externally normal, Nose externally normal.  EYES: No conjunctival injection. No icterus. EOM full.  LYMPHATICS: No lymphadenopathy of the neck or axillary or inguinal regions.   HEART: No murmur, gallop, or pericardial friction rub. Reg rate rhythm.  No JVD.  LUNGS: Bilateral rales, no rhonchi. No respiratory distress, no use of accessory muscles.  ABDOMEN: Soft, nontender, nondistended. No appreciable HSM.  Bowel sounds normal.  SKIN: Warm and dry without cutaneous eruptions.  No nodules.    PSYCHIATRIC: Mental status some confusion.  EXT:  No cellulitic change.  Normal range of motion.  No cyanosis or clubbing.  NEURO: Oriented to name, nonfocal.      Results Review:   I reviewed the patient's new clinical results.  I reviewed the patient's new imaging results and agree with the interpretation.  I reviewed the patient's other test results and agree with the interpretation    Results from last 7 days   Lab Units 10/05/22  0948 10/04/22  0537 10/03/22  0736   WBC 10*3/mm3 17.87* 17.59* 19.51*   HEMOGLOBIN g/dL 8.2* 7.6* 8.9*   HEMATOCRIT % 24.5* 23.9* 27.4*   PLATELETS 10*3/mm3 202 162 176     Results from last 7 days   Lab Units 10/05/22  0405   SODIUM mmol/L 136   POTASSIUM mmol/L 3.8   CHLORIDE mmol/L 105   CO2 mmol/L 18.0*   BUN mg/dL 30*   CREATININE mg/dL 1.52*   GLUCOSE mg/dL 119*   CALCIUM mg/dL 7.9*     Results " from last 7 days   Lab Units 10/05/22  0405   ALK PHOS U/L 187*   BILIRUBIN mg/dL 0.9   ALT (SGPT) U/L 14   AST (SGOT) U/L 49*             Results from last 7 days   Lab Units 10/03/22  0736   VANCOMYCIN TR mcg/mL 11.50     Results from last 7 days   Lab Units 10/05/22  0405   LACTATE mmol/L 1.8     Estimated Creatinine Clearance: 32.8 mL/min (A) (by C-G formula based on SCr of 1.52 mg/dL (H)).     Procalitonin Results:      Lab 10/04/22  0537 10/02/22  0814   PROCALCITONIN 1.42* 0.88*      Brief Urine Lab Results  (Last result in the past 365 days)        Color   Clarity   Blood   Leuk Est   Nitrite   Protein   CREAT   Urine HCG        10/01/22 0117 Yellow   Clear   Negative   Small (1+)   Negative   30 mg/dL (1+)                  No results found for: SITE, ALLENTEST, PHART, VQG5BCI, PO2ART, ADR2VRO, BASEEXCESS, P9WHDBNG, HGBBG, HCTABG, OXYHEMOGLOBI, METHHGBN, CARBOXYHGB, CO2CT, BAROMETRIC, MODALITY, FIO2     Microbiology:  Blood Culture   Date Value Ref Range Status   09/27/2022 No growth at 5 days  Final     No results found for: BCIDPCR, CXREFLEX, CSFCX, CULTURETIS  No results found for: CULTURES, HSVCX, URCX  No results found for: EYECULTURE, GCCX, HSVCULTURE, LABHSV  No results found for: LEGIONELLA, MRSACX, MUMPSCX, MYCOPLASCX  No results found for: NOCARDIACX, STOOLCX  Urine Culture   Date Value Ref Range Status   10/01/2022 No growth  Final     No results found for: VIRALCULTU, WOUNDCX     Blood Culture   Date Value Ref Range Status   09/27/2022 No growth at 5 days  Final     Body Fluid Culture   Date Value Ref Range Status   09/28/2022 No growth at 3 days  Final     Urine Culture   Date Value Ref Range Status   10/01/2022 No growth  Final   (    MRSA swab screen + 10/2/2022.  Radiology:  Imaging Results (Last 72 Hours)       Procedure Component Value Units Date/Time    CT Chest Without Contrast Diagnostic [[id number]] Collected: 10/05/22 1135     Updated: 10/05/22 2231    Narrative:      DATE OF EXAM:  10/5/2022 11:15 AM     PROCEDURE: CT CHEST WO CONTRAST DIAGNOSTIC-     INDICATIONS: worsening PNA; J18.9-Pneumonia, unspecified organism;  R10.9-Unspecified abdominal pain; K74.60-Unspecified cirrhosis of liver;  R18.8-Other ascites; N17.9-Acute kidney failure, unspecified;  I50.9-Heart failure, unspecified; A41.9-Sepsis, unspecified organism;  R13.10-Dysphagia, unspecified     COMPARISON: Chest radiograph 10/04/2022     TECHNIQUE: Routine transaxial slices were obtained through the chest  without the administration of intravenous contrast. Reconstructed  coronal and sagittal images were also obtained. Automated exposure  control and iterative construction methods were used.     The radiation dose reduction device was turned on for each scan per the  ALARA (As Low as Reasonably Achievable) protocol.     FINDINGS:  Earlier chest radiograph shows stable diffuse mixed interstitial and  airspace disease throughout the lungs consistent with multifocal  pneumonia.     Today's study shows predominantly coarse reticular disease throughout  the lungs, with a generalized groundglass appearance. Findings suggest  ARDS, and can be seen as well with any type of pneumonia in an  immunocompromised patient. Pattern does not particularly suggest  pulmonary edema. There is only a small right pleural effusion. No  pulmonary parenchymal mass is seen. The airways appear normally patent.  There are relatively few areas of actual lung consolidation. No  pneumothorax is seen. Limited mediastinal window images show no apparent  adenopathy, mediastinal mass or pericardial effusion. There is extensive  coronary artery calcification. Included images of the upper abdomen show  at least moderately extensive ascites. Bony structures appear to be  intact.        Impression:         1. Dense diffuse and extensive reticular and groundglass disease  throughout the lungs, most likely representing ARDS. Superimposed  atypical pneumonia, or any  pneumonia in an immunocompromised patient  could have this appearance. Much less likely, vasculitis is included in  the differential.  2. Very small right pleural effusion.  3. Moderately extensive ascites noted.     This report was finalized on 10/5/2022 10:28 PM by Dr. Noé Larry MD.       XR Chest 1 View [[id number]] Collected: 10/03/22 0845     Updated: 10/04/22 0813    Narrative:      DATE OF EXAM: 10/03/2022 8:23 AM     PROCEDURE: XR CHEST 1 VIEW-     INDICATIONS: increased O2 requirement; J18.9-Pneumonia, unspecified  organism; R10.9-Unspecified abdominal pain; K74.60-Unspecified cirrhosis  of liver; R18.8-Other ascites; N17.9-Acute kidney failure, unspecified;  I50.9-Heart failure, unspecified; A41.9-Sepsis, unspecified organism;  R13.10-Dysphagia, unspecified     COMPARISON: 10/02/2022     TECHNIQUE: Single radiographic AP view of the chest was obtained.     FINDINGS:  Heart is normal in size. There is diffuse and extensive pulmonary  interstitial disease, remains extensive throughout the right lung, and  appears to be increasing in the left upper and midlung, relatively  sparing the left lung base. No densely consolidated lung effusion or  pneumothorax is seen. Heart is normal in size. Vasculature is obscured  bilaterally.        Impression:      Bilateral pneumonia, diffuse and relatively stable on the right, mildly  increased in the left upper and midlung. No evidence of pneumothorax.     This report was finalized on 10/4/2022 8:10 AM by Dr. Noé Larry MD.       XR Abdomen KUB [[id number]] Collected: 10/04/22 0806     Updated: 10/04/22 0813    Narrative:      DATE OF EXAM: 10/4/2022 4:20 AM     PROCEDURE: XR ABDOMEN KUB-     INDICATIONS: abd distention, ascites; J18.9-Pneumonia, unspecified  organism; R10.9-Unspecified abdominal pain; K74.60-Unspecified cirrhosis  of liver; R18.8-Other ascites; N17.9-Acute kidney failure, unspecified;  I50.9-Heart failure, unspecified; A41.9-Sepsis, unspecified  organism;  R13.10-Dysphagia, unspecified     COMPARISON: No comparisons available.     TECHNIQUE: Single radiographic view of the abdomen was obtained.     FINDINGS:  Nonobstructive, nonspecific bowel gas pattern. There is centralization  of the bowel loops with lateralization of the properitoneal fat stripe,  compatible with ascites. Cholecystectomy clips are noted. A couple  surgical clips are seen in the left mid abdomen. Single surgical clip  and surgical sutures are noted in the lower midline pelvis. No acute  osseous findings.       Impression:      Nonobstructive, nonspecific bowel gas pattern.      Ascites.     This report was finalized on 10/4/2022 8:10 AM by Skip Seaman MD.       XR Chest 1 View [[id number]] Collected: 10/04/22 0728     Updated: 10/04/22 0733    Narrative:         DATE OF EXAM:   10/4/2022 4:17 AM     PROCEDURE:   XR CHEST 1 VW-     INDICATIONS:   F/u pneumonia; J18.9-Pneumonia, unspecified organism; R10.9-Unspecified  abdominal pain; K74.60-Unspecified cirrhosis of liver; R18.8-Other  ascites; N17.9-Acute kidney failure, unspecified; I50.9-Heart failure,  unspecified; A41.9-Sepsis, unspecified organism; R13.10-Dysphagia,  unspecified     COMPARISON:  10/03/2022     TECHNIQUE:   Portable chest radiograph.     FINDINGS:   Heart size within normal limits. No pneumothorax. No significant pleural  effusion. No change in diffuse mixed interstitial and airspace disease  throughout the lungs most concerning for pneumonia. The osseous  structures are intact.       Impression:      No change in diffuse mixed interstitial and airspace disease throughout  the lungs most concerning for multifocal pneumonia.     This report was finalized on 10/4/2022 7:29 AM by Pool Floyd MD.               IMPRESSION:     1.  Right greater than left pneumonia initially seen on CT scan from 9/27/2022, and chest x-ray showing bilateral infiltrates, some of which may be fluid.  Was on reasonable treatment with  piperacillin tazobactam on admission, but also had MRSA positive screen which may indicate an infection with MRSA as the cause of her sputum.  Not producing significant amounts.  Less likely atypical mycobacteria or fungal disease.  Negative Legionella and streptococcal urine antigen.  Respiratory viral PCR -10/3.  Histoplasma urine antigen negative, blastomycosis urine antigen negative.  2.  Leukocytosis, neutrophilic Slightly worse.  Did not receive steroids.  May be related to above issues.  3.  Encephalopathy, toxic and metabolic, as well as elevated ammonia level with cirrhosis and possible hepatic encephalopathy.  4.  Acute hypoxic respiratory failure increasing to 40 L/min on 10/3/2022.  Related to above issues.  5.  Acute renal failure, creatinine 1.55, may be worse with vancomycin.  Creatinine 1.71 on 10/4, worse.  Vancomycin was stopped 10/3.  1.52 on 10/5.  6.  Hyponatremia 132, worse.  7.  Hypocalcemia 7.8, worse.  8.  Cirrhosis, possibly related to substance use, hepatitis C, versus other.  Advanced findings for portal hypertension with ascites.  9.  Hepatitis C, treatment status unknown.  Acute hepatitis panel 10/4/2022 positive for hep C, negative for other.  10.  Elevated alkaline phosphatase 147.  11.  Anemia, chronic disease related to above issues.  Worse.  Also could be related to linezolid.  12.  Schizophrenia.  13.  Substance use, status unclear.  Drug screen had been positive for cocaine (9/28/2022).    Plan:    1.  Diagnostically, continue to follow patient's physical exam, CBC, CMP, CRP.  Radiographic studies.  2.  Therapeutically, continue piperacillin tazobactam, and changed vancomycin to linezolid 600 mg p.o. twice daily for MRSA pneumonia coverage (on 10/3), duration to be determined.  There is a small chance of a drug interaction between linezolid and amitriptyline with a serotonin syndrome.  Benefit greater than risk.  Patient probably is recurrently aspirating.  Likely to continue  antibiotics IV until 10/10/2022, then p.o. alone.  Patient has been refusing some of her oral medications.  3.  Oxygen support therapy.  40 L/min on 10/3/2022.  4.  Pulmonary following, and high risk for intubation if worsens.  5.  DNR/DNI.    I discussed the patient's findings and my recommendations with patient and nursing staff    Our group would be pleased to follow this patient over the course of their hospitalization and assist with outpatient antimicrobial therapy, as indicated.  Further recommendations depend on the results of the cultures and clinical course.    Hugo Castillo MD  10/6/2022

## 2022-10-06 NOTE — PROGRESS NOTES
[facility] Medicine Services  PROGRESS NOTE    Patient Name: Kaylie Cruz  : 1965  MRN: [id number]    Date of Admission: 2022  Primary Care Physician: Iesha Harris DO    Subjective   Subjective     CC: f/u PNA    HPI: Pt seen and examined. Crying this morning. Tells me she has a lot of pain over her R ribs. She has nausea. Asks for phenergan. No more diarrhea per her report.     ROS:  Gen- No fevers, chills  CV- No chest pain, palpitations  Resp- No cough, dyspnea  GI- No Vomiting, abd pain, diarrhea, +Nausea  MSK: R rib pain    Objective   Objective     Vital Signs:   Temp:  [98.5 °F (36.9 °C)-99.7 °F (37.6 °C)] 98.5 °F (36.9 °C)  Heart Rate:  [80-95] 82  Resp:  [18-20] 20  BP: (126-137)/(66-84) 137/81  Flow (L/min):  [6] 6     Physical Exam:  Constitutional: awake, alert, NAD but visibly upset, chronically ill appearing   HENT: NCAT, mucous membranes moist  Respiratory: Coarse breath sounds bilaterally, respiratory effort normal 6L NC  Cardiovascular: RRR, no murmurs, rubs, or gallops  Gastrointestinal: Positive bowel sounds, soft, nontender, nondistended  Musculoskeletal: No bilateral ankle edema, TTP over R ribs   Psychiatric: Flat but cooperative   Neurologic: No focal deficits , speech clear   Skin: DTPI sacrum     Results Reviewed:  LAB RESULTS:      Lab 10/05/22  0948 10/05/22  0405 10/04/22  0537 10/03/22  0736 10/02/22  0814 10/01/22  0430   WBC 17.87*  --  17.59* 19.51* 16.29* 12.15*   HEMOGLOBIN 8.2*  --  7.6* 8.9* 8.7* 8.7*   HEMATOCRIT 24.5*  --  23.9* 27.4* 27.7* 27.5*   PLATELETS 202  --  162 176 194 172   NEUTROS ABS 15.45*  --  14.84* 15.96* 13.85* 10.08*   IMMATURE GRANS (ABS) 0.17*  --  0.41* 0.83*  --   --    LYMPHS ABS 1.21  --  1.20 1.55  --   --    MONOS ABS 0.59  --  0.65 0.71  --   --    EOS ABS 0.35  --  0.39 0.35 0.16 0.36   MCV 79.8  --  82.4 81.3 82.9 82.1   PROCALCITONIN  --   --  1.42*  --  0.88*  --    LACTATE  --  1.8 1.4  --   --    --          Lab 10/05/22  0405 10/04/22  0537 10/03/22  0736 10/02/22  0814 10/01/22  0430   SODIUM 136 132* 133* 137 136   POTASSIUM 3.8 3.9 4.2 4.4 4.5   CHLORIDE 105 102 103 108* 107   CO2 18.0* 19.0* 19.0* 19.0* 19.0*   ANION GAP 13.0 11.0 11.0 10.0 10.0   BUN 30* 34* 30* 30* 36*   CREATININE 1.52* 1.71* 1.55* 1.32* 1.44*   EGFR 39.3* 34.2* 38.4* 46.6* 42.0*   GLUCOSE 119* 159* 123* 203* 136*   CALCIUM 7.9* 7.8* 8.2* 8.4* 8.3*         Lab 10/05/22  0405 10/04/22  0537 10/03/22  0736 10/02/22  0814 10/01/22  0430   TOTAL PROTEIN 6.1 5.7* 6.6 6.8 5.9*   ALBUMIN 2.10* 2.10* 2.10* 2.30* 2.40*   GLOBULIN 4.0 3.6 4.5 4.5 3.5   ALT (SGPT) 14 14 17 21 21   AST (SGOT) 49* 43* 49* 50* 56*   BILIRUBIN 0.9 1.0 0.7 0.7 0.7   ALK PHOS 187* 147* 153* 157* 131*                     Lab 10/03/22  0840   PH, ARTERIAL 7.418   PCO2, ARTERIAL 33.1*   PO2 ART 53.7*   FIO2 40   HCO3 ART 21.4   BASE EXCESS ART -2.7*   CARBOXYHEMOGLOBIN 1.3     Brief Urine Lab Results  (Last result in the past 365 days)      Color   Clarity   Blood   Leuk Est   Nitrite   Protein   CREAT   Urine HCG        10/01/22 0117 Yellow   Clear   Negative   Small (1+)   Negative   30 mg/dL (1+)                 Microbiology Results Abnormal     Procedure Component Value - Date/Time    Histoplasma Ag Ur - Urine, Urinary Bladder [[id number]] Collected: 10/03/22 1122    Lab Status: Final result Specimen: Urine from Urinary Bladder Updated: 10/06/22 0913     Histoplasma Galactomannan Ag Ur <0.5    Narrative:      Test(s) 183562-Histoplasma Gal'jaycee Ag Ur  was developed and its performance characteristics determined  by [facility]. It has not been cleared or approved by the Food  and Drug Administration.  Performed at:  [address]  [id number]  : Kamryn Eli MD, Phone:  [phone]    Blastomyces Antigen - Urine, Urinary Bladder [[id number]] Collected: 10/03/22 1122    Lab Status: Final result Specimen: Urine from  Urinary Bladder Updated: 10/06/22 0712     MVista(R) Blastomyces Ag None Detected ng/mL      Comment: Results reported as ng/mL in 0.2 - 14.7 ng/mL range  Results above the limit of detection but below 0.2 ng/mL  are reported as 'Positive, Below the Limit of  Quantification'  Results above 14.7 ng/mL are reported as 'Positive,  Above the Limit of Quantification'        Specimen Type URINE    Narrative:      Performed at:  01 - [facility] [place] [facility]  [facility] [place]  [id number]  : Jeanine Mancilla MD, Phone:  [phone]    Respiratory Panel PCR w/COVID-19(SARS-CoV-2) MALIK/STANLEY/AIXA/PAD/COR/MAD/HEATHER In-House, NP Swab in UTM/VTM, 3-4 HR TAT - Swab, Nasopharynx [[id number]]  (Normal) Collected: 10/03/22 1124    Lab Status: Final result Specimen: Swab from Nasopharynx Updated: 10/03/22 1237     ADENOVIRUS, PCR Not Detected     Coronavirus 229E Not Detected     Coronavirus HKU1 Not Detected     Coronavirus NL63 Not Detected     Coronavirus OC43 Not Detected     COVID19 Not Detected     Human Metapneumovirus Not Detected     Human Rhinovirus/Enterovirus Not Detected     Influenza A PCR Not Detected     Influenza B PCR Not Detected     Parainfluenza Virus 1 Not Detected     Parainfluenza Virus 2 Not Detected     Parainfluenza Virus 3 Not Detected     Parainfluenza Virus 4 Not Detected     RSV, PCR Not Detected     Bordetella pertussis pcr Not Detected     Bordetella parapertussis PCR Not Detected     Chlamydophila pneumoniae PCR Not Detected     Mycoplasma pneumo by PCR Not Detected    Narrative:      In the setting of a positive respiratory panel with a viral infection PLUS a negative procalcitonin without other underlying concern for bacterial infection, consider observing off antibiotics or discontinuation of antibiotics and continue supportive care. If the respiratory panel is positive for atypical bacterial infection (Bordetella pertussis, Chlamydophila pneumoniae, or Mycoplasma pneumoniae),  consider antibiotic de-escalation to target atypical bacterial infection.    Blood Culture - Blood, Hand, Right [[id number]]  (Normal) Collected: 09/27/22 1300    Lab Status: Final result Specimen: Blood from Hand, Right Updated: 10/02/22 1317     Blood Culture No growth at 5 days    Blood Culture - Blood, Arm, Right [[id number]]  (Normal) Collected: 09/27/22 1245    Lab Status: Final result Specimen: Blood from Arm, Right Updated: 10/02/22 1303     Blood Culture No growth at 5 days    Urine Culture - Urine, Urine, Random Void [[id number]]  (Normal) Collected: 10/01/22 0117    Lab Status: Final result Specimen: Urine, Random Void Updated: 10/02/22 1056     Urine Culture No growth    Body Fluid Culture - Body Fluid, Peritoneum [[id number]] Collected: 09/28/22 1143    Lab Status: Final result Specimen: Body Fluid from Peritoneum Updated: 10/01/22 0729     Body Fluid Culture No growth at 3 days     Gram Stain No WBCs or organisms seen    Legionella Antigen, Urine - Urine, Urine, Clean Catch [[id number]]  (Normal) Collected: 09/28/22 0643    Lab Status: Final result Specimen: Urine, Clean Catch Updated: 09/28/22 1229     LEGIONELLA ANTIGEN, URINE Negative    S. Pneumo Ag Urine or CSF - Urine, Urine, Clean Catch [[id number]]  (Normal) Collected: 09/28/22 0643    Lab Status: Final result Specimen: Urine, Clean Catch Updated: 09/28/22 1229     Strep Pneumo Ag Negative          CT Chest Without Contrast Diagnostic    Result Date: 10/5/2022  DATE OF EXAM: 10/5/2022 11:15 AM  PROCEDURE: CT CHEST WO CONTRAST DIAGNOSTIC-  INDICATIONS: worsening PNA; J18.9-Pneumonia, unspecified organism; R10.9-Unspecified abdominal pain; K74.60-Unspecified cirrhosis of liver; R18.8-Other ascites; N17.9-Acute kidney failure, unspecified; I50.9-Heart failure, unspecified; A41.9-Sepsis, unspecified organism; R13.10-Dysphagia, unspecified  COMPARISON: Chest radiograph 10/04/2022  TECHNIQUE: Routine transaxial slices were obtained through the chest  without the administration of intravenous contrast. Reconstructed coronal and sagittal images were also obtained. Automated exposure control and iterative construction methods were used.  The radiation dose reduction device was turned on for each scan per the ALARA (As Low as Reasonably Achievable) protocol.  FINDINGS: Earlier chest radiograph shows stable diffuse mixed interstitial and airspace disease throughout the lungs consistent with multifocal pneumonia.  Today's study shows predominantly coarse reticular disease throughout the lungs, with a generalized groundglass appearance. Findings suggest ARDS, and can be seen as well with any type of pneumonia in an immunocompromised patient. Pattern does not particularly suggest pulmonary edema. There is only a small right pleural effusion. No pulmonary parenchymal mass is seen. The airways appear normally patent. There are relatively few areas of actual lung consolidation. No pneumothorax is seen. Limited mediastinal window images show no apparent adenopathy, mediastinal mass or pericardial effusion. There is extensive coronary artery calcification. Included images of the upper abdomen show at least moderately extensive ascites. Bony structures appear to be intact.      Impression:  1. Dense diffuse and extensive reticular and groundglass disease throughout the lungs, most likely representing ARDS. Superimposed atypical pneumonia, or any pneumonia in an immunocompromised patient could have this appearance. Much less likely, vasculitis is included in the differential. 2. Very small right pleural effusion. 3. Moderately extensive ascites noted.  This report was finalized on 10/5/2022 10:28 PM by Dr. Noé Larry MD.            I have reviewed the medications:  Scheduled Meds:amLODIPine, 5 mg, Oral, Daily  aspirin, 81 mg, Oral, Daily  atorvastatin, 40 mg, Oral, Nightly  castor oil-balsam peru, 1 application, Topical, Q12H  folic acid, 1 mg, Oral, Daily  insulin detemir, 15  Units, Subcutaneous, Nightly  insulin lispro, 0-9 Units, Subcutaneous, TID AC  Insulin Lispro, 5 Units, Subcutaneous, TID With Meals  ipratropium-albuterol, 3 mL, Nebulization, 4x Daily - RT  lidocaine, 1 patch, Transdermal, Q24H  Linezolid, 600 mg, Intravenous, Q12H  metoprolol tartrate, 50 mg, Oral, BID  piperacillin-tazobactam, 3.375 g, Intravenous, Q8H  rifAXIMin, 550 mg, Oral, Q12H  sodium chloride, 10 mL, Intravenous, Q12H  thiamine, 100 mg, Oral, Daily      Continuous Infusions:   PRN Meds:.•  acetaminophen **OR** acetaminophen **OR** acetaminophen  •  albuterol  •  benzonatate  •  dextrose  •  dextrose  •  glucagon (human recombinant)  •  guaiFENesin  •  ondansetron **OR** ondansetron  •  oxyCODONE  •  potassium chloride **OR** potassium chloride **OR** potassium chloride  •  prochlorperazine  •  [COMPLETED] Insert peripheral IV **AND** sodium chloride  •  sodium chloride    Assessment & Plan   Assessment & Plan     Active Hospital Problems    Diagnosis  POA   • Pneumonia of right middle lobe due to infectious organism [J18.9]  Yes   • Sepsis, unspecified organism (HCC) [A41.9]  Unknown   • Tobacco use [Z72.0]  Yes   • Vaginal cancer (HCC) [C52]  Yes   • High grade squamous intraepithelial lesion (HGSIL) on cytologic smear of vagina [R87.623]  Yes   • Type 2 diabetes mellitus with hyperglycemia, with long-term current use of insulin (HCC) [E11.65, Z79.4]  Not Applicable   • Chronic hepatitis C without hepatic coma (HCC) [B18.2]  Yes   • Decompensated hepatic cirrhosis (HCC) [K72.90, K74.60]  Yes   • HFrEF (heart failure with reduced ejection fraction) (HCC) [I50.20]  Yes   • Coronary artery disease involving native coronary artery of native heart without angina pectoris [I25.10]  Yes   • Schizophrenia (HCC) [F20.9]  Yes   • Chronic pain disorder [G89.4]  Yes   • Primary hypertension [I10]  Yes   • Long-term current use of opiate analgesic [Z79.891]  Not Applicable   • Esophageal varices (HCC) [I85.00]  Yes       Resolved Hospital Problems   No resolved problems to display.        Brief Hospital Course to date:  Kaylie Cruz is a 57 y.o. 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    This patient's problems and plans were partially entered by my partner and updated as appropriate by me 10/06/22.    Sepsis, POA  Bilateral PNA  Leukocytosis   +MRSA PCR   - HFNC weaned to 6L, continue to wean as tolerated   - ID following, continue Zosyn and Zyvox, duration to be determined per ID   - Cryptococcal antigen negative  - Fungal Alina, Fungitell B-D glucan, Histoplasma pending  - Sputum cx ordered but patient without productive cough   - Respiratory PCR negative  - Continue scheduled DuoNebs   - Pt had refused to have CT scan chest done but was agreeable yesterday. Discussed with Dr Hayes who will review for further recommendations   -SLP has seen, S/P FEES 9/29 and functional oral and pharyngeal phase, signed off   -Palliative following, now DNR/DNI     Decompensated cirrhosis with ascites  History of Hepatitis C  Elevated LFTs   -Follows with GI outpatient but missed last appt.   -EGD due 04/2023 for EV screening  -S/P LVP on 9/29 with 2.25L of ascitic fluid removed, does not appear c/w SBP culture NGTD  -Hepatitis panel + Hep C ab  -Continue Xifaxan    -Continue to HOLD LACTULOSE today due to diarrhea yesterday. If no diarrhea today, will resume tomorrow at lower dose with goal of 2-3 soft  BM per day.     Likely CKD  --AM labs pending  --If Cr stable, will resume Lasix today     Hypokalemia  --Had Resolved, AM labs pending      Chronic HFrEF   Coronary artery disease involving native coronary artery of native heart without angina pectoris  Primary  hypertension  -Echo 3/2022 - EF 49%, global hypokinesis, grade 1 diastolic dysfunction  -C 4/3/2022 - nonobstructive LAD to LAD 60% in mid region  -Pt is supposed to follow with Dr Daniel, has missed appts   -Continue ASA 81, metoprolol  -Continue statin      Type 2 diabetes mellitus with diabetic polyneuropathy, with long-term current use of insulin  -A1c has improved from 8.5% to 6.6% with medication compliance over the past 3 months  -Has appointment to establish with Endocrinology next month  -Continue Lantus 15 units nightly  -Continue Humalog 5 units TID meals   -Continue MDSSI  -Continue to HOLD Gabapentin to avoid lethargy      Recent Gross hematuria  -Seen at  8/23/22 and diagnosed with UTI, treated with ABX   -Per patient the dysuria resolved but still having intermittent hematuria  -Patient has already been referred to Urology per PCP  -Repeat UA unremarkable on admission, however, UA from 10/1 as noted above. ABX as noted above  -CT A/P without contrast with unremarkable bladder      Tobacco use  -Encourage smoking cessation  -PCP note says they plan to obtain PFTs outpatient as she has had some wheezing but no formal Dx of COPD  -PRN Albuterol   -Continue scheduled DuoNebs      High grade squamous intraepithelial lesion (HGSIL) on cytologic smear of vagina  Vaginal cancer   -Diagnosed 2010? s/p radiation, surgical resection, and hysterectomy. Previously following with Dr. Roxanne Chaudhry, Gyn-Onc in [place] with [facility].   -She had vaginal pap smear with HGSIL and was lost to follow up.   -PCP has referred to GYN and gyn-onc for further evaluation      Schizophrenia, unspecified type  -Following with [facility] for talk therapy  -Continue to HOLD SEROQUEL AND ELAVIL to avoid lethargy      Substance abuse  -UDS positive for cocaine, oxycodone and TCA     Chronic Anemia  -H&H stable in review of chart, AM labs pending     R rib pain  -Likely from PNA  -Continue Lidoderm patch   -takes  Roxicodone at home, resume low dose 5mg q8PRN  -Obtain Rib XR this AM     Deep Tissue Pressure Injury (Bilateral Sacral area)  -WOC following/PT wound, plan to F/U with [facility] in 2-3 days   -Dolphin bed ordered, patient not happy with this mattress    [facility]   Expected Discharge Date: 10/10    DVT prophylaxis:  Mechanical DVT prophylaxis orders are present.     AM-PAC 6 Clicks Score (PT): 12 (10/04/22 2000)    CODE STATUS:   Code Status and Medical Interventions:   Ordered at: 10/05/22 1229     Medical Intervention Limits:    NO intubation (DNI)     Code Status (Patient has no pulse and is not breathing):    No CPR (Do Not Attempt to Resuscitate)     Medical Interventions (Patient has pulse or is breathing):    Limited Support       Nidia Rangel DO  10/06/22             no

## 2022-10-07 ENCOUNTER — APPOINTMENT (OUTPATIENT)
Dept: GENERAL RADIOLOGY | Facility: HOSPITAL | Age: 57
End: 2022-10-07

## 2022-10-07 LAB
1,3 BETA GLUCAN SER-MCNC: 41 PG/ML
GLUCOSE BLDC GLUCOMTR-MCNC: 159 MG/DL (ref 70–130)
GLUCOSE BLDC GLUCOMTR-MCNC: 182 MG/DL (ref 70–130)
GLUCOSE BLDC GLUCOMTR-MCNC: 80 MG/DL (ref 70–130)
GLUCOSE BLDC GLUCOMTR-MCNC: 89 MG/DL (ref 70–130)

## 2022-10-07 PROCEDURE — 97116 GAIT TRAINING THERAPY: CPT

## 2022-10-07 PROCEDURE — 63710000001 INSULIN LISPRO (HUMAN) PER 5 UNITS: Performed by: INTERNAL MEDICINE

## 2022-10-07 PROCEDURE — 94761 N-INVAS EAR/PLS OXIMETRY MLT: CPT

## 2022-10-07 PROCEDURE — 25010000002 LINEZOLID 600 MG/300ML SOLUTION: Performed by: INTERNAL MEDICINE

## 2022-10-07 PROCEDURE — 82962 GLUCOSE BLOOD TEST: CPT

## 2022-10-07 PROCEDURE — 63710000001 INSULIN LISPRO (HUMAN) PER 5 UNITS: Performed by: FAMILY MEDICINE

## 2022-10-07 PROCEDURE — 94799 UNLISTED PULMONARY SVC/PX: CPT

## 2022-10-07 PROCEDURE — 99232 SBSQ HOSP IP/OBS MODERATE 35: CPT | Performed by: INTERNAL MEDICINE

## 2022-10-07 PROCEDURE — 71045 X-RAY EXAM CHEST 1 VIEW: CPT

## 2022-10-07 PROCEDURE — 97110 THERAPEUTIC EXERCISES: CPT

## 2022-10-07 PROCEDURE — 25010000002 PIPERACILLIN SOD-TAZOBACTAM PER 1 G: Performed by: FAMILY MEDICINE

## 2022-10-07 RX ORDER — GUAIFENESIN 600 MG/1
600 TABLET, EXTENDED RELEASE ORAL EVERY 12 HOURS SCHEDULED
Status: DISCONTINUED | OUTPATIENT
Start: 2022-10-07 | End: 2022-10-12 | Stop reason: HOSPADM

## 2022-10-07 RX ORDER — IPRATROPIUM BROMIDE AND ALBUTEROL SULFATE 2.5; .5 MG/3ML; MG/3ML
3 SOLUTION RESPIRATORY (INHALATION) EVERY 6 HOURS PRN
Status: DISCONTINUED | OUTPATIENT
Start: 2022-10-07 | End: 2022-10-12 | Stop reason: HOSPADM

## 2022-10-07 RX ORDER — LACTULOSE 10 G/15ML
10 SOLUTION ORAL 2 TIMES DAILY
Status: DISCONTINUED | OUTPATIENT
Start: 2022-10-07 | End: 2022-10-07

## 2022-10-07 RX ORDER — LACTULOSE 10 G/15ML
10 SOLUTION ORAL DAILY
Status: DISCONTINUED | OUTPATIENT
Start: 2022-10-07 | End: 2022-10-10

## 2022-10-07 RX ADMIN — ATORVASTATIN CALCIUM 40 MG: 40 TABLET, FILM COATED ORAL at 20:23

## 2022-10-07 RX ADMIN — TAZOBACTAM SODIUM AND PIPERACILLIN SODIUM 3.38 G: 375; 3 INJECTION, SOLUTION INTRAVENOUS at 09:34

## 2022-10-07 RX ADMIN — INSULIN LISPRO 2 UNITS: 100 INJECTION, SOLUTION INTRAVENOUS; SUBCUTANEOUS at 12:49

## 2022-10-07 RX ADMIN — QUETIAPINE FUMARATE 150 MG: 100 TABLET ORAL at 20:23

## 2022-10-07 RX ADMIN — INSULIN LISPRO 5 UNITS: 100 INJECTION, SOLUTION INTRAVENOUS; SUBCUTANEOUS at 12:50

## 2022-10-07 RX ADMIN — LINEZOLID 600 MG: 2 INJECTION, SOLUTION INTRAVENOUS at 23:38

## 2022-10-07 RX ADMIN — LIDOCAINE 1 PATCH: 50 PATCH CUTANEOUS at 09:34

## 2022-10-07 RX ADMIN — TAZOBACTAM SODIUM AND PIPERACILLIN SODIUM 3.38 G: 375; 3 INJECTION, SOLUTION INTRAVENOUS at 02:04

## 2022-10-07 RX ADMIN — IPRATROPIUM BROMIDE AND ALBUTEROL SULFATE 3 ML: .5; 3 SOLUTION RESPIRATORY (INHALATION) at 12:53

## 2022-10-07 RX ADMIN — FOLIC ACID 1 MG: 1 TABLET ORAL at 09:34

## 2022-10-07 RX ADMIN — GUAIFENESIN 600 MG: 600 TABLET, EXTENDED RELEASE ORAL at 21:00

## 2022-10-07 RX ADMIN — RIFAXIMIN 550 MG: 550 TABLET ORAL at 20:23

## 2022-10-07 RX ADMIN — RIFAXIMIN 550 MG: 550 TABLET ORAL at 09:34

## 2022-10-07 RX ADMIN — GUAIFENESIN 600 MG: 600 TABLET, EXTENDED RELEASE ORAL at 14:58

## 2022-10-07 RX ADMIN — TAZOBACTAM SODIUM AND PIPERACILLIN SODIUM 3.38 G: 375; 3 INJECTION, SOLUTION INTRAVENOUS at 17:16

## 2022-10-07 RX ADMIN — LINEZOLID 600 MG: 2 INJECTION, SOLUTION INTRAVENOUS at 12:50

## 2022-10-07 RX ADMIN — CASTOR OIL AND BALSAM, PERU 1 APPLICATION: 788; 87 OINTMENT TOPICAL at 21:00

## 2022-10-07 RX ADMIN — LINEZOLID 600 MG: 2 INJECTION, SOLUTION INTRAVENOUS at 00:27

## 2022-10-07 RX ADMIN — THIAMINE HCL TAB 100 MG 100 MG: 100 TAB at 09:34

## 2022-10-07 RX ADMIN — CASTOR OIL AND BALSAM, PERU 1 APPLICATION: 788; 87 OINTMENT TOPICAL at 09:35

## 2022-10-07 RX ADMIN — IPRATROPIUM BROMIDE AND ALBUTEROL SULFATE 3 ML: .5; 3 SOLUTION RESPIRATORY (INHALATION) at 08:47

## 2022-10-07 RX ADMIN — INSULIN LISPRO 5 UNITS: 100 INJECTION, SOLUTION INTRAVENOUS; SUBCUTANEOUS at 17:16

## 2022-10-07 RX ADMIN — ASPIRIN 81 MG: 81 TABLET, COATED ORAL at 09:34

## 2022-10-07 RX ADMIN — AMLODIPINE BESYLATE 5 MG: 5 TABLET ORAL at 09:34

## 2022-10-07 RX ADMIN — METOPROLOL TARTRATE 50 MG: 50 TABLET, FILM COATED ORAL at 20:23

## 2022-10-07 RX ADMIN — METOPROLOL TARTRATE 50 MG: 50 TABLET, FILM COATED ORAL at 09:34

## 2022-10-07 RX ADMIN — INSULIN LISPRO 2 UNITS: 100 INJECTION, SOLUTION INTRAVENOUS; SUBCUTANEOUS at 17:17

## 2022-10-07 NOTE — PROGRESS NOTES
[facility]  PROGRESS NOTE    Patient Name: Kaylie Cruz  : 1965  MRN: [id number]    Date of Admission: 2022  Primary Care Physician: Iesha Harris DO    Subjective   Subjective     CC:  resp failure     HPI:  No acute events. States she is having pain in the chest when she takes deep breaths or coughs. Asked her to see if we could draw labs in the morning and she was very hesitant.     ROS:  Gen- No fevers, chills  CV- No chest pain, palpitations  Resp- + cough, dyspnea  GI- No N/V/D, abd pain      Objective   Objective     Vital Signs:   Temp:  [97.9 °F (36.6 °C)-98.5 °F (36.9 °C)] 98.5 °F (36.9 °C)  Heart Rate:  [75-85] 82  Resp:  [18-20] 18  BP: (124-147)/(73-91) 139/73  Flow (L/min):  [3-6] 4     Physical Exam:  Constitutional: No acute distress, awake, alert  HENT: NCAT, mucous membranes moist  Respiratory: poor effort; occ cough  Cardiovascular: RRR, no murmurs, rubs, or gallops  Gastrointestinal: Positive bowel sounds, soft, nontender, nondistended  Musculoskeletal: No bilateral ankle edema  Psychiatric: Appropriate affect, cooperative  Neurologic: Oriented x 3, strength symmetric in all extremities, Cranial Nerves grossly intact to confrontation, speech clear  Skin: No rashes    Results Reviewed:  LAB RESULTS:      Lab 10/05/22  0948 10/05/22  0405 10/04/22  0537 10/03/22  0736 10/02/22  0814 10/01/22  0430   WBC 17.87*  --  17.59* 19.51* 16.29* 12.15*   HEMOGLOBIN 8.2*  --  7.6* 8.9* 8.7* 8.7*   HEMATOCRIT 24.5*  --  23.9* 27.4* 27.7* 27.5*   PLATELETS 202  --  162 176 194 172   NEUTROS ABS 15.45*  --  14.84* 15.96* 13.85* 10.08*   IMMATURE GRANS (ABS) 0.17*  --  0.41* 0.83*  --   --    LYMPHS ABS 1.21  --  1.20 1.55  --   --    MONOS ABS 0.59  --  0.65 0.71  --   --    EOS ABS 0.35  --  0.39 0.35 0.16 0.36   MCV 79.8  --  82.4 81.3 82.9 82.1   PROCALCITONIN  --   --  1.42*  --  0.88*  --    LACTATE  --  1.8 1.4  --   --   --          Lab  10/05/22  0405 10/04/22  0537 10/03/22  0736 10/02/22  0814 10/01/22  0430   SODIUM 136 132* 133* 137 136   POTASSIUM 3.8 3.9 4.2 4.4 4.5   CHLORIDE 105 102 103 108* 107   CO2 18.0* 19.0* 19.0* 19.0* 19.0*   ANION GAP 13.0 11.0 11.0 10.0 10.0   BUN 30* 34* 30* 30* 36*   CREATININE 1.52* 1.71* 1.55* 1.32* 1.44*   EGFR 39.3* 34.2* 38.4* 46.6* 42.0*   GLUCOSE 119* 159* 123* 203* 136*   CALCIUM 7.9* 7.8* 8.2* 8.4* 8.3*         Lab 10/05/22  0405 10/04/22  0537 10/03/22  0736 10/02/22  0814 10/01/22  0430   TOTAL PROTEIN 6.1 5.7* 6.6 6.8 5.9*   ALBUMIN 2.10* 2.10* 2.10* 2.30* 2.40*   GLOBULIN 4.0 3.6 4.5 4.5 3.5   ALT (SGPT) 14 14 17 21 21   AST (SGOT) 49* 43* 49* 50* 56*   BILIRUBIN 0.9 1.0 0.7 0.7 0.7   ALK PHOS 187* 147* 153* 157* 131*                     Lab 10/03/22  0840   PH, ARTERIAL 7.418   PCO2, ARTERIAL 33.1*   PO2 ART 53.7*   FIO2 40   HCO3 ART 21.4   BASE EXCESS ART -2.7*   CARBOXYHEMOGLOBIN 1.3     Brief Urine Lab Results  (Last result in the past 365 days)      Color   Clarity   Blood   Leuk Est   Nitrite   Protein   CREAT   Urine HCG        10/01/22 0117 Yellow   Clear   Negative   Small (1+)   Negative   30 mg/dL (1+)                 Microbiology Results Abnormal     Procedure Component Value - Date/Time    Histoplasma Ag Ur - Urine, Urinary Bladder [[id number]] Collected: 10/03/22 1122    Lab Status: Final result Specimen: Urine from Urinary Bladder Updated: 10/06/22 0913     Histoplasma Galactomannan Ag Ur <0.5    Narrative:      Test(s) 183562-Histoplasma Gal'jaycee Ag Ur  was developed and its performance characteristics determined  by [facility]. It has not been cleared or approved by the Food  and Drug Administration.  Performed at:  [address]  [id number]  : Kamryn Eli MD, Phone:  [phone]    Blastomyces Antigen - Urine, Urinary Bladder [[id number]] Collected: 10/03/22 1122    Lab Status: Final result Specimen: Urine from Urinary Bladder  Updated: 10/06/22 0712     MVista(R) Blastomyces Ag None Detected ng/mL      Comment: Results reported as ng/mL in 0.2 - 14.7 ng/mL range  Results above the limit of detection but below 0.2 ng/mL  are reported as 'Positive, Below the Limit of  Quantification'  Results above 14.7 ng/mL are reported as 'Positive,  Above the Limit of Quantification'        Specimen Type URINE    Narrative:      Performed at:   - [facility]  [address] [place] [address] [place]  [id number]  : Jeanine Mancilla MD, Phone:  [phone]    Respiratory Panel PCR w/COVID-19(SARS-CoV-2) MALIK/STANLEY/AIXA/PAD/COR/MAD/HEATHER In-House, NP Swab in UTM/VTM, 3-4 HR TAT - Swab, Nasopharynx [[id number]]  (Normal) Collected: 10/03/22 1124    Lab Status: Final result Specimen: Swab from Nasopharynx Updated: 10/03/22 1237     ADENOVIRUS, PCR Not Detected     Coronavirus 229E Not Detected     Coronavirus HKU1 Not Detected     Coronavirus NL63 Not Detected     Coronavirus OC43 Not Detected     COVID19 Not Detected     Human Metapneumovirus Not Detected     Human Rhinovirus/Enterovirus Not Detected     Influenza A PCR Not Detected     Influenza B PCR Not Detected     Parainfluenza Virus 1 Not Detected     Parainfluenza Virus 2 Not Detected     Parainfluenza Virus 3 Not Detected     Parainfluenza Virus 4 Not Detected     RSV, PCR Not Detected     Bordetella pertussis pcr Not Detected     Bordetella parapertussis PCR Not Detected     Chlamydophila pneumoniae PCR Not Detected     Mycoplasma pneumo by PCR Not Detected    Narrative:      In the setting of a positive respiratory panel with a viral infection PLUS a negative procalcitonin without other underlying concern for bacterial infection, consider observing off antibiotics or discontinuation of antibiotics and continue supportive care. If the respiratory panel is positive for atypical bacterial infection (Bordetella pertussis, Chlamydophila pneumoniae, or Mycoplasma pneumoniae), consider antibiotic  de-escalation to target atypical bacterial infection.    Blood Culture - Blood, Hand, Right [[id number]]  (Normal) Collected: 09/27/22 1300    Lab Status: Final result Specimen: Blood from Hand, Right Updated: 10/02/22 1317     Blood Culture No growth at 5 days    Blood Culture - Blood, Arm, Right [[id number]]  (Normal) Collected: 09/27/22 1245    Lab Status: Final result Specimen: Blood from Arm, Right Updated: 10/02/22 1303     Blood Culture No growth at 5 days    Urine Culture - Urine, Urine, Random Void [[id number]]  (Normal) Collected: 10/01/22 0117    Lab Status: Final result Specimen: Urine, Random Void Updated: 10/02/22 1056     Urine Culture No growth    Body Fluid Culture - Body Fluid, Peritoneum [[id number]] Collected: 09/28/22 1143    Lab Status: Final result Specimen: Body Fluid from Peritoneum Updated: 10/01/22 0729     Body Fluid Culture No growth at 3 days     Gram Stain No WBCs or organisms seen    Legionella Antigen, Urine - Urine, Urine, Clean Catch [[id number]]  (Normal) Collected: 09/28/22 0643    Lab Status: Final result Specimen: Urine, Clean Catch Updated: 09/28/22 1229     LEGIONELLA ANTIGEN, URINE Negative    S. Pneumo Ag Urine or CSF - Urine, Urine, Clean Catch [[id number]]  (Normal) Collected: 09/28/22 0643    Lab Status: Final result Specimen: Urine, Clean Catch Updated: 09/28/22 1229     Strep Pneumo Ag Negative          XR Ribs 2 View Right    Result Date: 10/6/2022  DATE OF EXAM: 10/6/2022 1:27 PM  PROCEDURE: XR RIBS 2 VW RIGHT-  INDICATIONS: Right rib and chest pain.  COMPARISON: Chest x-ray performed on 10/04/2022.  TECHNIQUE: A minimum of two radiologic views of the right ribs were obtained.   FINDINGS: There is no acute or healing right rib fracture. There is diffuse mixed interstitial/airspace disease throughout both lungs similar to yesterday's chest x-ray and likely due to multifocal pneumonia. There is no pneumothorax or right pleural effusion.  The bony thorax is normal.       Impression:  1. No acute or healing right rib fracture. 2. Continued abnormal appearance of lungs compatible with multifocal pneumonia.  This report was finalized on 10/6/2022 3:40 PM by Alonso Oneill MD.      CT Chest Without Contrast Diagnostic    Result Date: 10/5/2022  DATE OF EXAM: 10/5/2022 11:15 AM  PROCEDURE: CT CHEST WO CONTRAST DIAGNOSTIC-  INDICATIONS: worsening PNA; J18.9-Pneumonia, unspecified organism; R10.9-Unspecified abdominal pain; K74.60-Unspecified cirrhosis of liver; R18.8-Other ascites; N17.9-Acute kidney failure, unspecified; I50.9-Heart failure, unspecified; A41.9-Sepsis, unspecified organism; R13.10-Dysphagia, unspecified  COMPARISON: Chest radiograph 10/04/2022  TECHNIQUE: Routine transaxial slices were obtained through the chest without the administration of intravenous contrast. Reconstructed coronal and sagittal images were also obtained. Automated exposure control and iterative construction methods were used.  The radiation dose reduction device was turned on for each scan per the ALARA (As Low as Reasonably Achievable) protocol.  FINDINGS: Earlier chest radiograph shows stable diffuse mixed interstitial and airspace disease throughout the lungs consistent with multifocal pneumonia.  Today's study shows predominantly coarse reticular disease throughout the lungs, with a generalized groundglass appearance. Findings suggest ARDS, and can be seen as well with any type of pneumonia in an immunocompromised patient. Pattern does not particularly suggest pulmonary edema. There is only a small right pleural effusion. No pulmonary parenchymal mass is seen. The airways appear normally patent. There are relatively few areas of actual lung consolidation. No pneumothorax is seen. Limited mediastinal window images show no apparent adenopathy, mediastinal mass or pericardial effusion. There is extensive coronary artery calcification. Included images of the upper abdomen show at least moderately  extensive ascites. Bony structures appear to be intact.      Impression:  1. Dense diffuse and extensive reticular and groundglass disease throughout the lungs, most likely representing ARDS. Superimposed atypical pneumonia, or any pneumonia in an immunocompromised patient could have this appearance. Much less likely, vasculitis is included in the differential. 2. Very small right pleural effusion. 3. Moderately extensive ascites noted.  This report was finalized on 10/5/2022 10:28 PM by Dr. Noé Larry MD.      XR Chest 1 View    Result Date: 10/7/2022  Examination: XR CHEST 1 VW-  Date of Exam: 10/7/2022 4:40 AM  Indication: F/u pneumonia; J18.9-Pneumonia, unspecified organism; R10.9-Unspecified abdominal pain; K74.60-Unspecified cirrhosis of liver; R18.8-Other ascites; N17.9-Acute kidney failure, unspecified; I50.9-Heart failure, unspecified; A41.9-Sepsis, unspecified organism; R13.10-Dysphagia, unspecified.  Comparison: Radiograph 10/04/2022, 10/03/2022  Technique: 1 view of the chest  Findings: Patchy airspace disease is seen within the bilateral upper lobes. No pleural effusions. No pneumothorax. The heart size is normal. The pulmonary vasculature appears mildly indistinct. No acute osseous abnormality identified.      Impression: Mild improvement in bilateral upper lobe pneumonia.  This report was finalized on 10/7/2022 7:21 AM by Jojo Mckinney MD.            I have reviewed the medications:  Scheduled Meds:amLODIPine, 5 mg, Oral, Daily  aspirin, 81 mg, Oral, Daily  atorvastatin, 40 mg, Oral, Nightly  castor oil-balsam peru, 1 application, Topical, Q12H  folic acid, 1 mg, Oral, Daily  insulin detemir, 15 Units, Subcutaneous, Nightly  insulin lispro, 0-9 Units, Subcutaneous, TID AC  Insulin Lispro, 5 Units, Subcutaneous, TID With Meals  ipratropium-albuterol, 3 mL, Nebulization, 4x Daily - RT  lidocaine, 1 patch, Transdermal, Q24H  Linezolid, 600 mg, Intravenous, Q12H  metoprolol tartrate, 50 mg, Oral,  BID  piperacillin-tazobactam, 3.375 g, Intravenous, Q8H  rifAXIMin, 550 mg, Oral, Q12H  sodium chloride, 10 mL, Intravenous, Q12H  thiamine, 100 mg, Oral, Daily      Continuous Infusions:   PRN Meds:.•  acetaminophen **OR** acetaminophen **OR** acetaminophen  •  albuterol  •  benzonatate  •  dextrose  •  dextrose  •  glucagon (human recombinant)  •  guaiFENesin  •  ondansetron **OR** ondansetron  •  oxyCODONE  •  potassium chloride **OR** potassium chloride **OR** potassium chloride  •  prochlorperazine  •  [COMPLETED] Insert peripheral IV **AND** sodium chloride  •  sodium chloride    Assessment & Plan   Assessment & Plan     Active Hospital Problems    Diagnosis  POA   • Pneumonia of right middle lobe due to infectious organism [J18.9]  Yes   • Sepsis, unspecified organism (HCC) [A41.9]  Unknown   • Tobacco use [Z72.0]  Yes   • Vaginal cancer (HCC) [C52]  Yes   • High grade squamous intraepithelial lesion (HGSIL) on cytologic smear of vagina [R87.623]  Yes   • Type 2 diabetes mellitus with hyperglycemia, with long-term current use of insulin (HCC) [E11.65, Z79.4]  Not Applicable   • Chronic hepatitis C without hepatic coma (HCC) [B18.2]  Yes   • Decompensated hepatic cirrhosis (HCC) [K72.90, K74.60]  Yes   • HFrEF (heart failure with reduced ejection fraction) (HCC) [I50.20]  Yes   • Coronary artery disease involving native coronary artery of native heart without angina pectoris [I25.10]  Yes   • Schizophrenia (HCC) [F20.9]  Yes   • Chronic pain disorder [G89.4]  Yes   • Primary hypertension [I10]  Yes   • Long-term current use of opiate analgesic [Z79.891]  Not Applicable   • Esophageal varices (HCC) [I85.00]  Yes      Resolved Hospital Problems   No resolved problems to display.        Brief Hospital Course to date:  Kaylie Cruz is a 57 y.o. female with PMHx vaginal squamous cell carcinoma in 2009 s/p WPRT, vaginal brachytherapy hysterectomy, HTN, HLD, history of substance abuse, history of Hepatitis C and  Cirrhosis, CAD, DM2, Schizophrenia, HFrEF (EF 49%),  who presented to ED with CC of cough, subjective fever, SOB, abdominal pain and diarrhea.     This patient's problems and plans were partially entered by my partner and updated as appropriate by me 10/07/22.     Sepsis, POA  Bilateral PNA  Leukocytosis   +MRSA PCR   - Cryptococcal antigen negative  - Fungal Alina, Fungitell B-D glucan, Histoplasma negative   - Sputum cx ordered but patient without productive cough   - Respiratory PCR negative  - Continue scheduled DuoNebs   - Pt had refused to have CT scan chest done on 10/5 - dense diffuse and extensive reticular and groundglass disease throughout most of the lungs   -SLP has seen, S/P FEES 9/29 and functional oral and pharyngeal phase, signed off   -Palliative following, now DNR/DNI  - Initially was on high flow and now improved to 4L -- continue to wean as tolerated   - ID and pulm following -- appreciate recs --  continue zosyn and zyvox   - Currently holding on steroids      Decompensated cirrhosis with ascites  History of Hepatitis C  Elevated LFTs   -Follows with GI outpatient but missed last appt.   -EGD due 04/2023 for EV screening  -S/P LVP on 9/29 with 2.25L of ascitic fluid removed, does not appear c/w SBP culture NGTD  -Hepatitis panel + Hep C ab  -Continue Xifaxan    - lactulose held due to frequent BMs. Will resume at 10 mg BID      Likely CKD  - Monitor. Currently holding lasix   - Encouraged patient to let us check renal function in the AM      Hypokalemia  --Had Resolved, AM labs      Chronic HFrEF   Coronary artery disease involving native coronary artery of native heart without angina pectoris  Primary hypertension  -Echo 3/2022 - EF 49%, global hypokinesis, grade 1 diastolic dysfunction  -LHC 4/3/2022 - nonobstructive LAD to LAD 60% in mid region  -Pt is supposed to follow with Dr Daniel, has missed appts   -Continue ASA 81, metoprolol  -Continue statin      Type 2 diabetes mellitus with diabetic  polyneuropathy, with long-term current use of insulin  -A1c has improved from 8.5% to 6.6% with medication compliance over the past 3 months  -Has appointment to establish with Endocrinology next month  -Continue Lantus 15 units nightly  -Continue Humalog 5 units TID meals   -Continue MDSSI  -Continue to HOLD Gabapentin to avoid lethargy      Recent Gross hematuria  -Seen at  8/23/22 and diagnosed with UTI, treated with ABX   -Per patient the dysuria resolved but still having intermittent hematuria  -Patient has already been referred to Urology per PCP   -CT A/P without contrast with unremarkable bladder   - Continue with outpatient urology follow up      Tobacco use  -Encourage smoking cessation  -PCP note says they plan to obtain PFTs outpatient as she has had some wheezing but no formal Dx of COPD  -PRN Albuterol   -Continue scheduled DuoNebs      High grade squamous intraepithelial lesion (HGSIL) on cytologic smear of vagina  Vaginal cancer   -Diagnosed 2010? s/p radiation, surgical resection, and hysterectomy. Previously following with Dr. Roxanne Chaudhry, Gyn-Onc in [place] with [facility].   -She had vaginal pap smear with HGSIL and was lost to follow up.   -PCP has referred to GYN and gyn-onc for further evaluation      Schizophrenia, unspecified type  -Following with [place] for talk therapy  - Will restart seroquel 150 mg qHS and monitor -- monitor mental status      Substance abuse  -UDS positive for cocaine, oxycodone and TCA     Chronic Anemia  - Stable. Monitor.      R rib pain  -Likely from PNA  -Continue Lidoderm patch   -Takes Roxicodone at home, resume low dose 5mg q8PRN  - rib XR with no acute or healing rib fracture      Deep Tissue Pressure Injury (Bilateral Sacral area)  -WOC following/PT wound, plan to F/U with [facility] in 2-3 days   -Dolphin bed ordered, patient not happy with this mattress     Expected Discharge Location and Transportation: acute rehab/[facility]   Expected Discharge Date:  10/10     DVT prophylaxis:  Mechanical DVT prophylaxis orders are present.     AM-PAC 6 Clicks Score (PT): 18 (10/06/22 1113)    CODE STATUS:   Code Status and Medical Interventions:   Ordered at: 10/05/22 1229     Medical Intervention Limits:    NO intubation (DNI)     Code Status (Patient has no pulse and is not breathing):    No CPR (Do Not Attempt to Resuscitate)     Medical Interventions (Patient has pulse or is breathing):    Limited Support       Lor Sam DO  10/07/22

## 2022-10-07 NOTE — THERAPY TREATMENT NOTE
Patient Name: Kaylie Cruz  : 1965    MRN: [id number]                              Today's Date: 10/7/2022       Admit Date: 2022    Visit Dx:     ICD-10-CM ICD-9-CM   1. Pneumonia of right middle lobe due to infectious organism  J18.9 486   2. Right sided abdominal pain  R10.9 789.09   3. Cirrhosis of liver with ascites, unspecified hepatic cirrhosis type (HCC)  K74.60 571.5    R18.8    4. ANIKET (acute kidney injury) (HCC)  N17.9 584.9   5. Acute on chronic congestive heart failure, unspecified heart failure type (HCC)  I50.9 428.0   6. Sepsis, due to unspecified organism, unspecified whether acute organ dysfunction present (HCC)  A41.9 038.9     995.91   7. Dysphagia, unspecified type  R13.10 787.20     Patient Active Problem List   Diagnosis   • Chronic pain disorder   • Primary hypertension   • Long-term current use of opiate analgesic   • Lumbosacral spondylosis without myelopathy   • Migraine   • Coronary artery disease involving native coronary artery of native heart without angina pectoris   • Schizophrenia (HCC)   • Esophageal varices (HCC)   • Decompensated hepatic cirrhosis (HCC)   • HFrEF (heart failure with reduced ejection fraction) (HCC)   • Vaginal cancer (HCC)   • High grade squamous intraepithelial lesion (HGSIL) on cytologic smear of vagina   • Type 2 diabetes mellitus with hyperglycemia, with long-term current use of insulin (HCC)   • Chronic hepatitis C without hepatic coma (HCC)   • Tobacco use   • Pneumonia of right middle lobe due to infectious organism   • Sepsis, unspecified organism (HCC)     Past Medical History:   Diagnosis Date   • Anemia    • Cancer (HCC)     cervical   • Depression    • Hyperlipidemia    • Hypertension    • Infectious viral hepatitis    • Myocardial infarction (HCC)    • Substance abuse (HCC)      Past Surgical History:   Procedure Laterality Date   • BLADDER SURGERY     • CARDIAC CATHETERIZATION      4/3/2022   • CHOLECYSTECTOMY     • COLON RESECTION       7/19/2017, descending and transverse colon   • COLON SURGERY     • COLONOSCOPY      7/19/2017   • ENDOSCOPY      EGD 4/1/22   • HYSTERECTOMY     • TRANSESOPHAGEAL ECHOCARDIOGRAM (AVINASH)      Global hyokinesis, 49% EF, LVH      General Information     Row Name 10/07/22 1023          Physical Therapy Time and Intention    Document Type therapy note (daily note)  -AS     Mode of Treatment physical therapy  -AS     Row Name 10/07/22 1023          General Information    Patient Profile Reviewed yes  -AS     Existing Precautions/Restrictions fall;oxygen therapy device and L/min  4L O2 per NC  -AS     Barriers to Rehab medically complex  -AS     Row Name 10/07/22 1023          Cognition    Orientation Status (Cognition) oriented x 3  -AS     Row Name 10/07/22 1023          Safety Issues, Functional Mobility    Safety Issues Affecting Function (Mobility) awareness of need for assistance;insight into deficits/self-awareness;safety precaution awareness;safety precautions follow-through/compliance;positioning of assistive device;sequencing abilities  -AS     Impairments Affecting Function (Mobility) balance;cognition;endurance/activity tolerance;strength;pain  -AS     Cognitive Impairments, Mobility Safety/Performance awareness, need for assistance;insight into deficits/self-awareness;judgment;safety precaution follow-through;sequencing abilities  -AS     Comment, Safety Issues/Impairments (Mobility) patient needing max encouragement to participate in therapy  -AS           User Key  (r) = Recorded By, (t) = Taken By, (c) = Cosigned By    Initials Name Provider Type    AS Yvonne Stauffer PTA Physical Therapist Assistant               Mobility     Row Name 10/07/22 1026          Bed Mobility    Supine-Sit Patrick (Bed Mobility) standby assist  -AS     Assistive Device (Bed Mobility) head of bed elevated;bed rails  -AS     Comment, (Bed Mobility) assist needed for line management  -AS     Row Name 10/07/22 1026           Transfers    Comment, (Transfers) mutliple cues to push up from bed  -AS     Row Name 10/07/22 1026          Bed-Chair Transfer    Bed-Chair Hettinger (Transfers) verbal cues;minimum assist (75% patient effort);1 person assist  -AS     Assistive Device (Bed-Chair Transfers) walker, front-wheeled  -AS     Row Name 10/07/22 1026          Sit-Stand Transfer    Sit-Stand [place]er (Transfers) verbal cues;contact guard;1 person assist  -AS     Assistive Device (Sit-Stand Transfers) walker, front-wheeled  -AS     Comment, (Sit-Stand Transfer) verbal cues for hadn placement  -AS     Row Name 10/07/22 1026          Gait/Stairs (Locomotion)    H[place]er Level (Gait) verbal cues;contact guard;minimum assist (75% patient effort);1 person assist;1 person to manage equipment  -AS     Assistive Device (Gait) walker, front-wheeled  -AS     Distance in Feet (Gait) 15 + 15  -AS     Deviations/Abnormal Patterns (Gait) base of support, narrow;randall decreased;gait speed decreased;stride length decreased  -AS     Bilateral Gait Deviations heel strike decreased  -AS     Comment, (Gait/Stairs) patient ambulated 15' + 15' with CGA/Min assist x1 and rolling walker and 4L O2 per nc. Tech assisted with IV pole. Verbal cues for safe use of walker and PLB technique. Gait distance limited by weakness and fatigue.  -AS           User Key  (r) = Recorded By, (t) = Taken By, (c) = Cosigned By    Initials Name Provider Type    AS Yvonne Stauffer PTA Physical Therapist Assistant               Obj/Interventions     Row Name 10/07/22 1035          Motor Skills    Therapeutic Exercise knee;ankle  -AS     Row Name 10/07/22 1035          Knee (Therapeutic Exercise)    Knee (Therapeutic Exercise) strengthening exercise  -AS     Knee Strengthening (Therapeutic Exercise) bilateral;marching while seated;LAQ (long arc quad);sitting;10 repetitions  -AS     Row Name 10/07/22 1035          Ankle (Therapeutic Exercise)    Ankle (Therapeutic  Exercise) AROM (active range of motion)  -AS     Ankle AROM (Therapeutic Exercise) bilateral;dorsiflexion;plantarflexion;sitting;10 repetitions  -AS           User Key  (r) = Recorded By, (t) = Taken By, (c) = Cosigned By    Initials Name Provider Type    AS Yvonne Stauffer PTA Physical Therapist Assistant               Goals/Plan    No documentation.                Clinical Impression     Row Name 10/07/22 1036          Pain    Pretreatment Pain Rating 5/10  -AS     Posttreatment Pain Rating 5/10  -AS     Pain Location - Side/Orientation Right  -AS     Pain Location - abdomen  -AS     Pain Intervention(s) Repositioned;Ambulation/increased activity  -AS     Row Name 10/07/22 1036          Plan of Care Review    Plan of Care Reviewed With patient  -AS     Progress improving  -AS     Outcome Evaluation patient ambulated 15' + 15' with CGA/Min assist x1 and rolling walker and 4L O2 per nc. Tech assisted with IV pole. Verbal cues for safe use of walker and PLB technique. Gait distance limited by weakness and fatigue.  -AS     Row Name 10/07/22 1036          Positioning and Restraints    Pre-Treatment Position in bed  -AS     Post Treatment Position chair  -AS     In Chair reclined;call light within reach;encouraged to call for assist;exit alarm on;waffle cushion;legs elevated;on mechanical lift sling  -AS           User Key  (r) = Recorded By, (t) = Taken By, (c) = Cosigned By    Initials Name Provider Type    AS Yvonne Stauffer PTA Physical Therapist Assistant               Outcome Measures     Row Name 10/07/22 1037          How much help from another person do you currently need...    Turning from your back to your side while in flat bed without using bedrails? 4  -AS     Moving from lying on back to sitting on the side of a flat bed without bedrails? 4  -AS     Moving to and from a bed to a chair (including a wheelchair)? 3  -AS     Standing up from a chair using your arms (e.g., wheelchair, bedside  chair)? 3  -AS     Climbing 3-5 steps with a railing? 2  -AS     To walk in hospital room? 3  -AS     AM-PAC 6 Clicks Score (PT) 19  -AS     Highest level of mobility 6 --> Walked 10 steps or more  -AS     Row Name 10/07/22 1037          Functional Assessment    Outcome Measure Options AM-PAC 6 Clicks Basic Mobility (PT)  -AS           User Key  (r) = Recorded By, (t) = Taken By, (c) = Cosigned By    Initials Name Provider Type    AS Yvonne Stauffer PTA Physical Therapist Assistant                             Physical Therapy Education                 Title: PT OT SLP Therapies (In Progress)     Topic: Physical Therapy (In Progress)     Point: Mobility training (In Progress)     Learning Progress Summary           Patient Acceptance, E, NR by AS at 10/7/2022 1037      Show all documentation for this point (2)                 Point: Home exercise program (In Progress)     Learning Progress Summary           Patient Acceptance, E, NR by AS at 10/7/2022 1037      Show all documentation for this point (1)                 Point: Body mechanics (In Progress)     Learning Progress Summary           Patient Acceptance, E, NR by AS at 10/7/2022 1037      Show all documentation for this point (2)                 Point: Precautions (In Progress)     Learning Progress Summary           Patient Acceptance, E, NR by AS at 10/7/2022 1037      Show all documentation for this point (2)                             User Key     Initials Effective Dates Name Provider Type Discipline    AS 06/16/21 -  Yvonne Stauffer PTA Physical Therapist Assistant PT              PT Recommendation and Plan     Plan of Care Reviewed With: patient  Progress: improving  Outcome Evaluation: patient ambulated 15' + 15' with CGA/Min assist x1 and rolling walker and 4L O2 per nc. Tech assisted with IV pole. Verbal cues for safe use of walker and PLB technique. Gait distance limited by weakness and fatigue.     Time Calculation:    PT Charges      Row Name 10/07/22 1037             Time Calculation    Start Time 0950  -AS      PT Received On 10/07/22  -AS      PT Goal Re-Cert Due Date 10/15/22  -AS              Timed Charges    46997 - PT Therapeutic Exercise Minutes 10  -AS      55857 - Gait Training Minutes  15  -AS              Total Minutes    Timed Charges Total Minutes 25  -AS       Total Minutes 25  -AS            User Key  (r) = Recorded By, (t) = Taken By, (c) = Cosigned By    Initials Name Provider Type    AS Yvonne Stauffer PTA Physical Therapist Assistant              Therapy Charges for Today     Code Description Service Date Service Provider Modifiers Qty    32503769365 HC PT THER PROC EA 15 MIN 10/7/2022 Yvonne Stauffer, DIRK GP 1    99549082264 HC GAIT TRAINING EA 15 MIN 10/7/2022 Yvonne Stauffer, DIRK GP 1    44575428954 HC PT THER SUPP EA 15 MIN 10/7/2022 Yvonne Stauffer PTA GP 2          PT G-Codes  Outcome Measure Options: AM-PAC 6 Clicks Basic Mobility (PT)  AM-PAC 6 Clicks Score (PT): 19  AM-PAC 6 Clicks Score (OT): 16    Yvonne Stauffer PTA  10/7/2022

## 2022-10-07 NOTE — PROGRESS NOTES
Palliative Care Progress Note    Date of Admission: 9/27/2022    Subjective: Patient with no complaints other than diarrhea at this point in time.  Current Code Status     Date Active Code Status Order ID Comments User Context       10/5/2022 1229 No CPR (Do Not Attempt to Resuscitate) [id number]  Brock Stallworth, DO Inpatient     Advance Care Planning Activity      Questions for Current Code Status     Question Answer    Code Status (Patient has no pulse and is not breathing) No CPR (Do Not Attempt to Resuscitate)    Medical Interventions (Patient has pulse or is breathing) Limited Support    Medical Intervention Limits: NO intubation (DNI)        No current facility-administered medications on file prior to encounter.     Current Outpatient Medications on File Prior to Encounter   Medication Sig Dispense Refill   • albuterol sulfate  (90 Base) MCG/ACT inhaler Inhale 2 puffs Every 4 (Four) Hours As Needed for Wheezing. 8 g 1   • amitriptyline (ELAVIL) 50 MG tablet Take 1 tablet by mouth Every Night. 30 tablet 2   • amLODIPine (NORVASC) 5 MG tablet Take 1 tablet by mouth Daily. 30 tablet 2   • aspirin 81 MG EC tablet Take 1 tablet by mouth Daily. 30 tablet 2   • atorvastatin (LIPITOR) 40 MG tablet Take 1 tablet by mouth Daily. 30 tablet 2   • Cholecalciferol 50 MCG (2000 UT) capsule Take 2,000 Units by mouth Daily.     • Easy Touch Lancets 30G misc      • folic acid (FOLVITE) 1 MG tablet Take 1 mg by mouth Daily.     • furosemide (LASIX) 40 MG tablet Take 1 tablet by mouth Daily. 30 tablet 5   • gabapentin (NEURONTIN) 800 MG tablet Take 800 mg by mouth Every 8 (Eight) Hours.     • Insulin Aspart FlexPen 100 UNIT/ML solution pen-injector Inject 5 Units under the skin into the appropriate area as directed 3 (Three) Times a Day Before Meals. 9 mL 5   • Insulin Pen Needle (Pen Needles) 31G X 5 MM misc 1 each 4 (Four) Times a Day Before Meals & at Bedtime. 300 each 2   • lactulose (CHRONULAC) 10 GM/15ML solution  "Take 15 mL by mouth 3 (Three) Times a Day As Needed (constipation). 473 mL 1   • metoprolol tartrate (LOPRESSOR) 50 MG tablet Take 1 tablet by mouth 2 (Two) Times a Day. 60 tablet 2   • OneTouch Verio test strip      • polyethylene glycol (MIRALAX) 17 GM/SCOOP powder TAKE 17 GRAMS BY MOUTH DAILY AS NEEDED (CONSTIPATION). 238 g 1   • potassium chloride (K-DUR,KLOR-CON) 20 MEQ CR tablet TAKE 1 TABLET BY MOUTH DAILY AS NEEDED (WHEN TAKING FUROSEMIDE (LASIX)). 30 tablet 1   • QUEtiapine (SEROquel) 300 MG tablet Take 1 tablet by mouth Every Night. 30 tablet 2   • Sofosbuvir-Velpatasvir 400-100 MG tablet      • spironolactone (Aldactone) 50 MG tablet Take 1 tablet by mouth Daily. 30 tablet 2   • thiamine 100 MG tablet      • insulin glargine (LANTUS, SEMGLEE) 100 UNIT/ML injection Inject 10 Units under the skin into the appropriate area as directed Every Night. 3 mL 2        •  acetaminophen **OR** acetaminophen **OR** acetaminophen  •  albuterol  •  benzonatate  •  dextrose  •  dextrose  •  glucagon (human recombinant)  •  guaiFENesin  •  ondansetron **OR** ondansetron  •  oxyCODONE  •  potassium chloride **OR** potassium chloride **OR** potassium chloride  •  prochlorperazine  •  [COMPLETED] Insert peripheral IV **AND** sodium chloride  •  sodium chloride    Objective: /73 (BP Location: Left arm, Patient Position: Lying)   Pulse 82   Temp 98.5 °F (36.9 °C) (Oral)   Resp 18   Ht 160 cm (63\")   Wt 52.2 kg (115 lb)   SpO2 94%   BMI 20.37 kg/m²      Intake/Output Summary (Last 24 hours) at 10/7/2022 1147  Last data filed at 10/7/2022 0836  Gross per 24 hour   Intake 600 ml   Output 700 ml   Net -100 ml     Physical Exam:      General Appearance:    Alert, cooperative, frail-appearing   Head:    Normocephalic, without obvious abnormality, atraumatic   Eyes:            Lids and lashes normal, conjunctivae and sclerae normal, no   icterus, no pallor, corneas clear, PERRLA   Ears:    Ears appear intact with no " abnormalities noted   Throat:   No oral lesions, no thrush, oral mucosa moist   Neck:   No adenopathy, supple, trachea midline, no thyromegaly, no     carotid bruit, no JVD   Back:     No kyphosis present, no scoliosis present, no skin lesions,       erythema or scars, no tenderness to percussion or                   palpation,   range of motion normal   Lungs:     Clear to auscultation,respirations regular, even and                   unlabored    Heart:    Regular rhythm and normal rate, normal S1 and S2, no            murmur, no gallop, no rub, no click   Breast Exam:    Deferred   Abdomen:     Normal bowel sounds, no masses, no organomegaly, soft        non-tender, non-distended, no guarding, no rebound                 tenderness   Genitalia:    Deferred   Extremities:   Moves all extremities well, no edema, no cyanosis, no              redness   Pulses:   Pulses palpable and equal bilaterally   Skin:   No bleeding, bruising or rash   Lymph nodes:   No palpable adenopathy   Neurologic:   Cranial nerves 2 - 12 grossly intact, sensation intact, DTR        present and equal bilaterally     Results from last 7 days   Lab Units 10/05/22  0948   WBC 10*3/mm3 17.87*   HEMOGLOBIN g/dL 8.2*   HEMATOCRIT % 24.5*   PLATELETS 10*3/mm3 202     Results from last 7 days   Lab Units 10/05/22  0405   SODIUM mmol/L 136   POTASSIUM mmol/L 3.8   CHLORIDE mmol/L 105   CO2 mmol/L 18.0*   BUN mg/dL 30*   CREATININE mg/dL 1.52*   CALCIUM mg/dL 7.9*   BILIRUBIN mg/dL 0.9   ALK PHOS U/L 187*   ALT (SGPT) U/L 14   AST (SGOT) U/L 49*   GLUCOSE mg/dL 119*       Impression: Dyspnea  Sepsis  Colitis  Diarrhea  Debility  Goals of care  Plan: Patient wants to continue treatment.  I did briefly talk to her about hospice, which surprised her as she feels that she is not at that stage.  Did tell her that she continues to not participate in therapy and continues to refuse treatments that she will definitely be hospice appropriate and will not  recover.  With this in mind I do not feel that palliative has anything left to offer the patient at present.  Palliative medicine will sign off.            Brock Stallworth DO  10/07/22  11:47 EDT

## 2022-10-07 NOTE — CASE MANAGEMENT/SOCIAL WORK
Discharge Planning Assessment  [facility]     Patient Name: Kaylie Cruz  MRN: [id number]  Today's Date: 10/7/2022    Admit Date: 9/27/2022    Plan: IRF   Discharge Needs Assessment    No documentation.                Discharge Plan     Row Name 10/07/22 1220       Plan    Plan IRF    Patient/Family in Agreement with Plan yes    Plan Comments Palliative, Pulm, and ID following.  Cont IV Zosyn (til 10/10) and PO Zyvox.  O2 weaned to 4lpm/NC.  PT wound care providing MIST therapy to sacral wound.  Pt is progressing with therapy.  [facility] at [facility] is still following.  CM will f/u on Monday.    Final Discharge Disposition Code 30 - still a patient              Continued Care and Services - Admitted Since 9/27/2022     Destination     Service Provider Request Status Selected Services Address Phone Fax Patient Preferred    [facility]  Pending - No Request Sent N/A [address] [id number] [phone] --              Expected Discharge Date and Time     Expected Discharge Date Expected Discharge Time    Oct 11, 2022          Demographic Summary    No documentation.                Functional Status    No documentation.                Psychosocial    No documentation.                Abuse/Neglect    No documentation.                Legal    No documentation.                Substance Abuse    No documentation.                Patient Forms    No documentation.                   Luiza Redding RN

## 2022-10-07 NOTE — PROGRESS NOTES
INFECTIOUS DISEASE Progress Note    Kaylie Render  1965  [id number]    Consult: 10/3/22  Admit date: 9/27/2022    Requesting Provider: No ref. provider found  Evaluating physician: Hugo Castillo MD  Reason for Consultation: Sepsis, leukocytosis, cirrhosis, hepatitis C, substance use, right lower lobe pneumonia  Chief Complaint:       Subjective   History of present illness:  Patient is a  57 y.o.  Yr old female with a history of substance use, hepatitis C, cirrhosis, cervical cancer, vaginal squamous cell cancer 2009, schizophrenia, diabetes mellitus type 2, congestive heart failure with ejection fraction 49%, recent urinary tract infection treated at the [facility], who developed increasing shortness of breath on 9/25/2022 and was admitted to [facility] on 9/27/2022 with radiographic findings consistent with right lower lobe pneumonia.  The patient was placed on piperacillin tazobactam.  The patient is a poor historian.  Creatinine 1.55, sodium 133, potassium 4.2, AST 49, alkaline phosphatase 153, bilirubin normal, vancomycin trough 11.5, WBC 19.51, hemoglobin 8.9, platelet count 176, MRSA PCR swab screen +10/2, procalcitonin 0.88, urinalysis 6-12 WBCs but urine culture from 10/1 negative, ascites with 208 WBCs with negative culture, 9/28, Legionella and streptococcal urine antigen 9/28 negative.  Ammonia 64.  Lactic acid 2.5 on admission.  Blood cultures x2 from 9/27 negative.  Chest x-ray 10/3 with bilateral pneumonia diffuse right greater than left.  CT scan of the abdomen and pelvis 9/27 with diffuse right lower lobe pneumonia and findings consistent with colitis at the hepatic flexure.  I was consulted on 10/3/2022 for further evaluation and treatment.  Patient was initially placed on piperacillin tazobactam, then vancomycin was added on 10/2/2022.  The patient denies ill contacts, significant travel history, zoonotic exposures, TB, or HIV.  Minimal sputum production.   White blood cell count has increased from 15-19,000.     10/4/2022 history reviewed.  Patient poor historian.  Tolerating linezolid and piperacillin tazobactam for pneumonia with positive MRSA screen.  Still on high flow oxygen at 40 L/min.  Oxygen concentration 55%.  Not coughing much.  Says she is breathing better.    10/5/2022 history reviewed.  No high fevers or chills.  Continues on linezolid and piperacillin tazobactam for pneumonia.  Breathing slowly improved.    10/6/2022 history reviewed.  Tolerating linezolid and piperacillin tazobactam for sepsis and pneumonia.  No high fevers.  Refusing some medications.  Breathing better.    10/7/2022 history reviewed.  Breathing continues to improve.  Difficulty with accepting some of her care.  Oxygen needs at 4 L/min nasal cannula.  No fever.  Tolerating linezolid and piperacillin tazobactam for sepsis and pneumonia to continue until 10/10 IV, and then convert to oral if possible.  She has been refusing oral medications.    Past Medical History:   Diagnosis Date   • Anemia    • Cancer (HCC)     cervical   • Depression    • Hyperlipidemia    • Hypertension    • Infectious viral hepatitis    • Myocardial infarction (HCC)    • Substance abuse (HCC)        Past Surgical History:   Procedure Laterality Date   • BLADDER SURGERY     • CARDIAC CATHETERIZATION      4/3/2022   • CHOLECYSTECTOMY     • COLON RESECTION      7/19/2017, descending and transverse colon   • COLON SURGERY     • COLONOSCOPY      7/19/2017   • ENDOSCOPY      EGD 4/1/22   • HYSTERECTOMY     • TRANSESOPHAGEAL ECHOCARDIOGRAM (AVINASH)      Global hyokinesis, 49% EF, LVH       Pediatric History   Patient Parents   • Not on file     Other Topics Concern   • Not on file   Social History Narrative   • Not on file       family history is not on file.    Allergies   Allergen Reactions   • Codeine Hives   • Morphine Hives   • Tagamet [Cimetidine] Other (See Comments)     DISCOLORATION OF SKIN   • Toradol [Ketorolac  Tromethamine] Hives         There is no immunization history on file for this patient.    Medication:    Current Facility-Administered Medications:   •  acetaminophen (TYLENOL) tablet 650 mg, 650 mg, Oral, Q4H PRN, 650 mg at 10/04/22 2033 **OR** acetaminophen (TYLENOL) 160 MG/5ML solution 650 mg, 650 mg, Oral, Q4H PRN **OR** acetaminophen (TYLENOL) suppository 650 mg, 650 mg, Rectal, Q4H PRN, Nidia Rangel DO  •  albuterol (PROVENTIL) nebulizer solution 0.083% 2.5 mg/3mL, 2.5 mg, Nebulization, Q6H PRN, Nidia Rangel DO, 2.5 mg at 10/03/22 0904  •  amLODIPine (NORVASC) tablet 5 mg, 5 mg, Oral, Daily, Nidia Rangel DO, 5 mg at 10/07/22 0934  •  aspirin EC tablet 81 mg, 81 mg, Oral, Daily, Nidia Rangel DO, 81 mg at 10/07/22 0934  •  atorvastatin (LIPITOR) tablet 40 mg, 40 mg, Oral, Nightly, Megan Landeros MD, 40 mg at 10/06/22 2118  •  benzonatate (TESSALON) capsule 200 mg, 200 mg, Oral, TID PRN, Megan Landeros MD  •  castor oil-balsam peru (VENELEX) ointment 1 application, 1 application, Topical, Q12H, Nidia Rangel DO, 1 application at 10/07/22 0935  •  dextrose (D50W) (25 g/50 mL) IV injection 25 g, 25 g, Intravenous, Q15 Min PRN, Nidia Rangel DO  •  dextrose (GLUTOSE) oral gel 15 g, 15 g, Oral, Q15 Min PRN, Nidia Rangel DO  •  folic acid (FOLVITE) tablet 1 mg, 1 mg, Oral, Daily, Nidia Rangel DO, 1 mg at 10/07/22 0934  •  glucagon (human recombinant) (GLUCAGEN DIAGNOSTIC) injection 1 mg, 1 mg, Intramuscular, Q15 Min PRN, Nidia Rangel DO  •  guaiFENesin (ROBITUSSIN) 100 MG/5ML oral solution 200 mg, 200 mg, Oral, Q4H PRN, Megan Landeros MD, 200 mg at 10/03/22 2059  •  insulin detemir (LEVEMIR) injection 15 Units, 15 Units, Subcutaneous, Nightly, Megan Landeros MD, 15 Units at 10/06/22 2118  •  Insulin Lispro (humaLOG) injection 0-9 Units, 0-9 Units, Subcutaneous, TID AC, Nidia Rangel DO, 2 Units at 10/06/22 1715  •   Insulin Lispro (humaLOG) injection 5 Units, 5 Units, Subcutaneous, TID With Meals, Megan Landeros MD, 5 Units at 10/06/22 1715  •  ipratropium-albuterol (DUO-NEB) nebulizer solution 3 mL, 3 mL, Nebulization, 4x Daily - RT, Nidia Rangel, , 3 mL at 10/07/22 0847  •  lidocaine (LIDODERM) 5 % 1 patch, 1 patch, Transdermal, Q24H, Nidia Rangel DO, 1 patch at 10/07/22 0934  •  Linezolid (ZYVOX) 600 mg 300 mL, 600 mg, Intravenous, Q12H, Hugo Castillo MD, Last Rate: 300 mL/hr at 10/07/22 0027, 600 mg at 10/07/22 0027  •  metoprolol tartrate (LOPRESSOR) tablet 50 mg, 50 mg, Oral, BID, Nidia Rangel DO, 50 mg at 10/07/22 0934  •  ondansetron (ZOFRAN) tablet 4 mg, 4 mg, Oral, Q6H PRN, 4 mg at 10/06/22 0019 **OR** ondansetron (ZOFRAN) injection 4 mg, 4 mg, Intravenous, Q6H PRN, Nidia Rangel DO  •  oxyCODONE (ROXICODONE) immediate release tablet 5 mg, 5 mg, Oral, Q8H PRN, Nidia Rangel, DO, 5 mg at 10/06/22 2118  •  piperacillin-tazobactam (ZOSYN) 3.375 g in iso-osmotic dextrose 50 ml (premix), 3.375 g, Intravenous, Q8H, Nidia Rangel, DO, 3.375 g at 10/07/22 0934  •  potassium chloride (MICRO-K) CR capsule 40 mEq, 40 mEq, Oral, PRN, 40 mEq at 09/29/22 2143 **OR** potassium chloride (KLOR-CON) packet 40 mEq, 40 mEq, Oral, PRN **OR** potassium chloride 10 mEq in 100 mL IVPB, 10 mEq, Intravenous, Q1H PRN, Henrietta Ramos II, DO, Last Rate: 100 mL/hr at 09/29/22 1152, 10 mEq at 09/29/22 1152  •  prochlorperazine (COMPAZINE) injection 2.5 mg, 2.5 mg, Intravenous, Q6H PRN, Nidia Rangel DO  •  riFAXIMin (XIFAXAN) tablet 550 mg, 550 mg, Oral, Q12H, Megan Landeros MD, 550 mg at 10/07/22 0934  •  [COMPLETED] Insert peripheral IV, , , Once **AND** sodium chloride 0.9 % flush 10 mL, 10 mL, Intravenous, PRN, Stephenie Martinez PA  •  sodium chloride 0.9 % flush 10 mL, 10 mL, Intravenous, Q12H, Nidia Rangel DO, 10 mL at 10/04/22 2034  •  sodium chloride 0.9 % flush 10  "mL, 10 mL, Intravenous, PRN, Nidia Rangel DO  •  thiamine (VITAMIN B-1) tablet 100 mg, 100 mg, Oral, Daily, Nidia Rangel DO, 100 mg at 10/07/22 0934    Please refer to the medical record for a full medication list    Review of Systems:    Constitutional-- No Fever, chills or sweats.  Appetite good, and no malaise. No fatigue.  HEENT-- No new vision, hearing or throat complaints.  No epistaxis or oral sores.  Denies odynophagia or dysphagia.  No odynophagia or dysphagia. No headache, photophobia or neck stiffness.  CV-- No chest pain, palpitation or syncope  Resp--some SOB/minimally productive cough/no hemoptysis  GI- No nausea, vomiting, or diarrhea.  No hematochezia, melena, or hematemesis. Denies jaundice or chronic liver disease.  -- No dysuria, hematuria, or flank pain.  Denies hesitancy, urgency or flank pain.  Lymph- no swollen lymph nodes in neck/axilla or groin.   Heme- No active bruising or bleeding; no Hx of DVT or PE.  MS-- no swelling or pain in the bones or joints of arms/legs.  No new back pain.  Neuro-- No acute focal weakness or numbness in the arms or legs.  No seizures.  Skin--No rashes or lesions, no nodules    Physical Exam:   Vital Signs   Temp:  [97.9 °F (36.6 °C)-98.5 °F (36.9 °C)] 98.5 °F (36.9 °C)  Heart Rate:  [75-85] 82  Resp:  [18-20] 18  BP: (124-147)/(73-91) 139/73    Temp  Min: 97.9 °F (36.6 °C)  Max: 98.5 °F (36.9 °C)  BP  Min: 124/74  Max: 147/83  Pulse  Min: 75  Max: 85  Resp  Min: 18  Max: 20  SpO2  Min: 92 %  Max: 94 %    Blood pressure 139/73, pulse 82, temperature 98.5 °F (36.9 °C), temperature source Oral, resp. rate 18, height 160 cm (63\"), weight 52.2 kg (115 lb), SpO2 94 %.  GENERAL: Awake and alert, in minimal distress. Appears older than stated age.  Cachectic.  Resting in bed.  HEENT:  Normocephalic, atraumatic.  Oropharynx without thrush. Dentition in poor repair. No cervical adenopathy. No neck masses.  Ears externally normal, Nose externally " normal.  EYES: No conjunctival injection. No icterus. EOM full.  LYMPHATICS: No lymphadenopathy of the neck or axillary or inguinal regions.   HEART: No murmur, gallop, or pericardial friction rub. Reg rate rhythm.  No JVD.  LUNGS: Bilateral crackles, no rhonchi. No respiratory distress, no use of accessory muscles.  ABDOMEN: Soft, nontender, nondistended. No appreciable HSM.  Bowel sounds normal.  SKIN: Warm and dry without cutaneous eruptions.  No nodules.    PSYCHIATRIC: Mental status some confusion but answers some questions.  EXT:  No cellulitic change.  Normal range of motion.  No cyanosis or clubbing.  NEURO: Oriented to name, nonfocal.      Results Review:   I reviewed the patient's new clinical results.  I reviewed the patient's new imaging results and agree with the interpretation.  I reviewed the patient's other test results and agree with the interpretation    Results from last 7 days   Lab Units 10/05/22  0948 10/04/22  0537 10/03/22  0736   WBC 10*3/mm3 17.87* 17.59* 19.51*   HEMOGLOBIN g/dL 8.2* 7.6* 8.9*   HEMATOCRIT % 24.5* 23.9* 27.4*   PLATELETS 10*3/mm3 202 162 176     Results from last 7 days   Lab Units 10/05/22  0405   SODIUM mmol/L 136   POTASSIUM mmol/L 3.8   CHLORIDE mmol/L 105   CO2 mmol/L 18.0*   BUN mg/dL 30*   CREATININE mg/dL 1.52*   GLUCOSE mg/dL 119*   CALCIUM mg/dL 7.9*     Results from last 7 days   Lab Units 10/05/22  0405   ALK PHOS U/L 187*   BILIRUBIN mg/dL 0.9   ALT (SGPT) U/L 14   AST (SGOT) U/L 49*             Results from last 7 days   Lab Units 10/03/22  0736   VANCOMYCIN TR mcg/mL 11.50     Results from last 7 days   Lab Units 10/05/22  0405   LACTATE mmol/L 1.8     Estimated Creatinine Clearance: 32.8 mL/min (A) (by C-G formula based on SCr of 1.52 mg/dL (H)).     Procalitonin Results:      Lab 10/04/22  0537 10/02/22  0814   PROCALCITONIN 1.42* 0.88*      Brief Urine Lab Results  (Last result in the past 365 days)      Color   Clarity   Blood   Leuk Est   Nitrite    Protein   CREAT   Urine HCG        10/01/22 0117 Yellow   Clear   Negative   Small (1+)   Negative   30 mg/dL (1+)                No results found for: SITE, ALLENTEST, PHART, BVY3TUV, PO2ART, CJM8HNH, BASEEXCESS, J1CHFDUP, HGBBG, HCTABG, OXYHEMOGLOBI, METHHGBN, CARBOXYHGB, CO2CT, BAROMETRIC, MODALITY, FIO2     Microbiology:  Blood Culture   Date Value Ref Range Status   09/27/2022 No growth at 5 days  Final     No results found for: BCIDPCR, CXREFLEX, CSFCX, CULTURETIS  No results found for: CULTURES, HSVCX, URCX  No results found for: EYECULTURE, GCCX, HSVCULTURE, LABHSV  No results found for: LEGIONELLA, MRSACX, MUMPSCX, MYCOPLASCX  No results found for: NOCARDIACX, STOOLCX  Urine Culture   Date Value Ref Range Status   10/01/2022 No growth  Final     No results found for: VIRALCULTU, WOUNDCX     Blood Culture   Date Value Ref Range Status   09/27/2022 No growth at 5 days  Final     Body Fluid Culture   Date Value Ref Range Status   09/28/2022 No growth at 3 days  Final     Urine Culture   Date Value Ref Range Status   10/01/2022 No growth  Final   (    MRSA swab screen + 10/2/2022.  Radiology:  Imaging Results (Last 72 Hours)     Procedure Component Value Units Date/Time    XR Chest 1 View [[id number]] Collected: 10/07/22 0721     Updated: 10/07/22 0724    Narrative:      Examination: XR CHEST 1 VW-     Date of Exam: 10/7/2022 4:40 AM     Indication: F/u pneumonia; J18.9-Pneumonia, unspecified organism;  R10.9-Unspecified abdominal pain; K74.60-Unspecified cirrhosis of liver;  R18.8-Other ascites; N17.9-Acute kidney failure, unspecified;  I50.9-Heart failure, unspecified; A41.9-Sepsis, unspecified organism;  R13.10-Dysphagia, unspecified.     Comparison: Radiograph 10/04/2022, 10/03/2022     Technique: 1 view of the chest      Findings:  Patchy airspace disease is seen within the bilateral upper lobes. No  pleural effusions. No pneumothorax. The heart size is normal. The  pulmonary vasculature appears mildly  indistinct. No acute osseous  abnormality identified.       Impression:      Mild improvement in bilateral upper lobe pneumonia.     This report was finalized on 10/7/2022 7:21 AM by Jojo Mckinney MD.       XR Ribs 2 View Right [[id number]] Collected: 10/06/22 1538     Updated: 10/06/22 1543    Narrative:      DATE OF EXAM:  10/6/2022 1:27 PM     PROCEDURE:  XR RIBS 2 VW RIGHT-     INDICATIONS:  Right rib and chest pain.     COMPARISON:  Chest x-ray performed on 10/04/2022.     TECHNIQUE:   A minimum of two radiologic views of the right ribs were obtained.        FINDINGS:  There is no acute or healing right rib fracture. There is diffuse mixed  interstitial/airspace disease throughout both lungs similar to  yesterday's chest x-ray and likely due to multifocal pneumonia. There is  no pneumothorax or right pleural effusion.  The bony thorax is normal.       Impression:         1. No acute or healing right rib fracture.  2. Continued abnormal appearance of lungs compatible with multifocal  pneumonia.     This report was finalized on 10/6/2022 3:40 PM by Alonso Oneill MD.       CT Chest Without Contrast Diagnostic [[id number]] Collected: 10/05/22 1135     Updated: 10/05/22 2231    Narrative:      DATE OF EXAM: 10/5/2022 11:15 AM     PROCEDURE: CT CHEST WO CONTRAST DIAGNOSTIC-     INDICATIONS: worsening PNA; J18.9-Pneumonia, unspecified organism;  R10.9-Unspecified abdominal pain; K74.60-Unspecified cirrhosis of liver;  R18.8-Other ascites; N17.9-Acute kidney failure, unspecified;  I50.9-Heart failure, unspecified; A41.9-Sepsis, unspecified organism;  R13.10-Dysphagia, unspecified     COMPARISON: Chest radiograph 10/04/2022     TECHNIQUE: Routine transaxial slices were obtained through the chest  without the administration of intravenous contrast. Reconstructed  coronal and sagittal images were also obtained. Automated exposure  control and iterative construction methods were used.     The radiation dose reduction device was  turned on for each scan per the  ALARA (As Low as Reasonably Achievable) protocol.     FINDINGS:  Earlier chest radiograph shows stable diffuse mixed interstitial and  airspace disease throughout the lungs consistent with multifocal  pneumonia.     Today's study shows predominantly coarse reticular disease throughout  the lungs, with a generalized groundglass appearance. Findings suggest  ARDS, and can be seen as well with any type of pneumonia in an  immunocompromised patient. Pattern does not particularly suggest  pulmonary edema. There is only a small right pleural effusion. No  pulmonary parenchymal mass is seen. The airways appear normally patent.  There are relatively few areas of actual lung consolidation. No  pneumothorax is seen. Limited mediastinal window images show no apparent  adenopathy, mediastinal mass or pericardial effusion. There is extensive  coronary artery calcification. Included images of the upper abdomen show  at least moderately extensive ascites. Bony structures appear to be  intact.        Impression:         1. Dense diffuse and extensive reticular and groundglass disease  throughout the lungs, most likely representing ARDS. Superimposed  atypical pneumonia, or any pneumonia in an immunocompromised patient  could have this appearance. Much less likely, vasculitis is included in  the differential.  2. Very small right pleural effusion.  3. Moderately extensive ascites noted.     This report was finalized on 10/5/2022 10:28 PM by Dr. Noé Larry MD.             IMPRESSION:     1.  Right greater than left pneumonia initially seen on CT scan from 9/27/2022, and chest x-ray showing bilateral infiltrates, some of which may be fluid.  Was on reasonable treatment with piperacillin tazobactam on admission, but also had MRSA positive screen which may indicate an infection with MRSA as the cause of her sputum.  Not producing significant amounts.  Less likely atypical mycobacteria or fungal disease.   Negative Legionella and streptococcal urine antigen.  Respiratory viral PCR -10/3.  Histoplasma urine antigen negative, blastomycosis urine antigen negative.  Noninfectious etiologies also possible including autoimmune versus other.  But clinically seems to be responding to antibiotics.  2.  Leukocytosis, neutrophilic Slightly worse.  Did not receive steroids.  May be related to above issues.  3.  Encephalopathy, toxic and metabolic, as well as elevated ammonia level with cirrhosis and possible hepatic encephalopathy.  Cryptococcal antigen negative.  4.  Acute hypoxic respiratory failure increasing to 40 L/min on 10/3/2022.  Related to above issues.  5.  Acute renal failure, creatinine 1.55, may be worse with vancomycin.  Creatinine 1.71 on 10/4, worse.  Vancomycin was stopped 10/3.  1.52 on 10/5.  6.  Hyponatremia 132, worse.  7.  Hypocalcemia 7.8, worse.  8.  Cirrhosis, possibly related to substance use, hepatitis C, versus other.  Advanced findings for portal hypertension with ascites.  9.  Hepatitis C, treatment status unknown.  Acute hepatitis panel 10/4/2022 positive for hep C, negative for other.  10.  Elevated alkaline phosphatase 147.  11.  Anemia, chronic disease related to above issues.  Worse.  Also could be related to linezolid.  12.  Schizophrenia.  13.  Substance use, status unclear.  Drug screen had been positive for cocaine (9/28/2022).    Plan:    1.  Diagnostically, continue to follow patient's physical exam, CBC, CMP, CRP.  Radiographic studies.  2.  Therapeutically, continue piperacillin tazobactam, and changed vancomycin to linezolid 600 mg p.o. twice daily for MRSA pneumonia coverage (on 10/3), duration to be determined (but was changed to IV because refusing p.o.).  There is a small chance of a drug interaction between linezolid and amitriptyline with a serotonin syndrome.  Benefit greater than risk.  Patient probably is recurrently aspirating.  Likely to continue antibiotics IV until  10/10/2022, then p.o. alone if she is more compliant.  Patient has been refusing some of her oral medications.  3.  Oxygen support therapy.  40 L/min on 10/3/2022.  4.  Pulmonary following, and high risk for intubation if worsens.  5.  DNR/DNI.    I discussed the patient's findings and my recommendations with patient and nursing staff    Our group would be pleased to follow this patient over the course of their hospitalization and assist with outpatient antimicrobial therapy, as indicated.  Further recommendations depend on the results of the cultures and clinical course.  See next on Monday, call sooner if needed.    Hugo Castillo MD  10/7/2022

## 2022-10-07 NOTE — PLAN OF CARE
Goal Outcome Evaluation:  Plan of Care Reviewed With: patient        Progress: improving  Outcome Evaluation: patient ambulated 15' + 15' with CGA/Min assist x1 and rolling walker and 4L O2 per nc. Tech assisted with IV pole. Verbal cues for safe use of walker and PLB technique. Gait distance limited by weakness and fatigue.

## 2022-10-07 NOTE — PROGRESS NOTES
INPATIENT PULMONARY SERVICE  PROGRESS NOTE     Hospital LOS: 10 days    Ms. Kaylie Cruz, is followed for a Chief Complaint of:  Chief Complaint   Patient presents with   • Back Pain       Chronic pain disorder    Primary hypertension    Long-term current use of opiate analgesic    Coronary artery disease involving native coronary artery of native heart without angina pectoris    Schizophrenia (HCC)    Esophageal varices (HCC)    Decompensated hepatic cirrhosis (HCC)    HFrEF (heart failure with reduced ejection fraction) (HCC)    Vaginal cancer (HCC)    High grade squamous intraepithelial lesion (HGSIL) on cytologic smear of vagina    Type 2 diabetes mellitus with hyperglycemia, with long-term current use of insulin (HCC)    Chronic hepatitis C without hepatic coma (HCC)    Tobacco use    Pneumonia of right middle lobe due to infectious organism    Sepsis, unspecified organism (HCC)      Subjective   S     Interval History:  On 4L nasal cannula this AM. She is still intermittently refusing medical care.        The patient's relevant past medical, surgical and social history were reviewed and updated in Epic as appropriate.      ROS:   Constitutional: Negative for fever.   Respiratory: Positive for dyspnea.   Cardiovascular: Negative for chest pain.   Gastrointestinal: Negative for  nausea, vomiting and diarrhea.     Objective   O     Vitals  Temp  Min: 97.9 °F (36.6 °C)  Max: 98.5 °F (36.9 °C)  BP  Min: 124/74  Max: 147/83  Pulse  Min: 69  Max: 85  Resp  Min: 16  Max: 20  SpO2  Min: 92 %  Max: 99 % Flow (L/min)  Min: 3  Max: 4    I/O 24 HR (7:00 AM-6:59AM):  Intake/Output       10/06/22 0700 - 10/07/22 0659 10/07/22 0700 - 10/08/22 0659    Intake (ml) 840 300    Output (ml) 700 350    Net (ml) 140 -50          Medications (Drips):       Physical Examination    Telemetry: Normal sinus rhythm.    Constitutional:  No acute distress. Cachectic.   Resting in bed on 4L nasal cannula.    Cardiovascular: Regular rate  and rhythm.  No murmurs, rub or gallop.   Respiratory: Normal symmetric chest expansion.  Normal respiratory effort.  Diminished bilaterally.    Abdominal:  Soft. No masses.   Non-tender. No distension.   No hepatosplenomegaly.   Extremities: No digital cyanosis or clubbing.  No peripheral edema.   Neurological:   Alert and Oriented to person, place, and time.   Moves all extremities.            Results from last 7 days   Lab Units 10/05/22  0948 10/04/22  0537 10/03/22  0736   WBC 10*3/mm3 17.87* 17.59* 19.51*   HEMOGLOBIN g/dL 8.2* 7.6* 8.9*   MCV fL 79.8 82.4 81.3   PLATELETS 10*3/mm3 202 162 176     Results from last 7 days   Lab Units 10/05/22  0405 10/04/22  0537 10/03/22  0736   SODIUM mmol/L 136 132* 133*   POTASSIUM mmol/L 3.8 3.9 4.2   CO2 mmol/L 18.0* 19.0* 19.0*   CREATININE mg/dL 1.52* 1.71* 1.55*     Serum creatinine: 1.52 mg/dL (H) 10/05/22 0405  Estimated creatinine clearance: 32.8 mL/min (A)  Results from last 7 days   Lab Units 10/05/22  0405 10/04/22  0537 10/03/22  0736   ALK PHOS U/L 187* 147* 153*   BILIRUBIN mg/dL 0.9 1.0 0.7   ALT (SGPT) U/L 14 14 17   AST (SGOT) U/L 49* 43* 49*       Results from last 7 days   Lab Units 10/03/22  0840   PH, ARTERIAL pH units 7.418   PCO2, ARTERIAL mm Hg 33.1*   PO2 ART mm Hg 53.7*   FIO2 % 40       Images:     Imaging Results (Last 24 Hours)     Procedure Component Value Units Date/Time    XR Chest 1 View [[id number]] Collected: 10/07/22 0721     Updated: 10/07/22 0724    Narrative:      Examination: XR CHEST 1 VW-     Date of Exam: 10/7/2022 4:40 AM     Indication: F/u pneumonia; J18.9-Pneumonia, unspecified organism;  R10.9-Unspecified abdominal pain; K74.60-Unspecified cirrhosis of liver;  R18.8-Other ascites; N17.9-Acute kidney failure, unspecified;  I50.9-Heart failure, unspecified; A41.9-Sepsis, unspecified organism;  R13.10-Dysphagia, unspecified.     Comparison: Radiograph 10/04/2022, 10/03/2022     Technique: 1 view of the chest       Findings:  Patchy airspace disease is seen within the bilateral upper lobes. No  pleural effusions. No pneumothorax. The heart size is normal. The  pulmonary vasculature appears mildly indistinct. No acute osseous  abnormality identified.       Impression:      Mild improvement in bilateral upper lobe pneumonia.     This report was finalized on 10/7/2022 7:21 AM by Jojo Mckinney MD.       XR Ribs 2 View Right [[id number]] Collected: 10/06/22 1538     Updated: 10/06/22 1543    Narrative:      DATE OF EXAM:  10/6/2022 1:27 PM     PROCEDURE:  XR RIBS 2 VW RIGHT-     INDICATIONS:  Right rib and chest pain.     COMPARISON:  Chest x-ray performed on 10/04/2022.     TECHNIQUE:   A minimum of two radiologic views of the right ribs were obtained.        FINDINGS:  There is no acute or healing right rib fracture. There is diffuse mixed  interstitial/airspace disease throughout both lungs similar to  yesterday's chest x-ray and likely due to multifocal pneumonia. There is  no pneumothorax or right pleural effusion.  The bony thorax is normal.       Impression:         1. No acute or healing right rib fracture.  2. Continued abnormal appearance of lungs compatible with multifocal  pneumonia.     This report was finalized on 10/6/2022 3:40 PM by Alonso Oneill MD.             I reviewed the patient's new clinical results.  I reviewed the patient's new imaging results and agree with the interpretation.    Assessment & Plan   A / P     Ms. Cruz is a 58yo F who was admitted for RLL pneumonia and has continued to worsen despite antibiotic therapy. She was initially on room air and has now progressed to HFNC. ID is following and she is being treated for pneumonia. CT Chest from 10/5/22 reviewed. There is diffuse extensive reticular groundglass disease throughout both lungs. There is a very small right pleural effusion. The differential includes pneumonia (bacterial vs atypical), organizing pneumonia, pulmonary edema or  hemorrhage/vasculitis (unlikley in the absence of hemoptysis). She seems to be clinically improving with antibiotic therapy.     Diet Regular; Consistent Carbohydrate  Code Status and Medical Interventions:   Ordered at: 10/05/22 1229     Medical Intervention Limits:    NO intubation (DNI)     Code Status (Patient has no pulse and is not breathing):    No CPR (Do Not Attempt to Resuscitate)     Medical Interventions (Patient has pulse or is breathing):    Limited Support       Active Hospital Problems    Diagnosis    • Pneumonia of right middle lobe due to infectious organism    • Sepsis, unspecified organism (HCC)    • Tobacco use    • Vaginal cancer (HCC)    • High grade squamous intraepithelial lesion (HGSIL) on cytologic smear of vagina    • Type 2 diabetes mellitus with hyperglycemia, with long-term current use of insulin (HCC)    • Chronic hepatitis C without hepatic coma (HCC)    • Decompensated hepatic cirrhosis (HCC)    • HFrEF (heart failure with reduced ejection fraction) (HCC)    • Coronary artery disease involving native coronary artery of native heart without angina pectoris    • Schizophrenia (HCC)    • Chronic pain disorder    • Primary hypertension    • Long-term current use of opiate analgesic    • Esophageal varices (HCC)        Assessment / Plan:  1. Wean supplemental O2 as tolerated   2. Antibiotics per ID  3. Diuretics as tolerated. This is impaired as the patient is refusing to have labs drawn.   4. Will defer on bronchoscopy as she appears to be clinically improving.   5. Will hold on steroids for now.   6. AM CXR with improved airspace disease.     Recommend to complete a course of antibiotic therapy. Pulmonary will sign off as she is clinically improving with treatment of pneumonia. Please call with any further questions.     I discussed the patient's findings and my recommendations with patient and primary care team    Time:  I spent 25 minutes, face to face, with the patient or on the  jacob coordinating care with other health care providers.   I spent > 50% percent of this time, counseling and discussing diagnostic testing .     Sissy V Case, DO  Pulmonary and Critical Care Medicine

## 2022-10-08 LAB
A FLAVUS AB SER QL ID: NEGATIVE
A FUMIGATUS AB SER QL ID: NEGATIVE
A NIGER AB SER QL ID: NEGATIVE
ANION GAP SERPL CALCULATED.3IONS-SCNC: 11 MMOL/L (ref 5–15)
B DERMAT AB TITR SER: NEGATIVE {TITER}
BUN SERPL-MCNC: 35 MG/DL (ref 6–20)
BUN/CREAT SERPL: 24.5 (ref 7–25)
CALCIUM SPEC-SCNC: 8.3 MG/DL (ref 8.6–10.5)
CHLORIDE SERPL-SCNC: 104 MMOL/L (ref 98–107)
CO2 SERPL-SCNC: 21 MMOL/L (ref 22–29)
CREAT SERPL-MCNC: 1.43 MG/DL (ref 0.57–1)
DEPRECATED RDW RBC AUTO: 49.6 FL (ref 37–54)
EGFRCR SERPLBLD CKD-EPI 2021: 42.3 ML/MIN/1.73
ERYTHROCYTE [DISTWIDTH] IN BLOOD BY AUTOMATED COUNT: 16.9 % (ref 12.3–15.4)
GLUCOSE BLDC GLUCOMTR-MCNC: 143 MG/DL (ref 70–130)
GLUCOSE BLDC GLUCOMTR-MCNC: 162 MG/DL (ref 70–130)
GLUCOSE SERPL-MCNC: 127 MG/DL (ref 65–99)
HCT VFR BLD AUTO: 25.7 % (ref 34–46.6)
HGB BLD-MCNC: 8.3 G/DL (ref 12–15.9)
MCH RBC QN AUTO: 26.1 PG (ref 26.6–33)
MCHC RBC AUTO-ENTMCNC: 32.3 G/DL (ref 31.5–35.7)
MCV RBC AUTO: 80.8 FL (ref 79–97)
PLATELET # BLD AUTO: 155 10*3/MM3 (ref 140–450)
PMV BLD AUTO: 10.4 FL (ref 6–12)
POTASSIUM SERPL-SCNC: 3.5 MMOL/L (ref 3.5–5.2)
RBC # BLD AUTO: 3.18 10*6/MM3 (ref 3.77–5.28)
SODIUM SERPL-SCNC: 136 MMOL/L (ref 136–145)
WBC NRBC COR # BLD: 7.48 10*3/MM3 (ref 3.4–10.8)

## 2022-10-08 PROCEDURE — 25010000002 LINEZOLID 600 MG/300ML SOLUTION: Performed by: INTERNAL MEDICINE

## 2022-10-08 PROCEDURE — 80048 BASIC METABOLIC PNL TOTAL CA: CPT | Performed by: INTERNAL MEDICINE

## 2022-10-08 PROCEDURE — 97535 SELF CARE MNGMENT TRAINING: CPT

## 2022-10-08 PROCEDURE — 25010000002 PIPERACILLIN SOD-TAZOBACTAM PER 1 G: Performed by: FAMILY MEDICINE

## 2022-10-08 PROCEDURE — 63710000001 INSULIN LISPRO (HUMAN) PER 5 UNITS: Performed by: INTERNAL MEDICINE

## 2022-10-08 PROCEDURE — 63710000001 INSULIN DETEMIR PER 5 UNITS: Performed by: INTERNAL MEDICINE

## 2022-10-08 PROCEDURE — 99232 SBSQ HOSP IP/OBS MODERATE 35: CPT | Performed by: INTERNAL MEDICINE

## 2022-10-08 PROCEDURE — 85027 COMPLETE CBC AUTOMATED: CPT | Performed by: INTERNAL MEDICINE

## 2022-10-08 PROCEDURE — 97110 THERAPEUTIC EXERCISES: CPT

## 2022-10-08 PROCEDURE — 63710000001 INSULIN LISPRO (HUMAN) PER 5 UNITS: Performed by: FAMILY MEDICINE

## 2022-10-08 PROCEDURE — 97610 LOW FREQUENCY NON-THERMAL US: CPT

## 2022-10-08 PROCEDURE — 82962 GLUCOSE BLOOD TEST: CPT

## 2022-10-08 RX ORDER — QUETIAPINE FUMARATE 25 MG/1
50 TABLET, FILM COATED ORAL ONCE
Status: COMPLETED | OUTPATIENT
Start: 2022-10-08 | End: 2022-10-08

## 2022-10-08 RX ORDER — FUROSEMIDE 40 MG/1
40 TABLET ORAL ONCE
Status: COMPLETED | OUTPATIENT
Start: 2022-10-08 | End: 2022-10-08

## 2022-10-08 RX ADMIN — INSULIN LISPRO 5 UNITS: 100 INJECTION, SOLUTION INTRAVENOUS; SUBCUTANEOUS at 08:46

## 2022-10-08 RX ADMIN — INSULIN LISPRO 2 UNITS: 100 INJECTION, SOLUTION INTRAVENOUS; SUBCUTANEOUS at 12:42

## 2022-10-08 RX ADMIN — GUAIFENESIN 600 MG: 600 TABLET, EXTENDED RELEASE ORAL at 21:17

## 2022-10-08 RX ADMIN — ASPIRIN 81 MG: 81 TABLET, COATED ORAL at 08:46

## 2022-10-08 RX ADMIN — FOLIC ACID 1 MG: 1 TABLET ORAL at 08:45

## 2022-10-08 RX ADMIN — FUROSEMIDE 40 MG: 40 TABLET ORAL at 09:18

## 2022-10-08 RX ADMIN — RIFAXIMIN 550 MG: 550 TABLET ORAL at 08:46

## 2022-10-08 RX ADMIN — OXYCODONE 5 MG: 5 TABLET ORAL at 00:30

## 2022-10-08 RX ADMIN — GUAIFENESIN 600 MG: 600 TABLET, EXTENDED RELEASE ORAL at 08:45

## 2022-10-08 RX ADMIN — ATORVASTATIN CALCIUM 40 MG: 40 TABLET, FILM COATED ORAL at 21:17

## 2022-10-08 RX ADMIN — Medication 10 ML: at 21:18

## 2022-10-08 RX ADMIN — LIDOCAINE 1 PATCH: 50 PATCH CUTANEOUS at 08:47

## 2022-10-08 RX ADMIN — THIAMINE HCL TAB 100 MG 100 MG: 100 TAB at 08:46

## 2022-10-08 RX ADMIN — METOPROLOL TARTRATE 50 MG: 50 TABLET, FILM COATED ORAL at 21:17

## 2022-10-08 RX ADMIN — OXYCODONE 5 MG: 5 TABLET ORAL at 21:17

## 2022-10-08 RX ADMIN — CASTOR OIL AND BALSAM, PERU 1 APPLICATION: 788; 87 OINTMENT TOPICAL at 12:33

## 2022-10-08 RX ADMIN — TAZOBACTAM SODIUM AND PIPERACILLIN SODIUM 3.38 G: 375; 3 INJECTION, SOLUTION INTRAVENOUS at 12:32

## 2022-10-08 RX ADMIN — QUETIAPINE FUMARATE 50 MG: 25 TABLET ORAL at 09:18

## 2022-10-08 RX ADMIN — AMLODIPINE BESYLATE 5 MG: 5 TABLET ORAL at 08:46

## 2022-10-08 RX ADMIN — INSULIN LISPRO 5 UNITS: 100 INJECTION, SOLUTION INTRAVENOUS; SUBCUTANEOUS at 12:43

## 2022-10-08 RX ADMIN — TAZOBACTAM SODIUM AND PIPERACILLIN SODIUM 3.38 G: 375; 3 INJECTION, SOLUTION INTRAVENOUS at 01:24

## 2022-10-08 RX ADMIN — INSULIN LISPRO 5 UNITS: 100 INJECTION, SOLUTION INTRAVENOUS; SUBCUTANEOUS at 18:11

## 2022-10-08 RX ADMIN — QUETIAPINE FUMARATE 150 MG: 100 TABLET ORAL at 21:16

## 2022-10-08 RX ADMIN — INSULIN DETEMIR 15 UNITS: 100 INJECTION, SOLUTION SUBCUTANEOUS at 21:20

## 2022-10-08 RX ADMIN — LINEZOLID 600 MG: 2 INJECTION, SOLUTION INTRAVENOUS at 12:32

## 2022-10-08 RX ADMIN — METOPROLOL TARTRATE 50 MG: 50 TABLET, FILM COATED ORAL at 08:46

## 2022-10-08 RX ADMIN — RIFAXIMIN 550 MG: 550 TABLET ORAL at 21:17

## 2022-10-08 RX ADMIN — CASTOR OIL AND BALSAM, PERU 1 APPLICATION: 788; 87 OINTMENT TOPICAL at 21:17

## 2022-10-08 NOTE — PLAN OF CARE
Goal Outcome Evaluation:  Plan of Care Reviewed With: patient        Progress: improving  Outcome Evaluation: Patient's sacrum remains open and she continues to benefit from mist therapy to improve granulation and decrease inflammation of wound bed to promote closure.

## 2022-10-08 NOTE — NURSING NOTE
Pts awake all night hallucinating and crying. IV went bad and pt refused to be stuck again. CNs informed

## 2022-10-08 NOTE — PLAN OF CARE
Goal Outcome Evaluation:  Plan of Care Reviewed With: patient        Progress: improving  Outcome Evaluation: OT recert completed with pt continuing to make progress toward ADL goals. Pt CGA for BSC transfers, Theo for toileting tasks in standing, ModA for LBD, TIDWELL for grooming tasks seated. Pt continues to be limited by decreased activity tolerance. Goals updated to reflect progress.

## 2022-10-08 NOTE — THERAPY WOUND CARE TREATMENT
Acute Care - Wound/Debridement Treatment Note  [facility]     Patient Name: Kaylie Cruz  : 1965  MRN: [id number]  Today's Date: 10/8/2022                Admit Date: 2022    Visit Dx:    ICD-10-CM ICD-9-CM   1. Pneumonia of right middle lobe due to infectious organism  J18.9 486   2. Right sided abdominal pain  R10.9 789.09   3. Cirrhosis of liver with ascites, unspecified hepatic cirrhosis type (HCC)  K74.60 571.5    R18.8    4. ANIKET (acute kidney injury) (HCC)  N17.9 584.9   5. Acute on chronic congestive heart failure, unspecified heart failure type (HCC)  I50.9 428.0   6. Sepsis, due to unspecified organism, unspecified whether acute organ dysfunction present (HCC)  A41.9 038.9     995.91   7. Dysphagia, unspecified type  R13.10 787.20       Patient Active Problem List   Diagnosis   • Chronic pain disorder   • Primary hypertension   • Long-term current use of opiate analgesic   • Lumbosacral spondylosis without myelopathy   • Migraine   • Coronary artery disease involving native coronary artery of native heart without angina pectoris   • Schizophrenia (HCC)   • Esophageal varices (HCC)   • Decompensated hepatic cirrhosis (HCC)   • HFrEF (heart failure with reduced ejection fraction) (HCC)   • Vaginal cancer (HCC)   • High grade squamous intraepithelial lesion (HGSIL) on cytologic smear of vagina   • Type 2 diabetes mellitus with hyperglycemia, with long-term current use of insulin (HCC)   • Chronic hepatitis C without hepatic coma (HCC)   • Tobacco use   • Pneumonia of right middle lobe due to infectious organism   • Sepsis, unspecified organism (HCC)        Past Medical History:   Diagnosis Date   • Anemia    • Cancer (HCC)     cervical   • Depression    • Hyperlipidemia    • Hypertension    • Infectious viral hepatitis    • Myocardial infarction (HCC)    • Substance abuse (HCC)         Past Surgical History:   Procedure Laterality Date   • BLADDER SURGERY     • CARDIAC CATHETERIZATION       4/3/2022   • CHOLECYSTECTOMY     • COLON RESECTION      7/19/2017, descending and transverse colon   • COLON SURGERY     • COLONOSCOPY      7/19/2017   • ENDOSCOPY      EGD 4/1/22   • HYSTERECTOMY     • TRANSESOPHAGEAL ECHOCARDIOGRAM (AVINASH)      Global hyokinesis, 49% EF, LVH           Wound 10/04/22 1115 Bilateral sacral spine Pressure Injury (Active)   Dressing Appearance open to air 10/08/22 1015   Base non-blanchable;maroon/purple;pink;yellow 10/08/22 1015   Periwound non-blanchable;dry 10/08/22 1015   Periwound Temperature warm 10/08/22 1015   Periwound Skin Turgor soft 10/08/22 1015   Edges irregular 10/08/22 1015   Drainage Characteristics/Odor serosanguineous 10/08/22 1015   Drainage Amount scant 10/08/22 1015   Care, Wound cleansed with;wound cleanser;irrigated with;sterile normal saline;ultrasound therapy, non contact low frequency 10/08/22 1015   Periwound Care cleansed with pH balanced cleanser;dry periwound area maintained;barrier ointment applied 10/07/22 2030         WOUND DEBRIDEMENT                     PT Assessment (last 12 hours)     PT Evaluation and Treatment     Row Name 10/08/22 1015          Physical Therapy Time and Intention    Subjective Information no complaints  -KW     Document Type wound care;therapy note (daily note)  -KW     Mode of Treatment physical therapy  -KW     Row Name 10/08/22 1015          General Information    Patient Profile Reviewed yes  -KW     Row Name 10/08/22 1015          Pain    Pretreatment Pain Rating 0/10 - no pain  -KW     Row Name 10/08/22 1015          Wound 10/04/22 1115 Bilateral sacral spine Pressure Injury    Wound - Properties Group Placement Date: 10/04/22  -TG Placement Time: 1115  -TG Present on Hospital Admission: N  -TG Side: Bilateral  -TG Location: sacral spine  -TG Primary Wound Type: Pressure inj  -TG    Dressing Appearance open to air  -KW     Base non-blanchable;maroon/purple;pink;yellow  -KW     Periwound non-blanchable;dry  -KW     Periwound  Temperature warm  -KW     Periwound Skin Turgor soft  -KW     Edges irregular  -KW     Drainage Characteristics/Odor serosanguineous  -KW     Drainage Amount scant  -KW     Care, Wound cleansed with;wound cleanser;irrigated with;sterile normal saline;ultrasound therapy, non contact low frequency  -KW     Dressing Care --  barrier cream  -KW     Retired Wound - Properties Group Placement Date: 10/04/22  -TG Placement Time: 1115  -TG Present on Hospital Admission: N  -TG Side: Bilateral  -TG Location: sacral spine  -TG Primary Wound Type: Pressure inj  -TG    Retired Wound - Properties Group Date first assessed: 10/04/22  -TG Time first assessed: 1115  -TG Present on Hospital Admission: N  -TG Side: Bilateral  -TG Location: sacral spine  -TG Primary Wound Type: Pressure inj  -TG    Row Name 10/08/22 1015          Plan of Care Review    Plan of Care Reviewed With patient  -KW     Progress improving  -KW     Outcome Evaluation Patient's sacrum remains open and she continues to benefit from mist therapy to improve granulation and decrease inflammation of wound bed to promote closure.  -KW     Row Name 10/08/22 1015          Positioning and Restraints    Pre-Treatment Position in bed  -KW     Post Treatment Position bed  -KW     In Bed side lying left;call light within reach;encouraged to call for assist  -KW           User Key  (r) = Recorded By, (t) = Taken By, (c) = Cosigned By    Initials Name Provider Type    TG Miki Quach, RN Registered Nurse    Danielle Good PT Physical Therapist              Physical Therapy Education     Title: PT OT SLP Therapies (In Progress)     Topic: Physical Therapy (Done)     Point: Mobility training (Done)     Learning Progress Summary           Patient Acceptance, E,TB, VU by BUCK at 10/8/2022 1015    Comment: patient edcuated about the continued benefit of skilled PT services/ mist therapy    Acceptance, E, NR by AS at 10/7/2022 1037    Acceptance, E, NR by FW at  9/30/2022 1340    Acceptance, E,D, NR by DM at 9/28/2022 1707                   Point: Home exercise program (Done)     Learning Progress Summary           Patient Acceptance, E,TB, VU by KW at 10/8/2022 1015    Comment: patient edcuated about the continued benefit of skilled PT services/ mist therapy    Acceptance, E, NR by AS at 10/7/2022 1037    Acceptance, E,D, NR by DM at 9/28/2022 1707                   Point: Body mechanics (Done)     Learning Progress Summary           Patient Acceptance, E,TB, VU by KW at 10/8/2022 1015    Comment: patient edcuated about the continued benefit of skilled PT services/ mist therapy    Acceptance, E, NR by AS at 10/7/2022 1037    Acceptance, E, NR by FW at 9/30/2022 1340    Acceptance, E,D, NR by DM at 9/28/2022 1707                   Point: Precautions (Done)     Learning Progress Summary           Patient Acceptance, E,TB, VU by KW at 10/8/2022 1015    Comment: patient edcuated about the continued benefit of skilled PT services/ mist therapy    Acceptance, E, NR by AS at 10/7/2022 1037    Acceptance, E, NR by FW at 9/30/2022 1340    Acceptance, E,D, NR by DM at 9/28/2022 1707                               User Key     Initials Effective Dates Name Provider Type Discipline    DM 06/16/21 -  Deepthi Chen, PT Physical Therapist PT    AS 06/16/21 -  Yvonne Stauffer, PTA Physical Therapist Assistant PT    FW 05/05/22 -  Chuck Moreno, PT Physical Therapist PT     01/27/22 -  Danielle Delgado, PT Physical Therapist PT                Recommendation and Plan  Anticipated Discharge Disposition (PT): inpatient rehabilitation facility  Planned Therapy Interventions (PT): wound care, patient/family education  Therapy Frequency (PT): daily  Plan of Care Reviewed With: patient   Progress: improving       Progress: improving  Outcome Evaluation: Patient's sacrum remains open and she continues to benefit from mist therapy to improve granulation and decrease inflammation of  wound bed to promote closure.  Plan of Care Reviewed With: patient            Time Calculation   PT Charges     Row Name 10/08/22 1015             Time Calculation    Start Time 1015  -KW         Untimed Charges    [id number] Mist 30  -KW         Total Minutes    Untimed Charges Total Minutes 30  -KW       Total Minutes 30  -KW            User Key  (r) = Recorded By, (t) = Taken By, (c) = Cosigned By    Initials Name Provider Type    Danielle Good PT Physical Therapist                  Therapy Charges for Today     Code Description Service Date Service Provider Modifiers Qty    [id number] HC PT NLFU MIST 10/8/2022 Danielle Delgado PT GP 1            PT G-Codes  Outcome Measure Options: AM-PAC 6 Clicks Basic Mobility (PT)  AM-PAC 6 Clicks Score (PT): 19  AM-PAC 6 Clicks Score (OT): 16       Danielle Delgado PT  10/8/2022

## 2022-10-08 NOTE — PROGRESS NOTES
[facility]  PROGRESS NOTE    Patient Name: Kaylie Cruz  : 1965  MRN: [id number]    Date of Admission: 2022  Primary Care Physician: Iesha Harris DO    Subjective   Subjective     CC:  resp failure     HPI:  Difficulty with hallucinations last night. Patient declines this today. States she feels ok. Reviewed her labs.      ROS:  Gen- No fevers, chills  CV- No chest pain, palpitations  Resp- No cough, dyspnea  GI- No N/V/D, abd pain      Objective   Objective     Vital Signs:   Temp:  [95 °F (35 °C)-98.2 °F (36.8 °C)] 97.1 °F (36.2 °C)  Heart Rate:  [67-82] 67  Resp:  [16-18] 18  BP: (132-145)/(75-94) 139/94  Flow (L/min):  [4] 4     Physical Exam:  Constitutional: No acute distress, awake, alert  HENT: NCAT, mucous membranes moist  Respiratory: poor effort; diminished  Cardiovascular: RRR, no murmurs, rubs, or gallops  Gastrointestinal: Positive bowel sounds, soft, nontender, nondistended  Musculoskeletal: No bilateral ankle edema  Psychiatric: cooperative  Neurologic: Oriented x 3, no active hallucinations on my exam, strength symmetric in all extremities, Cranial Nerves grossly intact to confrontation, speech clear  Skin: No rashes    Results Reviewed:  LAB RESULTS:      Lab 10/08/22  0415 10/05/22  0948 10/05/22  0405 10/04/22  0537 10/03/22  0736 10/02/22  0814   WBC 7.48 17.87*  --  17.59* 19.51* 16.29*   HEMOGLOBIN 8.3* 8.2*  --  7.6* 8.9* 8.7*   HEMATOCRIT 25.7* 24.5*  --  23.9* 27.4* 27.7*   PLATELETS 155 202  --  162 176 194   NEUTROS ABS  --  15.45*  --  14.84* 15.96* 13.85*   IMMATURE GRANS (ABS)  --  0.17*  --  0.41* 0.83*  --    LYMPHS ABS  --  1.21  --  1.20 1.55  --    MONOS ABS  --  0.59  --  0.65 0.71  --    EOS ABS  --  0.35  --  0.39 0.35 0.16   MCV 80.8 79.8  --  82.4 81.3 82.9   PROCALCITONIN  --   --   --  1.42*  --  0.88*   LACTATE  --   --  1.8 1.4  --   --          Lab 10/08/22  0415 10/05/22  0405 10/04/22  0537  10/03/22  0736 10/02/22  0814   SODIUM 136 136 132* 133* 137   POTASSIUM 3.5 3.8 3.9 4.2 4.4   CHLORIDE 104 105 102 103 108*   CO2 21.0* 18.0* 19.0* 19.0* 19.0*   ANION GAP 11.0 13.0 11.0 11.0 10.0   BUN 35* 30* 34* 30* 30*   CREATININE 1.43* 1.52* 1.71* 1.55* 1.32*   EGFR 42.3* 39.3* 34.2* 38.4* 46.6*   GLUCOSE 127* 119* 159* 123* 203*   CALCIUM 8.3* 7.9* 7.8* 8.2* 8.4*         Lab 10/05/22  0405 10/04/22  0537 10/03/22  0736 10/02/22  0814   TOTAL PROTEIN 6.1 5.7* 6.6 6.8   ALBUMIN 2.10* 2.10* 2.10* 2.30*   GLOBULIN 4.0 3.6 4.5 4.5   ALT (SGPT) 14 14 17 21   AST (SGOT) 49* 43* 49* 50*   BILIRUBIN 0.9 1.0 0.7 0.7   ALK PHOS 187* 147* 153* 157*                     Lab 10/03/22  0840   PH, ARTERIAL 7.418   PCO2, ARTERIAL 33.1*   PO2 ART 53.7*   FIO2 40   HCO3 ART 21.4   BASE EXCESS ART -2.7*   CARBOXYHEMOGLOBIN 1.3     Brief Urine Lab Results  (Last result in the past 365 days)      Color   Clarity   Blood   Leuk Est   Nitrite   Protein   CREAT   Urine HCG        10/01/22 0117 Yellow   Clear   Negative   Small (1+)   Negative   30 mg/dL (1+)                 Microbiology Results Abnormal     Procedure Component Value - Date/Time    Histoplasma Ag Ur - Urine, Urinary Bladder [[id number]] Collected: 10/03/22 1122    Lab Status: Final result Specimen: Urine from Urinary Bladder Updated: 10/06/22 0913     Histoplasma Galactomannan Ag Ur <0.5    Narrative:      Test(s) 183562-Histoplasma Gal'jaycee Ag Ur  was developed and its performance characteristics determined  by [facility]. It has not been cleared or approved by the Food  and Drug Administration.  Performed at:  [address] [place]  [id number]  : Kamryn Eli MD, Phone:  [phone]    Blastomyces Antigen - Urine, Urinary Bladder [[id number]] Collected: 10/03/22 1122    Lab Status: Final result Specimen: Urine from Urinary Bladder Updated: 10/06/22 0712     MVista(R) Blastomyces Ag None Detected ng/mL      Comment:  Results reported as ng/mL in 0.2 - 14.7 ng/mL range  Results above the limit of detection but below 0.2 ng/mL  are reported as 'Positive, Below the Limit of  Quantification'  Results above 14.7 ng/mL are reported as 'Positive,  Above the Limit of Quantification'        Specimen Type URINE    Narrative:      Performed at:  [address] [facility]  [address] [facility] [place]  [id number]  : Jeanine Mancilla MD, Phone:  [phone]    Respiratory Panel PCR w/COVID-19(SARS-CoV-2) MALIK/STANLEY/AIXA/PAD/COR/MAD/HEATHER In-House, NP Swab in UTM/VTM, 3-4 HR TAT - Swab, Nasopharynx [[id number]]  (Normal) Collected: 10/03/22 1124    Lab Status: Final result Specimen: Swab from Nasopharynx Updated: 10/03/22 1237     ADENOVIRUS, PCR Not Detected     Coronavirus 229E Not Detected     Coronavirus HKU1 Not Detected     Coronavirus NL63 Not Detected     Coronavirus OC43 Not Detected     COVID19 Not Detected     Human Metapneumovirus Not Detected     Human Rhinovirus/Enterovirus Not Detected     Influenza A PCR Not Detected     Influenza B PCR Not Detected     Parainfluenza Virus 1 Not Detected     Parainfluenza Virus 2 Not Detected     Parainfluenza Virus 3 Not Detected     Parainfluenza Virus 4 Not Detected     RSV, PCR Not Detected     Bordetella pertussis pcr Not Detected     Bordetella parapertussis PCR Not Detected     Chlamydophila pneumoniae PCR Not Detected     Mycoplasma pneumo by PCR Not Detected    Narrative:      In the setting of a positive respiratory panel with a viral infection PLUS a negative procalcitonin without other underlying concern for bacterial infection, consider observing off antibiotics or discontinuation of antibiotics and continue supportive care. If the respiratory panel is positive for atypical bacterial infection (Bordetella pertussis, Chlamydophila pneumoniae, or Mycoplasma pneumoniae), consider antibiotic de-escalation to target atypical bacterial infection.    Blood Culture - Blood, Hand,  Right [[id number]]  (Normal) Collected: 09/27/22 1300    Lab Status: Final result Specimen: Blood from Hand, Right Updated: 10/02/22 1317     Blood Culture No growth at 5 days    Blood Culture - Blood, Arm, Right [[id number]]  (Normal) Collected: 09/27/22 1245    Lab Status: Final result Specimen: Blood from Arm, Right Updated: 10/02/22 1303     Blood Culture No growth at 5 days    Urine Culture - Urine, Urine, Random Void [[id number]]  (Normal) Collected: 10/01/22 0117    Lab Status: Final result Specimen: Urine, Random Void Updated: 10/02/22 1056     Urine Culture No growth    Body Fluid Culture - Body Fluid, Peritoneum [[id number]] Collected: 09/28/22 1143    Lab Status: Final result Specimen: Body Fluid from Peritoneum Updated: 10/01/22 0729     Body Fluid Culture No growth at 3 days     Gram Stain No WBCs or organisms seen    Legionella Antigen, Urine - Urine, Urine, Clean Catch [[id number]]  (Normal) Collected: 09/28/22 0643    Lab Status: Final result Specimen: Urine, Clean Catch Updated: 09/28/22 1229     LEGIONELLA ANTIGEN, URINE Negative    S. Pneumo Ag Urine or CSF - Urine, Urine, Clean Catch [[id number]]  (Normal) Collected: 09/28/22 0643    Lab Status: Final result Specimen: Urine, Clean Catch Updated: 09/28/22 1229     Strep Pneumo Ag Negative          XR Ribs 2 View Right    Result Date: 10/6/2022  DATE OF EXAM: 10/6/2022 1:27 PM  PROCEDURE: XR RIBS 2 VW RIGHT-  INDICATIONS: Right rib and chest pain.  COMPARISON: Chest x-ray performed on 10/04/2022.  TECHNIQUE: A minimum of two radiologic views of the right ribs were obtained.   FINDINGS: There is no acute or healing right rib fracture. There is diffuse mixed interstitial/airspace disease throughout both lungs similar to yesterday's chest x-ray and likely due to multifocal pneumonia. There is no pneumothorax or right pleural effusion.  The bony thorax is normal.      Impression:  1. No acute or healing right rib fracture. 2. Continued abnormal appearance of  lungs compatible with multifocal pneumonia.  This report was finalized on 10/6/2022 3:40 PM by Alonso Oneill MD.      XR Chest 1 View    Result Date: 10/7/2022  Examination: XR CHEST 1 VW-  Date of Exam: 10/7/2022 4:40 AM  Indication: F/u pneumonia; J18.9-Pneumonia, unspecified organism; R10.9-Unspecified abdominal pain; K74.60-Unspecified cirrhosis of liver; R18.8-Other ascites; N17.9-Acute kidney failure, unspecified; I50.9-Heart failure, unspecified; A41.9-Sepsis, unspecified organism; R13.10-Dysphagia, unspecified.  Comparison: Radiograph 10/04/2022, 10/03/2022  Technique: 1 view of the chest  Findings: Patchy airspace disease is seen within the bilateral upper lobes. No pleural effusions. No pneumothorax. The heart size is normal. The pulmonary vasculature appears mildly indistinct. No acute osseous abnormality identified.      Impression: Mild improvement in bilateral upper lobe pneumonia.  This report was finalized on 10/7/2022 7:21 AM by Jojo Mckinney MD.            I have reviewed the medications:  Scheduled Meds:amLODIPine, 5 mg, Oral, Daily  aspirin, 81 mg, Oral, Daily  atorvastatin, 40 mg, Oral, Nightly  castor oil-balsam peru, 1 application, Topical, Q12H  folic acid, 1 mg, Oral, Daily  guaiFENesin, 600 mg, Oral, Q12H  insulin detemir, 15 Units, Subcutaneous, Nightly  insulin lispro, 0-9 Units, Subcutaneous, TID AC  Insulin Lispro, 5 Units, Subcutaneous, TID With Meals  lactulose, 10 g, Oral, Daily  lidocaine, 1 patch, Transdermal, Q24H  Linezolid, 600 mg, Intravenous, Q12H  metoprolol tartrate, 50 mg, Oral, BID  piperacillin-tazobactam, 3.375 g, Intravenous, Q8H  QUEtiapine, 150 mg, Oral, Nightly  rifAXIMin, 550 mg, Oral, Q12H  sodium chloride, 10 mL, Intravenous, Q12H  thiamine, 100 mg, Oral, Daily      Continuous Infusions:   PRN Meds:.•  acetaminophen **OR** acetaminophen **OR** acetaminophen  •  albuterol  •  benzonatate  •  dextrose  •  dextrose  •  glucagon (human recombinant)  •   ipratropium-albuterol  •  ondansetron **OR** ondansetron  •  oxyCODONE  •  potassium chloride **OR** potassium chloride **OR** potassium chloride  •  prochlorperazine  •  [COMPLETED] Insert peripheral IV **AND** sodium chloride  •  sodium chloride    Assessment & Plan   Assessment & Plan     Active Hospital Problems    Diagnosis  POA   • Pneumonia of right middle lobe due to infectious organism [J18.9]  Yes   • Sepsis, unspecified organism (HCC) [A41.9]  Unknown   • Tobacco use [Z72.0]  Yes   • Vaginal cancer (HCC) [C52]  Yes   • High grade squamous intraepithelial lesion (HGSIL) on cytologic smear of vagina [R87.623]  Yes   • Type 2 diabetes mellitus with hyperglycemia, with long-term current use of insulin (HCC) [E11.65, Z79.4]  Not Applicable   • Chronic hepatitis C without hepatic coma (HCC) [B18.2]  Yes   • Decompensated hepatic cirrhosis (HCC) [K72.90, K74.60]  Yes   • HFrEF (heart failure with reduced ejection fraction) (HCC) [I50.20]  Yes   • Coronary artery disease involving native coronary artery of native heart without angina pectoris [I25.10]  Yes   • Schizophrenia (HCC) [F20.9]  Yes   • Chronic pain disorder [G89.4]  Yes   • Primary hypertension [I10]  Yes   • Long-term current use of opiate analgesic [Z79.891]  Not Applicable   • Esophageal varices (HCC) [I85.00]  Yes      Resolved Hospital Problems   No resolved problems to display.        Brief Hospital Course to date:  Kaylie Cruz is a 57 y.o. female with PMHx vaginal squamous cell carcinoma in 2009 s/p WPRT, vaginal brachytherapy hysterectomy, HTN, HLD, history of substance abuse, history of Hepatitis C and Cirrhosis, CAD, DM2, Schizophrenia, HFrEF (EF 49%),  who presented to ED with CC of cough, subjective fever, SOB, abdominal pain and diarrhea.     This patient's problems and plans were partially entered by my partner and updated as appropriate by me 10/08/22.     Sepsis, POA  Bilateral PNA  Leukocytosis - resolved  +MRSA PCR   -  Cryptococcal antigen negative. Fungal Alina, Fungitell B-D glucan, Histoplasma negative   - Sputum cx ordered but patient without productive cough   - Respiratory PCR negative  - CT scan chest done on 10/5 - dense diffuse and extensive reticular and groundglass disease throughout most of the lungs   -SLP has seen, S/P FEES 9/29 and functional oral and pharyngeal phase, signed off   - Palliative following, now DNR/DNI  - Initially was on high flow and now improved to 4L -- continue to wean as tolerated   - ID and pulm following -- appreciate recs --  continue zosyn and zyvox   - Currently holding on steroids   - Mobilize. Trial mucinex.   - Will give 1 time dose of PO lasix today     Decompensated cirrhosis with ascites  History of Hepatitis C  Elevated LFTs   -Follows with GI outpatient but missed last appt.   -EGD due 04/2023 for EV screening  -S/P LVP on 9/29 with 2.25L of ascitic fluid removed, does not appear c/w SBP culture NGTD  -Hepatitis panel + Hep C ab  -Continue Xifaxan    - lactulose held due to frequent BMs. Will resume at 10 mg daily and monitor     Likely CKD  - patient is intermittently allowing labs  - Cr stable.      Hypokalemia  -- Improved     Chronic HFrEF   Coronary artery disease involving native coronary artery of native heart without angina pectoris  Primary hypertension  -Echo 3/2022 - EF 49%, global hypokinesis, grade 1 diastolic dysfunction  -C 4/3/2022 - nonobstructive LAD to LAD 60% in mid region  -Pt is supposed to follow with Dr Daniel, has missed appts   -Continue ASA 81, metoprolol  -Continue statin   - Monitor volume status     Type 2 diabetes mellitus with diabetic polyneuropathy, with long-term current use of insulin  -A1c has improved from 8.5% to 6.6% with medication compliance over the past 3 months  -Has appointment to establish with Endocrinology next month  -Continue Lantus 15 units nightly  -Continue Humalog 5 units TID meals   -Continue MDSSI  -Continue to HOLD Gabapentin  to avoid lethargy      Recent Gross hematuria  -Seen at  8/23/22 and diagnosed with UTI, treated with ABX   -Per patient the dysuria resolved but still having intermittent hematuria  -CT A/P without contrast with unremarkable bladder   -Patient has already been referred to Urology per PCP      Tobacco use  -Encourage smoking cessation  -PCP note says they plan to obtain PFTs outpatient as she has had some wheezing but no formal Dx of COPD  -PRN Albuterol   -Continue scheduled DuoNebs      High grade squamous intraepithelial lesion (HGSIL) on cytologic smear of vagina  Vaginal cancer   -Diagnosed 2010? s/p radiation, surgical resection, and hysterectomy. Previously following with Dr. Roxanne Chaudhry, Gyn-Onc in [place] with Replaced by [facility].   -She had vaginal pap smear with HGSIL and was lost to follow up.   -PCP has referred to GYN and gyn-onc for further evaluation      Schizophrenia, unspecified type  -Following with [facility] for talk therapy  - Will restart seroquel 150 mg qHS and monitor -- monitor mental status      Substance abuse  -UDS positive for cocaine, oxycodone and TCA     Chronic Anemia  - Stable. Monitor.      R rib pain  -Likely from PNA  -Continue Lidoderm patch   -Takes Roxicodone at home, resume low dose 5mg q8PRN  - rib XR with no acute or healing rib fracture      Deep Tissue Pressure Injury (Bilateral Sacral area)  -WOC following/PT wound, plan to F/U with [facility] in 2-3 days   -Dolphin bed ordered, patient not happy with this mattress     Expected Discharge Location and Transportation: acute rehab/[facility]   Expected Discharge Date: 10/10     DVT prophylaxis:  Mechanical DVT prophylaxis orders are present.     AM-PAC 6 Clicks Score (PT): 19 (10/07/22 2030)    CODE STATUS:   Code Status and Medical Interventions:   Ordered at: 10/05/22 1229     Medical Intervention Limits:    NO intubation (DNI)     Code Status (Patient has no pulse and is not breathing):    No CPR (Do Not Attempt to Resuscitate)      Medical Interventions (Patient has pulse or is breathing):    Limited Support       Lor Sam DO  10/08/22

## 2022-10-08 NOTE — THERAPY PROGRESS REPORT/RE-CERT
Patient Name: Kaylie Cruz  : 1965    MRN: [id number]                              Today's Date: 10/8/2022       Admit Date: 2022    Visit Dx:     ICD-10-CM ICD-9-CM   1. Pneumonia of right middle lobe due to infectious organism  J18.9 486   2. Right sided abdominal pain  R10.9 789.09   3. Cirrhosis of liver with ascites, unspecified hepatic cirrhosis type (HCC)  K74.60 571.5    R18.8    4. ANIKET (acute kidney injury) (HCC)  N17.9 584.9   5. Acute on chronic congestive heart failure, unspecified heart failure type (HCC)  I50.9 428.0   6. Sepsis, due to unspecified organism, unspecified whether acute organ dysfunction present (HCC)  A41.9 038.9     995.91   7. Dysphagia, unspecified type  R13.10 787.20     Patient Active Problem List   Diagnosis   • Chronic pain disorder   • Primary hypertension   • Long-term current use of opiate analgesic   • Lumbosacral spondylosis without myelopathy   • Migraine   • Coronary artery disease involving native coronary artery of native heart without angina pectoris   • Schizophrenia (HCC)   • Esophageal varices (HCC)   • Decompensated hepatic cirrhosis (HCC)   • HFrEF (heart failure with reduced ejection fraction) (HCC)   • Vaginal cancer (HCC)   • High grade squamous intraepithelial lesion (HGSIL) on cytologic smear of vagina   • Type 2 diabetes mellitus with hyperglycemia, with long-term current use of insulin (HCC)   • Chronic hepatitis C without hepatic coma (HCC)   • Tobacco use   • Pneumonia of right middle lobe due to infectious organism   • Sepsis, unspecified organism (HCC)     Past Medical History:   Diagnosis Date   • Anemia    • Cancer (HCC)     cervical   • Depression    • Hyperlipidemia    • Hypertension    • Infectious viral hepatitis    • Myocardial infarction (HCC)    • Substance abuse (HCC)      Past Surgical History:   Procedure Laterality Date   • BLADDER SURGERY     • CARDIAC CATHETERIZATION      4/3/2022   • CHOLECYSTECTOMY     • COLON RESECTION       7/19/2017, descending and transverse colon   • COLON SURGERY     • COLONOSCOPY      7/19/2017   • ENDOSCOPY      EGD 4/1/22   • HYSTERECTOMY     • TRANSESOPHAGEAL ECHOCARDIOGRAM (AVINASH)      [facility] hyokinesis, 49% EF, LVH      General Information     Row Name 10/08/22 1554          OT Time and Intention    Document Type progress note/recertification  -     Mode of Treatment occupational therapy  -     Row Name 10/08/22 1555          General Information    Patient Profile Reviewed yes  -CARO     Existing Precautions/Restrictions fall;oxygen therapy device and L/min  -CARO     Barriers to Rehab medically complex  -CARO     Row Name 10/08/22 1559          Cognition    Orientation Status (Cognition) oriented x 3  -CARO     Row Name 10/08/22 1553          Safety Issues, Functional Mobility    Safety Issues Affecting Function (Mobility) insight into deficits/self-awareness;judgment;problem-solving;safety precaution awareness;safety precautions follow-through/compliance;sequencing abilities  -CARO     Impairments Affecting Function (Mobility) balance;cognition;endurance/activity tolerance;shortness of breath;strength  -CARO     Cognitive Impairments, Mobility Safety/Performance awareness, need for assistance;insight into deficits/self-awareness;judgment;problem-solving/reasoning;safety precaution awareness;safety precaution follow-through;sequencing abilities  -CARO           User Key  (r) = Recorded By, (t) = Taken By, (c) = Cosigned By    Initials Name Provider Type    Sabirna Box OT Occupational Therapist                 Mobility/ADL's     Row Name 10/08/22 1556          Bed Mobility    Comment, (Bed Mobility) Pt received seated on BSC  -     Row Name 10/08/22 1556          Transfers    Transfers sit-stand transfer;toilet transfer  -     Comment, (Transfers) STSx4; cues for line management  -     Row Name 10/08/22 1556          Sit-Stand Transfer    Sit-Stand [place] (Transfers) contact guard;verbal cues  -      Assistive Device (Sit-Stand Transfers) walker, front-wheeled  -CARO     Row Name 10/08/22 1556          Toilet Transfer    Type (Toilet Transfer) sit-stand  -CARO     [place] Level (Toilet Transfer) contact guard  -CARO     Assistive Device (Toilet Transfer) commode, bedside without drop arms  -CARO     Row Name 10/08/22 1556          Functional Mobility    Functional Mobility- Ind. Level contact guard assist  -CARO     Functional Mobility- Device walker, front-wheeled  -CARO     Functional Mobility-Distance (Feet) 40  -CARO     Functional Mobility- Safety Issues step length decreased;supplemental O2  -CARO     Functional Mobility- Comment Pt ambulated in hallway with CGA, cues for pacing  -     Row Name 10/08/22 1556          Activities of Daily Living    BADL Assessment/Intervention lower body dressing;toileting  -     Row Name 10/08/22 1556          Lower Body Dressing Assessment/Training    [place] Level (Lower Body Dressing) don;pants/bottoms;shoes/slippers;minimum assist (75% patient effort);moderate assist (50% patient effort)  -CARO     Position (Lower Body Dressing) edge of bed sitting;supported standing  -CARO     Comment, (Lower Body Dressing) assist to thread underwear and pull-on pants through legholes; assist to tie lace-up shoes  -     Row Name 10/08/22 4066          Toileting Assessment/Training    [place] Level (Toileting) perform perineal hygiene;adjust/manage clothing;minimum assist (75% patient effort)  -CARO     Assistive Devices (Toileting) commode, bedside without drop arms  -CARO     Position (Toileting) supported standing  -CARO           User Key  (r) = Recorded By, (t) = Taken By, (c) = Cosigned By    Initials Name Provider Type    Sabrina Box OT Occupational Therapist               Obj/Interventions     Row Name 10/08/22 6074          Balance    Balance Assessment sitting static balance;sitting dynamic balance;standing static balance;standing dynamic balance  -     Static Sitting  Balance standby assist  -CARO     Dynamic Sitting Balance contact guard  -CARO     Position, Sitting Balance unsupported;other (see comments)  sitting on commode  -CARO     Static Standing Balance supervision  -CARO     Dynamic Standing Balance contact guard  -CARO     Position/Device Used, Standing Balance supported;walker, front-wheeled  -CARO     Balance Interventions standing;supported;occupation based/functional task  -CARO     Comment, Balance Theo for toileting tasks in standing  -CARO           User Key  (r) = Recorded By, (t) = Taken By, (c) = Cosigned By    Initials Name Provider Type    Sabrina Box OT Occupational Therapist               Goals/Plan     Row Name 10/08/22 1613          Bed Mobility Goal 1 (OT)    Strategies/Barriers (Bed Mobility Goal 1, OT) continue to monitor for consistency  -CARO     Progress/Outcomes (Bed Mobility Goal 1, OT) continuing progress toward goal  -CARO     Row Name 10/08/22 1613          Transfer Goal 1 (OT)    [place] Level/Cues Needed (Transfer Goal 1, OT) contact guard required  -CARO     Strategies/Barriers (Transfers Goal 1, OT) Pt currently CGA for transfers, continue to monitor for consistency  -CARO     Progress/Outcome (Transfer Goal 1, OT) goal met;goal revised this date  -CARO     Row Name 10/08/22 1613          Toileting Goal 1 (OT)    Activity/Device (Toileting Goal 1, OT) adjust/manage clothing;perform perineal hygiene;commode  -CARO     [place] Level/Cues Needed (Toileting Goal 1, OT) contact guard required  -CARO     Time Frame (Toileting Goal 1, OT) long term goal (LTG);by discharge  -CARO     Progress/Outcome (Toileting Goal 1, OT) new goal  -CARO     Row Name 10/08/22 1613          Grooming Goal 1 (OT)    Strategies/Barriers (Grooming Goal 1, OT) TIDWELL for grooming seated  -CARO     Progress/Outcome (Grooming Goal 1, OT) goal met  -CARO           User Key  (r) = Recorded By, (t) = Taken By, (c) = Cosigned By    Initials Name Provider Type    Sabrina Box OT Occupational  Therapist               Clinical Impression     Row Name 10/08/22 1605          Pain Scale: FACES Pre/Post-Treatment    Pain: FACES Scale, Pretreatment 0-->no hurt  -CARO     Posttreatment Pain Rating 0-->no hurt  -CARO     Row Name 10/08/22 1605          Plan of Care Review    Plan of Care Reviewed With patient  -CARO     Progress improving  -CARO     Outcome Evaluation OT recert completed with pt continuing to make progress toward ADL goals. Pt CGA for BSC transfers, Theo for toileting tasks in standing, ModA for LBD, TIDWELL for grooming tasks seated. Pt continues to be limited by decreased activity tolerance. Goals updated to reflect progress.  -CARO     Row Name 10/08/22 1605          Therapy Assessment/Plan (OT)    Therapy Frequency (OT) daily  -CARO     Row Name 10/08/22 1605          Therapy Plan Review/Discharge Plan (OT)    Anticipated Discharge Disposition (OT) inpatient rehabilitation facility  -CARO     Row Name 10/08/22 1605          Vital Signs    Pre SpO2 (%) 95  -CARO     O2 Delivery Pre Treatment supplemental O2  -CARO     O2 Delivery Intra Treatment supplemental O2  -CARO     Post SpO2 (%) 92  -CARO     O2 Delivery Post Treatment supplemental O2  -CARO     Pre Patient Position Sitting  -CARO     Intra Patient Position Standing  -CARO     Post Patient Position Sitting  -CARO     Row Name 10/08/22 1605          Positioning and Restraints    Pre-Treatment Position in bed  -CARO     Post Treatment Position bed  -CARO     In Bed notified nsg;sitting EOB;call light within reach;encouraged to call for assist;exit alarm on  -CARO           User Key  (r) = Recorded By, (t) = Taken By, (c) = Cosigned By    Initials Name Provider Type    Sabrina Box, SMITH Occupational Therapist               Outcome Measures     Row Name 10/08/22 1616          How much help from another is currently needed...    Putting on and taking off regular lower body clothing? 2  -CARO     Bathing (including washing, rinsing, and drying) 2  -CARO     Toileting (which includes  using toilet bed pan or urinal) 3  -CARO     Putting on and taking off regular upper body clothing 3  -CARO     Taking care of personal grooming (such as brushing teeth) 3  -CARO     Eating meals 3  -CARO     AM-PAC 6 Clicks Score (OT) 16  -CARO     Row Name 10/08/22 1616          Functional Assessment    Outcome Measure Options AM-PAC 6 Clicks Daily Activity (OT)  -CARO           User Key  (r) = Recorded By, (t) = Taken By, (c) = Cosigned By    Initials Name Provider Type    Sabrina Box OT Occupational Therapist                Occupational Therapy Education     Title: PT OT SLP Therapies (In Progress)     Topic: Occupational Therapy (In Progress)     Point: ADL training (Done)     Description:   Instruct learner(s) on proper safety adaptation and remediation techniques during self care or transfers.   Instruct in proper use of assistive devices.              Learning Progress Summary           Patient Acceptance, E, DU,NR by CARO at 10/8/2022 1616    Comment: IV line management; energy conservation/pacing; PLB    Acceptance, E, VU by  at 10/6/2022 1128    Acceptance, E,TB,D, NR by KF at 10/3/2022 1546    Acceptance, E, VU by CARO at 9/29/2022 1421    Comment: Reason for OT consult    Acceptance, E, NR,NL by MR at 9/28/2022 0837   Family Acceptance, E, VU by CARO at 9/29/2022 1421    Comment: Reason for OT consult                   Point: Home exercise program (In Progress)     Description:   Instruct learner(s) on appropriate technique for monitoring, assisting and/or progressing therapeutic exercises/activities.              Learning Progress Summary           Patient Acceptance, E,TB,D, NR by KF at 10/3/2022 1546    Acceptance, E, NR,NL by MR at 9/28/2022 0837                   Point: Precautions (Done)     Description:   Instruct learner(s) on prescribed precautions during self-care and functional transfers.              Learning Progress Summary           Patient Acceptance, E, DU,NR by CARO at 10/8/2022 1616    Comment:  IV line management; energy conservation/pacing; PLB    Acceptance, E,TB,D, NR by KF at 10/3/2022 1546    Acceptance, E, NR,NL by MR at 9/28/2022 0837                   Point: Body mechanics (Done)     Description:   Instruct learner(s) on proper positioning and spine alignment during self-care, functional mobility activities and/or exercises.              Learning Progress Summary           Patient Acceptance, E, DU,NR by CARO at 10/8/2022 1616    Comment: IV line management; energy conservation/pacing; PLB    Acceptance, E,TB,D, NR by KF at 10/3/2022 1546    Acceptance, E, NR,NL by MR at 9/28/2022 0837                               User Key     Initials Effective Dates Name Provider Type Discipline     06/16/21 -  Renae Alford, OT Occupational Therapist OT     06/16/21 -  Neyda Cagle, OT Occupational Therapist OT    CARO 06/16/21 -  Sabrina Garcia, OT Occupational Therapist OT    MR 09/22/22 -  Guadalupe Garcia, OT Occupational Therapist OT              OT Recommendation and Plan  Therapy Frequency (OT): daily  Plan of Care Review  Plan of Care Reviewed With: patient  Progress: improving  Outcome Evaluation: OT recert completed with pt continuing to make progress toward ADL goals. Pt CGA for BSC transfers, Theo for toileting tasks in standing, ModA for LBD, TIDWELL for grooming tasks seated. Pt continues to be limited by decreased activity tolerance. Goals updated to reflect progress.     Time Calculation:    Time Calculation- OT     Row Name 10/08/22 1513             Time Calculation- OT    OT Start Time 1513  -CRAO      OT Received On 10/08/22  -CARO      OT Goal Re-Cert Due Date 10/18/22  -CARO         Timed Charges    98957 - OT Therapeutic Exercise Minutes 20  -CARO      31303 - OT Self Care/Mgmt Minutes 25  -CARO         Total Minutes    Timed Charges Total Minutes 45  -CARO       Total Minutes 45  -CARO            User Key  (r) = Recorded By, (t) = Taken By, (c) = Cosigned By    Initials Name Provider Type    CARO Garcia  SMITH Bennett Occupational Therapist              Therapy Charges for Today     Code Description Service Date Service Provider Modifiers Qty    99085327634  OT THER PROC EA 15 MIN 10/8/2022 Sabrina Garcia OT GO 1    [id number] HC OT SELF CARE/MGMT/TRAIN EA 15 MIN 10/8/2022 Sabrina Garcia OT GO 2               Sabrina Garcia OT  10/8/2022

## 2022-10-09 LAB
GLUCOSE BLDC GLUCOMTR-MCNC: 108 MG/DL (ref 70–130)
GLUCOSE BLDC GLUCOMTR-MCNC: 126 MG/DL (ref 70–130)
GLUCOSE BLDC GLUCOMTR-MCNC: 149 MG/DL (ref 70–130)
GLUCOSE BLDC GLUCOMTR-MCNC: 211 MG/DL (ref 70–130)

## 2022-10-09 PROCEDURE — 63710000001 INSULIN LISPRO (HUMAN) PER 5 UNITS: Performed by: FAMILY MEDICINE

## 2022-10-09 PROCEDURE — 82962 GLUCOSE BLOOD TEST: CPT

## 2022-10-09 PROCEDURE — 63710000001 INSULIN DETEMIR PER 5 UNITS: Performed by: INTERNAL MEDICINE

## 2022-10-09 PROCEDURE — 93010 ELECTROCARDIOGRAM REPORT: CPT | Performed by: INTERNAL MEDICINE

## 2022-10-09 PROCEDURE — 99233 SBSQ HOSP IP/OBS HIGH 50: CPT | Performed by: INTERNAL MEDICINE

## 2022-10-09 PROCEDURE — 93005 ELECTROCARDIOGRAM TRACING: CPT | Performed by: NURSE PRACTITIONER

## 2022-10-09 PROCEDURE — 63710000001 INSULIN LISPRO (HUMAN) PER 5 UNITS: Performed by: INTERNAL MEDICINE

## 2022-10-09 RX ORDER — AMOXICILLIN AND CLAVULANATE POTASSIUM 875; 125 MG/1; MG/1
1 TABLET, FILM COATED ORAL EVERY 12 HOURS SCHEDULED
Status: DISCONTINUED | OUTPATIENT
Start: 2022-10-09 | End: 2022-10-12

## 2022-10-09 RX ORDER — LINEZOLID 600 MG/1
600 TABLET, FILM COATED ORAL EVERY 12 HOURS SCHEDULED
Status: DISCONTINUED | OUTPATIENT
Start: 2022-10-09 | End: 2022-10-12

## 2022-10-09 RX ORDER — HYDROXYZINE HYDROCHLORIDE 25 MG/1
25 TABLET, FILM COATED ORAL ONCE
Status: DISCONTINUED | OUTPATIENT
Start: 2022-10-09 | End: 2022-10-12 | Stop reason: HOSPADM

## 2022-10-09 RX ADMIN — LACTULOSE 10 G: 20 SOLUTION ORAL at 10:06

## 2022-10-09 RX ADMIN — THIAMINE HCL TAB 100 MG 100 MG: 100 TAB at 10:05

## 2022-10-09 RX ADMIN — ASPIRIN 81 MG: 81 TABLET, COATED ORAL at 10:06

## 2022-10-09 RX ADMIN — INSULIN LISPRO 5 UNITS: 100 INJECTION, SOLUTION INTRAVENOUS; SUBCUTANEOUS at 10:05

## 2022-10-09 RX ADMIN — INSULIN LISPRO 5 UNITS: 100 INJECTION, SOLUTION INTRAVENOUS; SUBCUTANEOUS at 17:18

## 2022-10-09 RX ADMIN — LINEZOLID 600 MG: 600 TABLET, FILM COATED ORAL at 22:02

## 2022-10-09 RX ADMIN — OXYCODONE 5 MG: 5 TABLET ORAL at 22:10

## 2022-10-09 RX ADMIN — INSULIN LISPRO 4 UNITS: 100 INJECTION, SOLUTION INTRAVENOUS; SUBCUTANEOUS at 17:17

## 2022-10-09 RX ADMIN — METOPROLOL TARTRATE 50 MG: 50 TABLET, FILM COATED ORAL at 22:02

## 2022-10-09 RX ADMIN — AMOXICILLIN AND CLAVULANATE POTASSIUM 1 TABLET: 875; 125 TABLET, FILM COATED ORAL at 22:03

## 2022-10-09 RX ADMIN — AMOXICILLIN AND CLAVULANATE POTASSIUM 1 TABLET: 875; 125 TABLET, FILM COATED ORAL at 12:44

## 2022-10-09 RX ADMIN — METOPROLOL TARTRATE 50 MG: 50 TABLET, FILM COATED ORAL at 10:06

## 2022-10-09 RX ADMIN — QUETIAPINE FUMARATE 150 MG: 100 TABLET ORAL at 22:01

## 2022-10-09 RX ADMIN — RIFAXIMIN 550 MG: 550 TABLET ORAL at 10:05

## 2022-10-09 RX ADMIN — FOLIC ACID 1 MG: 1 TABLET ORAL at 10:05

## 2022-10-09 RX ADMIN — ATORVASTATIN CALCIUM 40 MG: 40 TABLET, FILM COATED ORAL at 22:02

## 2022-10-09 RX ADMIN — GUAIFENESIN 600 MG: 600 TABLET, EXTENDED RELEASE ORAL at 22:02

## 2022-10-09 RX ADMIN — AMLODIPINE BESYLATE 5 MG: 5 TABLET ORAL at 10:05

## 2022-10-09 RX ADMIN — CASTOR OIL AND BALSAM, PERU 1 APPLICATION: 788; 87 OINTMENT TOPICAL at 10:08

## 2022-10-09 RX ADMIN — INSULIN DETEMIR 15 UNITS: 100 INJECTION, SOLUTION SUBCUTANEOUS at 22:10

## 2022-10-09 RX ADMIN — RIFAXIMIN 550 MG: 550 TABLET ORAL at 22:01

## 2022-10-09 RX ADMIN — INSULIN LISPRO 5 UNITS: 100 INJECTION, SOLUTION INTRAVENOUS; SUBCUTANEOUS at 12:44

## 2022-10-09 RX ADMIN — LINEZOLID 600 MG: 600 TABLET, FILM COATED ORAL at 12:44

## 2022-10-09 RX ADMIN — CASTOR OIL AND BALSAM, PERU 1 APPLICATION: 788; 87 OINTMENT TOPICAL at 22:26

## 2022-10-09 RX ADMIN — LIDOCAINE 1 PATCH: 50 PATCH CUTANEOUS at 10:07

## 2022-10-09 RX ADMIN — GUAIFENESIN 600 MG: 600 TABLET, EXTENDED RELEASE ORAL at 10:06

## 2022-10-09 NOTE — PLAN OF CARE
Goal Outcome Evaluation:  Plan of Care Reviewed With: patient   Pt pulled out recently placed U/S guided PIV at start of PM shift and we were unsuccessful at further attempts and pt refused to be stuck again until morning so no ABX overnight but did take PO medicines.    Pt also waxing and waning between compliance and defiance when involving safety policies. Hallucinating and paranoid a man is in her room trying to kill her. Moved pts room to across from nurses station for safety and to calm pt fears. She had not slept in over 36 hours. Continue to redirect, reassure, and allow for conditions of sleep.

## 2022-10-09 NOTE — SIGNIFICANT NOTE
"Pt very restless and agitated tonight. Pt had previously informed RN that she was going to leave AMA at the beginning of the shift. However, pt's family visited and pt calmed and decided to stay. After family left for the evening, pt is again persistently calling out to staff reporting that \"people are coming in her room and attempting to murder her\", pt w/ ongoing auditory and visual hallucinations despite routine night medications. Pt able to answer orientation questions and VS stable. Pt's family w/ approval to stay at bedside overnight but unable to do so. Sitter ordered at this time.   "

## 2022-10-09 NOTE — PROGRESS NOTES
[facility] Medicine Services  PROGRESS NOTE    Patient Name: Kaylie Cruz  : 1965  MRN: [id number]    Date of Admission: 2022  Primary Care Physician: Iesha Harris DO    Subjective   Subjective     CC:  resp failure     HPI:  Pt with agitation and both auditory/visual hallucinations last evening.  Moved closer to nursing station due to this. Pt states that she had BMs yesterday (none documented).  She denies any current hallucinations.     ROS:  Gen- No fevers, chills  CV- No chest pain, palpitations  Resp- No cough, dyspnea  GI- No N/V/D, abd pain      Objective   Objective     Vital Signs:   Temp:  [97.1 °F (36.2 °C)] 97.1 °F (36.2 °C)  Heart Rate:  [77] 77  Resp:  [18] 18  BP: (128-139)/(77-94) 128/77  Flow (L/min):  [4] 4     Physical Exam:  Constitutional: No acute distress, awake, alert  HENT: NCAT, mucous membranes moist  Respiratory: poor effort; diminished  Cardiovascular: RRR, no murmurs, rubs, or gallops  Gastrointestinal: Positive bowel sounds, soft, nontender, nondistended  Musculoskeletal: No bilateral ankle edema  Psychiatric: cooperative  Neurologic: Oriented x 3, no asterixis, strength symmetric in all extremities, Cranial Nerves grossly intact to confrontation, speech clear  Skin: No rashes    Results Reviewed:  LAB RESULTS:      Lab 10/08/22  0415 10/05/22  0948 10/05/22  0405 10/04/22  0537 10/03/22  0736 10/02/22  0814   WBC 7.48 17.87*  --  17.59* 19.51* 16.29*   HEMOGLOBIN 8.3* 8.2*  --  7.6* 8.9* 8.7*   HEMATOCRIT 25.7* 24.5*  --  23.9* 27.4* 27.7*   PLATELETS 155 202  --  162 176 194   NEUTROS ABS  --  15.45*  --  14.84* 15.96* 13.85*   IMMATURE GRANS (ABS)  --  0.17*  --  0.41* 0.83*  --    LYMPHS ABS  --  1.21  --  1.20 1.55  --    MONOS ABS  --  0.59  --  0.65 0.71  --    EOS ABS  --  0.35  --  0.39 0.35 0.16   MCV 80.8 79.8  --  82.4 81.3 82.9   PROCALCITONIN  --   --   --  1.42*  --  0.88*   LACTATE  --   --  1.8 1.4  --   --           Lab 10/08/22  0415 10/05/22  0405 10/04/22  0537 10/03/22  0736 10/02/22  0814   SODIUM 136 136 132* 133* 137   POTASSIUM 3.5 3.8 3.9 4.2 4.4   CHLORIDE 104 105 102 103 108*   CO2 21.0* 18.0* 19.0* 19.0* 19.0*   ANION GAP 11.0 13.0 11.0 11.0 10.0   BUN 35* 30* 34* 30* 30*   CREATININE 1.43* 1.52* 1.71* 1.55* 1.32*   EGFR 42.3* 39.3* 34.2* 38.4* 46.6*   GLUCOSE 127* 119* 159* 123* 203*   CALCIUM 8.3* 7.9* 7.8* 8.2* 8.4*         Lab 10/05/22  0405 10/04/22  0537 10/03/22  0736 10/02/22  0814   TOTAL PROTEIN 6.1 5.7* 6.6 6.8   ALBUMIN 2.10* 2.10* 2.10* 2.30*   GLOBULIN 4.0 3.6 4.5 4.5   ALT (SGPT) 14 14 17 21   AST (SGOT) 49* 43* 49* 50*   BILIRUBIN 0.9 1.0 0.7 0.7   ALK PHOS 187* 147* 153* 157*                     Lab 10/03/22  0840   PH, ARTERIAL 7.418   PCO2, ARTERIAL 33.1*   PO2 ART 53.7*   FIO2 40   HCO3 ART 21.4   BASE EXCESS ART -2.7*   CARBOXYHEMOGLOBIN 1.3     Brief Urine Lab Results  (Last result in the past 365 days)      Color   Clarity   Blood   Leuk Est   Nitrite   Protein   CREAT   Urine HCG        10/01/22 0117 Yellow   Clear   Negative   Small (1+)   Negative   30 mg/dL (1+)                 Microbiology Results Abnormal     Procedure Component Value - Date/Time    Histoplasma Ag Ur - Urine, Urinary Bladder [[id number]] Collected: 10/03/22 1122    Lab Status: Final result Specimen: Urine from Urinary Bladder Updated: 10/06/22 0913     Histoplasma Galactomannan Ag Ur <0.5    Narrative:      Test(s) 183562-Histoplasma Gal'jaycee Ag Ur  was developed and its performance characteristics determined  by [facility]. It has not been cleared or approved by the Food  and Drug Administration.  Performed at:  [address]  [id number]  : Kamryn Eli MD, Phone:  [phone]    Blastomyces Antigen - Urine, Urinary Bladder [[id number]] Collected: 10/03/22 1122    Lab Status: Final result Specimen: Urine from Urinary Bladder Updated: 10/06/22 0712     Deshawn)  Blastomyces Ag None Detected ng/mL      Comment: Results reported as ng/mL in 0.2 - 14.7 ng/mL range  Results above the limit of detection but below 0.2 ng/mL  are reported as 'Positive, Below the Limit of  Quantification'  Results above 14.7 ng/mL are reported as 'Positive,  Above the Limit of Quantification'        Specimen Type URINE    Narrative:      Performed at:  [facility] - [facility] [employer]  [address] [facility] [place]  [id number]  : Jeanine Mancilla MD, Phone:  [phone]    Respiratory Panel PCR w/COVID-19(SARS-CoV-2) MALIK/STANLEY/AIXA/PAD/COR/MAD/HEATHER In-House, NP Swab in UTM/VTM, 3-4 HR TAT - Swab, Nasopharynx [[id number]]  (Normal) Collected: 10/03/22 1124    Lab Status: Final result Specimen: Swab from Nasopharynx Updated: 10/03/22 1237     ADENOVIRUS, PCR Not Detected     Coronavirus 229E Not Detected     Coronavirus HKU1 Not Detected     Coronavirus NL63 Not Detected     Coronavirus OC43 Not Detected     COVID19 Not Detected     Human Metapneumovirus Not Detected     Human Rhinovirus/Enterovirus Not Detected     Influenza A PCR Not Detected     Influenza B PCR Not Detected     Parainfluenza Virus 1 Not Detected     Parainfluenza Virus 2 Not Detected     Parainfluenza Virus 3 Not Detected     Parainfluenza Virus 4 Not Detected     RSV, PCR Not Detected     Bordetella pertussis pcr Not Detected     Bordetella parapertussis PCR Not Detected     Chlamydophila pneumoniae PCR Not Detected     Mycoplasma pneumo by PCR Not Detected    Narrative:      In the setting of a positive respiratory panel with a viral infection PLUS a negative procalcitonin without other underlying concern for bacterial infection, consider observing off antibiotics or discontinuation of antibiotics and continue supportive care. If the respiratory panel is positive for atypical bacterial infection (Bordetella pertussis, Chlamydophila pneumoniae, or Mycoplasma pneumoniae), consider antibiotic de-escalation to target atypical  bacterial infection.    Blood Culture - Blood, Hand, Right [[id number]]  (Normal) Collected: 09/27/22 1300    Lab Status: Final result Specimen: Blood from Hand, Right Updated: 10/02/22 1317     Blood Culture No growth at 5 days    Blood Culture - Blood, Arm, Right [[id number]]  (Normal) Collected: 09/27/22 1245    Lab Status: Final result Specimen: Blood from Arm, Right Updated: 10/02/22 1303     Blood Culture No growth at 5 days    Urine Culture - Urine, Urine, Random Void [[id number]]  (Normal) Collected: 10/01/22 0117    Lab Status: Final result Specimen: Urine, Random Void Updated: 10/02/22 1056     Urine Culture No growth    Body Fluid Culture - Body Fluid, Peritoneum [[id number]] Collected: 09/28/22 1143    Lab Status: Final result Specimen: Body Fluid from Peritoneum Updated: 10/01/22 0729     Body Fluid Culture No growth at 3 days     Gram Stain No WBCs or organisms seen    Legionella Antigen, Urine - Urine, Urine, Clean Catch [[id number]]  (Normal) Collected: 09/28/22 0643    Lab Status: Final result Specimen: Urine, Clean Catch Updated: 09/28/22 1229     LEGIONELLA ANTIGEN, URINE Negative    S. Pneumo Ag Urine or CSF - Urine, Urine, Clean Catch [[id number]]  (Normal) Collected: 09/28/22 0643    Lab Status: Final result Specimen: Urine, Clean Catch Updated: 09/28/22 1229     Strep Pneumo Ag Negative          XR Chest 1 View    Result Date: 10/7/2022  Examination: XR CHEST 1 VW-  Date of Exam: 10/7/2022 4:40 AM  Indication: F/u pneumonia; J18.9-Pneumonia, unspecified organism; R10.9-Unspecified abdominal pain; K74.60-Unspecified cirrhosis of liver; R18.8-Other ascites; N17.9-Acute kidney failure, unspecified; I50.9-Heart failure, unspecified; A41.9-Sepsis, unspecified organism; R13.10-Dysphagia, unspecified.  Comparison: Radiograph 10/04/2022, 10/03/2022  Technique: 1 view of the chest  Findings: Patchy airspace disease is seen within the bilateral upper lobes. No pleural effusions. No pneumothorax. The heart  size is normal. The pulmonary vasculature appears mildly indistinct. No acute osseous abnormality identified.      Impression: Mild improvement in bilateral upper lobe pneumonia.  This report was finalized on 10/7/2022 7:21 AM by Jojo Mckinney MD.            I have reviewed the medications:  Scheduled Meds:amLODIPine, 5 mg, Oral, Daily  aspirin, 81 mg, Oral, Daily  atorvastatin, 40 mg, Oral, Nightly  castor oil-balsam peru, 1 application, Topical, Q12H  folic acid, 1 mg, Oral, Daily  guaiFENesin, 600 mg, Oral, Q12H  hydrOXYzine, 25 mg, Oral, Once  insulin detemir, 15 Units, Subcutaneous, Nightly  insulin lispro, 0-9 Units, Subcutaneous, TID AC  Insulin Lispro, 5 Units, Subcutaneous, TID With Meals  lactulose, 10 g, Oral, Daily  lidocaine, 1 patch, Transdermal, Q24H  Linezolid, 600 mg, Intravenous, Q12H  metoprolol tartrate, 50 mg, Oral, BID  piperacillin-tazobactam, 3.375 g, Intravenous, Q8H  QUEtiapine, 150 mg, Oral, Nightly  rifAXIMin, 550 mg, Oral, Q12H  sodium chloride, 10 mL, Intravenous, Q12H  thiamine, 100 mg, Oral, Daily      Continuous Infusions:   PRN Meds:.•  acetaminophen **OR** acetaminophen **OR** acetaminophen  •  albuterol  •  benzonatate  •  dextrose  •  dextrose  •  glucagon (human recombinant)  •  ipratropium-albuterol  •  ondansetron **OR** ondansetron  •  oxyCODONE  •  potassium chloride **OR** potassium chloride **OR** potassium chloride  •  prochlorperazine  •  [COMPLETED] Insert peripheral IV **AND** sodium chloride  •  sodium chloride    Assessment & Plan   Assessment & Plan     Active Hospital Problems    Diagnosis  POA   • Pneumonia of right middle lobe due to infectious organism [J18.9]  Yes   • Sepsis, unspecified organism (HCC) [A41.9]  Unknown   • Tobacco use [Z72.0]  Yes   • Vaginal cancer (HCC) [C52]  Yes   • High grade squamous intraepithelial lesion (HGSIL) on cytologic smear of vagina [R87.623]  Yes   • Type 2 diabetes mellitus with hyperglycemia, with long-term current use of  insulin (HCC) [E11.65, Z79.4]  Not Applicable   • Chronic hepatitis C without hepatic coma (HCC) [B18.2]  Yes   • Decompensated hepatic cirrhosis (HCC) [K72.90, K74.60]  Yes   • HFrEF (heart failure with reduced ejection fraction) (HCC) [I50.20]  Yes   • Coronary artery disease involving native coronary artery of native heart without angina pectoris [I25.10]  Yes   • Schizophrenia (HCC) [F20.9]  Yes   • Chronic pain disorder [G89.4]  Yes   • Primary hypertension [I10]  Yes   • Long-term current use of opiate analgesic [Z79.891]  Not Applicable   • Esophageal varices (HCC) [I85.00]  Yes      Resolved Hospital Problems   No resolved problems to display.        Brief Hospital Course to date:  Kaylie Cruz is a 57 y.o. female with PMHx vaginal squamous cell carcinoma in 2009 s/p WPRT, vaginal brachytherapy hysterectomy, HTN, HLD, history of substance abuse, history of Hepatitis C and Cirrhosis, CAD, DM2, Schizophrenia, HFrEF (EF 49%),  who presented to ED with CC of cough, subjective fever, SOB, abdominal pain and diarrhea.     This patient's problems and plans were partially entered by my partner and updated as appropriate by me 10/08/22.     Sepsis, POA  Bilateral PNA  Leukocytosis - resolved  +MRSA PCR   - Cryptococcal antigen negative. Fungal Alina, Fungitell B-D glucan, Histoplasma negative   - Sputum cx ordered but patient without productive cough   - Respiratory PCR negative  - CT scan chest done on 10/5 - dense diffuse and extensive reticular and groundglass disease throughout most of the lungs   -SLP has seen, S/P FEES 9/29 and functional oral and pharyngeal phase, signed off   - Palliative following, now DNR/DNI  - Initially was on high flow and now improved to 4L -- continue to wean as tolerated   - ID and pulm following -- appreciate recs --  continue zosyn and zyvox. Pt pulled out PIV again overnight and is difficult stick.  Will transition to PO Zyvox and change Zosyn to PO Augmentin (last dose of PCN  was supposed to be 10/10 per Dr. Castillo's note on 10/7)   - Currently holding on steroids   - Mobilize. Trial mucinex.   -  given 1 time dose of PO lasix 10/8, no accurate Is/Os     Decompensated cirrhosis with ascites  History of Hepatitis C  Elevated LFTs   -Follows with GI outpatient but missed last appt.   -EGD due 04/2023 for EV screening  -S/P LVP on 9/29 with 2.25L of ascitic fluid removed, does not appear c/w SBP culture NGTD  -Hepatitis panel + Hep C ab  -Continue Xifaxan    - lactulose held due to frequent BMs. Resumed at 10 mg daily on 10/7, no BM documented in several days but pt reports that she had at least one yesterday. Given increased frequency of hallucinations, will check ammonia- no asterixis on exam and, with reported BM, doubt that she's encephalopathic.      Likely CKD  - patient is intermittently allowing labs  - Cr stable.      Hypokalemia  -- Improved     Chronic HFrEF   Coronary artery disease involving native coronary artery of native heart without angina pectoris  Primary hypertension  -Echo 3/2022 - EF 49%, global hypokinesis, grade 1 diastolic dysfunction  -LHC 4/3/2022 - nonobstructive LAD to LAD 60% in mid region  -Pt is supposed to follow with Dr Daniel, has missed appts   -Continue ASA 81, metoprolol  -Continue statin   - Monitor volume status     Type 2 diabetes mellitus with diabetic polyneuropathy, with long-term current use of insulin  -A1c has improved from 8.5% to 6.6% with medication compliance over the past 3 months  -Has appointment to establish with Endocrinology next month  -Continue Lantus 15 units nightly  -Continue Humalog 5 units TID meals   -Continue MDSSI  -Continue to HOLD Gabapentin to avoid lethargy      Recent Gross hematuria  -Seen at  8/23/22 and diagnosed with UTI, treated with ABX   -Per patient the dysuria resolved but still having intermittent hematuria  -CT A/P without contrast with unremarkable bladder   -Patient has already been referred to Urology  per PCP      Tobacco use  -Encourage smoking cessation  -PCP note says they plan to obtain PFTs outpatient as she has had some wheezing but no formal Dx of COPD  -PRN Albuterol   -Continue scheduled DuoNebs      High grade squamous intraepithelial lesion (HGSIL) on cytologic smear of vagina  Vaginal cancer   -Diagnosed 2010? s/p radiation, surgical resection, and hysterectomy. Previously following with Dr. Roxanne Chaudhry, Gyn-Onc in [place] with [facility].   -She had vaginal pap smear with HGSIL and was lost to follow up.   -PCP has referred to GYN and gyn-onc for further evaluation      Schizophrenia, unspecified type  Hospital-related delirium vs hepatic encephalopathy  -Following with [place] for talk therapy  - restarted seroquel 150 mg qHS on 10/7, given an extra 50mg 10/8 am. QTc remains prolonged at 491. Will defer increasing Seroquel back to her home dose (200mg) due to this.  Monitor closely  -- sitter at bedside today.    -- lactulose plan as noted above     Substance abuse  -UDS positive for cocaine, oxycodone and TCA     Chronic Anemia  - Stable. Monitor.      R rib pain  -Likely from PNA  -Continue Lidoderm patch   -Takes Roxicodone at home, resume low dose 5mg q8PRN  - rib XR with no acute or healing rib fracture      Deep Tissue Pressure Injury (Bilateral Sacral area)  -WOC following/PT wound  -Dolphin bed ordered, patient not happy with this mattress     Expected Discharge Location and Transportation: acute rehab/[facility]   Expected Discharge Date: 10/11     DVT prophylaxis:  Mechanical DVT prophylaxis orders are present.     AM-PAC 6 Clicks Score (PT): 19 (10/07/22 2030)    CODE STATUS:   Code Status and Medical Interventions:   Ordered at: 10/05/22 1229     Medical Intervention Limits:    NO intubation (DNI)     Code Status (Patient has no pulse and is not breathing):    No CPR (Do Not Attempt to Resuscitate)     Medical Interventions (Patient has pulse or is breathing):    Limited Support        Megan Landeros MD  10/09/22

## 2022-10-09 NOTE — PLAN OF CARE
Problem: Fall Injury Risk  Goal: Absence of Fall and Fall-Related Injury  Outcome: Ongoing, Progressing  Intervention: Identify and Manage Contributors  Recent Flowsheet Documentation  Taken 10/9/2022 0800 by Jennie Dahl RN  Medication Review/Management: medications reviewed  Intervention: Promote Injury-Free Environment  Recent Flowsheet Documentation  Taken 10/9/2022 1800 by Jennie Dahl RN  Safety Promotion/Fall Prevention: (sitter att bedside) safety round/check completed  Taken 10/9/2022 1600 by Jennie Dahl RN  Safety Promotion/Fall Prevention: (sitter at bedside)   safety round/check completed   nonskid shoes/slippers when out of bed   assistive device/personal items within reach   clutter free environment maintained  Taken 10/9/2022 1200 by Jennie Dahl RN  Safety Promotion/Fall Prevention: (sitter at bedside)   safety round/check completed   lighting adjusted   clutter free environment maintained   assistive device/personal items within reach   other (see comments)  Taken 10/9/2022 0945 by Jennie Dahl RN  Safety Promotion/Fall Prevention: (sitter at bedside) safety round/check completed  Taken 10/9/2022 0800 by Jennie Dahl RN  Safety Promotion/Fall Prevention: (sitter at bedside)   safety round/check completed   other (see comments)     Problem: Adult Inpatient Plan of Care  Goal: Plan of Care Review  Outcome: Ongoing, Progressing  Flowsheets (Taken 10/9/2022 1834)  Plan of Care Reviewed With: patient  Outcome Evaluation: Patient alert, ,oriented x 3-4. Calm and compliant with some aspects of care. Appetite fair.  Sitter at bedside, care clustered  and calm environment ,maintained to decrease stimulation. Hygiene needs met, wound care done.  Goal: Patient-Specific Goal (Individualized)  Outcome: Ongoing, Progressing  Goal: Absence of Hospital-Acquired Illness or Injury  Outcome: Ongoing, Progressing  Intervention: Identify and Manage Fall Risk  Recent Flowsheet  Documentation  Taken 10/9/2022 1800 by Jennie Dahl RN  Safety Promotion/Fall Prevention: (sitter att bedside) safety round/check completed  Taken 10/9/2022 1600 by Jennie Dahl RN  Safety Promotion/Fall Prevention: (sitter at bedside)   safety round/check completed   nonskid shoes/slippers when out of bed   assistive device/personal items within reach   clutter free environment maintained  Taken 10/9/2022 1200 by Jennie Dahl RN  Safety Promotion/Fall Prevention: (sitter at bedside)   safety round/check completed   lighting adjusted   clutter free environment maintained   assistive device/personal items within reach   other (see comments)  Taken 10/9/2022 0945 by Jennie Dahl RN  Safety Promotion/Fall Prevention: (sitter at bedside) safety round/check completed  Taken 10/9/2022 0800 by Jennie Dahl RN  Safety Promotion/Fall Prevention: (sitter at bedside)   safety round/check completed   other (see comments)  Intervention: Prevent Skin Injury  Recent Flowsheet Documentation  Taken 10/9/2022 1800 by Jennie Dahl RN  Body Position: position changed independently  Taken 10/9/2022 1600 by Jennie Dahl RN  Body Position: position changed independently  Taken 10/9/2022 1200 by Jennie Dahl RN  Skin Protection: adhesive use limited  Taken 10/9/2022 0945 by Jennie Dahl RN  Body Position: position changed independently  Skin Protection: adhesive use limited  Taken 10/9/2022 0800 by Jennie Dahl RN  Body Position:   left   side-lying  Intervention: Prevent and Manage VTE (Venous Thromboembolism) Risk  Recent Flowsheet Documentation  Taken 10/9/2022 1600 by Jennie Dahl RN  Activity Management: activity adjusted per tolerance  Range of Motion: active ROM (range of motion) encouraged  Taken 10/9/2022 0945 by Jennie Dahl RN  Activity Management: activity adjusted per tolerance  Range of Motion: active ROM (range of motion) encouraged  Intervention: Prevent Infection  Recent  Flowsheet Documentation  Taken 10/9/2022 0945 by Jennie Dahl RN  Infection Prevention: environmental surveillance performed  Goal: Optimal Comfort and Wellbeing  Outcome: Ongoing, Progressing  Intervention: Provide Person-Centered Care  Recent Flowsheet Documentation  Taken 10/9/2022 1800 by Jennie Dahl RN  Trust Relationship/Rapport: care explained  Taken 10/9/2022 1600 by Jennie Dahl RN  Trust Relationship/Rapport: care explained  Taken 10/9/2022 1200 by Jennie Dahl RN  Trust Relationship/Rapport: care explained  Taken 10/9/2022 0945 by Jennie Dahl RN  Trust Relationship/Rapport: care explained  Goal: Readiness for Transition of Care  Outcome: Ongoing, Progressing     Problem: Skin Injury Risk Increased  Goal: Skin Health and Integrity  Outcome: Ongoing, Progressing  Intervention: Optimize Skin Protection  Recent Flowsheet Documentation  Taken 10/9/2022 1200 by Jennie Dahl RN  Pressure Reduction Techniques: frequent weight shift encouraged  Pressure Reduction Devices: specialty bed utilized  Skin Protection: adhesive use limited  Taken 10/9/2022 0945 by Jennie Dahl RN  Pressure Reduction Techniques: frequent weight shift encouraged  Pressure Reduction Devices: pressure-redistributing mattress utilized  Skin Protection: adhesive use limited     Problem: Palliative Care  Goal: Enhanced Quality of Life  Outcome: Ongoing, Progressing  Intervention: Maximize Comfort  Recent Flowsheet Documentation  Taken 10/9/2022 0800 by Jennie Dahl RN  Oral Care: patient refused intervention  Intervention: Optimize Function  Recent Flowsheet Documentation  Taken 10/9/2022 1800 by Jennie Dahl RN  Sensory Stimulation Regulation: care clustered  Taken 10/9/2022 0945 by Jennie Dahl RN  Sensory Stimulation Regulation:   care clustered   auditory stimulation minimized   quiet environment promoted  Intervention: Optimize Psychosocial Wellbeing  Recent Flowsheet Documentation  Taken 10/9/2022  1600 by Jennie Dahl, RN  Supportive Measures:   active listening utilized   self-care encouraged  Taken 10/9/2022 1200 by Jennie Dahl RN  Supportive Measures: active listening utilized  Family/Support System Care: support provided  Taken 10/9/2022 0945 by Jennie Dahl RN  Supportive Measures:   active listening utilized   verbalization of feelings encouraged  Family/Support System Care: support provided  Taken 10/9/2022 0800 by Jennie Dahl RN  Family/Support System Care: support provided   Goal Outcome Evaluation:  Plan of Care Reviewed With: patient           Outcome Evaluation: Patient alert, ,oriented x 3-4. Calm and compliant with some aspects of care. Appetite fair.  Sitter at bedside, care clustered  and calm environment ,maintained to decrease stimulation. Hygiene needs met, wound care done.

## 2022-10-10 LAB
ALBUMIN SERPL-MCNC: 2.3 G/DL (ref 3.5–5.2)
ALBUMIN/GLOB SERPL: 0.5 G/DL
ALP SERPL-CCNC: 170 U/L (ref 39–117)
ALT SERPL W P-5'-P-CCNC: 18 U/L (ref 1–33)
ANION GAP SERPL CALCULATED.3IONS-SCNC: 10 MMOL/L (ref 5–15)
AST SERPL-CCNC: 46 U/L (ref 1–32)
BASOPHILS # BLD AUTO: 0.1 10*3/MM3 (ref 0–0.2)
BASOPHILS NFR BLD AUTO: 1.5 % (ref 0–1.5)
BILIRUB SERPL-MCNC: 0.6 MG/DL (ref 0–1.2)
BUN SERPL-MCNC: 36 MG/DL (ref 6–20)
BUN/CREAT SERPL: 24.7 (ref 7–25)
CALCIUM SPEC-SCNC: 8.3 MG/DL (ref 8.6–10.5)
CHLORIDE SERPL-SCNC: 105 MMOL/L (ref 98–107)
CO2 SERPL-SCNC: 21 MMOL/L (ref 22–29)
CREAT SERPL-MCNC: 1.46 MG/DL (ref 0.57–1)
DEPRECATED RDW RBC AUTO: 47.9 FL (ref 37–54)
EGFRCR SERPLBLD CKD-EPI 2021: 41.3 ML/MIN/1.73
EOSINOPHIL # BLD AUTO: 0.25 10*3/MM3 (ref 0–0.4)
EOSINOPHIL NFR BLD AUTO: 3.7 % (ref 0.3–6.2)
ERYTHROCYTE [DISTWIDTH] IN BLOOD BY AUTOMATED COUNT: 16.7 % (ref 12.3–15.4)
GLOBULIN UR ELPH-MCNC: 4.7 GM/DL
GLUCOSE BLDC GLUCOMTR-MCNC: 113 MG/DL (ref 70–130)
GLUCOSE BLDC GLUCOMTR-MCNC: 114 MG/DL (ref 70–130)
GLUCOSE BLDC GLUCOMTR-MCNC: 130 MG/DL (ref 70–130)
GLUCOSE BLDC GLUCOMTR-MCNC: 173 MG/DL (ref 70–130)
GLUCOSE SERPL-MCNC: 121 MG/DL (ref 65–99)
HCT VFR BLD AUTO: 26.7 % (ref 34–46.6)
HGB BLD-MCNC: 8.7 G/DL (ref 12–15.9)
IMM GRANULOCYTES # BLD AUTO: 0.02 10*3/MM3 (ref 0–0.05)
IMM GRANULOCYTES NFR BLD AUTO: 0.3 % (ref 0–0.5)
LYMPHOCYTES # BLD AUTO: 2.03 10*3/MM3 (ref 0.7–3.1)
LYMPHOCYTES NFR BLD AUTO: 30 % (ref 19.6–45.3)
MCH RBC QN AUTO: 26.2 PG (ref 26.6–33)
MCHC RBC AUTO-ENTMCNC: 32.6 G/DL (ref 31.5–35.7)
MCV RBC AUTO: 80.4 FL (ref 79–97)
MONOCYTES # BLD AUTO: 0.28 10*3/MM3 (ref 0.1–0.9)
MONOCYTES NFR BLD AUTO: 4.1 % (ref 5–12)
NEUTROPHILS NFR BLD AUTO: 4.08 10*3/MM3 (ref 1.7–7)
NEUTROPHILS NFR BLD AUTO: 60.4 % (ref 42.7–76)
NRBC BLD AUTO-RTO: 0 /100 WBC (ref 0–0.2)
PLATELET # BLD AUTO: 131 10*3/MM3 (ref 140–450)
PMV BLD AUTO: 9.7 FL (ref 6–12)
POTASSIUM SERPL-SCNC: 3.5 MMOL/L (ref 3.5–5.2)
PROT SERPL-MCNC: 7 G/DL (ref 6–8.5)
QT INTERVAL: 418 MS
QTC INTERVAL: 491 MS
RBC # BLD AUTO: 3.32 10*6/MM3 (ref 3.77–5.28)
SODIUM SERPL-SCNC: 136 MMOL/L (ref 136–145)
WBC NRBC COR # BLD: 6.76 10*3/MM3 (ref 3.4–10.8)

## 2022-10-10 PROCEDURE — 80053 COMPREHEN METABOLIC PANEL: CPT | Performed by: INTERNAL MEDICINE

## 2022-10-10 PROCEDURE — 99232 SBSQ HOSP IP/OBS MODERATE 35: CPT | Performed by: INTERNAL MEDICINE

## 2022-10-10 PROCEDURE — 82962 GLUCOSE BLOOD TEST: CPT

## 2022-10-10 PROCEDURE — 63710000001 INSULIN LISPRO (HUMAN) PER 5 UNITS: Performed by: INTERNAL MEDICINE

## 2022-10-10 PROCEDURE — 97116 GAIT TRAINING THERAPY: CPT

## 2022-10-10 PROCEDURE — 85025 COMPLETE CBC W/AUTO DIFF WBC: CPT | Performed by: INTERNAL MEDICINE

## 2022-10-10 PROCEDURE — 97530 THERAPEUTIC ACTIVITIES: CPT

## 2022-10-10 PROCEDURE — 63710000001 INSULIN DETEMIR PER 5 UNITS: Performed by: INTERNAL MEDICINE

## 2022-10-10 PROCEDURE — 97535 SELF CARE MNGMENT TRAINING: CPT

## 2022-10-10 RX ORDER — LACTULOSE 10 G/15ML
10 SOLUTION ORAL 3 TIMES DAILY
Status: DISCONTINUED | OUTPATIENT
Start: 2022-10-10 | End: 2022-10-12 | Stop reason: HOSPADM

## 2022-10-10 RX ORDER — FUROSEMIDE 10 MG/ML
40 INJECTION INTRAMUSCULAR; INTRAVENOUS ONCE
Status: DISCONTINUED | OUTPATIENT
Start: 2022-10-10 | End: 2022-10-10

## 2022-10-10 RX ORDER — FUROSEMIDE 40 MG/1
80 TABLET ORAL DAILY
Status: DISCONTINUED | OUTPATIENT
Start: 2022-10-11 | End: 2022-10-12 | Stop reason: HOSPADM

## 2022-10-10 RX ADMIN — INSULIN DETEMIR 15 UNITS: 100 INJECTION, SOLUTION SUBCUTANEOUS at 21:35

## 2022-10-10 RX ADMIN — RIFAXIMIN 550 MG: 550 TABLET ORAL at 20:00

## 2022-10-10 RX ADMIN — AMLODIPINE BESYLATE 5 MG: 5 TABLET ORAL at 08:30

## 2022-10-10 RX ADMIN — RIFAXIMIN 550 MG: 550 TABLET ORAL at 08:44

## 2022-10-10 RX ADMIN — ACETAMINOPHEN 650 MG: 325 TABLET, FILM COATED ORAL at 16:14

## 2022-10-10 RX ADMIN — LIDOCAINE 1 PATCH: 50 PATCH CUTANEOUS at 08:29

## 2022-10-10 RX ADMIN — LINEZOLID 600 MG: 600 TABLET, FILM COATED ORAL at 08:30

## 2022-10-10 RX ADMIN — AMOXICILLIN AND CLAVULANATE POTASSIUM 1 TABLET: 875; 125 TABLET, FILM COATED ORAL at 08:30

## 2022-10-10 RX ADMIN — METOPROLOL TARTRATE 50 MG: 50 TABLET, FILM COATED ORAL at 08:44

## 2022-10-10 RX ADMIN — THIAMINE HCL TAB 100 MG 100 MG: 100 TAB at 08:44

## 2022-10-10 RX ADMIN — CASTOR OIL AND BALSAM, PERU 1 APPLICATION: 788; 87 OINTMENT TOPICAL at 21:34

## 2022-10-10 RX ADMIN — METOPROLOL TARTRATE 50 MG: 50 TABLET, FILM COATED ORAL at 20:00

## 2022-10-10 RX ADMIN — AMOXICILLIN AND CLAVULANATE POTASSIUM 1 TABLET: 875; 125 TABLET, FILM COATED ORAL at 20:00

## 2022-10-10 RX ADMIN — INSULIN LISPRO 5 UNITS: 100 INJECTION, SOLUTION INTRAVENOUS; SUBCUTANEOUS at 08:28

## 2022-10-10 RX ADMIN — LINEZOLID 600 MG: 600 TABLET, FILM COATED ORAL at 21:34

## 2022-10-10 RX ADMIN — GUAIFENESIN 600 MG: 600 TABLET, EXTENDED RELEASE ORAL at 20:00

## 2022-10-10 RX ADMIN — OXYCODONE 5 MG: 5 TABLET ORAL at 08:44

## 2022-10-10 RX ADMIN — OXYCODONE 5 MG: 5 TABLET ORAL at 19:50

## 2022-10-10 RX ADMIN — FOLIC ACID 1 MG: 1 TABLET ORAL at 08:30

## 2022-10-10 RX ADMIN — ASPIRIN 81 MG: 81 TABLET, COATED ORAL at 08:30

## 2022-10-10 RX ADMIN — INSULIN LISPRO 5 UNITS: 100 INJECTION, SOLUTION INTRAVENOUS; SUBCUTANEOUS at 17:10

## 2022-10-10 RX ADMIN — ATORVASTATIN CALCIUM 40 MG: 40 TABLET, FILM COATED ORAL at 20:00

## 2022-10-10 RX ADMIN — INSULIN LISPRO 5 UNITS: 100 INJECTION, SOLUTION INTRAVENOUS; SUBCUTANEOUS at 12:54

## 2022-10-10 RX ADMIN — QUETIAPINE FUMARATE 150 MG: 100 TABLET ORAL at 21:37

## 2022-10-10 RX ADMIN — LACTULOSE 10 G: 20 SOLUTION ORAL at 20:00

## 2022-10-10 RX ADMIN — CASTOR OIL AND BALSAM, PERU 1 APPLICATION: 788; 87 OINTMENT TOPICAL at 08:40

## 2022-10-10 RX ADMIN — GUAIFENESIN 600 MG: 600 TABLET, EXTENDED RELEASE ORAL at 08:30

## 2022-10-10 RX ADMIN — LACTULOSE 10 G: 20 SOLUTION ORAL at 16:15

## 2022-10-10 NOTE — PLAN OF CARE
Problem: Fall Injury Risk  Goal: Absence of Fall and Fall-Related Injury  Outcome: Ongoing, Progressing  Intervention: Identify and Manage Contributors  Recent Flowsheet Documentation  Taken 10/10/2022 0200 by Eveline Murdock RN  Medication Review/Management: medications reviewed  Taken 10/10/2022 0000 by Eveline Murdock RN  Medication Review/Management: medications reviewed  Taken 10/9/2022 2200 by Eveline Murdock RN  Medication Review/Management: medications reviewed  Taken 10/9/2022 2000 by Eveline Murdock RN  Medication Review/Management: medications reviewed  Self-Care Promotion: independence encouraged  Intervention: Promote Injury-Free Environment  Recent Flowsheet Documentation  Taken 10/10/2022 0200 by Eveline Murdock RN  Safety Promotion/Fall Prevention: safety round/check completed  Taken 10/10/2022 0000 by Eveline Murdock RN  Safety Promotion/Fall Prevention: safety round/check completed  Taken 10/9/2022 2200 by Eveline Murdock RN  Safety Promotion/Fall Prevention: safety round/check completed  Taken 10/9/2022 2000 by Eveline Murdock RN  Safety Promotion/Fall Prevention:   activity supervised   assistive device/personal items within reach   clutter free environment maintained   fall prevention program maintained   nonskid shoes/slippers when out of bed   room organization consistent     Problem: Adult Inpatient Plan of Care  Goal: Plan of Care Review  Outcome: Ongoing, Progressing  Flowsheets (Taken 10/10/2022 0221)  Plan of Care Reviewed With: patient  Goal: Patient-Specific Goal (Individualized)  Outcome: Ongoing, Progressing  Goal: Absence of Hospital-Acquired Illness or Injury  Outcome: Ongoing, Progressing  Intervention: Identify and Manage Fall Risk  Recent Flowsheet Documentation  Taken 10/10/2022 0200 by Eveline Murdock RN  Safety Promotion/Fall Prevention: safety round/check completed  Taken 10/10/2022 0000 by Eveline Murdock RN  Safety Promotion/Fall Prevention: safety  round/check completed  Taken 10/9/2022 2200 by Eveline Murdock RN  Safety Promotion/Fall Prevention: safety round/check completed  Taken 10/9/2022 2000 by Eveline Murdock RN  Safety Promotion/Fall Prevention:   activity supervised   assistive device/personal items within reach   clutter free environment maintained   fall prevention program maintained   nonskid shoes/slippers when out of bed   room organization consistent  Intervention: Prevent Skin Injury  Recent Flowsheet Documentation  Taken 10/10/2022 0200 by Eveline Murdock RN  Skin Protection: adhesive use limited  Taken 10/10/2022 0000 by Eveline Murdock RN  Skin Protection: adhesive use limited  Taken 10/9/2022 2200 by Eveline Murdock RN  Skin Protection: adhesive use limited  Taken 10/9/2022 2000 by Eveline Murdock RN  Body Position: position changed independently  Skin Protection: adhesive use limited  Intervention: Prevent and Manage VTE (Venous Thromboembolism) Risk  Recent Flowsheet Documentation  Taken 10/9/2022 2000 by Eveline Murdock RN  Activity Management: activity adjusted per tolerance  Intervention: Prevent Infection  Recent Flowsheet Documentation  Taken 10/9/2022 2000 by Evelnie Murdock RN  Infection Prevention:   environmental surveillance performed   personal protective equipment utilized  Goal: Optimal Comfort and Wellbeing  Outcome: Ongoing, Progressing  Goal: Readiness for Transition of Care  Outcome: Ongoing, Progressing     Problem: Skin Injury Risk Increased  Goal: Skin Health and Integrity  Outcome: Ongoing, Progressing  Intervention: Optimize Skin Protection  Recent Flowsheet Documentation  Taken 10/10/2022 0200 by Eveline Murdock RN  Pressure Reduction Techniques: frequent weight shift encouraged  Pressure Reduction Devices: specialty bed utilized  Skin Protection: adhesive use limited  Taken 10/10/2022 0000 by Eveline Murdock RN  Pressure Reduction Techniques: frequent weight shift encouraged  Skin Protection:  adhesive use limited  Taken 10/9/2022 2200 by Eveline Murdock RN  Pressure Reduction Techniques: frequent weight shift encouraged  Pressure Reduction Devices: specialty bed utilized  Skin Protection: adhesive use limited  Taken 10/9/2022 2000 by Eveline Murdock RN  Pressure Reduction Techniques: frequent weight shift encouraged  Head of Bed (HOB) Positioning: HOB elevated  Pressure Reduction Devices: specialty bed utilized  Skin Protection: adhesive use limited     Problem: Palliative Care  Goal: Enhanced Quality of Life  Outcome: Ongoing, Progressing  Intervention: Optimize Function  Recent Flowsheet Documentation  Taken 10/9/2022 2000 by Eveline Murdock RN  Sensory Stimulation Regulation: care clustered   Goal Outcome Evaluation:  Plan of Care Reviewed With: patient

## 2022-10-10 NOTE — PLAN OF CARE
Problem: Fall Injury Risk  Goal: Absence of Fall and Fall-Related Injury  Outcome: Ongoing, Progressing  Intervention: Promote Injury-Free Environment  Recent Flowsheet Documentation  Taken 10/10/2022 1614 by Jennie Dahl RN  Safety Promotion/Fall Prevention: safety round/check completed     Problem: Adult Inpatient Plan of Care  Goal: Plan of Care Review  Outcome: Ongoing, Progressing  Flowsheets (Taken 10/10/2022 1638)  Outcome Evaluation: Patient alert, oriented x 4, vital signs stable. Behavior and thought pattern WNL.  Continent of bladder, no bowel movement this shift, tolerates meals.Abdomen distended, soft, complain of pain to RUQ,RLQ. Pain radiates to back, MD notified. Pain medication administered, monitoring continues.  Goal: Patient-Specific Goal (Individualized)  Outcome: Ongoing, Progressing  Goal: Absence of Hospital-Acquired Illness or Injury  Outcome: Ongoing, Progressing  Intervention: Identify and Manage Fall Risk  Recent Flowsheet Documentation  Taken 10/10/2022 1614 by Jennie Dahl RN  Safety Promotion/Fall Prevention: safety round/check completed  Intervention: Prevent Skin Injury  Recent Flowsheet Documentation  Taken 10/10/2022 1614 by Jennie Dahl RN  Body Position: position changed independently  Intervention: Prevent Infection  Recent Flowsheet Documentation  Taken 10/10/2022 1614 by Jennie Dahl RN  Infection Prevention: environmental surveillance performed  Goal: Optimal Comfort and Wellbeing  Outcome: Ongoing, Progressing  Intervention: Monitor Pain and Promote Comfort  Recent Flowsheet Documentation  Taken 10/10/2022 1614 by Jennie Dahl RN  Pain Management Interventions: see MAR  Intervention: Provide Person-Centered Care  Recent Flowsheet Documentation  Taken 10/10/2022 1614 by Jennie Dahl RN  Trust Relationship/Rapport: care explained  Goal: Readiness for Transition of Care  Outcome: Ongoing, Progressing     Problem: Skin Injury Risk Increased  Goal: Skin  Health and Integrity  Outcome: Ongoing, Progressing  Intervention: Optimize Skin Protection  Recent Flowsheet Documentation  Taken 10/10/2022 1614 by Jennie Dahl RN  Head of Bed (HOB) Positioning: HOB at 20 degrees     Problem: Palliative Care  Goal: Enhanced Quality of Life  Outcome: Ongoing, Progressing  Intervention: Maximize Comfort  Recent Flowsheet Documentation  Taken 10/10/2022 1614 by Jennie Dahl RN  Pain Management Interventions: see MAR  Intervention: Optimize Psychosocial Wellbeing  Recent Flowsheet Documentation  Taken 10/10/2022 1614 by Jennie Dahl RN  Supportive Measures: active listening utilized   Goal Outcome Evaluation:  Plan of Care Reviewed With: patient           Outcome Evaluation: Patient alert, oriented x 4, vital signs stable. Behavior and thought pattern WNL.  Continent of bladder, no bowel movement this shift, tolerates meals.Abdomen distended, soft, complain of pain to RUQ,RLQ. Pain radiates to back, MD notified. Pain medication administered, monitoring continues.

## 2022-10-10 NOTE — PAYOR COMM NOTE
"Kisha Harden (57 y.o. Female)     Date of Birth   1965    Social Security Number       Address   [address] [place] [address] [address] [facility] [address]    Home Phone   [phone]    MRN   [id number]       [employer]    Marital Status   Single                            Admission Date   9/27/22    Admission Type   Emergency    Admitting Provider   Megan Landeros MD    Attending Provider   Megan Landeros MD    Department, Room/Bed   [facility] 5H, S582/1       Discharge Date       Discharge Disposition       Discharge Destination                               Attending Provider: Megan Landeros MD    Allergies: Codeine, Morphine, Tagamet [Cimetidine], Toradol [Ketorolac Tromethamine]    Isolation: None   Infection: MRSA (10/02/22)   Code Status: No CPR    Ht: 160 cm (63\")   Wt: 52.2 kg (115 lb)    Admission Cmt: None   Principal Problem: None                Active Insurance as of 9/27/2022     Primary Coverage     Payor Plan Insurance Group Employer/Plan Group    Modify BY BRET [email or web address] BY BRET [id number]     Payor Plan Address Payor Plan Phone Number Payor Plan Fax Number Effective Dates    [address] [address]   1/1/2021 - None Entered    [facility] [phone]       Subscriber Name Subscriber Birth Date Member ID       KISHA HARDEN 1965 [id number]                 Emergency Contacts      (Rel.) Home Phone Work Phone Mobile Phone    itzel Mcpherson -- -- [phone]            [place]: [facility] [id number] Tax ID [id number]  Insurance Information                Modify BY [email or web address] POPPY QUEEN Phone: --    Subscriber: Kisha Harden Subscriber#: [id number]    Group#: [id number] Precert#: --          Vital Signs (last day)     Date/Time Temp Temp src Pulse Resp BP Patient Position SpO2    10/10/22 1112 97.8 (36.6) Oral 72 18 136/78 Lying 92    10/10/22 1029 -- -- -- -- -- -- 85    10/10/22 0748 -- -- 69 " -- 131/75 -- 92    10/10/22 0511 98.3 (36.8) Oral 65 18 117/73 Lying 96    10/10/22 0109 -- -- -- -- -- -- 92    10/10/22 0100 98.3 (36.8) Oral 77 18 108/86 Lying 86    10/09/22 2159 98.7 (37.1) Oral -- 18 155/86 Lying 92    10/09/22 1149 -- -- -- -- 140/79 Lying --    10/09/22 0948 98.1 (36.7) Oral 85 18 141/75 -- --            Current Facility-Administered Medications   Medication Dose Route Frequency Provider Last Rate Last Admin   • acetaminophen (TYLENOL) tablet 650 mg  650 mg Oral Q4H PRN Nidia Rangel DO   650 mg at 10/04/22 2033    Or   • acetaminophen (TYLENOL) 160 MG/5ML solution 650 mg  650 mg Oral Q4H PRN Nidia Rangel DO        Or   • acetaminophen (TYLENOL) suppository 650 mg  650 mg Rectal Q4H PRN Nidia Rangel DO       • albuterol (PROVENTIL) nebulizer solution 0.083% 2.5 mg/3mL  2.5 mg Nebulization Q6H PRN Nidia Rangel DO   2.5 mg at 10/03/22 0904   • amLODIPine (NORVASC) tablet 5 mg  5 mg Oral Daily Nidia Rangel DO   5 mg at 10/10/22 0830   • amoxicillin-clavulanate (AUGMENTIN) 875-125 MG per tablet 1 tablet  1 tablet Oral Q12H Hugo Castillo MD   1 tablet at 10/10/22 0830   • aspirin EC tablet 81 mg  81 mg Oral Daily Nidia Rangel DO   81 mg at 10/10/22 0830   • atorvastatin (LIPITOR) tablet 40 mg  40 mg Oral Nightly Megan Landeros MD   40 mg at 10/09/22 2202   • benzonatate (TESSALON) capsule 200 mg  200 mg Oral TID PRN Megan Landeros MD       • castor oil-balsam peru (VENELEX) ointment 1 application  1 application Topical Q12H Nidia Rangel DO   1 application at 10/10/22 0840   • dextrose (D50W) (25 g/50 mL) IV injection 25 g  25 g Intravenous Q15 Min PRN Nidia Rangel DO       • dextrose (GLUTOSE) oral gel 15 g  15 g Oral Q15 Min PRN Nidia Rangel DO       • folic acid (FOLVITE) tablet 1 mg  1 mg Oral Daily Nidia Rangel DO   1 mg at 10/10/22 0830   • [START ON 10/11/2022] furosemide (LASIX) tablet 80 mg  80 mg  Oral Daily Megan Landeros MD       • glucagon (human recombinant) (GLUCAGEN DIAGNOSTIC) injection 1 mg  1 mg Intramuscular Q15 Min PRN Nidia Rangel DO       • guaiFENesin (MUCINEX) 12 hr tablet 600 mg  600 mg Oral Q12H Lor Sam DO   600 mg at 10/10/22 0830   • hydrOXYzine (ATARAX) tablet 25 mg  25 mg Oral Once Ema Khan APRN       • insulin detemir (LEVEMIR) injection 15 Units  15 Units Subcutaneous Nightly Megan Landeros MD   15 Units at 10/09/22 2210   • Insulin Lispro (humaLOG) injection 0-9 Units  0-9 Units Subcutaneous TID AC Nidia Rangel DO   4 Units at 10/09/22 1717   • Insulin Lispro (humaLOG) injection 5 Units  5 Units Subcutaneous TID With Meals Megan Landeros MD   5 Units at 10/10/22 1254   • ipratropium-albuterol (DUO-NEB) nebulizer solution 3 mL  3 mL Nebulization Q6H PRN Lor Sam DO       • lactulose (CHRONULAC) 10 GM/15ML solution 10 g  10 g Oral TID Megan Landeros MD       • lidocaine (LIDODERM) 5 % 1 patch  1 patch Transdermal Q24H Nidia Rangel DO   1 patch at 10/10/22 0829   • linezolid (ZYVOX) tablet 600 mg  600 mg Oral Q12H Hugo Castillo MD   600 mg at 10/10/22 0830   • metoprolol tartrate (LOPRESSOR) tablet 50 mg  50 mg Oral BID Nidia Rangel DO   50 mg at 10/10/22 0844   • ondansetron (ZOFRAN) tablet 4 mg  4 mg Oral Q6H PRN Nidia Rangel DO   4 mg at 10/06/22 0019    Or   • ondansetron (ZOFRAN) injection 4 mg  4 mg Intravenous Q6H PRN Nidia Rangel DO       • oxyCODONE (ROXICODONE) immediate release tablet 5 mg  5 mg Oral Q8H PRN Nidia Rangel DO   5 mg at 10/10/22 0844   • potassium chloride (MICRO-K) CR capsule 40 mEq  40 mEq Oral PRN Richard, Henrietta M II, DO   40 mEq at 09/29/22 2143    Or   • potassium chloride (KLOR-CON) packet 40 mEq  40 mEq Oral PRN Richard, Henrietta M II, DO        Or   • potassium chloride 10 mEq in 100 mL IVPB  10 mEq Intravenous Q1H PRN Richard, Henrietta M II,   mL/hr at 09/29/22 1152 10 mEq at 09/29/22 1152   • prochlorperazine (COMPAZINE) injection 2.5 mg  2.5 mg Intravenous Q6H PRN Nidia Rangel DO       • QUEtiapine (SEROquel) tablet 150 mg  150 mg Oral Nightly Lor Sam DO   150 mg at 10/09/22 2201   • riFAXIMin (XIFAXAN) tablet 550 mg  550 mg Oral Q12H Lor Sam DO   550 mg at 10/10/22 0844   • sodium chloride 0.9 % flush 10 mL  10 mL Intravenous PRN Stephenie Martinez PA       • sodium chloride 0.9 % flush 10 mL  10 mL Intravenous Q12H Nidia Rangel DO   10 mL at 10/08/22 2118   • sodium chloride 0.9 % flush 10 mL  10 mL Intravenous PRN Nidia Rangel DO       • thiamine (VITAMIN B-1) tablet 100 mg  100 mg Oral Daily Nidia Rangel DO   100 mg at 10/10/22 0844       Lab Results (last 24 hours)     Procedure Component Value Units Date/Time    POC Glucose Once [[id number]]  (Normal) Collected: 10/10/22 1114    Specimen: Blood Updated: 10/10/22 1116     Glucose 114 mg/dL      Comment: Meter: [id number] : [id number] [place]       Comprehensive Metabolic Panel [[id number]]  (Abnormal) Collected: 10/10/22 0730    Specimen: Blood Updated: 10/10/22 0810     Glucose 121 mg/dL      BUN 36 mg/dL      Creatinine 1.46 mg/dL      Sodium 136 mmol/L      Potassium 3.5 mmol/L      Chloride 105 mmol/L      CO2 21.0 mmol/L      Calcium 8.3 mg/dL      Total Protein 7.0 g/dL      Albumin 2.30 g/dL      ALT (SGPT) 18 U/L      AST (SGOT) 46 U/L      Alkaline Phosphatase 170 U/L      Total Bilirubin 0.6 mg/dL      Globulin 4.7 gm/dL      Comment: Calculated Result        A/G Ratio 0.5 g/dL      BUN/Creatinine Ratio 24.7     Anion Gap 10.0 mmol/L      eGFR 41.3 mL/min/1.73      Comment: National Kidney Foundation and American Society of Nephrology (ASN) Task Force recommended calculation based on the Chronic Kidney Disease Epidemiology Collaboration (CKD-EPI) equation refit without adjustment for race.       Narrative:      GFR Normal  >60  Chronic Kidney Disease <60  Kidney Failure <15      CBC & Differential [[id number]]  (Abnormal) Collected: 10/10/22 0730    Specimen: Blood Updated: 10/10/22 0752    Narrative:      The following orders were created for panel order CBC & Differential.  Procedure                               Abnormality         Status                     ---------                               -----------         ------                     CBC Auto Differential[[id number]]        Abnormal            Final result                 Please view results for these tests on the individual orders.    CBC Auto Differential [[id number]]  (Abnormal) Collected: 10/10/22 0730    Specimen: Blood Updated: 10/10/22 0752     WBC 6.76 10*3/mm3      RBC 3.32 10*6/mm3      Hemoglobin 8.7 g/dL      Hematocrit 26.7 %      MCV 80.4 fL      MCH 26.2 pg      MCHC 32.6 g/dL      RDW 16.7 %      RDW-SD 47.9 fl      MPV 9.7 fL      Platelets 131 10*3/mm3      Neutrophil % 60.4 %      Lymphocyte % 30.0 %      Monocyte % 4.1 %      Eosinophil % 3.7 %      Basophil % 1.5 %      Immature Grans % 0.3 %      Neutrophils, Absolute 4.08 10*3/mm3      Lymphocytes, Absolute 2.03 10*3/mm3      Monocytes, Absolute 0.28 10*3/mm3      Eosinophils, Absolute 0.25 10*3/mm3      Basophils, Absolute 0.10 10*3/mm3      Immature Grans, Absolute 0.02 10*3/mm3      nRBC 0.0 /100 WBC     POC Glucose Once [[id number]]  (Normal) Collected: 10/10/22 0723    Specimen: Blood Updated: 10/10/22 0726     Glucose 130 mg/dL      Comment: Meter: [id number] : [id number] Jerrell Tate       POC Glucose Once [[id number]]  (Normal) Collected: 10/09/22 2149    Specimen: Blood Updated: 10/09/22 2150     Glucose 126 mg/dL      Comment: Meter: [id number] : [id number] Renan Ulrich       POC Glucose Once [[id number]]  (Abnormal) Collected: 10/09/22 1653    Specimen: Blood Updated: 10/09/22 1655     Glucose 211 mg/dL      Comment: Meter: [id number] : [id number] Pancho Jaffe            Imaging Results (Last 24 Hours)     ** No results found for the last 24 hours. **        Orders (last 24 hrs)      Start     Ordered    10/11/22 0900  furosemide (LASIX) tablet 80 mg  Daily         10/10/22 1329    10/11/22 0600  CBC & Differential  Morning Draw         10/10/22 0603    10/11/22 0600  Comprehensive Metabolic Panel  Morning Draw         10/10/22 0603    10/11/22 0600  Lactic Acid, Plasma  Morning Draw         10/10/22 0603    10/11/22 0600  Basic Metabolic Panel  Morning Draw         10/10/22 1211    10/10/22 1600  lactulose (CHRONULAC) 10 GM/15ML solution 10 g  3 Times Daily         10/10/22 1049    10/10/22 1300  furosemide (LASIX) injection 40 mg  Once,   Status:  Discontinued         10/10/22 1210    10/10/22 1300  Strict Intake & Output  Every Hour       10/10/22 1210    10/10/22 1117  POC Glucose Once  PROCEDURE ONCE         10/10/22 1114    10/10/22 0727  POC Glucose Once  PROCEDURE ONCE         10/10/22 0723    10/10/22 0600  Comprehensive Metabolic Panel  Morning Draw         10/09/22 1037    10/10/22 0600  CBC & Differential  Morning Draw         10/09/22 1037    10/10/22 0600  CBC Auto Differential  PROCEDURE ONCE         10/09/22 2204    10/09/22 2151  POC Glucose Once  PROCEDURE ONCE         10/09/22 2149    10/09/22 1656  POC Glucose Once  PROCEDURE ONCE         10/09/22 1653    10/09/22 1130  linezolid (ZYVOX) tablet 600 mg  Every 12 Hours Scheduled         10/09/22 1031    10/09/22 1130  amoxicillin-clavulanate (AUGMENTIN) 875-125 MG per tablet 1 tablet  Every 12 Hours Scheduled         10/09/22 1035    10/09/22 0145  hydrOXYzine (ATARAX) tablet 25 mg  Once         10/09/22 0052    10/07/22 2100  QUEtiapine (SEROquel) tablet 150 mg  Nightly         10/07/22 1342    10/07/22 1945  ipratropium-albuterol (DUO-NEB) nebulizer solution 3 mL  Every 6 Hours PRN         10/07/22 1940    10/07/22 1430  lactulose (CHRONULAC) 10 GM/15ML solution 10 g  Daily,   Status:  Discontinued          10/07/22 1344    10/07/22 1345  guaiFENesin (MUCINEX) 12 hr tablet 600 mg  Every 12 Hours Scheduled         10/07/22 1250    10/07/22 1315  Apply Heat To Affected Area  Daily       10/07/22 1314    10/06/22 0842  oxyCODONE (ROXICODONE) immediate release tablet 5 mg  Every 8 Hours PRN         10/06/22 0842    10/06/22 0840  prochlorperazine (COMPAZINE) injection 2.5 mg  Every 6 Hours PRN         10/06/22 0842    10/04/22 1800  Apply Moisture Barrier After Any Incontinence  2 Times Daily       10/04/22 1311    10/04/22 1800  Wound Care  2 Times Daily      Comments: spray periwound with barrier spray and let dry and silicone foam will stick better    10/04/22 1311    10/04/22 1400  castor oil-balsam peru (VENELEX) ointment 1 application  Every 12 Hours Scheduled         10/04/22 1311    10/04/22 1215  lidocaine (LIDODERM) 5 % 1 patch  Every 24 Hours Scheduled         10/04/22 1115    10/02/22 2100  insulin detemir (LEVEMIR) injection 15 Units  Nightly         10/02/22 1037    10/02/22 1200  Insulin Lispro (humaLOG) injection 5 Units  3 Times Daily With Meals         10/02/22 1037    10/02/22 1035  benzonatate (TESSALON) capsule 200 mg  3 Times Daily PRN         10/02/22 1035    09/29/22 2100  atorvastatin (LIPITOR) tablet 40 mg  Nightly         09/29/22 1237    09/29/22 0850  Patient Currently On Electrolyte Replacement Protocol - Please Refer to MAR for Protocol Details  Misc Nursing Order (Specify)  Daily      Comments: Patient Currently On Electrolyte Replacement Protocol - Please Refer to MAR for Protocol Details    09/29/22 0849    09/28/22 1331  Patient Currently On Electrolyte Replacement Protocol - Please Refer to MAR for Protocol Details  Misc Nursing Order (Specify)  Daily      Comments: Patient Currently On Electrolyte Replacement Protocol - Please Refer to MAR for Protocol Details    09/28/22 1330    09/28/22 1330  potassium chloride (MICRO-K) CR capsule 40 mEq  As Needed        See Hyperspace for full  Linked Orders Report.    09/28/22 1330    09/28/22 1330  potassium chloride (KLOR-CON) packet 40 mEq  As Needed        See Hyperspace for full Linked Orders Report.    09/28/22 1330    09/28/22 1330  potassium chloride 10 mEq in 100 mL IVPB  Every 1 Hour PRN        See Hyperspace for full Linked Orders Report.    09/28/22 1330    09/28/22 1032  Patient Currently On Electrolyte Replacement Protocol - Please Refer to MAR for Protocol Details  Misc Nursing Order (Specify)  Daily      Comments: Patient Currently On Electrolyte Replacement Protocol - Please Refer to MAR for Protocol Details    09/28/22 1031    09/28/22 0900  riFAXIMin (XIFAXAN) tablet 550 mg  Every 12 Hours Scheduled         09/28/22 0733    09/28/22 0800  Bladder Scan  Every 4 Hours,   Status:  Canceled       09/28/22 0535    09/27/22 2100  sodium chloride 0.9 % flush 10 mL  Every 12 Hours Scheduled         09/27/22 1402    09/27/22 2100  metoprolol tartrate (LOPRESSOR) tablet 50 mg  2 Times Daily         09/27/22 1414    09/27/22 2000  POC Glucose NIGHTLY 8PM  Nightly      Comments: Additional nightly glucose check for patients on basal insulin. If bedtime blood glucose is greater than 350 mg/dl, call MD.      09/27/22 1411    09/27/22 1800  Oral Care  2 Times Daily       09/27/22 1402    09/27/22 1730  Insulin Lispro (humaLOG) injection 0-9 Units  3 Times Daily Before Meals         09/27/22 1411    09/27/22 1700  POC Glucose TID AC  3 Times Daily Before Meals       09/27/22 1411    09/27/22 1600  Vital Signs  Every 4 Hours       09/27/22 1402    09/27/22 1500  amLODIPine (NORVASC) tablet 5 mg  Daily         09/27/22 1414    09/27/22 1500  aspirin EC tablet 81 mg  Daily         09/27/22 1414    09/27/22 1500  folic acid (FOLVITE) tablet 1 mg  Daily         09/27/22 1414    09/27/22 1500  thiamine (VITAMIN B-1) tablet 100 mg  Daily         09/27/22 1414    09/27/22 1412  albuterol (PROVENTIL) nebulizer solution 0.083% 2.5 mg/3mL  Every 6 Hours PRN          09/27/22 1414    09/27/22 1410  dextrose (GLUTOSE) oral gel 15 g  Every 15 Minutes PRN         09/27/22 1411    09/27/22 1410  dextrose (D50W) (25 g/50 mL) IV injection 25 g  Every 15 Minutes PRN         09/27/22 1411    09/27/22 1410  glucagon (human recombinant) (GLUCAGEN DIAGNOSTIC) injection 1 mg  Every 15 Minutes PRN         09/27/22 1411    09/27/22 1403  Intake & Output  Every Shift       09/27/22 1402    09/27/22 1402  acetaminophen (TYLENOL) tablet 650 mg  Every 4 Hours PRN        See Hyperspace for full Linked Orders Report.    09/27/22 1402    09/27/22 1402  acetaminophen (TYLENOL) 160 MG/5ML solution 650 mg  Every 4 Hours PRN        See Hyperspace for full Linked Orders Report.    09/27/22 1402    09/27/22 1402  acetaminophen (TYLENOL) suppository 650 mg  Every 4 Hours PRN        See Hyperspace for full Linked Orders Report.    09/27/22 1402    09/27/22 1402  ondansetron (ZOFRAN) tablet 4 mg  Every 6 Hours PRN        See Hyperspace for full Linked Orders Report.    09/27/22 1402    09/27/22 1402  ondansetron (ZOFRAN) injection 4 mg  Every 6 Hours PRN        See Hyperspace for full Linked Orders Report.    09/27/22 1402    09/27/22 1402  sodium chloride 0.9 % flush 10 mL  As Needed         09/27/22 1402    09/27/22 0928  sodium chloride 0.9 % flush 10 mL  As Needed        See Hyperspace for full Linked Orders Report.    09/27/22 0928    Unscheduled  Up With Assistance  As Needed       09/27/22 1402    Unscheduled  Follow Hypoglycemia Standing Orders (Blood Glucose <70)  As Needed      Comments: Follow Protocol As Outlined in Process Instructions (Open Order Report to View Full Instructions)    09/27/22 1411    Unscheduled  Magnesium  As Needed       09/28/22 1031    Unscheduled  Potassium  As Needed       09/28/22 1031    Unscheduled  Magnesium  As Needed       09/28/22 1330    Unscheduled  Potassium  As Needed       09/28/22 1330    Unscheduled  Magnesium  As Needed       09/29/22 0849    Unscheduled   Potassium  As Needed       22 0849                   Physician Progress Notes (last 24 hours)      Megan Landeros MD at 10/10/22 0831              [facility] Medicine Services  PROGRESS NOTE    Patient Name: Kaylie Cruz  : 1965  MRN: [id number]    Date of Admission: 2022  Primary Care Physician: Iesha Harris DO    Subjective   Subjective     CC:  resp failure     HPI:  Pt doing well today. Denies any issues overnight.     ROS:  Gen- No fevers, chills  CV- No chest pain, palpitations  Resp- No cough, dyspnea  GI- No N/V/D, abd pain      Objective   Objective     Vital Signs:   Temp:  [98.1 °F (36.7 °C)-98.7 °F (37.1 °C)] 98.3 °F (36.8 °C)  Heart Rate:  [65-85] 65  Resp:  [18] 18  BP: (108-155)/(73-86) 117/73  Flow (L/min):  [3-4] 4     Physical Exam:  Constitutional: No acute distress, awake, alert  HENT: NCAT, mucous membranes moist  Respiratory: poor effort; diminished  Cardiovascular: RRR, no murmurs, rubs, or gallops  Gastrointestinal: Positive bowel sounds, soft, nontender, distended  Musculoskeletal: No bilateral ankle edema  Psychiatric: cooperative  Neurologic: Oriented x 3, no asterixis, strength symmetric in all extremities, Cranial Nerves grossly intact to confrontation, speech clear  Skin: No rashes    Results Reviewed:  LAB RESULTS:      Lab 10/10/22  0730 10/08/22  0415 10/05/22  0948 10/05/22  0405 10/04/22  0537   WBC 6.76 7.48 17.87*  --  17.59*   HEMOGLOBIN 8.7* 8.3* 8.2*  --  7.6*   HEMATOCRIT 26.7* 25.7* 24.5*  --  23.9*   PLATELETS 131* 155 202  --  162   NEUTROS ABS 4.08  --  15.45*  --  14.84*   IMMATURE GRANS (ABS) 0.02  --  0.17*  --  0.41*   LYMPHS ABS 2.03  --  1.21  --  1.20   MONOS ABS 0.28  --  0.59  --  0.65   EOS ABS 0.25  --  0.35  --  0.39   MCV 80.4 80.8 79.8  --  82.4   PROCALCITONIN  --   --   --   --  1.42*   LACTATE  --   --   --  1.8 1.4         Lab 10/10/22  0730 10/08/22  0415 10/05/22  0405 10/04/22  0537   SODIUM  136 136 136 132*   POTASSIUM 3.5 3.5 3.8 3.9   CHLORIDE 105 104 105 102   CO2 21.0* 21.0* 18.0* 19.0*   ANION GAP 10.0 11.0 13.0 11.0   BUN 36* 35* 30* 34*   CREATININE 1.46* 1.43* 1.52* 1.71*   EGFR 41.3* 42.3* 39.3* 34.2*   GLUCOSE 121* 127* 119* 159*   CALCIUM 8.3* 8.3* 7.9* 7.8*         Lab 10/10/22  0730 10/05/22  0405 10/04/22  0537   TOTAL PROTEIN 7.0 6.1 5.7*   ALBUMIN 2.30* 2.10* 2.10*   GLOBULIN 4.7 4.0 3.6   ALT (SGPT) 18 14 14   AST (SGOT) 46* 49* 43*   BILIRUBIN 0.6 0.9 1.0   ALK PHOS 170* 187* 147*                     Lab 10/03/22  0840   PH, ARTERIAL 7.418   PCO2, ARTERIAL 33.1*   PO2 ART 53.7*   FIO2 40   HCO3 ART 21.4   BASE EXCESS ART -2.7*   CARBOXYHEMOGLOBIN 1.3     Brief Urine Lab Results  (Last result in the past 365 days)      Color   Clarity   Blood   Leuk Est   Nitrite   Protein   CREAT   Urine HCG        10/01/22 0117 Yellow   Clear   Negative   Small (1+)   Negative   30 mg/dL (1+)                 Microbiology Results Abnormal     Procedure Component Value - Date/Time    Histoplasma Ag Ur - Urine, Urinary Bladder [[id number]] Collected: 10/03/22 1122    Lab Status: Final result Specimen: Urine from Urinary Bladder Updated: 10/06/22 0913     Histoplasma Galactomannan Ag Ur <0.5    Narrative:      Test(s) 183562-Histoplasma Gal'jaycee Ag Ur  was developed and its performance characteristics determined  by [facility]. It has not been cleared or approved by the Food  and Drug Administration.  Performed at:  [address]  [id number]  : Kamryn Eli MD, Phone:  [phone]    Blastomyces Antigen - Urine, Urinary Bladder [[id number]] Collected: 10/03/22 1122    Lab Status: Final result Specimen: Urine from Urinary Bladder Updated: 10/06/22 0712     MVista(R) Blastomyces Ag None Detected ng/mL      Comment: Results reported as ng/mL in 0.2 - 14.7 ng/mL range  Results above the limit of detection but below 0.2 ng/mL  are reported as 'Positive,  Below the Limit of  Quantification'  Results above 14.7 ng/mL are reported as 'Positive,  Above the Limit of Quantification'        Specimen Type URINE    Narrative:      Performed at:  [facility] - [facility]  [address] [facility] [place]  [id number]  : Jeanine Mancilla MD, Phone:  [phone]    Respiratory Panel PCR w/COVID-19(SARS-CoV-2) MALIK/STANLEY/AIXA/PAD/COR/MAD/HEATHER In-House, NP Swab in UTM/VTM, 3-4 HR TAT - Swab, Nasopharynx [[id number]]  (Normal) Collected: 10/03/22 1129    Lab Status: Final result Specimen: Swab from Nasopharynx Updated: 10/03/22 1237     ADENOVIRUS, PCR Not Detected     Coronavirus 229E Not Detected     Coronavirus HKU1 Not Detected     Coronavirus NL63 Not Detected     Coronavirus OC43 Not Detected     COVID19 Not Detected     Human Metapneumovirus Not Detected     Human Rhinovirus/Enterovirus Not Detected     Influenza A PCR Not Detected     Influenza B PCR Not Detected     Parainfluenza Virus 1 Not Detected     Parainfluenza Virus 2 Not Detected     Parainfluenza Virus 3 Not Detected     Parainfluenza Virus 4 Not Detected     RSV, PCR Not Detected     Bordetella pertussis pcr Not Detected     Bordetella parapertussis PCR Not Detected     Chlamydophila pneumoniae PCR Not Detected     Mycoplasma pneumo by PCR Not Detected    Narrative:      In the setting of a positive respiratory panel with a viral infection PLUS a negative procalcitonin without other underlying concern for bacterial infection, consider observing off antibiotics or discontinuation of antibiotics and continue supportive care. If the respiratory panel is positive for atypical bacterial infection (Bordetella pertussis, Chlamydophila pneumoniae, or Mycoplasma pneumoniae), consider antibiotic de-escalation to target atypical bacterial infection.    Blood Culture - Blood, Hand, Right [[id number]]  (Normal) Collected: 09/27/22 1300    Lab Status: Final result Specimen: Blood from Hand, Right Updated: 10/02/22 1317      Blood Culture No growth at 5 days    Blood Culture - Blood, Arm, Right [[id number]]  (Normal) Collected: 09/27/22 1245    Lab Status: Final result Specimen: Blood from Arm, Right Updated: 10/02/22 1303     Blood Culture No growth at 5 days    Urine Culture - Urine, Urine, Random Void [[id number]]  (Normal) Collected: 10/01/22 0117    Lab Status: Final result Specimen: Urine, Random Void Updated: 10/02/22 1056     Urine Culture No growth    Body Fluid Culture - Body Fluid, Peritoneum [[id number]] Collected: 09/28/22 1143    Lab Status: Final result Specimen: Body Fluid from Peritoneum Updated: 10/01/22 0729     Body Fluid Culture No growth at 3 days     Gram Stain No WBCs or organisms seen    Legionella Antigen, Urine - Urine, Urine, Clean Catch [[id number]]  (Normal) Collected: 09/28/22 0643    Lab Status: Final result Specimen: Urine, Clean Catch Updated: 09/28/22 1229     LEGIONELLA ANTIGEN, URINE Negative    S. Pneumo Ag Urine or CSF - Urine, Urine, Clean Catch [[id number]]  (Normal) Collected: 09/28/22 [id number]    Lab Status: Final result Specimen: Urine, Clean Catch Updated: 09/28/22 1229     Strep Pneumo Ag Negative          No radiology results from the last 24 hrs        I have reviewed the medications:  Scheduled Meds:amLODIPine, 5 mg, Oral, Daily  amoxicillin-clavulanate, 1 tablet, Oral, Q12H  aspirin, 81 mg, Oral, Daily  atorvastatin, 40 mg, Oral, Nightly  castor oil-balsam peru, 1 application, Topical, Q12H  folic acid, 1 mg, Oral, Daily  guaiFENesin, 600 mg, Oral, Q12H  hydrOXYzine, 25 mg, Oral, Once  insulin detemir, 15 Units, Subcutaneous, Nightly  insulin lispro, 0-9 Units, Subcutaneous, TID AC  Insulin Lispro, 5 Units, Subcutaneous, TID With Meals  lactulose, 10 g, Oral, Daily  lidocaine, 1 patch, Transdermal, Q24H  linezolid, 600 mg, Oral, Q12H  metoprolol tartrate, 50 mg, Oral, BID  QUEtiapine, 150 mg, Oral, Nightly  rifAXIMin, 550 mg, Oral, Q12H  sodium chloride, 10 mL, Intravenous, Q12H  thiamine,  100 mg, Oral, Daily      Continuous Infusions:   PRN Meds:.•  acetaminophen **OR** acetaminophen **OR** acetaminophen  •  albuterol  •  benzonatate  •  dextrose  •  dextrose  •  glucagon (human recombinant)  •  ipratropium-albuterol  •  ondansetron **OR** ondansetron  •  oxyCODONE  •  potassium chloride **OR** potassium chloride **OR** potassium chloride  •  prochlorperazine  •  [COMPLETED] Insert peripheral IV **AND** sodium chloride  •  sodium chloride    Assessment & Plan   Assessment & Plan     Active Hospital Problems    Diagnosis  POA   • Pneumonia of right middle lobe due to infectious organism [J18.9]  Yes   • Sepsis, unspecified organism (HCC) [A41.9]  Unknown   • Tobacco use [Z72.0]  Yes   • Vaginal cancer (HCC) [C52]  Yes   • High grade squamous intraepithelial lesion (HGSIL) on cytologic smear of vagina [R87.623]  Yes   • Type 2 diabetes mellitus with hyperglycemia, with long-term current use of insulin (HCC) [E11.65, Z79.4]  Not Applicable   • Chronic hepatitis C without hepatic coma (HCC) [B18.2]  Yes   • Decompensated hepatic cirrhosis (HCC) [K72.90, K74.60]  Yes   • HFrEF (heart failure with reduced ejection fraction) (HCC) [I50.20]  Yes   • Coronary artery disease involving native coronary artery of native heart without angina pectoris [I25.10]  Yes   • Schizophrenia (HCC) [F20.9]  Yes   • Chronic pain disorder [G89.4]  Yes   • Primary hypertension [I10]  Yes   • Long-term current use of opiate analgesic [Z79.891]  Not Applicable   • Esophageal varices (HCC) [I85.00]  Yes      Resolved Hospital Problems   No resolved problems to display.        Brief Hospital Course to date:  Kaylie Cruz is a 57 y.o. female with PMHx vaginal squamous cell carcinoma in 2009 s/p WPRT, vaginal brachytherapy hysterectomy, HTN, HLD, history of substance abuse, history of Hepatitis C and Cirrhosis, CAD, DM2, Schizophrenia, HFrEF (EF 49%),  who presented to ED with CC of cough, subjective fever, SOB, abdominal pain  and diarrhea.     This patient's problems and plans were partially entered by my partner and updated as appropriate by me 10/08/22.     Sepsis, POA  Bilateral PNA  Leukocytosis - resolved  +MRSA PCR   - Cryptococcal antigen negative. Fungal Alina, Fungitell B-D glucan, Histoplasma negative   - Sputum cx ordered but patient without productive cough   - Respiratory PCR negative  - CT scan chest done on 10/5 - dense diffuse and extensive reticular and groundglass disease throughout most of the lungs   -SLP has seen, S/P FEES 9/29 and functional oral and pharyngeal phase, signed off   - Palliative following, now DNR/DNI  - Initially was on high flow and now improved to 4L -- continue to wean as tolerated   - ID and pulm following -- appreciate recs --  continue zosyn and zyvox. Pt pulled out PIV again overnight and is difficult stick.  Transitioned to PO Zyvox and change Zosyn to PO Augmentin (last dose of PCN was supposed to be 10/10 per Dr. Castillo's note on 10/7)   - Currently holding on steroids   - Mobilize. Trial mucinex.   -  given 1 time dose of PO lasix 10/8, will give another dose IV Lasix due to ascites, no accurate Is/Os     Decompensated cirrhosis with ascites  History of Hepatitis C  Elevated LFTs   -Follows with GI outpatient but missed last appt.   -EGD due 04/2023 for EV screening  -S/P LVP on 9/29 with 2.25L of ascitic fluid removed, does not appear c/w SBP culture NGTD  -Hepatitis panel + Hep C ab  -Continue Xifaxan    - lactulose held due to frequent BMs. Resumed at 10 mg daily on 10/7, no BM documented in several days (accurate per RN, pt continues to claim she is having frequent BMs). Increase to TID today  -- Give one dose IV Lasix today for ascites    Likely CKD  - patient is intermittently allowing labs  - Cr stable.      Hypokalemia  -- Improved     Chronic HFrEF   Coronary artery disease involving native coronary artery of native heart without angina pectoris  Primary hypertension  -Echo 3/2022  - EF 49%, global hypokinesis, grade 1 diastolic dysfunction  -[facility] 4/3/2022 - nonobstructive LAD to LAD 60% in mid region  -Pt is supposed to follow with Dr Daniel, has missed appts   -Continue ASA 81, metoprolol  -Continue statin   - Monitor volume status     Type 2 diabetes mellitus with diabetic polyneuropathy, with long-term current use of insulin  -A1c has improved from 8.5% to 6.6% with medication compliance over the past 3 months  -Has appointment to establish with Endocrinology next month  -Continue Lantus 15 units nightly  -Continue Humalog 5 units TID meals   -Continue MDSSI  -Continue to HOLD Gabapentin to avoid lethargy      Recent Gross hematuria  -Seen at  8/23/22 and diagnosed with UTI, treated with ABX   -Per patient the dysuria resolved but still having intermittent hematuria  -CT A/P without contrast with unremarkable bladder   -Patient has already been referred to Urology per PCP      Tobacco use  -Encourage smoking cessation  -PCP note says they plan to obtain PFTs outpatient as she has had some wheezing but no formal Dx of COPD  -PRN Albuterol   -Continue scheduled DuoNebs      High grade squamous intraepithelial lesion (HGSIL) on cytologic smear of vagina  Vaginal cancer   -Diagnosed 2010? s/p radiation, surgical resection, and hysterectomy. Previously following with Dr. Roxanne Chaudhry, Gyn-Onc in [place] with [facility].   -She had vaginal pap smear with HGSIL and was lost to follow up.   -PCP has referred to GYN and gyn-onc for further evaluation      Schizophrenia, unspecified type  Hospital-related delirium vs hepatic encephalopathy  -Following with [place] for talk therapy  - restarted seroquel 150 mg qHS on 10/7, given an extra 50mg 10/8 am. QTc remains prolonged at 491. Will defer increasing Seroquel back to her home dose (200mg) due to this.  Monitor closely  -- sitter at bedside today.    -- lactulose plan as noted above     Substance abuse  -UDS positive for cocaine,  oxycodone and TCA     Chronic Anemia  - Stable. Monitor.      R rib pain  -Likely from PNA  -Continue Lidoderm patch   -Takes Roxicodone at home, resume low dose 5mg q8PRN  - rib XR with no acute or healing rib fracture      Deep Tissue Pressure Injury (Bilateral Sacral area)  -WOC following/PT wound  -Dolphin bed ordered, patient not happy with this mattress     [facility] Discharge [facility] and [facility]   Expected Discharge Date: 10/11     DVT prophylaxis:  Mechanical DVT prophylaxis orders are present.     AM-PAC 6 Clicks Score (PT): 19 (10/09/22 2000)    CODE STATUS:   Code Status and Medical Interventions:   Ordered at: 10/05/22 1229     Medical Intervention Limits:    NO intubation (DNI)     Code Status (Patient has no pulse and is not breathing):    No CPR (Do Not Attempt to Resuscitate)     Medical Interventions (Patient has pulse or is breathing):    Limited Support       Megan Landeros MD  10/10/22                Electronically signed by Megan Landeros MD at 10/10/22 1209     Hugo Castillo MD at 10/10/22 0556          INFECTIOUS DISEASE Progress Note    Kaylie Render  1965  [id number]    Consult: 10/3/22  Admit date: 9/27/2022    Requesting Provider: No ref. provider found  Evaluating physician: Hugo Castillo MD  Reason for Consultation: Sepsis, leukocytosis, cirrhosis, hepatitis C, substance use, right lower lobe pneumonia  Chief Complaint:       Subjective   History of present illness:  Patient is a  57 y.o.  Yr old female with a history of substance use, hepatitis C, cirrhosis, cervical cancer, vaginal squamous cell cancer 2009, schizophrenia, diabetes mellitus type 2, congestive heart failure with ejection fraction 49%, recent urinary tract infection treated at the [facility], who developed increasing shortness of breath on 9/25/2022 and was admitted to [facility] on 9/27/2022 with radiographic findings consistent with  right lower lobe pneumonia.  The patient was placed on piperacillin tazobactam.  The patient is a poor historian.  Creatinine 1.55, sodium 133, potassium 4.2, AST 49, alkaline phosphatase 153, bilirubin normal, vancomycin trough 11.5, WBC 19.51, hemoglobin 8.9, platelet count 176, MRSA PCR swab screen +10/2, procalcitonin 0.88, urinalysis 6-12 WBCs but urine culture from 10/1 negative, ascites with 208 WBCs with negative culture, 9/28, Legionella and streptococcal urine antigen 9/28 negative.  Ammonia 64.  Lactic acid 2.5 on admission.  Blood cultures x2 from 9/27 negative.  Chest x-ray 10/3 with bilateral pneumonia diffuse right greater than left.  CT scan of the abdomen and pelvis 9/27 with diffuse right lower lobe pneumonia and findings consistent with colitis at the hepatic flexure.  I was consulted on 10/3/2022 for further evaluation and treatment.  Patient was initially placed on piperacillin tazobactam, then vancomycin was added on 10/2/2022.  The patient denies ill contacts, significant travel history, zoonotic exposures, TB, or HIV.  Minimal sputum production.  White blood cell count has increased from 15-19,000.     10/4/2022 history reviewed.  Patient poor historian.  Tolerating linezolid and piperacillin tazobactam for pneumonia with positive MRSA screen.  Still on high flow oxygen at 40 L/min.  Oxygen concentration 55%.  Not coughing much.  Says she is breathing better.    10/5/2022 history reviewed.  No high fevers or chills.  Continues on linezolid and piperacillin tazobactam for pneumonia.  Breathing slowly improved.    10/6/2022 history reviewed.  Tolerating linezolid and piperacillin tazobactam for sepsis and pneumonia.  No high fevers.  Refusing some medications.  Breathing better.    10/7/2022 history reviewed.  Breathing continues to improve.  Difficulty with accepting some of her care.  Oxygen needs at 4 L/min nasal cannula.  No fever.  Tolerating linezolid and piperacillin tazobactam for  sepsis and pneumonia to continue until 10/10 IV, and then convert to oral if possible.  She has been refusing oral medications.    10/10/22 hx rev.  Patient more agitated over the weekend and pulled out lines.  Ab changed to po augmentin, linezolid.  No fever.    Past Medical History:   Diagnosis Date   • Anemia    • Cancer (HCC)     cervical   • Depression    • Hyperlipidemia    • Hypertension    • Infectious viral hepatitis    • Myocardial infarction (HCC)    • Substance abuse (HCC)        Past Surgical History:   Procedure Laterality Date   • BLADDER SURGERY     • CARDIAC CATHETERIZATION      4/3/2022   • CHOLECYSTECTOMY     • COLON RESECTION      7/19/2017, descending and transverse colon   • COLON SURGERY     • COLONOSCOPY      7/19/2017   • ENDOSCOPY      EGD 4/1/22   • HYSTERECTOMY     • TRANSESOPHAGEAL ECHOCARDIOGRAM (AVINASH)      Global hyokinesis, 49% EF, LVH       Pediatric History   Patient Parents   • Not on file     Other Topics Concern   • Not on file   Social History Narrative   • Not on file       family history is not on file.    Allergies   Allergen Reactions   • Codeine Hives   • Morphine Hives   • Tagamet [Cimetidine] Other (See Comments)     DISCOLORATION OF SKIN   • Toradol [Ketorolac Tromethamine] Hives         There is no immunization history on file for this patient.    Medication:    Current Facility-Administered Medications:   •  acetaminophen (TYLENOL) tablet 650 mg, 650 mg, Oral, Q4H PRN, 650 mg at 10/04/22 2033 **OR** acetaminophen (TYLENOL) 160 MG/5ML solution 650 mg, 650 mg, Oral, Q4H PRN **OR** acetaminophen (TYLENOL) suppository 650 mg, 650 mg, Rectal, Q4H PRN, Nidia Rangel DO  •  albuterol (PROVENTIL) nebulizer solution 0.083% 2.5 mg/3mL, 2.5 mg, Nebulization, Q6H PRN, Nidia Rangel DO, 2.5 mg at 10/03/22 0904  •  amLODIPine (NORVASC) tablet 5 mg, 5 mg, Oral, Daily, Nidia Rangel DO, 5 mg at 10/09/22 1005  •  amoxicillin-clavulanate (AUGMENTIN) 875-125 MG per  tablet 1 tablet, 1 tablet, Oral, Q12H, Megan Landeros MD, 1 tablet at 10/09/22 2203  •  aspirin EC tablet 81 mg, 81 mg, Oral, Daily, Nidia Rangel DO, 81 mg at 10/09/22 1006  •  atorvastatin (LIPITOR) tablet 40 mg, 40 mg, Oral, Nightly, Megan Landeros MD, 40 mg at 10/09/22 2202  •  benzonatate (TESSALON) capsule 200 mg, 200 mg, Oral, TID PRN, Megan Landeros MD  •  castor oil-balsam peru (VENELEX) ointment 1 application, 1 application, Topical, Q12H, Nidia Rangel DO, 1 application at 10/09/22 2226  •  dextrose (D50W) (25 g/50 mL) IV injection 25 g, 25 g, Intravenous, Q15 Min PRN, Nidia Rangel DO  •  dextrose (GLUTOSE) oral gel 15 g, 15 g, Oral, Q15 Min PRN, Nidia Rangel DO  •  folic acid (FOLVITE) tablet 1 mg, 1 mg, Oral, Daily, Nidia Rangel DO, 1 mg at 10/09/22 1005  •  glucagon (human recombinant) (GLUCAGEN DIAGNOSTIC) injection 1 mg, 1 mg, Intramuscular, Q15 Min PRN, Nidia Rangel DO  •  guaiFENesin (MUCINEX) 12 hr tablet 600 mg, 600 mg, Oral, Q12H, Lor Sam, DO, 600 mg at 10/09/22 2202  •  hydrOXYzine (ATARAX) tablet 25 mg, 25 mg, Oral, Once, Ema Khan, FRANK  •  insulin detemir (LEVEMIR) injection 15 Units, 15 Units, Subcutaneous, Nightly, Megan Landeors MD, 15 Units at 10/09/22 2210  •  Insulin Lispro (humaLOG) injection 0-9 Units, 0-9 Units, Subcutaneous, TID AC, Nidia Rangel DO, 4 Units at 10/09/22 1717  •  Insulin Lispro (humaLOG) injection 5 Units, 5 Units, Subcutaneous, TID With Meals, Megan Landeros MD, 5 Units at 10/09/22 1718  •  ipratropium-albuterol (DUO-NEB) nebulizer solution 3 mL, 3 mL, Nebulization, Q6H PRN, Lor Sam DO  •  lactulose (CHRONULAC) 10 GM/15ML solution 10 g, 10 g, Oral, Daily, Lor Sam DO, 10 g at 10/09/22 1006  •  lidocaine (LIDODERM) 5 % 1 patch, 1 patch, Transdermal, Q24H, Nidia Rangel, , 1 patch at 10/09/22 1007  •  linezolid (ZYVOX) tablet 600 mg, 600 mg,  Oral, Q12H, Megan Landeros MD, 600 mg at 10/09/22 2202  •  metoprolol tartrate (LOPRESSOR) tablet 50 mg, 50 mg, Oral, BID, Nidia Rangel, , 50 mg at 10/09/22 2202  •  ondansetron (ZOFRAN) tablet 4 mg, 4 mg, Oral, Q6H PRN, 4 mg at 10/06/22 0019 **OR** ondansetron (ZOFRAN) injection 4 mg, 4 mg, Intravenous, Q6H PRN, Nidia Rangel, DO  •  oxyCODONE (ROXICODONE) immediate release tablet 5 mg, 5 mg, Oral, Q8H PRN, Nidia Rangel, DO, 5 mg at 10/09/22 2210  •  potassium chloride (MICRO-K) CR capsule 40 mEq, 40 mEq, Oral, PRN, 40 mEq at 09/29/22 2143 **OR** potassium chloride (KLOR-CON) packet 40 mEq, 40 mEq, Oral, PRN **OR** potassium chloride 10 mEq in 100 mL IVPB, 10 mEq, Intravenous, Q1H PRN, Henrietta Ramos II, DO, Last Rate: 100 mL/hr at 09/29/22 1152, 10 mEq at 09/29/22 1152  •  prochlorperazine (COMPAZINE) injection 2.5 mg, 2.5 mg, Intravenous, Q6H PRN, Nidia Rangel, DO  •  QUEtiapine (SEROquel) tablet 150 mg, 150 mg, Oral, Nightly, Lor Sam DO, 150 mg at 10/09/22 2201  •  riFAXIMin (XIFAXAN) tablet 550 mg, 550 mg, Oral, Q12H, Lor Sam DO, 550 mg at 10/09/22 2201  •  [COMPLETED] Insert peripheral IV, , , Once **AND** sodium chloride 0.9 % flush 10 mL, 10 mL, Intravenous, PRN, Stephenie Martinez PA  •  sodium chloride 0.9 % flush 10 mL, 10 mL, Intravenous, Q12H, Nidia Rangel DO, 10 mL at 10/08/22 2118  •  sodium chloride 0.9 % flush 10 mL, 10 mL, Intravenous, PRN, Nidia Rangel DO  •  thiamine (VITAMIN B-1) tablet 100 mg, 100 mg, Oral, Daily, Nidia Rangel DO, 100 mg at 10/09/22 1005    Please refer to the medical record for a full medication list    Review of Systems:    Constitutional-- No Fever, chills or sweats.  Appetite good, and no malaise. No fatigue.  HEENT-- No new vision, hearing or throat complaints.  No epistaxis or oral sores.  Denies odynophagia or dysphagia.  No odynophagia or dysphagia. No headache, photophobia or neck stiffness.  CV--  "No chest pain, palpitation or syncope  Resp--some SOB/minimally productive cough/no hemoptysis  GI- No nausea, vomiting, or diarrhea.  No hematochezia, melena, or hematemesis. Denies jaundice or chronic liver disease.  -- No dysuria, hematuria, or flank pain.  Denies hesitancy, urgency or flank pain.  Lymph- no swollen lymph nodes in neck/axilla or groin.   Heme- No active bruising or bleeding; no Hx of DVT or PE.  MS-- no swelling or pain in the bones or joints of arms/legs.  No new back pain.  Neuro-- No acute focal weakness or numbness in the arms or legs.  No seizures.  Skin--No rashes or lesions    Physical Exam:   Vital Signs   Temp:  [98.1 °F (36.7 °C)-98.7 °F (37.1 °C)] 98.3 °F (36.8 °C)  Heart Rate:  [65-85] 65  Resp:  [18] 18  BP: (108-155)/(73-86) 117/73    Temp  Min: 98.1 °F (36.7 °C)  Max: 98.7 °F (37.1 °C)  BP  Min: 108/86  Max: 155/86  Pulse  Min: 65  Max: 85  Resp  Min: 18  Max: 18  SpO2  Min: 86 %  Max: 96 %    Blood pressure 117/73, pulse 65, temperature 98.3 °F (36.8 °C), temperature source Oral, resp. rate 18, height 160 cm (63\"), weight 52.2 kg (115 lb), SpO2 96 %.  GENERAL: Awake and alert, in minor distress. Appears older than stated age.  Cachectic.  Resting in bed.  HEENT:  Normocephalic, atraumatic.  Oropharynx without thrush. Dentition in poor repair. No cervical adenopathy. No neck masses.  Ears externally normal, Nose externally normal.  EYES: No conjunctival injection. No icterus. EOM full.  LYMPHATICS: No lymphadenopathy of the neck or axillary or inguinal regions.   HEART: No murmur, gallop, or pericardial friction rub. Reg rate rhythm.  No JVD.  LUNGS: Scattered rales, no rhonchi. No respiratory distress, no use of accessory muscles.  ABDOMEN: Soft, nontender, nondistended. No appreciable HSM.  Bowel sounds normal.  SKIN: Warm and dry without cutaneous eruptions.  No nodules.    PSYCHIATRIC: Mental status some confusion but answers some questions.  EXT:  No cellulitic change.  " Normal range of motion.  No cyanosis or clubbing.  NEURO: Oriented to name, nonfocal.      Results Review:   I reviewed the patient's new clinical results.  I reviewed the patient's new imaging results and agree with the interpretation.  I reviewed the patient's other test results and agree with the interpretation    Results from last 7 days   Lab Units 10/08/22  0415 10/05/22  0948 10/04/22  0537   WBC 10*3/mm3 7.48 17.87* 17.59*   HEMOGLOBIN g/dL 8.3* 8.2* 7.6*   HEMATOCRIT % 25.7* 24.5* 23.9*   PLATELETS 10*3/mm3 155 202 162     Results from last 7 days   Lab Units 10/08/22  0415   SODIUM mmol/L 136   POTASSIUM mmol/L 3.5   CHLORIDE mmol/L 104   CO2 mmol/L 21.0*   BUN mg/dL 35*   CREATININE mg/dL 1.43*   GLUCOSE mg/dL 127*   CALCIUM mg/dL 8.3*     Results from last 7 days   Lab Units 10/05/22  0405   ALK PHOS U/L 187*   BILIRUBIN mg/dL 0.9   ALT (SGPT) U/L 14   AST (SGOT) U/L 49*             Results from last 7 days   Lab Units 10/03/22  0736   VANCOMYCIN TR mcg/mL 11.50     Results from last 7 days   Lab Units 10/05/22  0405   LACTATE mmol/L 1.8     Estimated Creatinine Clearance: 34.9 mL/min (A) (by C-G formula based on SCr of 1.43 mg/dL (H)).     Procalitonin Results:      Lab 10/04/22  0537   PROCALCITONIN 1.42*      Brief Urine Lab Results  (Last result in the past 365 days)      Color   Clarity   Blood   Leuk Est   Nitrite   Protein   CREAT   Urine HCG        10/01/22 0117 Yellow   Clear   Negative   Small (1+)   Negative   30 mg/dL (1+)                No results found for: SITE, ALLENTEST, PHART, RHC5SPD, PO2ART, OQC8GPI, BASEEXCESS, E0WMHTKV, HGBBG, HCTABG, OXYHEMOGLOBI, METHHGBN, CARBOXYHGB, CO2CT, BAROMETRIC, MODALITY, FIO2     Microbiology:  Blood Culture   Date Value Ref Range Status   09/27/2022 No growth at 5 days  Final     No results found for: BCIDPCR, CXREFLEX, CSFCX, CULTURETIS  No results found for: CULTURES, HSVCX, URCX  No results found for: EYECULTURE, GCCX, HSVCULTURE, LABHSV  No results  found for: LEGIONELLA, MRSACX, MUMPSCX, MYCOPLASCX  No results found for: NOCARDIACX, STOOLCX  Urine Culture   Date Value Ref Range Status   10/01/2022 No growth  Final     No results found for: VIRALCULTU, WOUNDCX     Blood Culture   Date Value Ref Range Status   09/27/2022 No growth at 5 days  Final     Body Fluid Culture   Date Value Ref Range Status   09/28/2022 No growth at 3 days  Final     Urine Culture   Date Value Ref Range Status   10/01/2022 No growth  Final   (    MRSA swab screen + 10/2/2022.  Radiology:  Imaging Results (Last 72 Hours)     Procedure Component Value Units Date/Time    XR Chest 1 View [[id number]] Collected: 10/07/22 0721     Updated: 10/07/22 0724    Narrative:      Examination: XR CHEST 1 VW-     Date of Exam: 10/7/2022 4:40 AM     Indication: F/u pneumonia; J18.9-Pneumonia, unspecified organism;  R10.9-Unspecified abdominal pain; K74.60-Unspecified cirrhosis of liver;  R18.8-Other ascites; N17.9-Acute kidney failure, unspecified;  I50.9-Heart failure, unspecified; A41.9-Sepsis, unspecified organism;  R13.10-Dysphagia, unspecified.     Comparison: Radiograph 10/04/2022, 10/03/2022     Technique: 1 view of the chest      Findings:  Patchy airspace disease is seen within the bilateral upper lobes. No  pleural effusions. No pneumothorax. The heart size is normal. The  pulmonary vasculature appears mildly indistinct. No acute osseous  abnormality identified.       Impression:      Mild improvement in bilateral upper lobe pneumonia.     This report was finalized on 10/7/2022 7:21 AM by Jojo Mckinney MD.             IMPRESSION:     1.  Right greater than left pneumonia initially seen on CT scan from 9/27/2022, and chest x-ray showing bilateral infiltrates, some of which may be fluid.  Was on reasonable treatment with piperacillin tazobactam on admission, but also had MRSA positive screen which may indicate an infection with MRSA as the cause of her sputum.  Not producing significant amounts.   Less likely atypical mycobacteria or fungal disease.  Negative Legionella and streptococcal urine antigen.  Respiratory viral PCR -10/3.  Histoplasma urine antigen negative, blastomycosis urine antigen negative.  Noninfectious etiologies also possible including autoimmune versus other.  Was responding to abx, but noncompliant.  2.  Leukocytosis, neutrophilic resolved.  Did not receive steroids.  May be related to above issues.  3.  Encephalopathy, toxic and metabolic, as well as elevated ammonia level with cirrhosis and possible hepatic encephalopathy.  Cryptococcal antigen negative.  4.  Acute hypoxic respiratory failure increasing to 40 L/min on 10/3/2022.  Related to above issues.  RA, then 4 L/m on 10/10.  At risk for recurrent aspiration.  5.  Acute renal failure, creatinine 1.55, may be worse with vancomycin.  Creatinine 1.71 on 10/4, worse.  Vancomycin was stopped 10/3.  1.52 on 10/5.  1.43 10/8.  6.  Hyponatremia resolved.  7.  Hypocalcemia 8.3.  8.  Cirrhosis, possibly related to substance use, hepatitis C, versus other.  Advanced findings for portal hypertension with ascites.  9.  Hepatitis C, treatment status unknown.  Acute hepatitis panel 10/4/2022 positive for hep C, negative for other.  10.  Elevated alkaline phosphatase 147.  11.  Anemia, chronic disease related to above issues.  Ongoing.  Also could be related to linezolid.  12.  Schizophrenia.  Interfering with her care.  13.  Substance use, status unclear.  Drug screen had been positive for cocaine (9/28/2022).    Plan:    1.  Diagnostically, continue to follow patient's physical exam, CBC, CMP, CRP.  Radiographic studies.  2.  Therapeutically, finished piperacillin tazobactam on 10/9 (changed to po augmentin), and changed vancomycin to linezolid 600 mg p.o. twice daily for MRSA pneumonia coverage (on 10/3), duration to be determined (but was changed to IV because refusing p.o.), then back to po when pulled out lines.  There is a small chance of a  drug interaction between linezolid and amitriptyline with a serotonin syndrome.  Benefit greater than risk.  Patient probably is recurrently aspirating.  Consider continuing with zyvox/augmentin till 10/13?  3.  Oxygen support therapy.  40 L/min on 10/3/2022.  4.  Pulmonary following, and high risk for intubation if worsens.  5.  DNR/DNI.    I discussed the patient's findings and my recommendations with patient and nursing staff    Our group would be pleased to follow this patient over the course of their hospitalization and assist with outpatient antimicrobial therapy, as indicated.  Further recommendations depend on the results of the cultures and clinical course.  See next on Monday, call sooner if needed.    Hugo Castillo MD  10/10/2022    Electronically signed by Hugo Castillo MD at 10/10/22 1230       Consult Notes (last 24 hours)  Notes from 10/09/22 1442 through 10/10/22 1442   No notes of this type exist for this encounter.

## 2022-10-10 NOTE — NURSING NOTE
Wocn follow up for: Sacral deep tissue injury    Assessment: Hard black skin across most of deep tissue injury has sloughed off.  I attribute this to the healing power of mist.  There is now exposed pale pink and yellow subcutaneous tissue.  Some of this may have some epithelial islets so as to be dermis.  Overall the wound is very shallow.  There was Venelex in place in between mist therapy sessions.  Area was still not blanching but is the maroon-purple areas are gone.    Improvement: Slight    Recommendations/ changes: Recommend covering with silicone foam if patient's bowel movements have calm down.  Otherwise continue with applying thin layer of Venelex and covering with barrier cream.  A stronger barrier cream was left in room it is called calycine please use this if not going to use silicone foam.    Skin interventions in place.    Specialty bed: [facility].    Pressure Injury Protocol (initiate for Oscar Score of 18 or less):   *Maintain good skin care, keep dry, turn q 2 hr, keep heels elevated and offloaded with heel boots.    *Apply z-guard to sacrococcygeal area/ perineal area BID or daily and PRN per incontinent episodes.  *Follow C.A.R.E protocol if medical devices (Bipap, limon, Ng tube, etc) are being used.     Woc will follow.    Please contact with questions or concerns.

## 2022-10-10 NOTE — THERAPY TREATMENT NOTE
Patient Name: Kaylie Cruz  : 1965    MRN: [id number]                              Today's Date: 10/10/2022       Admit Date: 2022    Visit Dx:     ICD-10-CM ICD-9-CM   1. Pneumonia of right middle lobe due to infectious organism  J18.9 486   2. Right sided abdominal pain  R10.9 789.09   3. Cirrhosis of liver with ascites, unspecified hepatic cirrhosis type (HCC)  K74.60 571.5    R18.8    4. ANIKET (acute kidney injury) (HCC)  N17.9 584.9   5. Acute on chronic congestive heart failure, unspecified heart failure type (HCC)  I50.9 428.0   6. Sepsis, due to unspecified organism, unspecified whether acute organ dysfunction present (HCC)  A41.9 038.9     995.91   7. Dysphagia, unspecified type  R13.10 787.20     Patient Active Problem List   Diagnosis   • Chronic pain disorder   • Primary hypertension   • Long-term current use of opiate analgesic   • Lumbosacral spondylosis without myelopathy   • Migraine   • Coronary artery disease involving native coronary artery of native heart without angina pectoris   • Schizophrenia (HCC)   • Esophageal varices (HCC)   • Decompensated hepatic cirrhosis (HCC)   • HFrEF (heart failure with reduced ejection fraction) (HCC)   • Vaginal cancer (HCC)   • High grade squamous intraepithelial lesion (HGSIL) on cytologic smear of vagina   • Type 2 diabetes mellitus with hyperglycemia, with long-term current use of insulin (HCC)   • Chronic hepatitis C without hepatic coma (HCC)   • Tobacco use   • Pneumonia of right middle lobe due to infectious organism   • Sepsis, unspecified organism (HCC)     Past Medical History:   Diagnosis Date   • Anemia    • Cancer (HCC)     cervical   • Depression    • Hyperlipidemia    • Hypertension    • Infectious viral hepatitis    • Myocardial infarction (HCC)    • Substance abuse (HCC)      Past Surgical History:   Procedure Laterality Date   • BLADDER SURGERY     • CARDIAC CATHETERIZATION      4/3/2022   • CHOLECYSTECTOMY     • COLON RESECTION       7/19/2017, descending and transverse colon   • COLON SURGERY     • COLONOSCOPY      7/19/2017   • ENDOSCOPY      EGD 4/1/22   • HYSTERECTOMY     • TRANSESOPHAGEAL ECHOCARDIOGRAM (AVINASH)      Global hyokinesis, 49% EF, LVH      General Information     Row Name 10/10/22 1300          OT Time and Intention    Document Type therapy note (daily note)  -     Mode of Treatment occupational therapy  -     Row Name 10/10/22 1300          General Information    Patient Profile Reviewed yes  -     Existing Precautions/Restrictions fall  -     Row Name 10/10/22 1300          Cognition    Orientation Status (Cognition) oriented x 3  -     Row Name 10/10/22 1300          Safety Issues, Functional Mobility    Safety Issues Affecting Function (Mobility) insight into deficits/self-awareness;safety precaution awareness  -     Impairments Affecting Function (Mobility) balance;endurance/activity tolerance;strength  -     Cognitive Impairments, Mobility Safety/Performance insight into deficits/self-awareness;safety precaution awareness  -           User Key  (r) = Recorded By, (t) = Taken By, (c) = Cosigned By    Initials Name Provider Type     Renae Alford, OT Occupational Therapist                 Mobility/ADL's     Row Name 10/10/22 1325          Bed Mobility    Bed Mobility supine-sit;sit-supine  -     Supine-Sit [place] (Bed Mobility) verbal cues;contact guard  -     Sit-Supine [place] (Bed Mobility) verbal cues;contact guard  -     Assistive Device (Bed Mobility) head of bed elevated;bed rails  -     Row Name 10/10/22 1325          Transfers    Transfers sit-stand transfer;toilet transfer  -     Row Name 10/10/22 1325          Sit-Stand Transfer    Sit-Stand [place] (Transfers) contact guard  -     Assistive Device (Sit-Stand Transfers) walker, front-wheeled  -     Row Name 10/10/22 1325          Toilet Transfer    Type (Toilet Transfer) sit-stand  -     [place] Level  (Toilet Transfer) minimum assist (75% patient effort)  -     Assistive Device (Toilet Transfer) commode;grab bars/safety frame  -     Row Name 10/10/22 1325          Functional Mobility    Functional Mobility- Ind. Level contact guard assist  -     Functional Mobility- Device walker, front-wheeled  -     Functional Mobility-Distance (Feet) 24  -     Row Name 10/10/22 1325          Activities of Daily Living    BADL Assessment/Intervention lower body dressing;toileting  -     Row Name 10/10/22 1325          Lower Body Dressing Assessment/Training    [place] Level (Lower Body Dressing) don;doff;shoes/slippers;contact guard assist  -     Position (Lower Body Dressing) edge of bed sitting;unsupported sitting  -     Row Name 10/10/22 1325          Toileting Assessment/Training    [place] Level (Toileting) standby assist;perform perineal hygiene;minimum assist (75% patient effort);adjust/manage clothing  -     Assistive Devices (Toileting) commode;grab bar/safety frame  -     Position (Toileting) unsupported sitting;supported standing  -           User Key  (r) = Recorded By, (t) = Taken By, (c) = Cosigned By    Initials Name Provider Type     Renae Alford, OT Occupational Therapist               Obj/Interventions    No documentation.                Goals/Plan    No documentation.                Clinical Impression     Row Name 10/10/22 1328          Pain Assessment    Pretreatment Pain Rating 4/10  -     Posttreatment Pain Rating 4/10  -     Pain Location - back  -     Pain Intervention(s) Repositioned  -     Row Name 10/10/22 1328          Plan of Care Review    Plan of Care Reviewed With patient  -     Progress improving  -     Outcome Evaluation Pt CGA to don shoes, min A toilet transfer, SBA post toileting hygiene, min A clothing mgmt,  CGA to ambulate in room with RW,  CGA sit to supine.  Pt's O2 sat dropped to 87% on RA and recovered to 90% after PLB. Pt with good  progress, but limited by weakness and decreased activity tolerance.  -AC     Row Name 10/10/22 1328          Vital Signs    Pre Systolic BP Rehab 136  -AC     Pre Treatment Diastolic BP 78  -AC     Posttreatment Heart Rate (beats/min) 66  -AC     Pre SpO2 (%) 92  -AC     O2 Delivery Pre Treatment room air  -AC     Intra SpO2 (%) 87  -AC     O2 Delivery Intra Treatment room air  -AC     Post SpO2 (%) 90  -AC     O2 Delivery Post Treatment room air  -AC     Pre Patient Position Supine  -AC     Post Patient Position Supine  -AC     Row Name 10/10/22 1328          Positioning and Restraints    Pre-Treatment Position in bed  -AC     Post Treatment Position bed  -AC     In Bed notified nsg;supine;call light within reach;encouraged to call for assist;exit alarm on  -AC           User Key  (r) = Recorded By, (t) = Taken By, (c) = Cosigned By    Initials Name Provider Type    AC Renae Alford, OT Occupational Therapist               Outcome Measures     Row Name 10/10/22 1334          How much help from another is currently needed...    Putting on and taking off regular lower body clothing? 3  -AC     Bathing (including washing, rinsing, and drying) 3  -AC     Toileting (which includes using toilet bed pan or urinal) 3  -AC     Putting on and taking off regular upper body clothing 3  -AC     Taking care of personal grooming (such as brushing teeth) 3  -AC     Eating meals 3  -AC     AM-PAC 6 Clicks Score (OT) 18  -AC     Row Name 10/10/22 1102          How much help from another person do you currently need...    Turning from your back to your side while in flat bed without using bedrails? 4  -CM     Moving from lying on back to sitting on the side of a flat bed without bedrails? 3  -CM     Moving to and from a bed to a chair (including a wheelchair)? 3  -CM     Standing up from a chair using your arms (e.g., wheelchair, bedside chair)? 3  -CM     Climbing 3-5 steps with a railing? 2  -CM     To walk in hospital room? 3   -CM     AM-PAC 6 Clicks Score (PT) 18  -CM     Highest level of mobility 6 --> Walked 10 steps or more  -CM     Row Name 10/10/22 1334          Functional Assessment    Outcome Measure Options AM-PAC 6 Clicks Daily Activity (OT)  -AC           User Key  (r) = Recorded By, (t) = Taken By, (c) = Cosigned By    Initials Name Provider Type    AC Renae Alford, OT Occupational Therapist    CM Kady Holland, PT Physical Therapist                Occupational Therapy Education     Title: PT OT SLP Therapies (In Progress)     Topic: Occupational Therapy (In Progress)     Point: ADL training (Done)     Description:   Instruct learner(s) on proper safety adaptation and remediation techniques during self care or transfers.   Instruct in proper use of assistive devices.              Learning Progress Summary           Patient Acceptance, E, VU by  at 10/10/2022 1335    Acceptance, E, DU,NR by CARO at 10/8/2022 1616    Comment: IV line management; energy conservation/pacing; PLB    Acceptance, E, VU by  at 10/6/2022 1128    Acceptance, E,TB,D, NR by  at 10/3/2022 1546    Acceptance, E, VU by CARO at 9/29/2022 1421    Comment: Reason for OT consult    Acceptance, E, NR,NL by MR at 9/28/2022 0837   Family Acceptance, E, VU by CARO at 9/29/2022 1421    Comment: Reason for OT consult                   Point: Home exercise program (In Progress)     Description:   Instruct learner(s) on appropriate technique for monitoring, assisting and/or progressing therapeutic exercises/activities.              Learning Progress Summary           Patient Acceptance, E,TB,D, NR by KF at 10/3/2022 1546    Acceptance, E, NR,NL by  at 9/28/2022 0837                   Point: Precautions (Done)     Description:   Instruct learner(s) on prescribed precautions during self-care and functional transfers.              Learning Progress Summary           Patient Acceptance, E, DU,NR by CARO at 10/8/2022 1616    Comment: IV line management; energy  conservation/pacing; PLB    Acceptance, E,TB,D, NR by KF at 10/3/2022 1546    Acceptance, E, NR,NL by MR at 9/28/2022 0837                   Point: Body mechanics (Done)     Description:   Instruct learner(s) on proper positioning and spine alignment during self-care, functional mobility activities and/or exercises.              Learning Progress Summary           Patient Acceptance, E, DU,NR by CARO at 10/8/2022 1616    Comment: IV line management; energy conservation/pacing; PLB    Acceptance, E,TB,D, NR by KF at 10/3/2022 1546    Acceptance, E, NR,NL by MR at 9/28/2022 0837                               User Key     Initials Effective Dates Name Provider Type Discipline    AC 06/16/21 -  Renae Alford, OT Occupational Therapist OT    KF 06/16/21 -  Neyda Cagle, OT Occupational Therapist OT    CARO 06/16/21 -  Sabrina Garcia, OT Occupational Therapist OT    MR 09/22/22 -  Guadalupe Garcia, OT Occupational Therapist OT              OT Recommendation and Plan     Plan of Care Review  Plan of Care Reviewed With: patient  Progress: improving  Outcome Evaluation: Pt CGA to don shoes, min A toilet transfer, SBA post toileting hygiene, min A clothing mgmt,  CGA to ambulate in room with RW,  CGA sit to supine.  Pt's O2 sat dropped to 87% on RA and recovered to 90% after PLB. Pt with good progress, but limited by weakness and decreased activity tolerance.     Time Calculation:    Time Calculation- OT     Row Name 10/10/22 1301 10/10/22 1105          Time Calculation- OT    OT Start Time 1301  -AC --     OT Received On 10/10/22  -AC --        Timed Charges    86191 - Gait Training Minutes  -- 23  -CM     11905 - OT Therapeutic Activity Minutes 8  -AC --     50973 - OT Self Care/Mgmt Minutes 15  -AC --        Total Minutes    Timed Charges Total Minutes 23  -AC 23  -CM      Total Minutes 23  -AC 23  -CM           User Key  (r) = Recorded By, (t) = Taken By, (c) = Cosigned By    Initials Name Provider Type    AC Renae Alford  AUBREY OT Occupational Therapist    CM Kady Holland, PT Physical Therapist              Therapy Charges for Today     Code Description Service Date Service Provider Modifiers Qty    [id number]  OT THERAPEUTIC ACT EA 15 MIN 10/10/2022 Renae Alford, OT GO 1    [id number]  OT SELF CARE/MGMT/TRAIN EA 15 MIN 10/10/2022 Renae Alford OT GO 1               Renae Alford OT  10/10/2022

## 2022-10-10 NOTE — PROGRESS NOTES
[facility] Medicine Services  PROGRESS NOTE    Patient Name: Kaylie Cruz  : 1965  MRN: [id number]    Date of Admission: 2022  Primary Care Physician: Iesha Harris DO    Subjective   Subjective     CC:  resp failure     HPI:  Pt doing well today. Denies any issues overnight.     ROS:  Gen- No fevers, chills  CV- No chest pain, palpitations  Resp- No cough, dyspnea  GI- No N/V/D, abd pain      Objective   Objective     Vital Signs:   Temp:  [98.1 °F (36.7 °C)-98.7 °F (37.1 °C)] 98.3 °F (36.8 °C)  Heart Rate:  [65-85] 65  Resp:  [18] 18  BP: (108-155)/(73-86) 117/73  Flow (L/min):  [3-4] 4     Physical Exam:  Constitutional: No acute distress, awake, alert  HENT: NCAT, mucous membranes moist  Respiratory: poor effort; diminished  Cardiovascular: RRR, no murmurs, rubs, or gallops  Gastrointestinal: Positive bowel sounds, soft, nontender, distended  Musculoskeletal: No bilateral ankle edema  Psychiatric: cooperative  Neurologic: Oriented x 3, no asterixis, strength symmetric in all extremities, Cranial Nerves grossly intact to confrontation, speech clear  Skin: No rashes    Results Reviewed:  LAB RESULTS:      Lab 10/10/22  0730 10/08/22  0415 10/05/22  0948 10/05/22  0405 10/04/22  0537   WBC 6.76 7.48 17.87*  --  17.59*   HEMOGLOBIN 8.7* 8.3* 8.2*  --  7.6*   HEMATOCRIT 26.7* 25.7* 24.5*  --  23.9*   PLATELETS 131* 155 202  --  162   NEUTROS ABS 4.08  --  15.45*  --  14.84*   IMMATURE GRANS (ABS) 0.02  --  0.17*  --  0.41*   LYMPHS ABS 2.03  --  1.21  --  1.20   MONOS ABS 0.28  --  0.59  --  0.65   EOS ABS 0.25  --  0.35  --  0.39   MCV 80.4 80.8 79.8  --  82.4   PROCALCITONIN  --   --   --   --  1.42*   LACTATE  --   --   --  1.8 1.4         Lab 10/10/22  0730 10/08/22  0415 10/05/22  0405 10/04/22  0537   SODIUM 136 136 136 132*   POTASSIUM 3.5 3.5 3.8 3.9   CHLORIDE 105 104 105 102   CO2 21.0* 21.0* 18.0* 19.0*   ANION GAP 10.0 11.0 13.0 11.0   BUN 36* 35* 30* 34*    CREATININE 1.46* 1.43* 1.52* 1.71*   EGFR 41.3* 42.3* 39.3* 34.2*   GLUCOSE 121* 127* 119* 159*   CALCIUM 8.3* 8.3* 7.9* 7.8*         Lab 10/10/22  0730 10/05/22  0405 10/04/22  0537   TOTAL PROTEIN 7.0 6.1 5.7*   ALBUMIN 2.30* 2.10* 2.10*   GLOBULIN 4.7 4.0 3.6   ALT (SGPT) 18 14 14   AST (SGOT) 46* 49* 43*   BILIRUBIN 0.6 0.9 1.0   ALK PHOS 170* 187* 147*                     Lab 10/03/22  0840   PH, ARTERIAL 7.418   PCO2, ARTERIAL 33.1*   PO2 ART 53.7*   FIO2 40   HCO3 ART 21.4   BASE EXCESS ART -2.7*   CARBOXYHEMOGLOBIN 1.3     Brief Urine Lab Results  (Last result in the past 365 days)      Color   Clarity   Blood   Leuk Est   Nitrite   Protein   CREAT   Urine HCG        10/01/22 0117 Yellow   Clear   Negative   Small (1+)   Negative   30 mg/dL (1+)                 Microbiology Results Abnormal     Procedure Component Value - Date/Time    Histoplasma Ag Ur - Urine, Urinary Bladder [[id number]] Collected: 10/03/22 1122    Lab Status: Final result Specimen: Urine from Urinary Bladder Updated: 10/06/22 0913     Histoplasma Galactomannan Ag Ur <0.5    Narrative:      Test(s) 183562-Histoplasma Gal'jaycee Ag Ur  was developed and its performance characteristics determined  by [facility]. It has not been cleared or approved by the Food  and Drug Administration.  Performed at:  [address]  [id number]  : Kamryn Eli MD, Phone:  [phone]    Blastomyces Antigen - Urine, Urinary Bladder [[id number]] Collected: 10/03/22 1122    Lab Status: Final result Specimen: Urine from Urinary Bladder Updated: 10/06/22 0712     MVista(R) Blastomyces Ag None Detected ng/mL      Comment: Results reported as ng/mL in 0.2 - 14.7 ng/mL range  Results above the limit of detection but below 0.2 ng/mL  are reported as 'Positive, Below the Limit of  Quantification'  Results above 14.7 ng/mL are reported as 'Positive,  Above the Limit of Quantification'        Specimen Type URINE     Narrative:      Performed at:  [address] [facility] [employer]  [address] [facility] [place]  [id number]  : Jeanine Mancilla MD, Phone:  [phone]    Respiratory Panel PCR w/COVID-19(SARS-CoV-2) MALIK/STANLEY/AIXA/PAD/COR/MAD/HEATHER In-House, NP Swab in UTM/VTM, 3-4 HR TAT - Swab, Nasopharynx [[id number]]  (Normal) Collected: 10/03/22 1124    Lab Status: Final result Specimen: Swab from Nasopharynx Updated: 10/03/22 1237     ADENOVIRUS, PCR Not Detected     Coronavirus 229E Not Detected     Coronavirus HKU1 Not Detected     Coronavirus NL63 Not Detected     Coronavirus OC43 Not Detected     COVID19 Not Detected     Human Metapneumovirus Not Detected     Human Rhinovirus/Enterovirus Not Detected     Influenza A PCR Not Detected     Influenza B PCR Not Detected     Parainfluenza Virus 1 Not Detected     Parainfluenza Virus 2 Not Detected     Parainfluenza Virus 3 Not Detected     Parainfluenza Virus 4 Not Detected     RSV, PCR Not Detected     Bordetella pertussis pcr Not Detected     Bordetella parapertussis PCR Not Detected     Chlamydophila pneumoniae PCR Not Detected     Mycoplasma pneumo by PCR Not Detected    Narrative:      In the setting of a positive respiratory panel with a viral infection PLUS a negative procalcitonin without other underlying concern for bacterial infection, consider observing off antibiotics or discontinuation of antibiotics and continue supportive care. If the respiratory panel is positive for atypical bacterial infection (Bordetella pertussis, Chlamydophila pneumoniae, or Mycoplasma pneumoniae), consider antibiotic de-escalation to target atypical bacterial infection.    Blood Culture - Blood, Hand, Right [[id number]]  (Normal) Collected: 09/27/22 1300    Lab Status: Final result Specimen: Blood from Hand, Right Updated: 10/02/22 1317     Blood Culture No growth at 5 days    Blood Culture - Blood, Arm, Right [[id number]]  (Normal) Collected: 09/27/22 1245    Lab Status: Final result  Specimen: Blood from Arm, Right Updated: 10/02/22 1303     Blood Culture No growth at 5 days    Urine Culture - Urine, Urine, Random Void [[id number]]  (Normal) Collected: 10/01/22 0117    Lab Status: Final result Specimen: Urine, Random Void Updated: 10/02/22 1056     Urine Culture No growth    Body Fluid Culture - Body Fluid, Peritoneum [[id number]] Collected: 09/28/22 1143    Lab Status: Final result Specimen: Body Fluid from Peritoneum Updated: 10/01/22 0729     Body Fluid Culture No growth at 3 days     Gram Stain No WBCs or organisms seen    Legionella Antigen, Urine - Urine, Urine, Clean Catch [[id number]]  (Normal) Collected: 09/28/22 0643    Lab Status: Final result Specimen: Urine, Clean Catch Updated: 09/28/22 1229     LEGIONELLA ANTIGEN, URINE Negative    S. Pneumo Ag Urine or CSF - Urine, Urine, Clean Catch [[id number]]  (Normal) Collected: 09/28/22 0643    Lab Status: Final result Specimen: Urine, Clean Catch Updated: 09/28/22 1229     Strep Pneumo Ag Negative          No radiology results from the last 24 hrs        I have reviewed the medications:  Scheduled Meds:amLODIPine, 5 mg, Oral, Daily  amoxicillin-clavulanate, 1 tablet, Oral, Q12H  aspirin, 81 mg, Oral, Daily  atorvastatin, 40 mg, Oral, Nightly  castor oil-balsam peru, 1 application, Topical, Q12H  folic acid, 1 mg, Oral, Daily  guaiFENesin, 600 mg, Oral, Q12H  hydrOXYzine, 25 mg, Oral, Once  insulin detemir, 15 Units, Subcutaneous, Nightly  insulin lispro, 0-9 Units, Subcutaneous, TID AC  Insulin Lispro, 5 Units, Subcutaneous, TID With Meals  lactulose, 10 g, Oral, Daily  lidocaine, 1 patch, Transdermal, Q24H  linezolid, 600 mg, Oral, Q12H  metoprolol tartrate, 50 mg, Oral, BID  QUEtiapine, 150 mg, Oral, Nightly  rifAXIMin, 550 mg, Oral, Q12H  sodium chloride, 10 mL, Intravenous, Q12H  thiamine, 100 mg, Oral, Daily      Continuous Infusions:   PRN Meds:.•  acetaminophen **OR** acetaminophen **OR** acetaminophen  •  albuterol  •  benzonatate  •   dextrose  •  dextrose  •  glucagon (human recombinant)  •  ipratropium-albuterol  •  ondansetron **OR** ondansetron  •  oxyCODONE  •  potassium chloride **OR** potassium chloride **OR** potassium chloride  •  prochlorperazine  •  [COMPLETED] Insert peripheral IV **AND** sodium chloride  •  sodium chloride    Assessment & Plan   Assessment & Plan     Active Hospital Problems    Diagnosis  POA   • Pneumonia of right middle lobe due to infectious organism [J18.9]  Yes   • Sepsis, unspecified organism (HCC) [A41.9]  Unknown   • Tobacco use [Z72.0]  Yes   • Vaginal cancer (HCC) [C52]  Yes   • High grade squamous intraepithelial lesion (HGSIL) on cytologic smear of vagina [R87.623]  Yes   • Type 2 diabetes mellitus with hyperglycemia, with long-term current use of insulin (HCC) [E11.65, Z79.4]  Not Applicable   • Chronic hepatitis C without hepatic coma (HCC) [B18.2]  Yes   • Decompensated hepatic cirrhosis (HCC) [K72.90, K74.60]  Yes   • HFrEF (heart failure with reduced ejection fraction) (HCC) [I50.20]  Yes   • Coronary artery disease involving native coronary artery of native heart without angina pectoris [I25.10]  Yes   • Schizophrenia (HCC) [F20.9]  Yes   • Chronic pain disorder [G89.4]  Yes   • Primary hypertension [I10]  Yes   • Long-term current use of opiate analgesic [Z79.891]  Not Applicable   • Esophageal varices (HCC) [I85.00]  Yes      Resolved Hospital Problems   No resolved problems to display.        Brief Hospital Course to date:  Kaylie Cruz is a 57 y.o. female with PMHx vaginal squamous cell carcinoma in 2009 s/p WPRT, vaginal brachytherapy hysterectomy, HTN, HLD, history of substance abuse, history of Hepatitis C and Cirrhosis, CAD, DM2, Schizophrenia, HFrEF (EF 49%),  who presented to ED with CC of cough, subjective fever, SOB, abdominal pain and diarrhea.     This patient's problems and plans were partially entered by my partner and updated as appropriate by me 10/08/22.     Sepsis,  POA  Bilateral PNA  Leukocytosis - resolved  +MRSA PCR   - Cryptococcal antigen negative. Fungal Alina, Fungitell B-D glucan, Histoplasma negative   - Sputum cx ordered but patient without productive cough   - Respiratory PCR negative  - CT scan chest done on 10/5 - dense diffuse and extensive reticular and groundglass disease throughout most of the lungs   -SLP has seen, S/P FEES 9/29 and functional oral and pharyngeal phase, signed off   - Palliative following, now DNR/DNI  - Initially was on high flow and now improved to 4L -- continue to wean as tolerated   - ID and pulm following -- appreciate recs --  continue zosyn and zyvox. Pt pulled out PIV again overnight and is difficult stick.  Transitioned to PO Zyvox and change Zosyn to PO Augmentin (last dose of PCN was supposed to be 10/10 per Dr. Castillo's note on 10/7)   - Currently holding on steroids   - Mobilize. Trial mucinex.   -  given 1 time dose of PO lasix 10/8, will give another dose IV Lasix due to ascites, no accurate Is/Os     Decompensated cirrhosis with ascites  History of Hepatitis C  Elevated LFTs   -Follows with GI outpatient but missed last appt.   -EGD due 04/2023 for EV screening  -S/P LVP on 9/29 with 2.25L of ascitic fluid removed, does not appear c/w SBP culture NGTD  -Hepatitis panel + Hep C ab  -Continue Xifaxan    - lactulose held due to frequent BMs. Resumed at 10 mg daily on 10/7, no BM documented in several days (accurate per RN, pt continues to claim she is having frequent BMs). Increase to TID today  -- Give one dose IV Lasix today for ascites    Likely CKD  - patient is intermittently allowing labs  - Cr stable.      Hypokalemia  -- Improved     Chronic HFrEF   Coronary artery disease involving native coronary artery of native heart without angina pectoris  Primary hypertension  -Echo 3/2022 - EF 49%, global hypokinesis, grade 1 diastolic dysfunction  -LHC 4/3/2022 - nonobstructive LAD to LAD 60% in mid region  -Pt is supposed to  follow with Dr Daniel, has missed appts   -Continue ASA 81, metoprolol  -Continue statin   - Monitor volume status     Type 2 diabetes mellitus with diabetic polyneuropathy, with long-term current use of insulin  -A1c has improved from 8.5% to 6.6% with medication compliance over the past 3 months  -Has appointment to establish with Endocrinology next month  -Continue Lantus 15 units nightly  -Continue Humalog 5 units TID meals   -Continue MDSSI  -Continue to HOLD Gabapentin to avoid lethargy      Recent Gross hematuria  -Seen at  8/23/22 and diagnosed with UTI, treated with ABX   -Per patient the dysuria resolved but still having intermittent hematuria  -CT A/P without contrast with unremarkable bladder   -Patient has already been referred to Urology per PCP      Tobacco use  -Encourage smoking cessation  -PCP note says they plan to obtain PFTs outpatient as she has had some wheezing but no formal Dx of COPD  -PRN Albuterol   -Continue scheduled DuoNebs      High grade squamous intraepithelial lesion (HGSIL) on cytologic smear of vagina  Vaginal cancer   -Diagnosed 2010? s/p radiation, surgical resection, and hysterectomy. Previously following with Dr. Roxanne Chaudhry, Gyn-Onc in [place] with [facility].   -She had vaginal pap smear with HGSIL and was lost to follow up.   -PCP has referred to GYN and gyn-onc for further evaluation      Schizophrenia, unspecified type  Hospital-related delirium vs hepatic encephalopathy  -Following with [facility] for talk therapy  - restarted seroquel 150 mg qHS on 10/7, given an extra 50mg 10/8 am. QTc remains prolonged at 491. Will defer increasing Seroquel back to her home dose (200mg) due to this.  Monitor closely  -- sitter at bedside today.    -- lactulose plan as noted above     Substance abuse  -UDS positive for cocaine, oxycodone and TCA     Chronic Anemia  - Stable. Monitor.      R rib pain  -Likely from PNA  -Continue Lidoderm patch   -Takes Roxicodone at home,  resume low dose 5mg q8PRN  - rib XR with no acute or healing rib fracture      Deep Tissue Pressure Injury (Bilateral Sacral area)  -WOC following/PT wound  -Dolphin bed ordered, patient not happy with this mattress     Expected Discharge Location and Transportation: acute rehab/[facility]   Expected Discharge Date: 10/11     DVT prophylaxis:  Mechanical DVT prophylaxis orders are present.     AM-PAC 6 Clicks Score (PT): 19 (10/09/22 2000)    CODE STATUS:   Code Status and Medical Interventions:   Ordered at: 10/05/22 1229     Medical Intervention Limits:    NO intubation (DNI)     Code Status (Patient has no pulse and is not breathing):    No CPR (Do Not Attempt to Resuscitate)     Medical Interventions (Patient has pulse or is breathing):    Limited Support       Megan Landeros MD  10/10/22

## 2022-10-10 NOTE — THERAPY TREATMENT NOTE
Patient Name: Kaylie Cruz  : 1965    MRN: [id number]                              Today's Date: 10/10/2022       Admit Date: 2022    Visit Dx:     ICD-10-CM ICD-9-CM   1. Pneumonia of right middle lobe due to infectious organism  J18.9 486   2. Right sided abdominal pain  R10.9 789.09   3. Cirrhosis of liver with ascites, unspecified hepatic cirrhosis type (HCC)  K74.60 571.5    R18.8    4. ANIKET (acute kidney injury) (HCC)  N17.9 584.9   5. Acute on chronic congestive heart failure, unspecified heart failure type (HCC)  I50.9 428.0   6. Sepsis, due to unspecified organism, unspecified whether acute organ dysfunction present (HCC)  A41.9 038.9     995.91   7. Dysphagia, unspecified type  R13.10 787.20     Patient Active Problem List   Diagnosis   • Chronic pain disorder   • Primary hypertension   • Long-term current use of opiate analgesic   • Lumbosacral spondylosis without myelopathy   • Migraine   • Coronary artery disease involving native coronary artery of native heart without angina pectoris   • Schizophrenia (HCC)   • Esophageal varices (HCC)   • Decompensated hepatic cirrhosis (HCC)   • HFrEF (heart failure with reduced ejection fraction) (HCC)   • Vaginal cancer (HCC)   • High grade squamous intraepithelial lesion (HGSIL) on cytologic smear of vagina   • Type 2 diabetes mellitus with hyperglycemia, with long-term current use of insulin (HCC)   • Chronic hepatitis C without hepatic coma (HCC)   • Tobacco use   • Pneumonia of right middle lobe due to infectious organism   • Sepsis, unspecified organism (HCC)     Past Medical History:   Diagnosis Date   • Anemia    • Cancer (HCC)     cervical   • Depression    • Hyperlipidemia    • Hypertension    • Infectious viral hepatitis    • Myocardial infarction (HCC)    • Substance abuse (HCC)      Past Surgical History:   Procedure Laterality Date   • BLADDER SURGERY     • CARDIAC CATHETERIZATION      4/3/2022   • CHOLECYSTECTOMY     • COLON RESECTION       7/19/2017, descending and transverse colon   • COLON SURGERY     • COLONOSCOPY      7/19/2017   • ENDOSCOPY      EGD 4/1/22   • HYSTERECTOMY     • TRANSESOPHAGEAL ECHOCARDIOGRAM (AVINASH)      Global hyokinesis, 49% EF, LVH      General Information     Row Name 10/10/22 1043          Physical Therapy Time and Intention    Document Type therapy note (daily note)  -CM     Mode of Treatment physical therapy  -CM     Row Name 10/10/22 1043          General Information    Patient Profile Reviewed yes  -CM     Existing Precautions/Restrictions fall  -CM     Barriers to Rehab medically complex  -CM     Row Name 10/10/22 1043          Cognition    Orientation Status (Cognition) oriented x 3  -CM     Row Name 10/10/22 1043          Safety Issues, Functional Mobility    Safety Issues Affecting Function (Mobility) awareness of need for assistance;insight into deficits/self-awareness;safety precaution awareness;safety precautions follow-through/compliance  -CM     Impairments Affecting Function (Mobility) balance;endurance/activity tolerance;shortness of breath  -CM     Cognitive Impairments, Mobility Safety/Performance awareness, need for assistance;insight into deficits/self-awareness;safety precaution awareness;safety precaution follow-through  -CM           User Key  (r) = Recorded By, (t) = Taken By, (c) = Cosigned By    Initials Name Provider Type    CM Kady Holland, PT Physical Therapist               Mobility     Row Name 10/10/22 1048          Bed Mobility    Bed Mobility supine-sit;sit-supine  -CM     Supine-Sit [place] (Bed Mobility) contact guard  -CM     Sit-Supine [place] (Bed Mobility) contact guard  -CM     Assistive Device (Bed Mobility) head of bed elevated  -CM     Comment, (Bed Mobility) verbal cues provided to bring BLEs off edge of bed to initiate supine-sit transfer  -CM     Row Name 10/10/22 1048          Sit-Stand Transfer    Sit-Stand [place] (Transfers) contact guard  -CM      Assistive Device (Sit-Stand Transfers) walker, front-wheeled  -CM     Comment, (Sit-Stand Transfer) verbal cueds for safe hand placement with one hand on bed and one on walker  -     Row Name 10/10/22 1048          Gait/Stairs (Locomotion)    [place] Level (Gait) contact guard  -CM     Distance in Feet (Gait) 64  -CM     Deviations/Abnormal Patterns (Gait) randall decreased;gait speed decreased;stride length decreased  -CM     Comment, (Gait/Stairs) Patient ambulated 64' in hallway with RW and CGA. One brief standing rest break taken after 32' prior to returning to bed. Verbal cues provided for pursed lip breathing secondary to oxygen desaturation after ambulation.  -CM           User Key  (r) = Recorded By, (t) = Taken By, (c) = Cosigned By    Initials Name Provider Type    Kady Villar, ARIANE Physical Therapist               Obj/Interventions     Row Name 10/10/22 1055          Hip (Therapeutic Exercise)    Hip (Therapeutic Exercise) --  Patient deferred BLE therapeutic exercise secondary to back pain  -     Row Name 10/10/22 1055          Balance    Balance Assessment sitting static balance;standing static balance;standing dynamic balance;sitting dynamic balance  -CM     Static Sitting Balance contact guard;1-person assist  -CM     Dynamic Sitting Balance contact guard;1-person assist  -CM     Position, Sitting Balance sitting edge of bed;unsupported  -CM     Static Standing Balance contact guard;1-person assist  -CM     Dynamic Standing Balance contact guard;1-person assist  -CM     Position/Device Used, Standing Balance supported;walker, rolling  -CM     Comment, Balance patient able to complete dynamic sitting balance activity donning tennis shoes at edge of bed with CGA  -CM           User Key  (r) = Recorded By, (t) = Taken By, (c) = Cosigned By    Initials Name Provider Type    Kady Villar PT Physical Therapist               Goals/Plan    No documentation.                 Clinical Impression     Row Name 10/10/22 1057          Pain    Pretreatment Pain Rating 7/10  -CM     Posttreatment Pain Rating 7/10  -CM     Pain Location - back  -CM     Pain Intervention(s) Repositioned;Ambulation/increased activity  patient reports receiving medication for pain prior to treatment  -     Row Name 10/10/22 1057          Plan of Care Review    Plan of Care Reviewed With patient  -CM     Progress improving  -CM     Outcome Evaluation Patient with improved ambulation distance today ambulating 64' in hallway with RW and CGA. One brief standing rest break taken after 32' prior to returning to bed with oxygen desaturation to 85%. Verbal cues provided for safety and PLB throughout treatment. Recommend IPR at discharge.  -     Row Name 10/10/22 1057          Vital Signs    Pre Systolic BP Rehab 131  -CM     Pre Treatment Diastolic BP 75  -CM     Pretreatment Heart Rate (beats/min) 81  -CM     Posttreatment Heart Rate (beats/min) 74  -CM     Pre SpO2 (%) 91  -CM     O2 Delivery Pre Treatment room air  -CM     Intra SpO2 (%) 85  -CM     O2 Delivery Intra Treatment room air  -CM     Post SpO2 (%) 94  -CM     O2 Delivery Post Treatment room air  -CM     Pre Patient Position Supine  -CM     Intra Patient Position Sitting  -CM     Post Patient Position Supine  -CM     Row Name 10/10/22 1057          Positioning and Restraints    Pre-Treatment Position in bed  -CM     Post Treatment Position bed  -CM     In Bed side lying left;call light within reach;with other staff;side rails up x3  sitter present  -CM           User Key  (r) = Recorded By, (t) = Taken By, (c) = Cosigned By    Initials Name Provider Type    Kady Villar, PT Physical Therapist               Outcome Measures     Row Name 10/10/22 1102          How much help from another person do you currently need...    Turning from your back to your side while in flat bed without using bedrails? 4  -CM     Moving from lying on back to sitting  on the side of a flat bed without bedrails? 3  -CM     Moving to and from a bed to a chair (including a wheelchair)? 3  -CM     Standing up from a chair using your arms (e.g., wheelchair, bedside chair)? 3  -CM     Climbing 3-5 steps with a railing? 2  -CM     To walk in hospital room? 3  -CM     AM-PAC 6 Clicks Score (PT) 18  -CM     Highest level of mobility 6 --> Walked 10 steps or more  -CM           User Key  (r) = Recorded By, (t) = Taken By, (c) = Cosigned By    Initials Name Provider Type    Kady Villar, PT Physical Therapist                             Physical Therapy Education     Title: PT OT SLP Therapies (In Progress)     Topic: Physical Therapy (Done)     Point: Mobility training (Done)     Learning Progress Summary           Patient Acceptance, E, VU by CM at 10/10/2022 1103    Acceptance, E,TB, VU by KW at 10/8/2022 1015    Comment: patient edcuated about the continued benefit of skilled PT services/ mist therapy    Acceptance, E, NR by AS at 10/7/2022 1037    Acceptance, E, NR by FW at 9/30/2022 1340    Acceptance, E,D, NR by DM at 9/28/2022 1707                   Point: Home exercise program (Done)     Learning Progress Summary           Patient Acceptance, E,TB, VU by KW at 10/8/2022 1015    Comment: patient edcuated about the continued benefit of skilled PT services/ mist therapy    Acceptance, E, NR by AS at 10/7/2022 1037    Acceptance, E,D, NR by DM at 9/28/2022 1707                   Point: Body mechanics (Done)     Learning Progress Summary           Patient Acceptance, E,TB, VU by KW at 10/8/2022 1015    Comment: patient edcuated about the continued benefit of skilled PT services/ mist therapy    Acceptance, E, NR by AS at 10/7/2022 1037    Acceptance, E, NR by FW at 9/30/2022 1340    Acceptance, E,D, NR by DM at 9/28/2022 1707                   Point: Precautions (Done)     Learning Progress Summary           Patient Acceptance, E, VU by OPAL at 10/10/2022 1103    Acceptance,  E,TB, VU by KW at 10/8/2022 1015    Comment: patient edcuated about the continued benefit of skilled PT services/ mist therapy    Acceptance, E, NR by AS at 10/7/2022 1037    Acceptance, E, NR by FW at 9/30/2022 1340    Acceptance, E,D, NR by DM at 9/28/2022 1707                               User Key     Initials Effective Dates Name Provider Type Discipline    DM 06/16/21 -  Deepthi Chen, PT Physical Therapist PT    AS 06/16/21 -  Yvonne Stauffer, PTA Physical Therapist Assistant PT    FW 05/05/22 -  Chuck Moreno, PT Physical Therapist PT    KW 01/27/22 -  Danielle Delgado, PT Physical Therapist PT    CM 09/22/22 -  Kady Holland, PT Physical Therapist PT              PT Recommendation and Plan     Plan of Care Reviewed With: patient  Progress: improving  Outcome Evaluation: Patient with improved ambulation distance today ambulating 64' in hallway with RW and CGA. One brief standing rest break taken after 32' prior to returning to bed with oxygen desaturation to 85%. Verbal cues provided for safety and PLB throughout treatment. Recommend IPR at discharge.     Time Calculation:    PT Charges     Row Name 10/10/22 1105             Time Calculation    Start Time 1013  -CM      PT Received On 10/10/22  -CM      PT Goal Re-Cert Due Date 10/15/22  -CM         Timed Charges    63506 - Gait Training Minutes  23  -CM         Total Minutes    Timed Charges Total Minutes 23  -CM       Total Minutes 23  -CM            User Key  (r) = Recorded By, (t) = Taken By, (c) = Cosigned By    Initials Name Provider Type    CM Kady Holland, PT Physical Therapist              Therapy Charges for Today     Code Description Service Date Service Provider Modifiers Qty    [id number] HC GAIT TRAINING EA 15 MIN 10/10/2022 Kady Holland, PT GP 2          PT G-Codes  Outcome Measure Options: AM-PAC 6 Clicks Daily Activity (OT)  AM-PAC 6 Clicks Score (PT): 18  AM-PAC 6 Clicks Score (OT): 16    Kady  Amalia, PT  10/10/2022

## 2022-10-10 NOTE — PLAN OF CARE
Goal Outcome Evaluation:  Plan of Care Reviewed With: patient        Progress: improving  Outcome Evaluation: Patient with improved ambulation distance today ambulating 64' in hallway with RW and CGA. One brief standing rest break taken after 32' prior to returning to bed with oxygen desaturation to 85%. Verbal cues provided for safety and PLB throughout treatment. Recommend IPR at discharge.

## 2022-10-10 NOTE — PROGRESS NOTES
INFECTIOUS DISEASE Progress Note    Kaylie Render  1965  [id number]    Consult: 10/3/22  Admit date: 9/27/2022    Requesting Provider: No ref. provider found  Evaluating physician: Hugo Castillo MD  Reason for Consultation: Sepsis, leukocytosis, cirrhosis, hepatitis C, substance use, right lower lobe pneumonia  Chief Complaint:       Subjective   History of present illness:  Patient is a  57 y.o.  Yr old female with a history of substance use, hepatitis C, cirrhosis, cervical cancer, vaginal squamous cell cancer 2009, schizophrenia, diabetes mellitus type 2, congestive heart failure with ejection fraction 49%, recent urinary tract infection treated at the [facility], who developed increasing shortness of breath on 9/25/2022 and was admitted to [facility] on 9/27/2022 with radiographic findings consistent with right lower lobe pneumonia.  The patient was placed on piperacillin tazobactam.  The patient is a poor historian.  Creatinine 1.55, sodium 133, potassium 4.2, AST 49, alkaline phosphatase 153, bilirubin normal, vancomycin trough 11.5, WBC 19.51, hemoglobin 8.9, platelet count 176, MRSA PCR swab screen +10/2, procalcitonin 0.88, urinalysis 6-12 WBCs but urine culture from 10/1 negative, ascites with 208 WBCs with negative culture, 9/28, Legionella and streptococcal urine antigen 9/28 negative.  Ammonia 64.  Lactic acid 2.5 on admission.  Blood cultures x2 from 9/27 negative.  Chest x-ray 10/3 with bilateral pneumonia diffuse right greater than left.  CT scan of the abdomen and pelvis 9/27 with diffuse right lower lobe pneumonia and findings consistent with colitis at the hepatic flexure.  I was consulted on 10/3/2022 for further evaluation and treatment.  Patient was initially placed on piperacillin tazobactam, then vancomycin was added on 10/2/2022.  The patient denies ill contacts, significant travel history, zoonotic exposures, TB, or HIV.  Minimal sputum production.   White blood cell count has increased from 15-19,000.     10/4/2022 history reviewed.  Patient poor historian.  Tolerating linezolid and piperacillin tazobactam for pneumonia with positive MRSA screen.  Still on high flow oxygen at 40 L/min.  Oxygen concentration 55%.  Not coughing much.  Says she is breathing better.    10/5/2022 history reviewed.  No high fevers or chills.  Continues on linezolid and piperacillin tazobactam for pneumonia.  Breathing slowly improved.    10/6/2022 history reviewed.  Tolerating linezolid and piperacillin tazobactam for sepsis and pneumonia.  No high fevers.  Refusing some medications.  Breathing better.    10/7/2022 history reviewed.  Breathing continues to improve.  Difficulty with accepting some of her care.  Oxygen needs at 4 L/min nasal cannula.  No fever.  Tolerating linezolid and piperacillin tazobactam for sepsis and pneumonia to continue until 10/10 IV, and then convert to oral if possible.  She has been refusing oral medications.    10/10/22 hx rev.  Patient more agitated over the weekend and pulled out lines.  Ab changed to po augmentin, linezolid.  No fever.    Past Medical History:   Diagnosis Date   • Anemia    • Cancer (HCC)     cervical   • Depression    • Hyperlipidemia    • Hypertension    • Infectious viral hepatitis    • Myocardial infarction (HCC)    • Substance abuse (HCC)        Past Surgical History:   Procedure Laterality Date   • BLADDER SURGERY     • CARDIAC CATHETERIZATION      4/3/2022   • CHOLECYSTECTOMY     • COLON RESECTION      7/19/2017, descending and transverse colon   • COLON SURGERY     • COLONOSCOPY      7/19/2017   • ENDOSCOPY      EGD 4/1/22   • HYSTERECTOMY     • TRANSESOPHAGEAL ECHOCARDIOGRAM (AVINASH)      Global hyokinesis, 49% EF, LVH       Pediatric History   Patient Parents   • Not on file     Other Topics Concern   • Not on file   Social History Narrative   • Not on file       family history is not on file.    Allergies   Allergen Reactions    • Codeine Hives   • Morphine Hives   • Tagamet [Cimetidine] Other (See Comments)     DISCOLORATION OF SKIN   • Toradol [Ketorolac Tromethamine] Hives         There is no immunization history on file for this patient.    Medication:    Current Facility-Administered Medications:   •  acetaminophen (TYLENOL) tablet 650 mg, 650 mg, Oral, Q4H PRN, 650 mg at 10/04/22 2033 **OR** acetaminophen (TYLENOL) 160 MG/5ML solution 650 mg, 650 mg, Oral, Q4H PRN **OR** acetaminophen (TYLENOL) suppository 650 mg, 650 mg, Rectal, Q4H PRN, Nidia Rangel DO  •  albuterol (PROVENTIL) nebulizer solution 0.083% 2.5 mg/3mL, 2.5 mg, Nebulization, Q6H PRN, Nidia Rangel DO, 2.5 mg at 10/03/22 0904  •  amLODIPine (NORVASC) tablet 5 mg, 5 mg, Oral, Daily, Nidia Rangel DO, 5 mg at 10/09/22 1005  •  amoxicillin-clavulanate (AUGMENTIN) 875-125 MG per tablet 1 tablet, 1 tablet, Oral, Q12H, Megan Landeros MD, 1 tablet at 10/09/22 2203  •  aspirin EC tablet 81 mg, 81 mg, Oral, Daily, Nidia Rangel DO, 81 mg at 10/09/22 1006  •  atorvastatin (LIPITOR) tablet 40 mg, 40 mg, Oral, Nightly, Megan Landeros MD, 40 mg at 10/09/22 2202  •  benzonatate (TESSALON) capsule 200 mg, 200 mg, Oral, TID PRN, Megan Landeros MD  •  castor oil-balsam peru (VENELEX) ointment 1 application, 1 application, Topical, Q12H, Nidia Rangel DO, 1 application at 10/09/22 2226  •  dextrose (D50W) (25 g/50 mL) IV injection 25 g, 25 g, Intravenous, Q15 Min PRN, Nidia Rangel DO  •  dextrose (GLUTOSE) oral gel 15 g, 15 g, Oral, Q15 Min PRN, Nidia Rangel DO  •  folic acid (FOLVITE) tablet 1 mg, 1 mg, Oral, Daily, Nidia Rangel DO, 1 mg at 10/09/22 1005  •  glucagon (human recombinant) (GLUCAGEN DIAGNOSTIC) injection 1 mg, 1 mg, Intramuscular, Q15 Min PRN, Nidia Rangel DO  •  guaiFENesin (MUCINEX) 12 hr tablet 600 mg, 600 mg, Oral, Q12H, Lor Sam DO, 600 mg at 10/09/22 2202  •  hydrOXYzine  (ATARAX) tablet 25 mg, 25 mg, Oral, Once, Ema Khan, APRN  •  insulin detemir (LEVEMIR) injection 15 Units, 15 Units, Subcutaneous, Nightly, Megan Landeros MD, 15 Units at 10/09/22 2210  •  Insulin Lispro (humaLOG) injection 0-9 Units, 0-9 Units, Subcutaneous, TID AC, Nidia Rangel DO, 4 Units at 10/09/22 1717  •  Insulin Lispro (humaLOG) injection 5 Units, 5 Units, Subcutaneous, TID With Meals, Megan Landeros MD, 5 Units at 10/09/22 1718  •  ipratropium-albuterol (DUO-NEB) nebulizer solution 3 mL, 3 mL, Nebulization, Q6H PRN, Lor Sam DO  •  lactulose (CHRONULAC) 10 GM/15ML solution 10 g, 10 g, Oral, Daily, Lor Sam DO, 10 g at 10/09/22 1006  •  lidocaine (LIDODERM) 5 % 1 patch, 1 patch, Transdermal, Q24H, Nidia Rangel DO, 1 patch at 10/09/22 1007  •  linezolid (ZYVOX) tablet 600 mg, 600 mg, Oral, Q12H, Megan Landeros MD, 600 mg at 10/09/22 2202  •  metoprolol tartrate (LOPRESSOR) tablet 50 mg, 50 mg, Oral, BID, Nidia Rangel DO, 50 mg at 10/09/22 2202  •  ondansetron (ZOFRAN) tablet 4 mg, 4 mg, Oral, Q6H PRN, 4 mg at 10/06/22 0019 **OR** ondansetron (ZOFRAN) injection 4 mg, 4 mg, Intravenous, Q6H PRN, Nidia Rangel DO  •  oxyCODONE (ROXICODONE) immediate release tablet 5 mg, 5 mg, Oral, Q8H PRN, Nidia Rangel DO, 5 mg at 10/09/22 2210  •  potassium chloride (MICRO-K) CR capsule 40 mEq, 40 mEq, Oral, PRN, 40 mEq at 09/29/22 2143 **OR** potassium chloride (KLOR-CON) packet 40 mEq, 40 mEq, Oral, PRN **OR** potassium chloride 10 mEq in 100 mL IVPB, 10 mEq, Intravenous, Q1H PRN, Henrietta Ramos II, DO, Last Rate: 100 mL/hr at 09/29/22 1152, 10 mEq at 09/29/22 1152  •  prochlorperazine (COMPAZINE) injection 2.5 mg, 2.5 mg, Intravenous, Q6H PRN, Nidia Rangel, DO  •  QUEtiapine (SEROquel) tablet 150 mg, 150 mg, Oral, Nightly, Lor Sam, , 150 mg at 10/09/22 2201  •  riFAXIMin (XIFAXAN) tablet 550 mg, 550 mg, Oral, Q12H, Cecy  "Lor DO, 550 mg at 10/09/22 2201  •  [COMPLETED] Insert peripheral IV, , , Once **AND** sodium chloride 0.9 % flush 10 mL, 10 mL, Intravenous, PRN, Stephenie Martinez PA  •  sodium chloride 0.9 % flush 10 mL, 10 mL, Intravenous, Q12H, Nidia Rangel DO, 10 mL at 10/08/22 2118  •  sodium chloride 0.9 % flush 10 mL, 10 mL, Intravenous, PRN, Nidia Rangel DO  •  thiamine (VITAMIN B-1) tablet 100 mg, 100 mg, Oral, Daily, Nidia Rangel DO, 100 mg at 10/09/22 1005    Please refer to the medical record for a full medication list    Review of Systems:    Constitutional-- No Fever, chills or sweats.  Appetite good, and no malaise. No fatigue.  HEENT-- No new vision, hearing or throat complaints.  No epistaxis or oral sores.  Denies odynophagia or dysphagia.  No odynophagia or dysphagia. No headache, photophobia or neck stiffness.  CV-- No chest pain, palpitation or syncope  Resp--some SOB/minimally productive cough/no hemoptysis  GI- No nausea, vomiting, or diarrhea.  No hematochezia, melena, or hematemesis. Denies jaundice or chronic liver disease.  -- No dysuria, hematuria, or flank pain.  Denies hesitancy, urgency or flank pain.  Lymph- no swollen lymph nodes in neck/axilla or groin.   Heme- No active bruising or bleeding; no Hx of DVT or PE.  MS-- no swelling or pain in the bones or joints of arms/legs.  No new back pain.  Neuro-- No acute focal weakness or numbness in the arms or legs.  No seizures.  Skin--No rashes or lesions    Physical Exam:   Vital Signs   Temp:  [98.1 °F (36.7 °C)-98.7 °F (37.1 °C)] 98.3 °F (36.8 °C)  Heart Rate:  [65-85] 65  Resp:  [18] 18  BP: (108-155)/(73-86) 117/73    Temp  Min: 98.1 °F (36.7 °C)  Max: 98.7 °F (37.1 °C)  BP  Min: 108/86  Max: 155/86  Pulse  Min: 65  Max: 85  Resp  Min: 18  Max: 18  SpO2  Min: 86 %  Max: 96 %    Blood pressure 117/73, pulse 65, temperature 98.3 °F (36.8 °C), temperature source Oral, resp. rate 18, height 160 cm (63\"), weight 52.2 kg (115 " lb), SpO2 96 %.  GENERAL: Awake and alert, in minor distress. Appears older than stated age.  Cachectic.  Resting in bed.  HEENT:  Normocephalic, atraumatic.  Oropharynx without thrush. Dentition in poor repair. No cervical adenopathy. No neck masses.  Ears externally normal, Nose externally normal.  EYES: No conjunctival injection. No icterus. EOM full.  LYMPHATICS: No lymphadenopathy of the neck or axillary or inguinal regions.   HEART: No murmur, gallop, or pericardial friction rub. Reg rate rhythm.  No JVD.  LUNGS: Scattered rales, no rhonchi. No respiratory distress, no use of accessory muscles.  ABDOMEN: Soft, nontender, nondistended. No appreciable HSM.  Bowel sounds normal.  SKIN: Warm and dry without cutaneous eruptions.  No nodules.    PSYCHIATRIC: Mental status some confusion but answers some questions.  EXT:  No cellulitic change.  Normal range of motion.  No cyanosis or clubbing.  NEURO: Oriented to name, nonfocal.      Results Review:   I reviewed the patient's new clinical results.  I reviewed the patient's new imaging results and agree with the interpretation.  I reviewed the patient's other test results and agree with the interpretation    Results from last 7 days   Lab Units 10/08/22  0415 10/05/22  0948 10/04/22  0537   WBC 10*3/mm3 7.48 17.87* 17.59*   HEMOGLOBIN g/dL 8.3* 8.2* 7.6*   HEMATOCRIT % 25.7* 24.5* 23.9*   PLATELETS 10*3/mm3 155 202 162     Results from last 7 days   Lab Units 10/08/22  0415   SODIUM mmol/L 136   POTASSIUM mmol/L 3.5   CHLORIDE mmol/L 104   CO2 mmol/L 21.0*   BUN mg/dL 35*   CREATININE mg/dL 1.43*   GLUCOSE mg/dL 127*   CALCIUM mg/dL 8.3*     Results from last 7 days   Lab Units 10/05/22  0405   ALK PHOS U/L 187*   BILIRUBIN mg/dL 0.9   ALT (SGPT) U/L 14   AST (SGOT) U/L 49*             Results from last 7 days   Lab Units 10/03/22  0736   VANCOMYCIN TR mcg/mL 11.50     Results from last 7 days   Lab Units 10/05/22  0405   LACTATE mmol/L 1.8     Estimated Creatinine  Clearance: 34.9 mL/min (A) (by C-G formula based on SCr of 1.43 mg/dL (H)).     Procalitonin Results:      Lab 10/04/22  0537   PROCALCITONIN 1.42*      Brief Urine Lab Results  (Last result in the past 365 days)      Color   Clarity   Blood   Leuk Est   Nitrite   Protein   CREAT   Urine HCG        10/01/22 0117 Yellow   Clear   Negative   Small (1+)   Negative   30 mg/dL (1+)                No results found for: SITE, ALLENTEST, PHART, MMT4PBQ, PO2ART, AXV9JUN, BASEEXCESS, W2EUVFNG, HGBBG, HCTABG, OXYHEMOGLOBI, METHHGBN, CARBOXYHGB, CO2CT, BAROMETRIC, MODALITY, FIO2     Microbiology:  Blood Culture   Date Value Ref Range Status   09/27/2022 No growth at 5 days  Final     No results found for: BCIDPCR, CXREFLEX, CSFCX, CULTURETIS  No results found for: CULTURES, HSVCX, URCX  No results found for: EYECULTURE, GCCX, HSVCULTURE, LABHSV  No results found for: LEGIONELLA, MRSACX, MUMPSCX, MYCOPLASCX  No results found for: NOCARDIACX, STOOLCX  Urine Culture   Date Value Ref Range Status   10/01/2022 No growth  Final     No results found for: VIRALCULTU, WOUNDCX     Blood Culture   Date Value Ref Range Status   09/27/2022 No growth at 5 days  Final     Body Fluid Culture   Date Value Ref Range Status   09/28/2022 No growth at 3 days  Final     Urine Culture   Date Value Ref Range Status   10/01/2022 No growth  Final   (    MRSA swab screen + 10/2/2022.  Radiology:  Imaging Results (Last 72 Hours)     Procedure Component Value Units Date/Time    XR Chest 1 View [[id number]] Collected: 10/07/22 0721     Updated: 10/07/22 0724    Narrative:      Examination: XR CHEST 1 VW-     Date of Exam: 10/7/2022 4:40 AM     Indication: F/u pneumonia; J18.9-Pneumonia, unspecified organism;  R10.9-Unspecified abdominal pain; K74.60-Unspecified cirrhosis of liver;  R18.8-Other ascites; N17.9-Acute kidney failure, unspecified;  I50.9-Heart failure, unspecified; A41.9-Sepsis, unspecified organism;  R13.10-Dysphagia, unspecified.      Comparison: Radiograph 10/04/2022, 10/03/2022     Technique: 1 view of the chest      Findings:  Patchy airspace disease is seen within the bilateral upper lobes. No  pleural effusions. No pneumothorax. The heart size is normal. The  pulmonary vasculature appears mildly indistinct. No acute osseous  abnormality identified.       Impression:      Mild improvement in bilateral upper lobe pneumonia.     This report was finalized on 10/7/2022 7:21 AM by Jojo Mckinney MD.             IMPRESSION:     1.  Right greater than left pneumonia initially seen on CT scan from 9/27/2022, and chest x-ray showing bilateral infiltrates, some of which may be fluid.  Was on reasonable treatment with piperacillin tazobactam on admission, but also had MRSA positive screen which may indicate an infection with MRSA as the cause of her sputum.  Not producing significant amounts.  Less likely atypical mycobacteria or fungal disease.  Negative Legionella and streptococcal urine antigen.  Respiratory viral PCR -10/3.  Histoplasma urine antigen negative, blastomycosis urine antigen negative.  Noninfectious etiologies also possible including autoimmune versus other.  Was responding to abx, but noncompliant.  2.  Leukocytosis, neutrophilic resolved.  Did not receive steroids.  May be related to above issues.  3.  Encephalopathy, toxic and metabolic, as well as elevated ammonia level with cirrhosis and possible hepatic encephalopathy.  Cryptococcal antigen negative.  4.  Acute hypoxic respiratory failure increasing to 40 L/min on 10/3/2022.  Related to above issues.  RA, then 4 L/m on 10/10.  At risk for recurrent aspiration.  5.  Acute renal failure, creatinine 1.55, may be worse with vancomycin.  Creatinine 1.71 on 10/4, worse.  Vancomycin was stopped 10/3.  1.52 on 10/5.  1.43 10/8.  6.  Hyponatremia resolved.  7.  Hypocalcemia 8.3.  8.  Cirrhosis, possibly related to substance use, hepatitis C, versus other.  Advanced findings for portal  hypertension with ascites.  9.  Hepatitis C, treatment status unknown.  Acute hepatitis panel 10/4/2022 positive for hep C, negative for other.  10.  Elevated alkaline phosphatase 147.  11.  Anemia, chronic disease related to above issues.  Ongoing.  Also could be related to linezolid.  12.  Schizophrenia.  Interfering with her care.  13.  Substance use, status unclear.  Drug screen had been positive for cocaine (9/28/2022).    Plan:    1.  Diagnostically, continue to follow patient's physical exam, CBC, CMP, CRP.  Radiographic studies.  2.  Therapeutically, finished piperacillin tazobactam on 10/9 (changed to po augmentin), and changed vancomycin to linezolid 600 mg p.o. twice daily for MRSA pneumonia coverage (on 10/3), duration to be determined (but was changed to IV because refusing p.o.), then back to po when pulled out lines.  There is a small chance of a drug interaction between linezolid and amitriptyline with a serotonin syndrome.  Benefit greater than risk.  Patient probably is recurrently aspirating.  Consider continuing with zyvox/augmentin till 10/13?  3.  Oxygen support therapy.  40 L/min on 10/3/2022.  4.  Pulmonary following, and high risk for intubation if worsens.  5.  DNR/DNI.    I discussed the patient's findings and my recommendations with patient and nursing staff    Our group would be pleased to follow this patient over the course of their hospitalization and assist with outpatient antimicrobial therapy, as indicated.  Further recommendations depend on the results of the cultures and clinical course.  See next on Monday, call sooner if needed.    Hugo Castillo MD  10/10/2022

## 2022-10-10 NOTE — PLAN OF CARE
Goal Outcome Evaluation:  Plan of Care Reviewed With: patient        Progress: improving  Outcome Evaluation: Pt CGA to don shoes, min A toilet transfer, SBA post toileting hygiene, min A clothing mgmt,  CGA to ambulate in room with RW,  CGA sit to supine.  Pt's O2 sat dropped to 87% on RA and recovered to 90% after PLB. Pt with good progress, but limited by weakness and decreased activity tolerance.

## 2022-10-10 NOTE — CASE MANAGEMENT/SOCIAL WORK
Continued Stay Note   [place]     Patient Name: aKylie Cruz  MRN: [id number]  Today's Date: 10/10/2022    Admit Date: 9/27/2022    Plan: [facility]   Discharge Plan     Row Name 10/10/22 1430       Plan    Plan [facility]    Patient/Family in Agreement with Plan yes    Plan Comments per Lazaro at [facility], there was a glitch in the precert process. I spoke with the patient . She is still interested in [facility]. Lazaro is working on precert.    Final Discharge Disposition Code 62 - inpatient rehab facility               Discharge Codes    No documentation.               Expected Discharge Date and Time     Expected Discharge Date Expected Discharge Time    Oct 11, 2022             Doris Raymond RN

## 2022-10-11 ENCOUNTER — APPOINTMENT (OUTPATIENT)
Dept: ULTRASOUND IMAGING | Facility: HOSPITAL | Age: 57
End: 2022-10-11

## 2022-10-11 LAB
ALBUMIN SERPL-MCNC: 2.6 G/DL (ref 3.5–5.2)
ALBUMIN/GLOB SERPL: 0.6 G/DL
ALP SERPL-CCNC: 162 U/L (ref 39–117)
ALT SERPL W P-5'-P-CCNC: 19 U/L (ref 1–33)
ANION GAP SERPL CALCULATED.3IONS-SCNC: 9 MMOL/L (ref 5–15)
ANISOCYTOSIS BLD QL: NORMAL
AST SERPL-CCNC: 44 U/L (ref 1–32)
BASOPHILS # BLD AUTO: 0.1 10*3/MM3 (ref 0–0.2)
BASOPHILS NFR BLD AUTO: 1.7 % (ref 0–1.5)
BILIRUB SERPL-MCNC: 0.5 MG/DL (ref 0–1.2)
BUN SERPL-MCNC: 38 MG/DL (ref 6–20)
BUN/CREAT SERPL: 27.1 (ref 7–25)
CALCIUM SPEC-SCNC: 8.2 MG/DL (ref 8.6–10.5)
CHLORIDE SERPL-SCNC: 105 MMOL/L (ref 98–107)
CO2 SERPL-SCNC: 22 MMOL/L (ref 22–29)
CREAT SERPL-MCNC: 1.4 MG/DL (ref 0.57–1)
D-LACTATE SERPL-SCNC: 2.3 MMOL/L (ref 0.5–2)
DACRYOCYTES BLD QL SMEAR: NORMAL
DEPRECATED RDW RBC AUTO: 49.6 FL (ref 37–54)
EGFRCR SERPLBLD CKD-EPI 2021: 43.4 ML/MIN/1.73
EOSINOPHIL # BLD AUTO: 0.24 10*3/MM3 (ref 0–0.4)
EOSINOPHIL NFR BLD AUTO: 4.1 % (ref 0.3–6.2)
ERYTHROCYTE [DISTWIDTH] IN BLOOD BY AUTOMATED COUNT: 16.9 % (ref 12.3–15.4)
GLOBULIN UR ELPH-MCNC: 4.2 GM/DL
GLUCOSE BLDC GLUCOMTR-MCNC: 110 MG/DL (ref 70–130)
GLUCOSE BLDC GLUCOMTR-MCNC: 164 MG/DL (ref 70–130)
GLUCOSE BLDC GLUCOMTR-MCNC: 164 MG/DL (ref 70–130)
GLUCOSE BLDC GLUCOMTR-MCNC: 468 MG/DL (ref 70–130)
GLUCOSE SERPL-MCNC: 103 MG/DL (ref 65–99)
HCT VFR BLD AUTO: 25.8 % (ref 34–46.6)
HGB BLD-MCNC: 8.2 G/DL (ref 12–15.9)
HYPOCHROMIA BLD QL: NORMAL
IMM GRANULOCYTES # BLD AUTO: 0.02 10*3/MM3 (ref 0–0.05)
IMM GRANULOCYTES NFR BLD AUTO: 0.3 % (ref 0–0.5)
LYMPHOCYTES # BLD AUTO: 1.8 10*3/MM3 (ref 0.7–3.1)
LYMPHOCYTES NFR BLD AUTO: 30.5 % (ref 19.6–45.3)
MCH RBC QN AUTO: 25.9 PG (ref 26.6–33)
MCHC RBC AUTO-ENTMCNC: 31.8 G/DL (ref 31.5–35.7)
MCV RBC AUTO: 81.4 FL (ref 79–97)
MONOCYTES # BLD AUTO: 0.2 10*3/MM3 (ref 0.1–0.9)
MONOCYTES NFR BLD AUTO: 3.4 % (ref 5–12)
NEUTROPHILS NFR BLD AUTO: 3.55 10*3/MM3 (ref 1.7–7)
NEUTROPHILS NFR BLD AUTO: 60 % (ref 42.7–76)
NRBC BLD AUTO-RTO: 0 /100 WBC (ref 0–0.2)
PLAT MORPH BLD: NORMAL
PLATELET # BLD AUTO: 129 10*3/MM3 (ref 140–450)
PMV BLD AUTO: 10.4 FL (ref 6–12)
POTASSIUM SERPL-SCNC: 3.5 MMOL/L (ref 3.5–5.2)
PROT SERPL-MCNC: 6.8 G/DL (ref 6–8.5)
RBC # BLD AUTO: 3.17 10*6/MM3 (ref 3.77–5.28)
SODIUM SERPL-SCNC: 136 MMOL/L (ref 136–145)
WBC MORPH BLD: NORMAL
WBC NRBC COR # BLD: 5.91 10*3/MM3 (ref 3.4–10.8)

## 2022-10-11 PROCEDURE — 82962 GLUCOSE BLOOD TEST: CPT

## 2022-10-11 PROCEDURE — 63710000001 INSULIN DETEMIR PER 5 UNITS: Performed by: INTERNAL MEDICINE

## 2022-10-11 PROCEDURE — 99232 SBSQ HOSP IP/OBS MODERATE 35: CPT | Performed by: INTERNAL MEDICINE

## 2022-10-11 PROCEDURE — 63710000001 INSULIN LISPRO (HUMAN) PER 5 UNITS: Performed by: INTERNAL MEDICINE

## 2022-10-11 PROCEDURE — 76942 ECHO GUIDE FOR BIOPSY: CPT

## 2022-10-11 PROCEDURE — 63710000001 INSULIN LISPRO (HUMAN) PER 5 UNITS: Performed by: FAMILY MEDICINE

## 2022-10-11 PROCEDURE — 97610 LOW FREQUENCY NON-THERMAL US: CPT

## 2022-10-11 PROCEDURE — 86593 SYPHILIS TEST NON-TREP QUANT: CPT | Performed by: INTERNAL MEDICINE

## 2022-10-11 PROCEDURE — 0 LIDOCAINE 1 % SOLUTION: Performed by: INTERNAL MEDICINE

## 2022-10-11 PROCEDURE — 85025 COMPLETE CBC W/AUTO DIFF WBC: CPT | Performed by: INTERNAL MEDICINE

## 2022-10-11 PROCEDURE — 0W9G3ZZ DRAINAGE OF PERITONEAL CAVITY, PERCUTANEOUS APPROACH: ICD-10-PCS | Performed by: RADIOLOGY

## 2022-10-11 PROCEDURE — 85007 BL SMEAR W/DIFF WBC COUNT: CPT | Performed by: INTERNAL MEDICINE

## 2022-10-11 PROCEDURE — 80053 COMPREHEN METABOLIC PANEL: CPT | Performed by: INTERNAL MEDICINE

## 2022-10-11 PROCEDURE — 83605 ASSAY OF LACTIC ACID: CPT | Performed by: INTERNAL MEDICINE

## 2022-10-11 PROCEDURE — C1729 CATH, DRAINAGE: HCPCS

## 2022-10-11 PROCEDURE — 87522 HEPATITIS C REVRS TRNSCRPJ: CPT | Performed by: INTERNAL MEDICINE

## 2022-10-11 RX ORDER — LIDOCAINE HYDROCHLORIDE 10 MG/ML
5 INJECTION, SOLUTION INFILTRATION; PERINEURAL ONCE
Status: COMPLETED | OUTPATIENT
Start: 2022-10-11 | End: 2022-10-11

## 2022-10-11 RX ORDER — ALBUMIN (HUMAN) 12.5 G/50ML
50 SOLUTION INTRAVENOUS ONCE
Status: DISCONTINUED | OUTPATIENT
Start: 2022-10-11 | End: 2022-10-12 | Stop reason: HOSPADM

## 2022-10-11 RX ORDER — OXYCODONE HYDROCHLORIDE 5 MG/1
5 TABLET ORAL ONCE
Status: COMPLETED | OUTPATIENT
Start: 2022-10-11 | End: 2022-10-11

## 2022-10-11 RX ADMIN — AMOXICILLIN AND CLAVULANATE POTASSIUM 1 TABLET: 875; 125 TABLET, FILM COATED ORAL at 21:18

## 2022-10-11 RX ADMIN — CASTOR OIL AND BALSAM, PERU 1 APPLICATION: 788; 87 OINTMENT TOPICAL at 21:45

## 2022-10-11 RX ADMIN — RIFAXIMIN 550 MG: 550 TABLET ORAL at 21:10

## 2022-10-11 RX ADMIN — ATORVASTATIN CALCIUM 40 MG: 40 TABLET, FILM COATED ORAL at 21:11

## 2022-10-11 RX ADMIN — LACTULOSE 10 G: 20 SOLUTION ORAL at 21:10

## 2022-10-11 RX ADMIN — OXYCODONE 5 MG: 5 TABLET ORAL at 00:41

## 2022-10-11 RX ADMIN — LINEZOLID 600 MG: 600 TABLET, FILM COATED ORAL at 21:11

## 2022-10-11 RX ADMIN — LIDOCAINE HYDROCHLORIDE 2 ML: 10 INJECTION, SOLUTION INFILTRATION; PERINEURAL at 09:05

## 2022-10-11 RX ADMIN — LIDOCAINE HYDROCHLORIDE 2 ML: 10 INJECTION, SOLUTION INFILTRATION; PERINEURAL at 09:52

## 2022-10-11 RX ADMIN — INSULIN LISPRO 9 UNITS: 100 INJECTION, SOLUTION INTRAVENOUS; SUBCUTANEOUS at 16:17

## 2022-10-11 RX ADMIN — INSULIN DETEMIR 15 UNITS: 100 INJECTION, SOLUTION SUBCUTANEOUS at 21:19

## 2022-10-11 RX ADMIN — INSULIN LISPRO 5 UNITS: 100 INJECTION, SOLUTION INTRAVENOUS; SUBCUTANEOUS at 16:20

## 2022-10-11 RX ADMIN — GUAIFENESIN 600 MG: 600 TABLET, EXTENDED RELEASE ORAL at 21:11

## 2022-10-11 RX ADMIN — QUETIAPINE FUMARATE 150 MG: 100 TABLET ORAL at 21:10

## 2022-10-11 RX ADMIN — OXYCODONE 5 MG: 5 TABLET ORAL at 04:46

## 2022-10-11 RX ADMIN — OXYCODONE 5 MG: 5 TABLET ORAL at 16:10

## 2022-10-11 NOTE — PROGRESS NOTES
INFECTIOUS DISEASE Progress Note    Kaylie Render  1965  [id number]    Consult: 10/3/22  Admit date: 9/27/2022    Requesting Provider: No ref. provider found  Evaluating physician: Hugo Castillo MD  Reason for Consultation: Sepsis, leukocytosis, cirrhosis, hepatitis C, substance use, right lower lobe pneumonia  Chief Complaint:       Subjective   History of present illness:  Patient is a  57 y.o.  Yr old female with a history of substance use, hepatitis C, cirrhosis, cervical cancer, vaginal squamous cell cancer 2009, schizophrenia, diabetes mellitus type 2, congestive heart failure with ejection fraction 49%, recent urinary tract infection treated at the [facility], who developed increasing shortness of breath on 9/25/2022 and was admitted to [facility] on 9/27/2022 with radiographic findings consistent with right lower lobe pneumonia.  The patient was placed on piperacillin tazobactam.  The patient is a poor historian.  Creatinine 1.55, sodium 133, potassium 4.2, AST 49, alkaline phosphatase 153, bilirubin normal, vancomycin trough 11.5, WBC 19.51, hemoglobin 8.9, platelet count 176, MRSA PCR swab screen +10/2, procalcitonin 0.88, urinalysis 6-12 WBCs but urine culture from 10/1 negative, ascites with 208 WBCs with negative culture, 9/28, Legionella and streptococcal urine antigen 9/28 negative.  Ammonia 64.  Lactic acid 2.5 on admission.  Blood cultures x2 from 9/27 negative.  Chest x-ray 10/3 with bilateral pneumonia diffuse right greater than left.  CT scan of the abdomen and pelvis 9/27 with diffuse right lower lobe pneumonia and findings consistent with colitis at the hepatic flexure.  I was consulted on 10/3/2022 for further evaluation and treatment.  Patient was initially placed on piperacillin tazobactam, then vancomycin was added on 10/2/2022.  The patient denies ill contacts, significant travel history, zoonotic exposures, TB, or HIV.  Minimal sputum production.   White blood cell count has increased from 15-19,000.     10/4/2022 history reviewed.  Patient poor historian.  Tolerating linezolid and piperacillin tazobactam for pneumonia with positive MRSA screen.  Still on high flow oxygen at 40 L/min.  Oxygen concentration 55%.  Not coughing much.  Says she is breathing better.    10/5/2022 history reviewed.  No high fevers or chills.  Continues on linezolid and piperacillin tazobactam for pneumonia.  Breathing slowly improved.    10/6/2022 history reviewed.  Tolerating linezolid and piperacillin tazobactam for sepsis and pneumonia.  No high fevers.  Refusing some medications.  Breathing better.    10/7/2022 history reviewed.  Breathing continues to improve.  Difficulty with accepting some of her care.  Oxygen needs at 4 L/min nasal cannula.  No fever.  Tolerating linezolid and piperacillin tazobactam for sepsis and pneumonia to continue until 10/10 IV, and then convert to oral if possible.  She has been refusing oral medications.    10/10/22 hx rev.  Patient more agitated over the weekend and pulled out lines.  Ab changed to po augmentin, linezolid.  No fever.    10/11/2022 history reviewed.  No high fevers or chills.  Tolerating linezolid and Augmentin until 10/13.    Past Medical History:   Diagnosis Date   • Anemia    • Cancer (HCC)     cervical   • Depression    • Hyperlipidemia    • Hypertension    • Infectious viral hepatitis    • Myocardial infarction (HCC)    • Substance abuse (HCC)        Past Surgical History:   Procedure Laterality Date   • BLADDER SURGERY     • CARDIAC CATHETERIZATION      4/3/2022   • CHOLECYSTECTOMY     • COLON RESECTION      7/19/2017, descending and transverse colon   • COLON SURGERY     • COLONOSCOPY      7/19/2017   • ENDOSCOPY      EGD 4/1/22   • HYSTERECTOMY     • TRANSESOPHAGEAL ECHOCARDIOGRAM (AVINASH)      Global hyokinesis, 49% EF, LVH       Pediatric History   Patient Parents   • Not on file     Other Topics Concern   • Not on file    Social History Narrative   • Not on file       family history is not on file.    Allergies   Allergen Reactions   • Codeine Hives   • Morphine Hives   • Tagamet [Cimetidine] Other (See Comments)     DISCOLORATION OF SKIN   • Toradol [Ketorolac Tromethamine] Hives         There is no immunization history on file for this patient.    Medication:    Current Facility-Administered Medications:   •  acetaminophen (TYLENOL) tablet 650 mg, 650 mg, Oral, Q4H PRN, 650 mg at 10/10/22 1614 **OR** acetaminophen (TYLENOL) 160 MG/5ML solution 650 mg, 650 mg, Oral, Q4H PRN **OR** acetaminophen (TYLENOL) suppository 650 mg, 650 mg, Rectal, Q4H PRN, Nidia Rangel DO  •  albumin human 25 % IV SOLN 50 g, 50 g, Intravenous, Once, Megan Landeros MD  •  albuterol (PROVENTIL) nebulizer solution 0.083% 2.5 mg/3mL, 2.5 mg, Nebulization, Q6H PRN, Nidia Rangel DO, 2.5 mg at 10/03/22 0904  •  amLODIPine (NORVASC) tablet 5 mg, 5 mg, Oral, Daily, Nidia Rangel DO, 5 mg at 10/10/22 0830  •  amoxicillin-clavulanate (AUGMENTIN) 875-125 MG per tablet 1 tablet, 1 tablet, Oral, Q12H, Hugo Castillo MD, 1 tablet at 10/10/22 2000  •  aspirin EC tablet 81 mg, 81 mg, Oral, Daily, Nidia Rangel DO, 81 mg at 10/10/22 0830  •  atorvastatin (LIPITOR) tablet 40 mg, 40 mg, Oral, Nightly, Megan Landeros MD, 40 mg at 10/10/22 2000  •  benzonatate (TESSALON) capsule 200 mg, 200 mg, Oral, TID PRN, Megan Landeros MD  •  castor oil-balsam peru (VENELEX) ointment 1 application, 1 application, Topical, Q12H, Nidia Rangel DO, 1 application at 10/10/22 2134  •  dextrose (D50W) (25 g/50 mL) IV injection 25 g, 25 g, Intravenous, Q15 Min PRN, Nidia Rangel,   •  dextrose (GLUTOSE) oral gel 15 g, 15 g, Oral, Q15 Min PRN, Nidia Rangel DO  •  folic acid (FOLVITE) tablet 1 mg, 1 mg, Oral, Daily, Nidia Rangel DO, 1 mg at 10/10/22 0830  •  furosemide (LASIX) tablet 80 mg, 80 mg, Oral, Daily,  Megan Landeros MD  •  glucagon (human recombinant) (GLUCAGEN DIAGNOSTIC) injection 1 mg, 1 mg, Intramuscular, Q15 Min PRN, Nidia Rangel DO  •  guaiFENesin (MUCINEX) 12 hr tablet 600 mg, 600 mg, Oral, Q12H, Lor Sam DO, 600 mg at 10/10/22 2000  •  hydrOXYzine (ATARAX) tablet 25 mg, 25 mg, Oral, Once, Ema Khan APRN  •  insulin detemir (LEVEMIR) injection 15 Units, 15 Units, Subcutaneous, Nightly, Megan Landeros MD, 15 Units at 10/10/22 2135  •  Insulin Lispro (humaLOG) injection 0-9 Units, 0-9 Units, Subcutaneous, TID AC, Nidia Rangel DO, 4 Units at 10/09/22 1717  •  Insulin Lispro (humaLOG) injection 5 Units, 5 Units, Subcutaneous, TID With Meals, Megan Landeros MD, 5 Units at 10/10/22 1710  •  ipratropium-albuterol (DUO-NEB) nebulizer solution 3 mL, 3 mL, Nebulization, Q6H PRN, Lor Sam DO  •  lactulose (CHRONULAC) 10 GM/15ML solution 10 g, 10 g, Oral, TID, Megan Landeros MD, 10 g at 10/10/22 2000  •  lidocaine (LIDODERM) 5 % 1 patch, 1 patch, Transdermal, Q24H, Nidia Rangel DO, 1 patch at 10/10/22 0829  •  linezolid (ZYVOX) tablet 600 mg, 600 mg, Oral, Q12H, Hugo Castillo MD, 600 mg at 10/10/22 2134  •  metoprolol tartrate (LOPRESSOR) tablet 50 mg, 50 mg, Oral, BID, Nidia Rangel DO, 50 mg at 10/10/22 2000  •  ondansetron (ZOFRAN) tablet 4 mg, 4 mg, Oral, Q6H PRN, 4 mg at 10/06/22 0019 **OR** ondansetron (ZOFRAN) injection 4 mg, 4 mg, Intravenous, Q6H PRN, Nidia Rangel, DO  •  oxyCODONE (ROXICODONE) immediate release tablet 5 mg, 5 mg, Oral, Q8H PRN, Nidia Rangel DO, 5 mg at 10/11/22 0446  •  potassium chloride (MICRO-K) CR capsule 40 mEq, 40 mEq, Oral, PRN, 40 mEq at 09/29/22 2143 **OR** potassium chloride (KLOR-CON) packet 40 mEq, 40 mEq, Oral, PRN **OR** potassium chloride 10 mEq in 100 mL IVPB, 10 mEq, Intravenous, Q1H PRN, Henrietta Ramos II, DO, Last Rate: 100 mL/hr at 09/29/22 1152, 10 mEq at 09/29/22  1152  •  prochlorperazine (COMPAZINE) injection 2.5 mg, 2.5 mg, Intravenous, Q6H PRN, Nidia Rangel DO  •  QUEtiapine (SEROquel) tablet 150 mg, 150 mg, Oral, Nightly, Lor Sam, , 150 mg at 10/10/22 2137  •  riFAXIMin (XIFAXAN) tablet 550 mg, 550 mg, Oral, Q12H, Lor Sam DO, 550 mg at 10/10/22 2000  •  [COMPLETED] Insert peripheral IV, , , Once **AND** sodium chloride 0.9 % flush 10 mL, 10 mL, Intravenous, PRN, Stephenie Martinez PA  •  sodium chloride 0.9 % flush 10 mL, 10 mL, Intravenous, Q12H, Nidia Rangel, , 10 mL at 10/08/22 2118  •  sodium chloride 0.9 % flush 10 mL, 10 mL, Intravenous, PRN, Nidia Rangel DO  •  thiamine (VITAMIN B-1) tablet 100 mg, 100 mg, Oral, Daily, Nidia Rangel DO, 100 mg at 10/10/22 0844    Please refer to the medical record for a full medication list    Review of Systems:    Constitutional-- No Fever, chills or sweats.  Appetite good, and no malaise. No fatigue.  HEENT-- No new vision, hearing or throat complaints.  No epistaxis or oral sores.  Denies odynophagia or dysphagia.  No odynophagia or dysphagia. No headache, photophobia or neck stiffness.  CV-- No chest pain, palpitation or syncope  Resp--some SOB/minimally productive cough/no hemoptysis  GI- No nausea, vomiting, or diarrhea.  No hematochezia, melena, or hematemesis. Denies jaundice or chronic liver disease.  -- No dysuria, hematuria, or flank pain.  Denies hesitancy, urgency or flank pain.  Lymph- no swollen lymph nodes in neck/axilla or groin.   Heme- No active bruising or bleeding; no Hx of DVT or PE.  MS-- no swelling or pain in the bones or joints of arms/legs.  No new back pain.  Neuro-- No acute focal weakness or numbness in the arms or legs.  No seizures.  Skin--No rashes or lesions, no nodules    Physical Exam:   Vital Signs   Temp:  [96.1 °F (35.6 °C)-98.4 °F (36.9 °C)] 96.1 °F (35.6 °C)  Heart Rate:  [61-73] 70  Resp:  [18-20] 18  BP: (109-157)/(68-87) 147/86    Temp  Min:  "96.1 °F (35.6 °C)  Max: 98.4 °F (36.9 °C)  BP  Min: 109/68  Max: 157/87  Pulse  Min: 61  Max: 73  Resp  Min: 18  Max: 20  SpO2  Min: 85 %  Max: 100 %    Blood pressure 147/86, pulse 70, temperature 96.1 °F (35.6 °C), temperature source Oral, resp. rate 18, height 160 cm (63\"), weight 53.6 kg (118 lb 1.6 oz), SpO2 95 %.  GENERAL: Awake and alert, in minor distress. Appears older than stated age.  Cachectic.  Resting in bed.  HEENT:  Normocephalic, atraumatic.  Oropharynx without thrush. Dentition in poor repair. No cervical adenopathy. No neck masses.  Ears externally normal, Nose externally normal.  EYES: No conjunctival injection. No icterus. EOM full.  LYMPHATICS: No lymphadenopathy of the neck or axillary or inguinal regions.   HEART: No murmur, gallop, or pericardial friction rub. Reg rate rhythm.  No JVD.  LUNGS: Bilateral scattered crackle, no rhonchi. No respiratory distress, no use of accessory muscles.  ABDOMEN: Soft, nontender, nondistended. No appreciable HSM.  Bowel sounds normal.  SKIN: Warm and dry without cutaneous eruptions.  No nodules.    PSYCHIATRIC: Mental status some confusion but answers some questions.  EXT:  No cellulitic change.  Normal range of motion.  No cyanosis or clubbing.  NEURO: Oriented to name, nonfocal.      Results Review:   I reviewed the patient's new clinical results.  I reviewed the patient's new imaging results and agree with the interpretation.  I reviewed the patient's other test results and agree with the interpretation    Results from last 7 days   Lab Units 10/11/22  0651 10/10/22  0730 10/08/22  0415   WBC 10*3/mm3 5.91 6.76 7.48   HEMOGLOBIN g/dL 8.2* 8.7* 8.3*   HEMATOCRIT % 25.8* 26.7* 25.7*   PLATELETS 10*3/mm3 129* 131* 155     Results from last 7 days   Lab Units 10/11/22  0651   SODIUM mmol/L 136   POTASSIUM mmol/L 3.5   CHLORIDE mmol/L 105   CO2 mmol/L 22.0   BUN mg/dL 38*   CREATININE mg/dL 1.40*   GLUCOSE mg/dL 103*   CALCIUM mg/dL 8.2*     Results from last " 7 days   Lab Units 10/11/22  0651   ALK PHOS U/L 162*   BILIRUBIN mg/dL 0.5   ALT (SGPT) U/L 19   AST (SGOT) U/L 44*                 Results from last 7 days   Lab Units 10/11/22  0651   LACTATE mmol/L 2.3*     Estimated Creatinine Clearance: 36.6 mL/min (A) (by C-G formula based on SCr of 1.4 mg/dL (H)).     Procalitonin Results:       Brief Urine Lab Results  (Last result in the past 365 days)      Color   Clarity   Blood   Leuk Est   Nitrite   Protein   CREAT   Urine HCG        10/01/22 0117 Yellow   Clear   Negative   Small (1+)   Negative   30 mg/dL (1+)                No results found for: SITE, ALLENTEST, PHART, TGO3JDV, PO2ART, PZW1WMI, BASEEXCESS, O1LHPVFM, HGBBG, HCTABG, OXYHEMOGLOBI, METHHGBN, CARBOXYHGB, CO2CT, BAROMETRIC, MODALITY, FIO2     Microbiology:  Blood Culture   Date Value Ref Range Status   09/27/2022 No growth at 5 days  Final     No results found for: BCIDPCR, CXREFLEX, CSFCX, CULTURETIS  No results found for: CULTURES, HSVCX, URCX  No results found for: EYECULTURE, GCCX, HSVCULTURE, LABHSV  No results found for: LEGIONELLA, MRSACX, MUMPSCX, MYCOPLASCX  No results found for: NOCARDIACX, STOOLCX  Urine Culture   Date Value Ref Range Status   10/01/2022 No growth  Final     No results found for: VIRALCULTU, WOUNDCX     Blood Culture   Date Value Ref Range Status   09/27/2022 No growth at 5 days  Final     Body Fluid Culture   Date Value Ref Range Status   09/28/2022 No growth at 3 days  Final     Urine Culture   Date Value Ref Range Status   10/01/2022 No growth  Final   (    MRSA swab screen + 10/2/2022.  Radiology:  Imaging Results (Last 72 Hours)     Procedure Component Value Units Date/Time    US Paracentesis [[id number]] Resulted: 10/11/22 0904    Specimen: Body Fluid Updated: 10/11/22 0904          IMPRESSION:     1.  Right greater than left pneumonia initially seen on CT scan from 9/27/2022, and chest x-ray showing bilateral infiltrates, some of which may be fluid.  Was on reasonable  treatment with piperacillin tazobactam on admission, but also had MRSA positive screen which may indicate an infection with MRSA as the cause of her sputum.  Not producing significant amounts.  Less likely atypical mycobacteria or fungal disease.  Negative Legionella and streptococcal urine antigen.  Respiratory viral PCR -10/3.  Histoplasma urine antigen negative, blastomycosis urine antigen negative.  Noninfectious etiologies also possible including autoimmune versus other.  Was responding to abx, but noncompliant.  2.  Leukocytosis, neutrophilic resolved.  Did not receive steroids.  May be related to above issues.  3.  Encephalopathy, toxic and metabolic, as well as elevated ammonia level with cirrhosis and possible hepatic encephalopathy.  Cryptococcal antigen negative.  4.  Acute hypoxic respiratory failure increasing to 40 L/min on 10/3/2022.  Related to above issues.  RA, then 4 L/m on 10/10.  At risk for recurrent aspiration.  5.  Acute renal failure, creatinine 1.55, may be worse with vancomycin.  Creatinine 1.71 on 10/4, worse.  Vancomycin was stopped 10/3.  1.52 on 10/5.  1.43 10/8.  1.40 10/11.  6.  Hyponatremia resolved.  7.  Hypocalcemia 8.3.  8.  Cirrhosis, possibly related to substance use, hepatitis C, versus other.  Advanced findings for portal hypertension with ascites.  9.  Hepatitis C, treatment status unknown.  Acute hepatitis panel 10/4/2022 positive for hep C, negative for other.  10.  Elevated alkaline phosphatase 147.  11.  Anemia, chronic disease related to above issues.  Worse.  Also could be related to linezolid.  12.  Schizophrenia.  Interfering with her care.  13.  Substance use, status unclear.  Drug screen had been positive for cocaine (9/28/2022).    Plan:    1.  Diagnostically, continue to follow patient's physical exam, CBC, CMP, CRP.  Radiographic studies.  2.  Therapeutically, finished piperacillin tazobactam on 10/9 (changed to po augmentin), and changed vancomycin to linezolid  600 mg p.o. twice daily for MRSA pneumonia coverage (on 10/3), duration to be determined (but was changed to IV because refusing p.o.), then back to po when pulled out lines.  There is a small chance of a drug interaction between linezolid and amitriptyline with a serotonin syndrome.  Benefit greater than risk.  Patient probably is recurrently aspirating.  Consider continuing with zyvox/augmentin till 10/13, then follow-up antibiotics.  3.  Oxygen support therapy.  40 L/min on 10/3/2022.  Room air on 10/11.  4.  Pulmonary following, and high risk for intubation if worsens.  5.  DNR/DNI.    I discussed the patient's findings and my recommendations with patient and nursing staff    Our group would be pleased to follow this patient over the course of their hospitalization and assist with outpatient antimicrobial therapy, as indicated.  Further recommendations depend on the results of the cultures and clinical course.      Hugo Castillo MD  10/11/2022

## 2022-10-11 NOTE — CASE MANAGEMENT/SOCIAL WORK
Continued Stay Note  [facility]     Patient Name: Kaylie Cruz  MRN: [id number]  Today's Date: 10/11/2022    Admit Date: 9/27/2022    Plan: [facility]   Discharge Plan     Row Name 10/11/22 1134       Plan    Plan [facility]    Patient/Family in Agreement with Plan yes    Plan Comments I spoke with Lazaro at [facility] and she stated that she did submit more information for the insurance precert. per the patient's nurse, I called the sommernet's family to update her about [facility] and discussed that we are still waiting for approval from insurance. She stated that she and the patient are getting frustrated with staff telling her she is ready to be discharged, but she does not end up being discharged. I assured the family member that when we hear back from [facility], she and the patient will be notified.    Final Discharge Disposition Code 62 - inpatient rehab facility               Discharge Codes    No documentation.               Expected Discharge Date and Time     Expected Discharge Date Expected Discharge Time    Oct 13, 2022             Doris Raymond RN

## 2022-10-11 NOTE — PRE-SEDATION DOCUMENTATION
[facility]   Vascular Interventional Radiology  History & Physicial    Patient Name:Kaylie Cruz    : 1965    MRN: [id number]    Primary Care Physician: Iesha Harris DO    Referring Physician: No ref. provider found     Date of admission: 2022    Subjective     Reason for Consult: Para    History of Present Illness     Kaylie Cruz is a 57 y.o. female referred to IR as noted above.      Active Hospital Problems:  Active Hospital Problems    Diagnosis    • Pneumonia of right middle lobe due to infectious organism    • Sepsis, unspecified organism (HCC)    • Tobacco use    • Vaginal cancer (HCC)    • High grade squamous intraepithelial lesion (HGSIL) on cytologic smear of vagina    • Type 2 diabetes mellitus with hyperglycemia, with long-term current use of insulin (HCC)    • Chronic hepatitis C without hepatic coma (HCC)    • Decompensated hepatic cirrhosis (HCC)    • HFrEF (heart failure with reduced ejection fraction) (HCC)    • Coronary artery disease involving native coronary artery of native heart without angina pectoris    • Schizophrenia (HCC)    • Chronic pain disorder    • Primary hypertension    • Long-term current use of opiate analgesic    • Esophageal varices (HCC)        Personal History     Past Medical History:   Diagnosis Date   • Anemia    • Cancer (HCC)     cervical   • Depression    • Hyperlipidemia    • Hypertension    • Infectious viral hepatitis    • Myocardial infarction (HCC)    • Substance abuse (HCC)        Past Surgical History:   Procedure Laterality Date   • BLADDER SURGERY     • CARDIAC CATHETERIZATION      4/3/2022   • CHOLECYSTECTOMY     • COLON RESECTION      2017, descending and transverse colon   • COLON SURGERY     • COLONOSCOPY      2017   • ENDOSCOPY      EGD 22   • HYSTERECTOMY     • TRANSESOPHAGEAL ECHOCARDIOGRAM (AVINASH)      Global hyokinesis, 49% EF, LVH       Family History: Her family history is not on file.     Social History:  "She  reports that she has been smoking cigarettes. She has been smoking an average of .5 packs per day. She has never used smokeless tobacco. She reports that she does not currently use drugs after having used the following drugs: Marijuana and Cocaine(coke). She reports that she does not drink alcohol.    Home Medications:  Cholecalciferol, Easy Touch Lancets 30G, Insulin Aspart FlexPen, Pen Needles, QUEtiapine, Sofosbuvir-Velpatasvir, albuterol sulfate HFA, amLODIPine, amitriptyline, aspirin, atorvastatin, folic acid, furosemide, gabapentin, glucose blood, insulin glargine, lactulose, metoprolol tartrate, polyethylene glycol, potassium chloride, spironolactone, and thiamine    Current Medications:  •  acetaminophen **OR** acetaminophen **OR** acetaminophen  •  albuterol  •  amLODIPine  •  amoxicillin-clavulanate  •  aspirin  •  atorvastatin  •  benzonatate  •  castor oil-balsam peru  •  dextrose  •  dextrose  •  folic acid  •  furosemide  •  glucagon (human recombinant)  •  guaiFENesin  •  hydrOXYzine  •  insulin detemir  •  insulin lispro  •  Insulin Lispro  •  ipratropium-albuterol  •  lactulose  •  lidocaine  •  linezolid  •  metoprolol tartrate  •  ondansetron **OR** ondansetron  •  oxyCODONE  •  potassium chloride **OR** potassium chloride **OR** potassium chloride  •  prochlorperazine  •  QUEtiapine  •  rifAXIMin  •  [COMPLETED] Insert peripheral IV **AND** sodium chloride  •  sodium chloride  •  sodium chloride  •  thiamine     Allergies:  She is allergic to codeine, morphine, tagamet [cimetidine], and toradol [ketorolac tromethamine].    Review of Systems    Objective     Visit Vitals  /86   Pulse 70   Temp 96.1 °F (35.6 °C) (Oral)   Resp 18   Ht 160 cm (63\")   Wt 53.6 kg (118 lb 1.6 oz)   SpO2 95%   BMI 20.92 kg/m²        Physical Exam    A&Ox3.   Able to communicate  No Apparent Distress  Average physique       Result Review      I have personally reviewed the results from the time of this " admission to 10/11/2022 09:16 EDT and agree with these findings.  [x]  Laboratory  []  Microbiology  [x]  Radiology  []  EKG/Telemetry   []  Cardiology/Vascular   []  Pathology  []  Old records  []  Other:    Most notable findings include: As noted:    Results from last 7 days   Lab Units 10/11/22  0651 10/10/22  0730 10/08/22  0415 10/05/22  0948   HEMOGLOBIN g/dL 8.2* 8.7* 8.3* 8.2*   HEMATOCRIT % 25.8* 26.7* 25.7* 24.5*   PLATELETS 10*3/mm3 129* 131* 155 202       Estimated Creatinine Clearance: 36.6 mL/min (A) (by C-G formula based on SCr of 1.4 mg/dL (H)).   Creatinine   Date Value Ref Range Status   10/11/2022 1.40 (H) 0.57 - 1.00 mg/dL Final   10/10/2022 1.46 (H) 0.57 - 1.00 mg/dL Final       COVID19   Date Value Ref Range Status   10/03/2022 Not Detected Not Detected - Ref. Range Final        Lab Results   Component Value Date    PREGTESTUR Negative 08/17/2014        ASA SCALE ASSESSMENT (applicable if sedation planned):        MALLAMPATI CLASSIFICATION (applicable if sedation planned):       Assessment / Plan     Kaylie Render is a 57 y.o. female referred to the IR service with above problem.    Plan:   As above.    Dioni Perez MD   Vascular Interventional Radiology  10/11/22   9:16 AM EDT

## 2022-10-11 NOTE — PLAN OF CARE
Goal Outcome Evaluation:  Plan of Care Reviewed With: patient        Progress: improving  Outcome Evaluation: Pt continues to present with bilateral sacral area of nonblanchable discoloration with scattered areas of open ulceration. PT performed MIST to help increase localized blood flow, promote cellular activity, and promote optimal wound healing environment. PT plans to f/u with MIST in 2-3 days.

## 2022-10-11 NOTE — THERAPY WOUND CARE TREATMENT
Acute Care - Wound/Debridement Treatment Note  [facility]     Patient Name: Kaylie Cruz  : 1965  MRN: [id number]  Today's Date: 10/11/2022                Admit Date: 2022    Visit Dx:    ICD-10-CM ICD-9-CM   1. Pneumonia of right middle lobe due to infectious organism  J18.9 486   2. Right sided abdominal pain  R10.9 789.09   3. Cirrhosis of liver with ascites, unspecified hepatic cirrhosis type (HCC)  K74.60 571.5    R18.8    4. ANIKET (acute kidney injury) (HCC)  N17.9 584.9   5. Acute on chronic congestive heart failure, unspecified heart failure type (HCC)  I50.9 428.0   6. Sepsis, due to unspecified organism, unspecified whether acute organ dysfunction present (HCC)  A41.9 038.9     995.91   7. Dysphagia, unspecified type  R13.10 787.20       Patient Active Problem List   Diagnosis   • Chronic pain disorder   • Primary hypertension   • Long-term current use of opiate analgesic   • Lumbosacral spondylosis without myelopathy   • Migraine   • Coronary artery disease involving native coronary artery of native heart without angina pectoris   • Schizophrenia (HCC)   • Esophageal varices (HCC)   • Decompensated hepatic cirrhosis (HCC)   • HFrEF (heart failure with reduced ejection fraction) (HCC)   • Vaginal cancer (HCC)   • High grade squamous intraepithelial lesion (HGSIL) on cytologic smear of vagina   • Type 2 diabetes mellitus with hyperglycemia, with long-term current use of insulin (HCC)   • Chronic hepatitis C without hepatic coma (HCC)   • Tobacco use   • Pneumonia of right middle lobe due to infectious organism   • Sepsis, unspecified organism (HCC)        Past Medical History:   Diagnosis Date   • Anemia    • Cancer (HCC)     cervical   • Depression    • Hyperlipidemia    • Hypertension    • Infectious viral hepatitis    • Myocardial infarction (HCC)    • Substance abuse (HCC)         Past Surgical History:   Procedure Laterality Date   • BLADDER SURGERY     • CARDIAC CATHETERIZATION       4/3/2022   • CHOLECYSTECTOMY     • COLON RESECTION      7/19/2017, descending and transverse colon   • COLON SURGERY     • COLONOSCOPY      7/19/2017   • ENDOSCOPY      EGD 4/1/22   • HYSTERECTOMY     • TRANSESOPHAGEAL ECHOCARDIOGRAM (AVINASH)      Global hyokinesis, 49% EF, LVH           Wound 10/04/22 1115 Bilateral sacral spine Pressure Injury (Active)   Pressure Injury Stage 2 10/11/22 1445   Dressing Appearance dry;intact 10/11/22 1445   Closure None 10/11/22 1445   Base non-blanchable;dry;dermis 10/11/22 1445   Periwound non-blanchable 10/11/22 1445   Periwound Temperature warm 10/11/22 1445   Periwound Skin Turgor soft 10/11/22 1445   Edges irregular 10/11/22 1445   Wound Length (cm) 5 cm 10/11/22 1445   Wound Width (cm) 8 cm 10/11/22 1445   Wound Depth (cm) 0.1 cm 10/11/22 1445   Wound Surface Area (cm^2) 40 cm^2 10/11/22 1445   Wound Volume (cm^3) 4 cm^3 10/11/22 1445   Drainage Amount scant 10/11/22 1445   Care, Wound irrigated with;sterile normal saline;ultrasound therapy, non contact low frequency 10/11/22 1445   Dressing Care dressing applied;low-adherent;border dressing 10/11/22 1445   Periwound Care dry periwound area maintained;barrier ointment applied;cleansed with pH balanced cleanser 10/11/22 1445         WOUND DEBRIDEMENT                     PT Assessment (last 12 hours)     PT Evaluation and Treatment     Row Name 10/11/22 1445          Physical Therapy Time and Intention    Subjective Information complains of;pain  -AB     Document Type wound care  -AB     Mode of Treatment physical therapy;individual therapy  -AB     Row Name 10/11/22 1445          General Information    Patient Profile Reviewed yes  -AB     Row Name 10/11/22 3945          Pain    Pretreatment Pain Rating 5/10  -AB     Posttreatment Pain Rating 6/10  -AB     Pain Location generalized  -AB     Pain Location - --  sacral  -AB     Row Name 10/11/22 1445          Cognition    Affect/Mental Status (Cognition) flat/blunted affect  -AB   "   Orientation Status (Cognition) oriented x 3  -AB     Follows Commands (Cognition) WFL  -AB     Row Name 10/11/22 1445          Wound 10/04/22 1115 Bilateral sacral spine Pressure Injury    Wound - Properties Group Placement Date: 10/04/22  -TG Placement Time: 1115  -TG Present on Hospital Admission: N  -TG Side: Bilateral  -TG Location: sacral spine  -TG Primary Wound Type: Pressure inj  -TG    Pressure Injury Stage 2  -AB     Dressing Appearance dry;intact  -AB     Closure None  -AB     Base non-blanchable;dry;dermis  -AB     Periwound non-blanchable  -AB     Periwound Temperature warm  -AB     Periwound Skin Turgor soft  -AB     Edges irregular  -AB     Wound Length (cm) 5 cm  -AB     Wound Width (cm) 8 cm  -AB     Wound Depth (cm) 0.1 cm  -AB     Wound Surface Area (cm^2) 40 cm^2  -AB     Wound Volume (cm^3) 4 cm^3  -AB     Drainage Amount scant  -AB     Care, Wound irrigated with;sterile normal saline;ultrasound therapy, non contact low frequency  MIST 6 minutes  -AB     Dressing Care dressing applied;low-adherent;border dressing  Venelex, 6\" optifoam  -AB     Periwound Care dry periwound area maintained;barrier ointment applied;cleansed with pH balanced cleanser  -AB     Retired Wound - Properties Group Placement Date: 10/04/22  -TG Placement Time: 1115  -TG Present on Hospital Admission: N  -TG Side: Bilateral  -TG Location: sacral spine  -TG Primary Wound Type: Pressure inj  -TG    Retired Wound - Properties Group Date first assessed: 10/04/22  -TG Time first assessed: 1115  -TG Present on Hospital Admission: N  -TG Side: Bilateral  -TG Location: sacral spine  -TG Primary Wound Type: Pressure inj  -TG    Row Name 10/11/22 1445          Coping    Observed Emotional State cooperative;quiet;withdrawn  -AB     Verbalized Emotional State frustration  -AB     Row Name 10/11/22 1445          Plan of Care Review    Plan of Care Reviewed With patient  -AB     Progress improving  -AB     Outcome Evaluation Pt " continues to present with bilateral sacral area of nonblanchable discoloration with scattered areas of open ulceration. PT performed MIST to help increase localized blood flow, promote cellular activity, and promote optimal wound healing environment. PT plans to f/u with MIST in 2-3 days.  -AB     Row Name 10/11/22 1445          Positioning and Restraints    Pre-Treatment Position in bed  -AB     Post Treatment Position bed  -AB     In Bed supine  -AB           User Key  (r) = Recorded By, (t) = Taken By, (c) = Cosigned By    Initials Name Provider Type    TG Miki Quach, RN Registered Nurse    Yvonne Daniel PT Physical Therapist              Physical Therapy Education     Title: PT OT SLP Therapies (In Progress)     Topic: Physical Therapy (Done)     Point: Mobility training (Done)     Learning Progress Summary           Patient Acceptance, E, VU by CM at 10/10/2022 1103    Acceptance, E,TB, VU by KW at 10/8/2022 1015    Comment: patient edcuated about the continued benefit of skilled PT services/ mist therapy    Acceptance, E, NR by AS at 10/7/2022 1037    Acceptance, E, NR by FW at 9/30/2022 1340    Acceptance, E,D, NR by DM at 9/28/2022 1707                   Point: Home exercise program (Done)     Learning Progress Summary           Patient Acceptance, E,TB, VU by KW at 10/8/2022 1015    Comment: patient edcuated about the continued benefit of skilled PT services/ mist therapy    Acceptance, E, NR by AS at 10/7/2022 1037    Acceptance, E,D, NR by DM at 9/28/2022 1707                   Point: Body mechanics (Done)     Learning Progress Summary           Patient Acceptance, E,TB, VU by KW at 10/8/2022 1015    Comment: patient edcuated about the continued benefit of skilled PT services/ mist therapy    Acceptance, E, NR by AS at 10/7/2022 1037    Acceptance, E, NR by FW at 9/30/2022 1340    Acceptance, E,D, NR by DM at 9/28/2022 1707                   Point: Precautions (Done)     Learning Progress  Summary           Patient Acceptance, E, VU by CM at 10/10/2022 1103    Acceptance, E,TB, VU by KW at 10/8/2022 1015    Comment: patient edcuated about the continued benefit of skilled PT services/ mist therapy    Acceptance, E, NR by AS at 10/7/2022 1037    Acceptance, E, NR by FW at 9/30/2022 1340    Acceptance, E,D, NR by DM at 9/28/2022 1707                               User Key     Initials Effective Dates Name Provider Type Discipline    DM 06/16/21 -  Deepthi Chen, PT Physical Therapist PT    AS 06/16/21 -  Yvonne Stauffer, PTA Physical Therapist Assistant PT    FW 05/05/22 -  Chuck Moreno, PT Physical Therapist PT    KW 01/27/22 -  Danielle Delgado, PT Physical Therapist PT    CM 09/22/22 -  Kady Holland, PT Physical Therapist PT                Recommendation and Plan  Anticipated Discharge Disposition (PT): inpatient rehabilitation facility  Planned Therapy Interventions (PT): wound care, patient/family education  Therapy Frequency (PT): daily  Plan of Care Reviewed With: patient   Progress: improving       Progress: improving  Outcome Evaluation: Pt continues to present with bilateral sacral area of nonblanchable discoloration with scattered areas of open ulceration. PT performed MIST to help increase localized blood flow, promote cellular activity, and promote optimal wound healing environment. PT plans to f/u with MIST in 2-3 days.  Plan of Care Reviewed With: patient            Time Calculation   PT Charges     Row Name 10/11/22 1526             Time Calculation    Start Time 1445  -AB      PT Goal Re-Cert Due Date 10/15/22  -AB         Untimed Charges    30377-TMHZ Mist 30  -AB         Total Minutes    Untimed Charges Total Minutes 30  -AB       Total Minutes 30  -AB            User Key  (r) = Recorded By, (t) = Taken By, (c) = Cosigned By    Initials Name Provider Type    AB Yvonne Miles, PT Physical Therapist                  Therapy Charges for Today     Code  Description Service Date Service Provider Modifiers Qty    [id number] HC PT [facility]LFU MIST 10/11/2022 Yvonne Miles, PT GP 1            PT G-Codes  Outcome Measure Options: AM-PAC 6 Clicks Daily Activity (OT)  AM-PAC 6 Clicks Score (PT): 18  AM-PAC 6 Clicks Score (OT): 18       Yvonne Miles PT  10/11/2022

## 2022-10-11 NOTE — NURSING NOTE
US paracentesis by Dr Perez.  4.5 liters clear yellow fluid removed.  No labs ordered.  Pt tolerated well.  Report called to 5H.

## 2022-10-11 NOTE — PROGRESS NOTES
"    [facility] Medicine Services  PROGRESS NOTE    Patient Name: Kaylie Cruz  : 1965  MRN: [id number]    Date of Admission: 2022  Primary Care Physician: Iesha Harris DO    Subjective   Subjective     CC:  resp failure     HPI:  Pt upset that her breakfast tray is cold and wants another one with a hot pancake. States that we are all \"just bullcrapping me\" and \"feeding me lies everyday\".     ROS:  Gen- No fevers, chills  CV- No chest pain, palpitations  Resp- No cough, dyspnea  GI- No N/V/D, abd pain      Objective   Objective     Vital Signs:   Temp:  [96.1 °F (35.6 °C)-98.4 °F (36.9 °C)] 96.1 °F (35.6 °C)  Heart Rate:  [61-73] 61  Resp:  [18-20] 20  BP: (109-136)/(68-78) 121/70  Flow (L/min):  [2] 2     Physical Exam:  Constitutional: No acute distress, awake, alert  HENT: NCAT, mucous membranes moist  Respiratory: poor effort; diminished  Cardiovascular: RRR, no murmurs, rubs, or gallops  Gastrointestinal: Positive bowel sounds, soft, nontender, nondistended  Musculoskeletal: No bilateral ankle edema  Psychiatric: upset  Neurologic: Oriented x 3, no asterixis, strength symmetric in all extremities, Cranial Nerves grossly intact to confrontation, speech clear  Skin: No rashes    Results Reviewed:  LAB RESULTS:      Lab 10/11/22  0651 10/10/22  0730 10/08/22  0415 10/05/22  0948 10/05/22  0405   WBC 5.91 6.76 7.48 17.87*  --    HEMOGLOBIN 8.2* 8.7* 8.3* 8.2*  --    HEMATOCRIT 25.8* 26.7* 25.7* 24.5*  --    PLATELETS 129* 131* 155 202  --    NEUTROS ABS 3.55 4.08  --  15.45*  --    IMMATURE GRANS (ABS) 0.02 0.02  --  0.17*  --    LYMPHS ABS 1.80 2.03  --  1.21  --    MONOS ABS 0.20 0.28  --  0.59  --    EOS ABS 0.24 0.25  --  0.35  --    MCV 81.4 80.4 80.8 79.8  --    LACTATE 2.3*  --   --   --  1.8         Lab 10/11/22  0651 10/10/22  0730 10/08/22  0415 10/05/22  0405   SODIUM 136 136 136 136   POTASSIUM 3.5 3.5 3.5 3.8   CHLORIDE 105 105 104 105   CO2 22.0 21.0* 21.0* " 18.0*   ANION GAP 9.0 10.0 11.0 13.0   BUN 38* 36* 35* 30*   CREATININE 1.40* 1.46* 1.43* 1.52*   EGFR 43.4* 41.3* 42.3* 39.3*   GLUCOSE 103* 121* 127* 119*   CALCIUM 8.2* 8.3* 8.3* 7.9*         Lab 10/11/22  0651 10/10/22  0730 10/05/22  0405   TOTAL PROTEIN 6.8 7.0 6.1   ALBUMIN 2.60* 2.30* 2.10*   GLOBULIN 4.2 4.7 4.0   ALT (SGPT) 19 18 14   AST (SGOT) 44* 46* 49*   BILIRUBIN 0.5 0.6 0.9   ALK PHOS 162* 170* 187*                     Brief Urine Lab Results  (Last result in the past 365 days)      Color   Clarity   Blood   Leuk Est   Nitrite   Protein   CREAT   Urine HCG        10/01/22 0117 Yellow   Clear   Negative   Small (1+)   Negative   30 mg/dL (1+)                 Microbiology Results Abnormal     Procedure Component Value - Date/Time    Histoplasma Ag Ur - Urine, Urinary Bladder [[id number]] Collected: 10/03/22 1122    Lab Status: Final result Specimen: Urine from Urinary Bladder Updated: 10/06/22 0913     Histoplasma Galactomannan Ag Ur <0.5    Narrative:      Test(s) 183562-Histoplasma Gal'jaycee Ag Ur  was developed and its performance characteristics determined  by [facility]. It has not been cleared or approved by the Food  and Drug Administration.  Performed at:  [address] [place] [address]  [id number]  : Kamryn Eli MD, Phone:  [phone]    Blastomyces Antigen - Urine, Urinary Bladder [[id number]] Collected: 10/03/22 1122    Lab Status: Final result Specimen: Urine from Urinary Bladder Updated: 10/06/22 0712     MVista(R) Blastomyces Ag None Detected ng/mL      Comment: Results reported as ng/mL in 0.2 - 14.7 ng/mL range  Results above the limit of detection but below 0.2 ng/mL  are reported as 'Positive, Below the Limit of  Quantification'  Results above 14.7 ng/mL are reported as 'Positive,  Above the Limit of Quantification'        Specimen Type URINE    Narrative:      Performed at:  [address] [place], [place] [id number] [facility]  [address] [facility], [place]   [id number]  : Jeanine Mancilla MD, Phone:  [phone]    Respiratory Panel PCR w/COVID-19(SARS-CoV-2) MALIK/STANLEY/AIXA/PAD/COR/MAD/HEATHER In-House, NP Swab in UTM/VTM, 3-4 HR TAT - Swab, Nasopharynx [[id number]]  (Normal) Collected: 10/03/22 1124    Lab Status: Final result Specimen: Swab from Nasopharynx Updated: 10/03/22 1237     ADENOVIRUS, PCR Not Detected     Coronavirus 229E Not Detected     Coronavirus HKU1 Not Detected     Coronavirus NL63 Not Detected     Coronavirus OC43 Not Detected     COVID19 Not Detected     Human Metapneumovirus Not Detected     Human Rhinovirus/Enterovirus Not Detected     Influenza A PCR Not Detected     Influenza B PCR Not Detected     Parainfluenza Virus 1 Not Detected     Parainfluenza Virus 2 Not Detected     Parainfluenza Virus 3 Not Detected     Parainfluenza Virus 4 Not Detected     RSV, PCR Not Detected     Bordetella pertussis pcr Not Detected     Bordetella parapertussis PCR Not Detected     Chlamydophila pneumoniae PCR Not Detected     Mycoplasma pneumo by PCR Not Detected    Narrative:      In the setting of a positive respiratory panel with a viral infection PLUS a negative procalcitonin without other underlying concern for bacterial infection, consider observing off antibiotics or discontinuation of antibiotics and continue supportive care. If the respiratory panel is positive for atypical bacterial infection (Bordetella pertussis, Chlamydophila pneumoniae, or Mycoplasma pneumoniae), consider antibiotic de-escalation to target atypical bacterial infection.    Blood Culture - Blood, Hand, Right [[id number]]  (Normal) Collected: 09/27/22 1300    Lab Status: Final result Specimen: Blood from Hand, Right Updated: 10/02/22 1317     Blood Culture No growth at 5 days    Blood Culture - Blood, Arm, Right [[id number]]  (Normal) Collected: 09/27/22 1245    Lab Status: Final result Specimen: Blood from Arm, Right Updated: 10/02/22 1303     Blood Culture No growth at 5 days    Urine  Culture - Urine, Urine, Random Void [[id number]]  (Normal) Collected: 10/01/22 0117    Lab Status: Final result Specimen: Urine, Random Void Updated: 10/02/22 1056     Urine Culture No growth    Body Fluid Culture - Body Fluid, Peritoneum [[id number]] Collected: 09/28/22 1143    Lab Status: Final result Specimen: Body Fluid from Peritoneum Updated: 10/01/22 0729     Body Fluid Culture No growth at 3 days     Gram Stain No WBCs or organisms seen    Legionella Antigen, Urine - Urine, Urine, Clean Catch [[id number]]  (Normal) Collected: 09/28/22 0643    Lab Status: Final result Specimen: Urine, Clean Catch Updated: 09/28/22 1229     LEGIONELLA ANTIGEN, URINE Negative    S. Pneumo Ag Urine or CSF - Urine, Urine, Clean Catch [[id number]]  (Normal) Collected: 09/28/22 0643    Lab Status: Final result Specimen: Urine, Clean Catch Updated: 09/28/22 1229     Strep Pneumo Ag Negative          No radiology results from the last 24 hrs        I have reviewed the medications:  Scheduled Meds:amLODIPine, 5 mg, Oral, Daily  amoxicillin-clavulanate, 1 tablet, Oral, Q12H  aspirin, 81 mg, Oral, Daily  atorvastatin, 40 mg, Oral, Nightly  castor oil-balsam peru, 1 application, Topical, Q12H  folic acid, 1 mg, Oral, Daily  furosemide, 80 mg, Oral, Daily  guaiFENesin, 600 mg, Oral, Q12H  hydrOXYzine, 25 mg, Oral, Once  insulin detemir, 15 Units, Subcutaneous, Nightly  insulin lispro, 0-9 Units, Subcutaneous, TID AC  Insulin Lispro, 5 Units, Subcutaneous, TID With Meals  lactulose, 10 g, Oral, TID  lidocaine, 1 patch, Transdermal, Q24H  linezolid, 600 mg, Oral, Q12H  metoprolol tartrate, 50 mg, Oral, BID  QUEtiapine, 150 mg, Oral, Nightly  rifAXIMin, 550 mg, Oral, Q12H  sodium chloride, 10 mL, Intravenous, Q12H  thiamine, 100 mg, Oral, Daily      Continuous Infusions:   PRN Meds:.•  acetaminophen **OR** acetaminophen **OR** acetaminophen  •  albuterol  •  benzonatate  •  dextrose  •  dextrose  •  glucagon (human recombinant)  •   ipratropium-albuterol  •  ondansetron **OR** ondansetron  •  oxyCODONE  •  potassium chloride **OR** potassium chloride **OR** potassium chloride  •  prochlorperazine  •  [COMPLETED] Insert peripheral IV **AND** sodium chloride  •  sodium chloride    Assessment & Plan   Assessment & Plan     Active Hospital Problems    Diagnosis  POA   • Pneumonia of right middle lobe due to infectious organism [J18.9]  Yes   • Sepsis, unspecified organism (HCC) [A41.9]  Unknown   • Tobacco use [Z72.0]  Yes   • Vaginal cancer (HCC) [C52]  Yes   • High grade squamous intraepithelial lesion (HGSIL) on cytologic smear of vagina [R87.623]  Yes   • Type 2 diabetes mellitus with hyperglycemia, with long-term current use of insulin (HCC) [E11.65, Z79.4]  Not Applicable   • Chronic hepatitis C without hepatic coma (HCC) [B18.2]  Yes   • Decompensated hepatic cirrhosis (HCC) [K72.90, K74.60]  Yes   • HFrEF (heart failure with reduced ejection fraction) (HCC) [I50.20]  Yes   • Coronary artery disease involving native coronary artery of native heart without angina pectoris [I25.10]  Yes   • Schizophrenia (HCC) [F20.9]  Yes   • Chronic pain disorder [G89.4]  Yes   • Primary hypertension [I10]  Yes   • Long-term current use of opiate analgesic [Z79.891]  Not Applicable   • Esophageal varices (HCC) [I85.00]  Yes      Resolved Hospital Problems   No resolved problems to display.        Brief Hospital Course to date:  Kaylie Cruz is a 57 y.o. female with PMHx vaginal squamous cell carcinoma in 2009 s/p WPRT, vaginal brachytherapy hysterectomy, HTN, HLD, history of substance abuse, history of Hepatitis C and Cirrhosis, CAD, DM2, Schizophrenia, HFrEF (EF 49%),  who presented to ED with CC of cough, subjective fever, SOB, abdominal pain and diarrhea.     This patient's problems and plans were partially entered by my partner and updated as appropriate by me 10/08/22.     Sepsis, POA  Bilateral PNA  Leukocytosis - resolved  +MRSA PCR   -  Cryptococcal antigen negative. Fungal Alina, Fungitell B-D glucan, Histoplasma negative   - Sputum cx ordered but patient without productive cough   - Respiratory PCR negative  - CT scan chest done on 10/5 - dense diffuse and extensive reticular and groundglass disease throughout most of the lungs   -SLP has seen, S/P FEES 9/29 and functional oral and pharyngeal phase, signed off   - Palliative following, now DNR/DNI  - Initially was on high flow and now improved to 4L -- continue to wean as tolerated   - ID and pulm following -- appreciate recs --  continue zosyn and zyvox. Pt pulled out PIV again overnight and is difficult stick.  Transitioned to PO Zyvox and changed Zosyn to PO Augmentin - duration TBD by Dr. Castillo  - Currently holding on steroids   - Mobilize. Trial mucinex.      Decompensated cirrhosis with ascites  History of Hepatitis C  Elevated LFTs   -Follows with GI outpatient but missed last appt.   -EGD due 04/2023 for EV screening  -S/P LVP on 9/29 with 2.25L of ascitic fluid removed, does not appear c/w SBP culture NGTD  -- repeat LVP today as ascites accumulating once again despite lasix attempts (10/8 and 10/10), s/p 4.5L ascitic fluid removed.  Ordered Albumin post-procedure but pt refusing  -Hepatitis panel + Hep C ab  -Continue Xifaxan    - lactulose TID, has had 2 BMs last 24 hours.       Likely CKD  - patient is intermittently allowing labs  - Cr stable.      Hypokalemia  -- Improved     Chronic HFrEF   Coronary artery disease involving native coronary artery of native heart without angina pectoris  Primary hypertension  -Echo 3/2022 - EF 49%, global hypokinesis, grade 1 diastolic dysfunction  -C 4/3/2022 - nonobstructive LAD to LAD 60% in mid region  -Pt is supposed to follow with Dr Daniel, has missed appts   -Continue ASA 81, metoprolol  -Continue statin   - Monitor volume status     Type 2 diabetes mellitus with diabetic polyneuropathy, with long-term current use of insulin  -A1c has  improved from 8.5% to 6.6% with medication compliance over the past 3 months  -Has appointment to establish with Endocrinology next month  -Continue Lantus 15 units nightly  -Continue Humalog 5 units TID meals   -Continue MDSSI  -Continue to HOLD Gabapentin to avoid lethargy      Recent Gross hematuria  -Seen at  8/23/22 and diagnosed with UTI, treated with ABX   -Per patient the dysuria resolved but still having intermittent hematuria  -CT A/P without contrast with unremarkable bladder   -Patient has already been referred to Urology per PCP      Tobacco use  -Encourage smoking cessation  -PCP note says they plan to obtain PFTs outpatient as she has had some wheezing but no formal Dx of COPD  -PRN Albuterol   -Continue scheduled DuoNebs      High grade squamous intraepithelial lesion (HGSIL) on cytologic smear of vagina  Vaginal cancer   -Diagnosed 2010? s/p radiation, surgical resection, and hysterectomy. Previously following with Dr. Roxanne Chaudhry, Gyn-Onc in [place] with [facility].   -She had vaginal pap smear with HGSIL and was lost to follow up.   -PCP has referred to GYN and gyn-onc for further evaluation      Schizophrenia, unspecified type  Hospital-related delirium vs hepatic encephalopathy  -Following with [place] [facility] for talk therapy  - restarted seroquel 150 mg qHS on 10/7, given an extra 50mg 10/8 am. QTc remains prolonged at 491. Will defer increasing Seroquel back to her home dose (200mg) due to this.  Monitor closely  -- lactulose plan as noted above     Substance abuse  -UDS positive for cocaine, oxycodone and TCA     Chronic Anemia  - Stable. Monitor.      R rib pain  -Likely from PNA  -Continue Lidoderm patch   -Takes Roxicodone at home, resume low dose 5mg q8PRN  - rib XR with no acute or healing rib fracture      Deep Tissue Pressure Injury (Bilateral Sacral area)  -WOC following/PT wound  -Dolphin bed ordered, patient not happy with this mattress     Expected Discharge Location and  Transportation: acute rehab/[facility]   Expected Discharge Date: 10/13     DVT prophylaxis:  Mechanical DVT prophylaxis orders are present.     AM-PAC 6 Clicks Score (PT): 18 (10/10/22 2035)    CODE STATUS:   Code Status and Medical Interventions:   Ordered at: 10/05/22 1229     Medical Intervention Limits:    NO intubation (DNI)     Code Status (Patient has no pulse and is not breathing):    No CPR (Do Not Attempt to Resuscitate)     Medical Interventions (Patient has pulse or is breathing):    Limited Support       Megan Landeros MD  10/11/22

## 2022-10-12 VITALS
SYSTOLIC BLOOD PRESSURE: 115 MMHG | TEMPERATURE: 97.5 F | OXYGEN SATURATION: 94 % | DIASTOLIC BLOOD PRESSURE: 79 MMHG | HEIGHT: 63 IN | HEART RATE: 89 BPM | WEIGHT: 114 LBS | BODY MASS INDEX: 20.2 KG/M2 | RESPIRATION RATE: 18 BRPM

## 2022-10-12 LAB
ALBUMIN SERPL-MCNC: 2.2 G/DL (ref 3.5–5.2)
ALBUMIN/GLOB SERPL: 0.6 G/DL
ALP SERPL-CCNC: 149 U/L (ref 39–117)
ALT SERPL W P-5'-P-CCNC: 21 U/L (ref 1–33)
ANION GAP SERPL CALCULATED.3IONS-SCNC: 9 MMOL/L (ref 5–15)
AST SERPL-CCNC: 52 U/L (ref 1–32)
BASOPHILS # BLD AUTO: 0.06 10*3/MM3 (ref 0–0.2)
BASOPHILS NFR BLD AUTO: 1 % (ref 0–1.5)
BILIRUB SERPL-MCNC: 0.4 MG/DL (ref 0–1.2)
BUN SERPL-MCNC: 38 MG/DL (ref 6–20)
BUN/CREAT SERPL: 25.3 (ref 7–25)
CALCIUM SPEC-SCNC: 7.7 MG/DL (ref 8.6–10.5)
CHLORIDE SERPL-SCNC: 108 MMOL/L (ref 98–107)
CO2 SERPL-SCNC: 20 MMOL/L (ref 22–29)
CREAT SERPL-MCNC: 1.5 MG/DL (ref 0.57–1)
DEPRECATED RDW RBC AUTO: 47.6 FL (ref 37–54)
EGFRCR SERPLBLD CKD-EPI 2021: 40 ML/MIN/1.73
EOSINOPHIL # BLD AUTO: 0.23 10*3/MM3 (ref 0–0.4)
EOSINOPHIL NFR BLD AUTO: 3.8 % (ref 0.3–6.2)
ERYTHROCYTE [DISTWIDTH] IN BLOOD BY AUTOMATED COUNT: 16.6 % (ref 12.3–15.4)
GLOBULIN UR ELPH-MCNC: 3.6 GM/DL
GLUCOSE BLDC GLUCOMTR-MCNC: 157 MG/DL (ref 70–130)
GLUCOSE BLDC GLUCOMTR-MCNC: 83 MG/DL (ref 70–130)
GLUCOSE SERPL-MCNC: 86 MG/DL (ref 65–99)
HCT VFR BLD AUTO: 20.9 % (ref 34–46.6)
HEMOCCULT STL QL: POSITIVE
HGB BLD-MCNC: 6.8 G/DL (ref 12–15.9)
IMM GRANULOCYTES # BLD AUTO: 0.02 10*3/MM3 (ref 0–0.05)
IMM GRANULOCYTES NFR BLD AUTO: 0.3 % (ref 0–0.5)
LYMPHOCYTES # BLD AUTO: 1.87 10*3/MM3 (ref 0.7–3.1)
LYMPHOCYTES NFR BLD AUTO: 31.3 % (ref 19.6–45.3)
MCH RBC QN AUTO: 26 PG (ref 26.6–33)
MCHC RBC AUTO-ENTMCNC: 32.5 G/DL (ref 31.5–35.7)
MCV RBC AUTO: 79.8 FL (ref 79–97)
MONOCYTES # BLD AUTO: 0.23 10*3/MM3 (ref 0.1–0.9)
MONOCYTES NFR BLD AUTO: 3.8 % (ref 5–12)
NEUTROPHILS NFR BLD AUTO: 3.57 10*3/MM3 (ref 1.7–7)
NEUTROPHILS NFR BLD AUTO: 59.8 % (ref 42.7–76)
NRBC BLD AUTO-RTO: 0 /100 WBC (ref 0–0.2)
PLATELET # BLD AUTO: 102 10*3/MM3 (ref 140–450)
PMV BLD AUTO: 11.1 FL (ref 6–12)
POTASSIUM SERPL-SCNC: 3.5 MMOL/L (ref 3.5–5.2)
PROT SERPL-MCNC: 5.8 G/DL (ref 6–8.5)
RBC # BLD AUTO: 2.62 10*6/MM3 (ref 3.77–5.28)
RPR SER-TITR: ABNORMAL {TITER}
SODIUM SERPL-SCNC: 137 MMOL/L (ref 136–145)
WBC NRBC COR # BLD: 5.98 10*3/MM3 (ref 3.4–10.8)

## 2022-10-12 PROCEDURE — 63710000001 INSULIN LISPRO (HUMAN) PER 5 UNITS: Performed by: FAMILY MEDICINE

## 2022-10-12 PROCEDURE — 82962 GLUCOSE BLOOD TEST: CPT

## 2022-10-12 PROCEDURE — 97535 SELF CARE MNGMENT TRAINING: CPT

## 2022-10-12 PROCEDURE — 80053 COMPREHEN METABOLIC PANEL: CPT | Performed by: INTERNAL MEDICINE

## 2022-10-12 PROCEDURE — 63710000001 INSULIN LISPRO (HUMAN) PER 5 UNITS: Performed by: INTERNAL MEDICINE

## 2022-10-12 PROCEDURE — 82272 OCCULT BLD FECES 1-3 TESTS: CPT | Performed by: NURSE PRACTITIONER

## 2022-10-12 PROCEDURE — 85025 COMPLETE CBC W/AUTO DIFF WBC: CPT | Performed by: INTERNAL MEDICINE

## 2022-10-12 PROCEDURE — 97110 THERAPEUTIC EXERCISES: CPT

## 2022-10-12 PROCEDURE — 99232 SBSQ HOSP IP/OBS MODERATE 35: CPT | Performed by: NURSE PRACTITIONER

## 2022-10-12 RX ORDER — LOPERAMIDE HYDROCHLORIDE 2 MG/1
2 CAPSULE ORAL ONCE
Status: COMPLETED | OUTPATIENT
Start: 2022-10-12 | End: 2022-10-12

## 2022-10-12 RX ADMIN — LOPERAMIDE HYDROCHLORIDE 2 MG: 2 CAPSULE ORAL at 09:56

## 2022-10-12 RX ADMIN — INSULIN LISPRO 5 UNITS: 100 INJECTION, SOLUTION INTRAVENOUS; SUBCUTANEOUS at 12:09

## 2022-10-12 RX ADMIN — LIDOCAINE 1 PATCH: 50 PATCH CUTANEOUS at 09:56

## 2022-10-12 RX ADMIN — THIAMINE HCL TAB 100 MG 100 MG: 100 TAB at 08:29

## 2022-10-12 RX ADMIN — RIFAXIMIN 550 MG: 550 TABLET ORAL at 08:30

## 2022-10-12 RX ADMIN — FUROSEMIDE 80 MG: 40 TABLET ORAL at 08:30

## 2022-10-12 RX ADMIN — ASPIRIN 81 MG: 81 TABLET, COATED ORAL at 08:30

## 2022-10-12 RX ADMIN — GUAIFENESIN 600 MG: 600 TABLET, EXTENDED RELEASE ORAL at 08:30

## 2022-10-12 RX ADMIN — CASTOR OIL AND BALSAM, PERU 1 APPLICATION: 788; 87 OINTMENT TOPICAL at 08:33

## 2022-10-12 RX ADMIN — OXYCODONE 5 MG: 5 TABLET ORAL at 03:19

## 2022-10-12 RX ADMIN — AMLODIPINE BESYLATE 5 MG: 5 TABLET ORAL at 08:30

## 2022-10-12 RX ADMIN — METOPROLOL TARTRATE 50 MG: 50 TABLET, FILM COATED ORAL at 08:30

## 2022-10-12 RX ADMIN — INSULIN LISPRO 2 UNITS: 100 INJECTION, SOLUTION INTRAVENOUS; SUBCUTANEOUS at 12:08

## 2022-10-12 RX ADMIN — FOLIC ACID 1 MG: 1 TABLET ORAL at 08:30

## 2022-10-12 NOTE — NURSING NOTE
Went to patient room to discuss patient's H&H and subsequent order for blood, at this time patient again refusing PIV placement as well as type and screen and unit of blood. Provider to be notified. Will continue to monitor closely.

## 2022-10-12 NOTE — PROGRESS NOTES
[facility] Medicine Services  PROGRESS NOTE    Patient Name: Kaylie Cruz  : 1965  MRN: [id number]    Date of Admission: 2022  Primary Care Physician: Iesha Harris DO    Subjective   Subjective     CC:  resp failure     HPI:  Patient resting in bed. Not happy with continued hospitalization. Does not want to be here. Refused IV and blood draw/ transfusion overnight. States she feels fine other than large amount of diarrhea  Wants imodium   ROS:  Gen- No fevers, chills  CV- No chest pain, palpitations  Resp- No cough, dyspnea  GI- as above    Objective   Objective     Vital Signs:   Temp:  [97.5 °F (36.4 °C)-98.2 °F (36.8 °C)] 97.5 °F (36.4 °C)  Heart Rate:  [82-99] 89  Resp:  [18-20] 18  BP: ()/(64-79) 115/79     Physical Exam:  Constitutional: No acute distress, awake, alert  HENT: NCAT, mucous membranes moist  Respiratory:CTAB  Cardiovascular: RRR, no murmurs, rubs, or gallops  Gastrointestinal: Positive bowel sounds, soft, nontender,+ ascites  Musculoskeletal: No bilateral ankle edema  Psychiatric: angry  Neurologic: Oriented x 3, COPELAND freely, speech clear  Skin: No rashes    Results Reviewed:  LAB RESULTS:      Lab 10/12/22  0314 10/11/22  0651 10/10/22  0730 10/08/22  0415   WBC 5.98 5.91 6.76 7.48   HEMOGLOBIN 6.8* 8.2* 8.7* 8.3*   HEMATOCRIT 20.9* 25.8* 26.7* 25.7*   PLATELETS 102* 129* 131* 155   NEUTROS ABS 3.57 3.55 4.08  --    IMMATURE GRANS (ABS) 0.02 0.02 0.02  --    LYMPHS ABS 1.87 1.80 2.03  --    MONOS ABS 0.23 0.20 0.28  --    EOS ABS 0.23 0.24 0.25  --    MCV 79.8 81.4 80.4 80.8   LACTATE  --  2.3*  --   --          Lab 10/12/22  0314 10/11/22  0651 10/10/22  0730 10/08/22  0415   SODIUM 137 136 136 136   POTASSIUM 3.5 3.5 3.5 3.5   CHLORIDE 108* 105 105 104   CO2 20.0* 22.0 21.0* 21.0*   ANION GAP 9.0 9.0 10.0 11.0   BUN 38* 38* 36* 35*   CREATININE 1.50* 1.40* 1.46* 1.43*   EGFR 40.0* 43.4* 41.3* 42.3*   GLUCOSE 86 103* 121* 127*   CALCIUM  7.7* 8.2* 8.3* 8.3*         Lab 10/12/22  0314 10/11/22  0651 10/10/22  0730   TOTAL PROTEIN 5.8* 6.8 7.0   ALBUMIN 2.20* 2.60* 2.30*   GLOBULIN 3.6 4.2 4.7   ALT (SGPT) 21 19 18   AST (SGOT) 52* 44* 46*   BILIRUBIN 0.4 0.5 0.6   ALK PHOS 149* 162* 170*                     Brief Urine Lab Results  (Last result in the past 365 days)      Color   Clarity   Blood   Leuk Est   Nitrite   Protein   CREAT   Urine HCG        10/01/22 0117 Yellow   Clear   Negative   Small (1+)   Negative   30 mg/dL (1+)                 Microbiology Results Abnormal     Procedure Component Value - Date/Time    Histoplasma Ag Ur - Urine, Urinary Bladder [[id number]] Collected: 10/03/22 1122    Lab Status: Final result Specimen: Urine from Urinary Bladder Updated: 10/06/22 0913     Histoplasma Galactomannan Ag Ur <0.5    Narrative:      Test(s) 183562-Histoplasma Gal'jaycee Ag Ur  was developed and its performance characteristics determined  by [employer]. It has not been cleared or approved by the Food  and Drug Administration.  Performed at:  [address]  [id number]  : Kamryn Eli MD, Phone:  [phone]    Blastomyces Antigen - Urine, Urinary Bladder [[id number]] Collected: 10/03/22 1122    Lab Status: Final result Specimen: Urine from Urinary Bladder Updated: 10/06/22 0712     MVista(R) Blastomyces Ag None Detected ng/mL      Comment: Results reported as ng/mL in 0.2 - 14.7 ng/mL range  Results above the limit of detection but below 0.2 ng/mL  are reported as 'Positive, Below the Limit of  Quantification'  Results above 14.7 ng/mL are reported as 'Positive,  Above the Limit of Quantification'        Specimen Type URINE    Narrative:      Performed at:  01  [facility]  [address] [place] [address], [place]  [id number]  : Jeanine Mancilla MD, Phone:  [phone]    Respiratory Panel PCR w/COVID-19(SARS-CoV-2) MALIK/STANLEY/AIXA/PAD/COR/MAD/HEATHER In-House, NP Swab in UTM/VTM, 3-4  HR TAT - Swab, Nasopharynx [[id number]]  (Normal) Collected: 10/03/22 1124    Lab Status: Final result Specimen: Swab from Nasopharynx Updated: 10/03/22 1237     ADENOVIRUS, PCR Not Detected     Coronavirus 229E Not Detected     Coronavirus HKU1 Not Detected     Coronavirus NL63 Not Detected     Coronavirus OC43 Not Detected     COVID19 Not Detected     Human Metapneumovirus Not Detected     Human Rhinovirus/Enterovirus Not Detected     Influenza A PCR Not Detected     Influenza B PCR Not Detected     Parainfluenza Virus 1 Not Detected     Parainfluenza Virus 2 Not Detected     Parainfluenza Virus 3 Not Detected     Parainfluenza Virus 4 Not Detected     RSV, PCR Not Detected     Bordetella pertussis pcr Not Detected     Bordetella parapertussis PCR Not Detected     Chlamydophila pneumoniae PCR Not Detected     Mycoplasma pneumo by PCR Not Detected    Narrative:      In the setting of a positive respiratory panel with a viral infection PLUS a negative procalcitonin without other underlying concern for bacterial infection, consider observing off antibiotics or discontinuation of antibiotics and continue supportive care. If the respiratory panel is positive for atypical bacterial infection (Bordetella pertussis, Chlamydophila pneumoniae, or Mycoplasma pneumoniae), consider antibiotic de-escalation to target atypical bacterial infection.    Blood Culture - Blood, Hand, Right [[id number]]  (Normal) Collected: 09/27/22 1300    Lab Status: Final result Specimen: Blood from Hand, Right Updated: 10/02/22 1317     Blood Culture No growth at 5 days    Blood Culture - Blood, Arm, Right [[id number]]  (Normal) Collected: 09/27/22 1245    Lab Status: Final result Specimen: Blood from Arm, Right Updated: 10/02/22 1303     Blood Culture No growth at 5 days    Urine Culture - Urine, Urine, Random Void [[id number]]  (Normal) Collected: 10/01/22 0117    Lab Status: Final result Specimen: Urine, Random Void Updated: 10/02/22 1056     Urine  Culture No growth    Body Fluid Culture - Body Fluid, Peritoneum [[id number]] Collected: 09/28/22 1143    Lab Status: Final result Specimen: Body Fluid from Peritoneum Updated: 10/01/22 0729     Body Fluid Culture No growth at 3 days     Gram Stain No WBCs or organisms seen    Legionella Antigen, Urine - Urine, Urine, Clean Catch [[id number]]  (Normal) Collected: 09/28/22 0643    Lab Status: Final result Specimen: Urine, Clean Catch Updated: 09/28/22 1229     LEGIONELLA ANTIGEN, URINE Negative    S. Pneumo Ag Urine or CSF - Urine, Urine, Clean Catch [[id number]]  (Normal) Collected: 09/28/22 0643    Lab Status: Final result Specimen: Urine, Clean Catch Updated: 09/28/22 1229     Strep Pneumo Ag Negative          US Paracentesis    Result Date: 10/11/2022  US PARACENTESIS-                                                         History: ascites; J18.9-Pneumonia, unspecified organism; R10.9-Unspecified abdominal pain; K74.60-Unspecified cirrhosis of liver; R18.8-Other ascites; N17.9-Acute kidney failure, unspecified; I50.9-Heart failure, unspecified; A41.9-Sepsis, unspecified organism; R13.10-Dysphagia, unspecified    : Dioni Perez MD.  Assistant:  None.                                                                             Modality: Ultrasonography                                                                                                          Sedation: None.  Anesthesia: Lidocaine, local infiltration.           Estimated blood loss:  < 5 cc.          Technique: Discussion of risks, benefits, and alternatives were made with the patient. The patient expressed understanding and agreed to proceed. A written informed written consent was obtained. A universal timeout was performed prior to starting the procedure.  A preliminary ultrasonography was performed to assess the target and determine a safe access site. It showed ascites. Pertinent ultrasound images were stored to the PACS for  documentation.  Maximal sterile precautions were utilized. The procedure room personnel used personal protective equipment. The operators additionally used sterile surgical gloves. The access site was sterilely prepped and draped. Local anesthesia was administered. A dermatotomy was performed if needed. A catheter over the needle system was advanced into the peritoneal cavity. Straw colored fluid was aspirated and the plastic catheter advanced into the peritoneum. The catheter was then connected to a fluid recovery system. At the end of the procedure, the catheter was withdrawn and an aseptic dressing applied. The patient tolerated the procedure well.  After uneventful recovery recovery, the patient was discharged from the department in stable condition.  The total amount of fluid recovered is given below.  Albumin was infused intravenously if ordered by the referring doctor.  Complications: None immediate.  Specimen: The specimen was labeled and sent to the lab in appropriate carriers & containers if such was requested by the ordering provider.                                                                 Impression: Impression:                                                              Successful ultrasound-guided paracentesis using a Right lower quadrant access with recovery of 4.5 liters of fluid as described above.  Thank you for the opportunity to assist in the care of your patient.  This report was finalized on 10/11/2022 10:34 AM by Dioni Perez MD.            I have reviewed the medications:  Scheduled Meds:albumin human, 50 g, Intravenous, Once  amLODIPine, 5 mg, Oral, Daily  aspirin, 81 mg, Oral, Daily  atorvastatin, 40 mg, Oral, Nightly  castor oil-balsam peru, 1 application, Topical, Q12H  folic acid, 1 mg, Oral, Daily  furosemide, 80 mg, Oral, Daily  guaiFENesin, 600 mg, Oral, Q12H  hydrOXYzine, 25 mg, Oral, Once  insulin detemir, 15 Units, Subcutaneous, Nightly  insulin lispro, 0-9 Units,  Subcutaneous, TID AC  Insulin Lispro, 5 Units, Subcutaneous, TID With Meals  lactulose, 10 g, Oral, TID  lidocaine, 1 patch, Transdermal, Q24H  metoprolol tartrate, 50 mg, Oral, BID  QUEtiapine, 150 mg, Oral, Nightly  rifAXIMin, 550 mg, Oral, Q12H  sodium chloride, 10 mL, Intravenous, Q12H  thiamine, 100 mg, Oral, Daily      Continuous Infusions:   PRN Meds:.•  acetaminophen **OR** acetaminophen **OR** acetaminophen  •  albuterol  •  benzonatate  •  dextrose  •  dextrose  •  glucagon (human recombinant)  •  ipratropium-albuterol  •  ondansetron **OR** ondansetron  •  oxyCODONE  •  potassium chloride **OR** potassium chloride **OR** potassium chloride  •  prochlorperazine  •  [COMPLETED] Insert peripheral IV **AND** sodium chloride  •  sodium chloride    Assessment & Plan   Assessment & Plan     Active Hospital Problems    Diagnosis  POA   • Pneumonia of right middle lobe due to infectious organism [J18.9]  Yes   • Sepsis, unspecified organism (HCC) [A41.9]  Unknown   • Tobacco use [Z72.0]  Yes   • Vaginal cancer (HCC) [C52]  Yes   • High grade squamous intraepithelial lesion (HGSIL) on cytologic smear of vagina [R87.623]  Yes   • Type 2 diabetes mellitus with hyperglycemia, with long-term current use of insulin (HCC) [E11.65, Z79.4]  Not Applicable   • Chronic hepatitis C without hepatic coma (HCC) [B18.2]  Yes   • Decompensated hepatic cirrhosis (HCC) [K72.90, K74.60]  Yes   • HFrEF (heart failure with reduced ejection fraction) (HCC) [I50.20]  Yes   • Coronary artery disease involving native coronary artery of native heart without angina pectoris [I25.10]  Yes   • Schizophrenia (HCC) [F20.9]  Yes   • Chronic pain disorder [G89.4]  Yes   • Primary hypertension [I10]  Yes   • Long-term current use of opiate analgesic [Z79.891]  Not Applicable   • Esophageal varices (HCC) [I85.00]  Yes      Resolved Hospital Problems   No resolved problems to display.        Brief Hospital Course to date:  Kaylie Cruz is a 57  y.o. female with PMHx vaginal squamous cell carcinoma in 2009 s/p WPRT, vaginal brachytherapy hysterectomy, HTN, HLD, history of substance abuse, history of Hepatitis C and Cirrhosis, CAD, DM2, Schizophrenia, HFrEF (EF 49%),  who presented to ED with CC of cough, subjective fever, SOB, abdominal pain and diarrhea.     This patient's problems and plans were partially entered by my partner and updated as appropriate by me 10/08/22.     Sepsis, POA  Bilateral PNA  Leukocytosis - resolved  +MRSA PCR   - Cryptococcal antigen negative. Fungal Alina, Fungitell B-D glucan, Histoplasma negative   - Sputum cx ordered but patient without productive cough   - Respiratory PCR negative  - CT scan chest done on 10/5 - dense diffuse and extensive reticular and groundglass disease throughout most of the lungs   -SLP has seen, S/P FEES 9/29 and functional oral and pharyngeal phase, signed off   - Palliative following, now DNR/DNI  - Initially was on high flow and now improved RA-2LNC   - ID and pulm following have followed  - ABx changed to augmentin and zyvox to continue through 10/13  - Currently holding on steroids   - Mobilize. Trial mucinex.     Acute on Chronic Anemia  - hgb range ~ 8-8.5  -today 6.8. refused type and screen and transfusion overnight and this morning. Denies any symptoms to me currently     Decompensated cirrhosis with ascites  History of Hepatitis C  Elevated LFTs   -Follows with GI outpatient but missed last appt.   -EGD due 04/2023 for EV screening  -S/P LVP on 9/29 with 2.25L of ascitic fluid removed, does not appear c/w SBP culture NGTD  -- repeat LVP 10/11 as ascites accumulating once again despite lasix attempts (10/8 and 10/10), s/p 4.5L ascitic fluid removed.  Ordered Albumin post-procedure but pt refused  -Hepatitis panel + Hep C ab  -Continue Xifaxan    - lactulose TID ordered- but patient having large amount of diarrhea since paracentesis and being held. Giving one dose of imodium as also minimally  eating or drinking 2/2 diarrhea      Likely CKD  - creatinine remains 1.3-1.4  - Cr stable. However was fairly normal in may, currently 1.5 and suspect a mild dehydration component with decreased intake. Patient refusing IV, fluids at this time     Hypokalemia  -- Improved     Chronic HFrEF   Coronary artery disease involving native coronary artery of native heart without angina pectoris  Primary hypertension  -Echo 3/2022 - EF 49%, global hypokinesis, grade 1 diastolic dysfunction  -LHC 4/3/2022 - nonobstructive LAD to LAD 60% in mid region  -Pt is supposed to follow with Dr Daniel, has missed appts   -Continue ASA 81, metoprolol  -Continue statin   - Monitor volume status     Type 2 diabetes mellitus with diabetic polyneuropathy, with long-term current use of insulin  -A1c has improved from 8.5% to 6.6% with medication compliance over the past 3 months  -Has appointment to establish with Endocrinology next month  -Continue Lantus 15 units nightly  -Continue Humalog 5 units TID meals   -Continue MDSSI  -Continue to HOLD Gabapentin to avoid lethargy   - no change today     Recent Gross hematuria  -Seen at  8/23/22 and diagnosed with UTI, treated with ABX   -Per patient the dysuria resolved but still having intermittent hematuria  -CT A/P without contrast with unremarkable bladder   -Patient has already been referred to Urology per PCP      Tobacco use  -Encourage smoking cessation  -PCP note says they plan to obtain PFTs outpatient as she has had some wheezing but no formal Dx of COPD  -PRN Albuterol   -Continue scheduled DuoNebs      High grade squamous intraepithelial lesion (HGSIL) on cytologic smear of vagina  Vaginal cancer   -Diagnosed 2010? s/p radiation, surgical resection, and hysterectomy. Previously following with Dr. Roxanne Chaudhry, Gyn-Onc in [place] with [facility].   -She had vaginal pap smear with HGSIL and was lost to follow up.   -PCP has referred to GYN and gyn-onc for further  evaluation      Schizophrenia, unspecified type  Hospital-related delirium vs hepatic encephalopathy  -Following with [facility] for talk therapy  - restarted seroquel 150 mg qHS on 10/7, given an extra 50mg 10/8 am. QTc remains prolonged at 491. Will defer increasing Seroquel back to her home dose (200mg) due to this.  Monitor closely  -- lactulose plan as noted above     Substance abuse  -UDS positive for cocaine, oxycodone and TCA     R rib pain  -Likely from PNA  -Continue Lidoderm patch   -Takes Roxicodone at home, resume low dose 5mg q8PRN  - rib XR with no acute or healing rib fracture      Deep Tissue Pressure Injury (Bilateral Sacral area)  -WOC following/PT wound  -Dolphin bed ordered, patient not happy with this mattress    POC: Patient wants to leave, tired of being in the hospital. She has been refusing treatment. Would like me to discuss labs/ transfusion and medications with daughter before she will agree. I have attempted to call Willis, daughter, but have not been able to reach 3 times today     Expected Discharge Location and Transportation: acute rehab/[facility]   Expected Discharge Date: 10/13     DVT prophylaxis:  Mechanical DVT prophylaxis orders are present.     AM-PAC 6 Clicks Score (PT): 18 (10/11/22 1925)    CODE STATUS:   Code Status and Medical Interventions:   Ordered at: 10/05/22 1229     Medical Intervention Limits:    NO intubation (DNI)     Code Status (Patient has no pulse and is not breathing):    No CPR (Do Not Attempt to Resuscitate)     Medical Interventions (Patient has pulse or is breathing):    Limited Support       Margarita Yang, APRN  10/12/22

## 2022-10-12 NOTE — PLAN OF CARE
Goal Outcome Evaluation:  Plan of Care Reviewed With: patient        Progress: improving  Outcome Evaluation: Pt fatigued and frustrated over having continuous bowel movements.  Pt mod ind to roll R/L x 4 for dep jaclyn care,  mod ind to scoot up in bed,  mod A to don pullup brief in supine.  Pt completed 15 reps BUE TE given cues for full range. Pt limited by pain, weakness and decreased activity tolerance. Recommend IP rehab upon d/c.

## 2022-10-12 NOTE — CASE MANAGEMENT/SOCIAL WORK
Continued Stay Note  [facility]     Patient Name: Kaylie Cruz  MRN: [id number]  Today's Date: 10/12/2022    Admit Date: 9/27/2022    Plan: home vs [facility]   Discharge Plan     Row Name 10/12/22 1519       Plan    Plan home vs [facility]    Patient/Family in Agreement with Plan yes    Plan Comments I spoke with the patient regarding our awaiting insurance approval at [facility]. She is quite frustrated with waiting. Will update when we hear from [facility]. It may take a few days to hear back.    Final Discharge Disposition Code 01 - home or self-care               Discharge Codes    No documentation.               Expected Discharge Date and Time     Expected Discharge Date Expected Discharge Time    Oct 14, 2022             Doris Raymond RN

## 2022-10-12 NOTE — SIGNIFICANT NOTE
H&H 6.8/20.9 this morning.  Ordered Type and screen and to transfuse 1 unit of PRBC.  Patient refusing lab draw and blood transfusion at this time.

## 2022-10-12 NOTE — PROGRESS NOTES
INFECTIOUS DISEASE Progress Note    Kaylie Render  1965  [id number]    Consult: 10/3/22  Admit date: 9/27/2022    Requesting Provider: No ref. provider found  Evaluating physician: Hugo Castillo MD  Reason for Consultation: Sepsis, leukocytosis, cirrhosis, hepatitis C, substance use, right lower lobe pneumonia  Chief Complaint:       Subjective   History of present illness:  Patient is a  57 y.o.  Yr old female with a history of substance use, hepatitis C, cirrhosis, cervical cancer, vaginal squamous cell cancer 2009, schizophrenia, diabetes mellitus type 2, congestive heart failure with ejection fraction 49%, recent urinary tract infection treated at the [facility], who developed increasing shortness of breath on 9/25/2022 and was admitted to [facility] on 9/27/2022 with radiographic findings consistent with right lower lobe pneumonia.  The patient was placed on piperacillin tazobactam.  The patient is a poor historian.  Creatinine 1.55, sodium 133, potassium 4.2, AST 49, alkaline phosphatase 153, bilirubin normal, vancomycin trough 11.5, WBC 19.51, hemoglobin 8.9, platelet count 176, MRSA PCR swab screen +10/2, procalcitonin 0.88, urinalysis 6-12 WBCs but urine culture from 10/1 negative, ascites with 208 WBCs with negative culture, 9/28, Legionella and streptococcal urine antigen 9/28 negative.  Ammonia 64.  Lactic acid 2.5 on admission.  Blood cultures x2 from 9/27 negative.  Chest x-ray 10/3 with bilateral pneumonia diffuse right greater than left.  CT scan of the abdomen and pelvis 9/27 with diffuse right lower lobe pneumonia and findings consistent with colitis at the hepatic flexure.  I was consulted on 10/3/2022 for further evaluation and treatment.  Patient was initially placed on piperacillin tazobactam, then vancomycin was added on 10/2/2022.  The patient denies ill contacts, significant travel history, zoonotic exposures, TB, or HIV.  Minimal sputum production.   White blood cell count has increased from 15-19,000.     10/4/2022 history reviewed.  Patient poor historian.  Tolerating linezolid and piperacillin tazobactam for pneumonia with positive MRSA screen.  Still on high flow oxygen at 40 L/min.  Oxygen concentration 55%.  Not coughing much.  Says she is breathing better.    10/5/2022 history reviewed.  No high fevers or chills.  Continues on linezolid and piperacillin tazobactam for pneumonia.  Breathing slowly improved.    10/6/2022 history reviewed.  Tolerating linezolid and piperacillin tazobactam for sepsis and pneumonia.  No high fevers.  Refusing some medications.  Breathing better.    10/7/2022 history reviewed.  Breathing continues to improve.  Difficulty with accepting some of her care.  Oxygen needs at 4 L/min nasal cannula.  No fever.  Tolerating linezolid and piperacillin tazobactam for sepsis and pneumonia to continue until 10/10 IV, and then convert to oral if possible.  She has been refusing oral medications.    10/10/22 hx rev.  Patient more agitated over the weekend and pulled out lines.  Ab changed to po augmentin, linezolid.  No fever.    10/11/2022 history reviewed.  No high fevers or chills.  Tolerating linezolid and Augmentin until 10/13.    10/12/2022 history reviewed.  Stopping antibiotics.  Patient wants to leave hospital.  Not always compliant with care.  No fever.  Breathing okay.    Past Medical History:   Diagnosis Date   • Anemia    • Cancer (HCC)     cervical   • Depression    • Hyperlipidemia    • Hypertension    • Infectious viral hepatitis    • Myocardial infarction (HCC)    • Substance abuse (HCC)        Past Surgical History:   Procedure Laterality Date   • BLADDER SURGERY     • CARDIAC CATHETERIZATION      4/3/2022   • CHOLECYSTECTOMY     • COLON RESECTION      7/19/2017, descending and transverse colon   • COLON SURGERY     • COLONOSCOPY      7/19/2017   • ENDOSCOPY      EGD 4/1/22   • HYSTERECTOMY     • TRANSESOPHAGEAL  ECHOCARDIOGRAM (VAINASH)      Global hyokinesis, 49% EF, LVH       Pediatric History   Patient Parents   • Not on file     Other Topics Concern   • Not on file   Social History Narrative   • Not on file       family history is not on file.    Allergies   Allergen Reactions   • Codeine Hives   • Morphine Hives   • Tagamet [Cimetidine] Other (See Comments)     DISCOLORATION OF SKIN   • Toradol [Ketorolac Tromethamine] Hives         There is no immunization history on file for this patient.    Medication:    Current Facility-Administered Medications:   •  acetaminophen (TYLENOL) tablet 650 mg, 650 mg, Oral, Q4H PRN, 650 mg at 10/10/22 1614 **OR** acetaminophen (TYLENOL) 160 MG/5ML solution 650 mg, 650 mg, Oral, Q4H PRN **OR** acetaminophen (TYLENOL) suppository 650 mg, 650 mg, Rectal, Q4H PRN, Nidia Rangel DO  •  albumin human 25 % IV SOLN 50 g, 50 g, Intravenous, Once, Megan Landeros MD  •  albuterol (PROVENTIL) nebulizer solution 0.083% 2.5 mg/3mL, 2.5 mg, Nebulization, Q6H PRN, Nidia Rangel DO, 2.5 mg at 10/03/22 0904  •  amLODIPine (NORVASC) tablet 5 mg, 5 mg, Oral, Daily, Nidia Rangel DO, 5 mg at 10/10/22 0830  •  amoxicillin-clavulanate (AUGMENTIN) 875-125 MG per tablet 1 tablet, 1 tablet, Oral, Q12H, Hugo Castillo MD, 1 tablet at 10/11/22 2118  •  aspirin EC tablet 81 mg, 81 mg, Oral, Daily, Nidia Rangel DO, 81 mg at 10/10/22 0830  •  atorvastatin (LIPITOR) tablet 40 mg, 40 mg, Oral, Nightly, Megan Landeros MD, 40 mg at 10/11/22 2111  •  benzonatate (TESSALON) capsule 200 mg, 200 mg, Oral, TID PRN, Megan Landeros MD  •  castor oil-balsam peru (VENELEX) ointment 1 application, 1 application, Topical, Q12H, Nidia Rangel DO, 1 application at 10/11/22 0876  •  dextrose (D50W) (25 g/50 mL) IV injection 25 g, 25 g, Intravenous, Q15 Min PRN, Nidia Rangel,   •  dextrose (GLUTOSE) oral gel 15 g, 15 g, Oral, Q15 Min PRN, Nidia Rangel,   •  folic  acid (FOLVITE) tablet 1 mg, 1 mg, Oral, Daily, Nidia Rangel DO, 1 mg at 10/10/22 0830  •  furosemide (LASIX) tablet 80 mg, 80 mg, Oral, Daily, Megan Landeros MD  •  glucagon (human recombinant) (GLUCAGEN DIAGNOSTIC) injection 1 mg, 1 mg, Intramuscular, Q15 Min PRN, Nidia Rangel DO  •  guaiFENesin (MUCINEX) 12 hr tablet 600 mg, 600 mg, Oral, Q12H, Lor Sam DO, 600 mg at 10/11/22 2111  •  hydrOXYzine (ATARAX) tablet 25 mg, 25 mg, Oral, Once, Ema Khan APRN  •  insulin detemir (LEVEMIR) injection 15 Units, 15 Units, Subcutaneous, Nightly, Megan Landeros MD, 15 Units at 10/11/22 2119  •  Insulin Lispro (humaLOG) injection 0-9 Units, 0-9 Units, Subcutaneous, TID AC, Nidia Rangel DO, 9 Units at 10/11/22 1617  •  Insulin Lispro (humaLOG) injection 5 Units, 5 Units, Subcutaneous, TID With Meals, Megan Landeros MD, 5 Units at 10/11/22 1620  •  ipratropium-albuterol (DUO-NEB) nebulizer solution 3 mL, 3 mL, Nebulization, Q6H PRN, Lor Sam DO  •  lactulose (CHRONULAC) 10 GM/15ML solution 10 g, 10 g, Oral, TID, Megan Landeros MD, 10 g at 10/11/22 2110  •  lidocaine (LIDODERM) 5 % 1 patch, 1 patch, Transdermal, Q24H, Nidia Rangel DO, 1 patch at 10/10/22 0829  •  linezolid (ZYVOX) tablet 600 mg, 600 mg, Oral, Q12H, Hugo Castillo MD, 600 mg at 10/11/22 2111  •  metoprolol tartrate (LOPRESSOR) tablet 50 mg, 50 mg, Oral, BID, Nidia Rangel DO, 50 mg at 10/10/22 2000  •  ondansetron (ZOFRAN) tablet 4 mg, 4 mg, Oral, Q6H PRN, 4 mg at 10/06/22 0019 **OR** ondansetron (ZOFRAN) injection 4 mg, 4 mg, Intravenous, Q6H PRN, Nidia Rangel,   •  oxyCODONE (ROXICODONE) immediate release tablet 5 mg, 5 mg, Oral, Q8H PRN, Nidia Rangel, , 5 mg at 10/12/22 0319  •  potassium chloride (MICRO-K) CR capsule 40 mEq, 40 mEq, Oral, PRN, 40 mEq at 09/29/22 1094 **OR** potassium chloride (KLOR-CON) packet 40 mEq, 40 mEq, Oral, PRN **OR** potassium  chloride 10 mEq in 100 mL IVPB, 10 mEq, Intravenous, Q1H PRN, Henrietta Ramos II, DO, Last Rate: 100 mL/hr at 09/29/22 1152, 10 mEq at 09/29/22 1152  •  prochlorperazine (COMPAZINE) injection 2.5 mg, 2.5 mg, Intravenous, Q6H PRN, Nidia Rangel, DO  •  QUEtiapine (SEROquel) tablet 150 mg, 150 mg, Oral, Nightly, Lor Sam, DO, 150 mg at 10/11/22 2110  •  riFAXIMin (XIFAXAN) tablet 550 mg, 550 mg, Oral, Q12H, Lor Sam, DO, 550 mg at 10/11/22 2110  •  [COMPLETED] Insert peripheral IV, , , Once **AND** sodium chloride 0.9 % flush 10 mL, 10 mL, Intravenous, PRN, Stephenie Martinez PA  •  sodium chloride 0.9 % flush 10 mL, 10 mL, Intravenous, Q12H, Nidia Rangel, DO, 10 mL at 10/08/22 2118  •  sodium chloride 0.9 % flush 10 mL, 10 mL, Intravenous, PRN, Nidia Rangel, DO  •  thiamine (VITAMIN B-1) tablet 100 mg, 100 mg, Oral, Daily, Nidia Rangel, DO, 100 mg at 10/10/22 0844    Please refer to the medical record for a full medication list    Review of Systems:    Constitutional-- No Fever, chills or sweats.  Appetite good, and no malaise. No fatigue.  HEENT-- No new vision, hearing or throat complaints.  No epistaxis or oral sores.  Denies odynophagia or dysphagia.  No odynophagia or dysphagia. No headache, photophobia or neck stiffness.  CV-- No chest pain, palpitation or syncope  Resp--some SOB/minimally productive cough/no hemoptysis  GI- No nausea, vomiting, or diarrhea.  No hematochezia, melena, or hematemesis. Denies jaundice or chronic liver disease.  -- No dysuria, hematuria, or flank pain.  Denies hesitancy, urgency or flank pain.  Lymph- no swollen lymph nodes in neck/axilla or groin.   Heme- No active bruising or bleeding; no Hx of DVT or PE.  MS-- no swelling or pain in the bones or joints of arms/legs.  No new back pain.  Neuro-- No acute focal weakness or numbness in the arms or legs.  No seizures.  Skin--No rashes or lesions    Physical Exam:   Vital Signs   Temp:  [97.8 °F  "(36.6 °C)-98.2 °F (36.8 °C)] 98 °F (36.7 °C)  Heart Rate:  [64-99] 90  Resp:  [18-20] 20  BP: ()/(64-87) 115/65    Temp  Min: 97.8 °F (36.6 °C)  Max: 98.2 °F (36.8 °C)  BP  Min: 98/64  Max: 157/87  Pulse  Min: 64  Max: 99  Resp  Min: 18  Max: 20  SpO2  Min: 92 %  Max: 99 %    Blood pressure 115/65, pulse 90, temperature 98 °F (36.7 °C), temperature source Oral, resp. rate 20, height 160 cm (63\"), weight 51.7 kg (114 lb), SpO2 95 %.  GENERAL: Awake and alert, in minimal distress. Appears older than stated age.  Cachectic.  Resting in bed.  HEENT:  Normocephalic, atraumatic.  Oropharynx without thrush. Dentition in poor repair. No cervical adenopathy. No neck masses.  Ears externally normal, Nose externally normal.  EYES: No conjunctival injection. No icterus. EOM full.  LYMPHATICS: No lymphadenopathy of the neck or axillary or inguinal regions.   HEART: No murmur, gallop, or pericardial friction rub. Reg rate rhythm.  No JVD.  LUNGS: Occasional rales left base, no rhonchi. No respiratory distress, no use of accessory muscles.  ABDOMEN: Soft, nontender, nondistended. No appreciable HSM.  Bowel sounds normal.  SKIN: Warm and dry without cutaneous eruptions.  No nodules.    PSYCHIATRIC: Mental status some confusion but answers some questions.  EXT:  No cellulitic change.  Normal range of motion.  No cyanosis or clubbing.  NEURO: Oriented to name, nonfocal.      Results Review:   I reviewed the patient's new clinical results.  I reviewed the patient's new imaging results and agree with the interpretation.  I reviewed the patient's other test results and agree with the interpretation    Results from last 7 days   Lab Units 10/12/22  0314 10/11/22  0651 10/10/22  0730   WBC 10*3/mm3 5.98 5.91 6.76   HEMOGLOBIN g/dL 6.8* 8.2* 8.7*   HEMATOCRIT % 20.9* 25.8* 26.7*   PLATELETS 10*3/mm3 102* 129* 131*     Results from last 7 days   Lab Units 10/12/22  0314   SODIUM mmol/L 137   POTASSIUM mmol/L 3.5   CHLORIDE mmol/L 108* "   CO2 mmol/L 20.0*   BUN mg/dL 38*   CREATININE mg/dL 1.50*   GLUCOSE mg/dL 86   CALCIUM mg/dL 7.7*     Results from last 7 days   Lab Units 10/12/22  0314   ALK PHOS U/L 149*   BILIRUBIN mg/dL 0.4   ALT (SGPT) U/L 21   AST (SGOT) U/L 52*                 Results from last 7 days   Lab Units 10/11/22  0651   LACTATE mmol/L 2.3*     Estimated Creatinine Clearance: 33 mL/min (A) (by C-G formula based on SCr of 1.5 mg/dL (H)).     Procalitonin Results:       Brief Urine Lab Results  (Last result in the past 365 days)      Color   Clarity   Blood   Leuk Est   Nitrite   Protein   CREAT   Urine HCG        10/01/22 0117 Yellow   Clear   Negative   Small (1+)   Negative   30 mg/dL (1+)                No results found for: SITE, ALLENTEST, PHART, ALZ2OJM, PO2ART, YVM4KJK, BASEEXCESS, P1SVWDAM, HGBBG, HCTABG, OXYHEMOGLOBI, METHHGBN, CARBOXYHGB, CO2CT, BAROMETRIC, MODALITY, FIO2     Microbiology:  Blood Culture   Date Value Ref Range Status   09/27/2022 No growth at 5 days  Final     No results found for: BCIDPCR, CXREFLEX, CSFCX, CULTURETIS  No results found for: CULTURES, HSVCX, URCX  No results found for: EYECULTURE, GCCX, HSVCULTURE, LABHSV  No results found for: LEGIONELLA, MRSACX, MUMPSCX, MYCOPLASCX  No results found for: NOCARDIACX, STOOLCX  Urine Culture   Date Value Ref Range Status   10/01/2022 No growth  Final     No results found for: VIRALCULTU, WOUNDCX     Blood Culture   Date Value Ref Range Status   09/27/2022 No growth at 5 days  Final     Body Fluid Culture   Date Value Ref Range Status   09/28/2022 No growth at 3 days  Final     Urine Culture   Date Value Ref Range Status   10/01/2022 No growth  Final   (    MRSA swab screen + 10/2/2022.  Radiology:  Imaging Results (Last 72 Hours)     Procedure Component Value Units Date/Time    US Paracentesis [[id number]] Collected: 10/11/22 0917    Specimen: Body Fluid Updated: 10/11/22 1037    Narrative:      US PARACENTESIS-                                                             History: ascites; J18.9-Pneumonia, unspecified organism;  R10.9-Unspecified abdominal pain; K74.60-Unspecified cirrhosis of liver;  R18.8-Other ascites; N17.9-Acute kidney failure, unspecified;  I50.9-Heart failure, unspecified; A41.9-Sepsis, unspecified organism;  R13.10-Dysphagia, unspecified       : Dioni Perez MD.     Assistant:  None.                                                                                   Modality: Ultrasonography                                                                                                                Sedation: None.      Anesthesia: Lidocaine, local infiltration.               Estimated blood loss:  < 5 cc.              Technique:   Discussion of risks, benefits, and alternatives were made with the  patient. The patient expressed understanding and agreed to proceed. A  written informed written consent was obtained. A universal timeout was  performed prior to starting the procedure.      A preliminary ultrasonography was performed to assess the target and  determine a safe access site. It showed ascites. Pertinent ultrasound  images were stored to the PACS for documentation.      Maximal sterile precautions were utilized. The procedure room personnel  used personal protective equipment. The operators additionally used  sterile surgical gloves. The access site was sterilely prepped and  draped. Local anesthesia was administered. A dermatotomy was performed  if needed. A catheter over the needle system was advanced into the  peritoneal cavity. Straw colored fluid was aspirated and the plastic  catheter advanced into the peritoneum. The catheter was then connected  to a fluid recovery system. At the end of the procedure, the catheter  was withdrawn and an aseptic dressing applied. The patient tolerated the  procedure well.      After uneventful recovery recovery, the patient was discharged from the  department in stable condition.      The total amount of fluid recovered is given below.     Albumin was infused intravenously if ordered by the referring doctor.     Complications: None immediate.     Specimen: The specimen was labeled and sent to the lab in appropriate  carriers & containers if such was requested by the ordering provider.                                                                     Impression:      Impression:                                                                  Successful ultrasound-guided paracentesis using a Right lower quadrant  access with recovery of 4.5 liters of fluid as described above.     Thank you for the opportunity to assist in the care of your patient.     This report was finalized on 10/11/2022 10:34 AM by Dioni Perez MD.             IMPRESSION:     1.  Right greater than left pneumonia initially seen on CT scan from 9/27/2022, and chest x-ray showing bilateral infiltrates, some of which may be fluid.  Was on reasonable treatment with piperacillin tazobactam on admission, but also had MRSA positive screen which may indicate an infection with MRSA as the cause of her sputum.  Not producing significant amounts.  Less likely atypical mycobacteria or fungal disease.  Negative Legionella and streptococcal urine antigen.  Respiratory viral PCR -10/3.  Histoplasma urine antigen negative, blastomycosis urine antigen negative.  Noninfectious etiologies also possible including autoimmune versus other.  Was responding to abx, but noncompliant.  2.  Leukocytosis, neutrophilic resolved.  Did not receive steroids.  May be related to above issues.  3.  Encephalopathy, toxic and metabolic, as well as elevated ammonia level with cirrhosis and possible hepatic encephalopathy.  Cryptococcal antigen negative.  4.  Acute hypoxic respiratory failure increasing to 40 L/min on 10/3/2022.  Related to above issues.  RA, then 4 L/m on 10/10.  At risk for recurrent aspiration.  5.  Acute renal failure, creatinine 1.55,  may be worse with vancomycin.  Creatinine 1.71 on 10/4, worse.  Vancomycin was stopped 10/3.  1.52 on 10/5.  1.43 10/8.  1.40 10/11.  6.  Hyponatremia resolved.  7.  Hypocalcemia 8.3.  8.  Cirrhosis, possibly related to substance use, hepatitis C, versus other.  Advanced findings for portal hypertension with ascites.  9.  Hepatitis C, treatment status unknown.  Acute hepatitis panel 10/4/2022 positive for hep C, negative for other.  10.  Elevated alkaline phosphatase 147.  11.  Anemia, chronic disease related to above issues.  Slightly worse.  Also could be related to linezolid.  12.  Schizophrenia.  Interfering with her care.  13.  Substance use, status unclear.  Drug screen had been positive for cocaine (9/28/2022).  14.  Thrombocytopenia, worse.  May be related to Zyvox.    Plan:    1.  Diagnostically, continue to follow patient's physical exam, CBC, CMP, CRP.  Radiographic studies.  2.  Therapeutically, finished piperacillin tazobactam on 10/9 (changed to po augmentin), and changed vancomycin to linezolid 600 mg p.o. twice daily for MRSA pneumonia coverage (on 10/3), duration to be determined (but was changed to IV because refusing p.o.), then back to po when pulled out lines.  There is a small chance of a drug interaction between linezolid and amitriptyline with a serotonin syndrome.  Benefit greater than risk.  Patient probably is recurrently aspirating.  Discontinue zyvox/augmentin till 10/12, then follow-up antibiotics.  3.  Oxygen support therapy.  40 L/min on 10/3/2022.  Room air on 10/11, 10/12.  4.  High risk for readmission.  5.  DNR/DNI.    I discussed the patient's findings and my recommendations with patient and nursing staff    Our group would be pleased to follow this patient over the course of their hospitalization and assist with outpatient antimicrobial therapy, as indicated.  Further recommendations depend on the results of the cultures and clinical course.      Hugo Castillo,  MD  10/12/2022

## 2022-10-12 NOTE — DISCHARGE SUMMARY
[facility] Medicine Services  ELOPEMENT AGAINST MEDICAL ADVICE    Patient Name: Kaylie Cruz  : 1965  MRN: [id number]    Date of Admission: 2022  Date of Elopement:  10/12/2022  Primary Care Physician: Iesha Harris DO    Consults     Date and Time Order Name Status Description    10/5/2022 11:03 AM Inpatient Palliative Care MD Consult Completed     10/3/2022  9:43 AM Inpatient Infectious Diseases Consult Completed     10/3/2022  9:43 AM Inpatient Pulmonology Consult Completed         Hospital Course     Presenting Problem:   Pneumonia of right middle lobe due to infectious organism [J18.9]  Pneumonia of right middle lobe due to infectious organism [J18.9]  Pneumonia of right middle lobe due to infectious organism [J18.9]    Active Hospital Problems    Diagnosis  POA   • Pneumonia of right middle lobe due to infectious organism [J18.9]  Yes   • Sepsis, unspecified organism (HCC) [A41.9]  Unknown   • Tobacco use [Z72.0]  Yes   • Vaginal cancer (HCC) [C52]  Yes   • High grade squamous intraepithelial lesion (HGSIL) on cytologic smear of vagina [R87.623]  Yes   • Type 2 diabetes mellitus with hyperglycemia, with long-term current use of insulin (HCC) [E11.65, Z79.4]  Not Applicable   • Chronic hepatitis C without hepatic coma (HCC) [B18.2]  Yes   • Decompensated hepatic cirrhosis (HCC) [K72.90, K74.60]  Yes   • HFrEF (heart failure with reduced ejection fraction) (HCC) [I50.20]  Yes   • Coronary artery disease involving native coronary artery of native heart without angina pectoris [I25.10]  Yes   • Schizophrenia (HCC) [F20.9]  Yes   • Chronic pain disorder [G89.4]  Yes   • Primary hypertension [I10]  Yes   • Long-term current use of opiate analgesic [Z79.891]  Not Applicable   • Esophageal varices (HCC) [I85.00]  Yes      Resolved Hospital Problems   No resolved problems to display.          Hospital Course:  Kaylie Cruz is a 57 y.o. female with PMHx vaginal  squamous cell carcinoma in 2009 s/p WPRT, vaginal brachytherapy hysterectomy, HTN, HLD, history of substance abuse, history of Hepatitis C and Cirrhosis, CAD, DM2, Schizophrenia, HFrEF (EF 49%),  who presented to ED with CC of cough, subjective fever, SOB, abdominal pain and diarrhea.    Patient treated for bilateral pneumonia, sepsis with LIDC following for antibiotic management. Zosyn changed to augmentin with zyvox as patient pulled out IV and refused replacement. She had paracentesis x 2 during admission with improvement in SOA. Today noted decrease in Hgb 6.8. patient refused blood transfusion and further labs despite multiple discussions. Patient left AMA this after refusing further orders placed.    The following is hospital course to date:  Sepsis, POA  Bilateral PNA  Leukocytosis - resolved  +MRSA PCR   - Cryptococcal antigen negative. Fungal Alina, Fungitell B-D glucan, Histoplasma negative   - Sputum cx ordered but patient without productive cough   - Respiratory PCR negative  - CT scan chest done on 10/5 - dense diffuse and extensive reticular and groundglass disease throughout most of the lungs   -SLP has seen, S/P FEES 9/29 and functional oral and pharyngeal phase, signed off   - Palliative following, now DNR/DNI  - Initially was on high flow and now improved RA-2LNC   - ID and pulm following have followed  - ABx changed to augmentin and zyvox to continue through 10/13  - Currently holding on steroids   - Mobilize. Trial mucinex.      Acute on Chronic Anemia  - hgb range ~ 8-8.5  -today 6.8. refused type and screen and transfusion overnight and this morning. Denies any symptoms to me currently     Decompensated cirrhosis with ascites  History of Hepatitis C  Elevated LFTs   -Follows with GI outpatient but missed last appt.   -EGD due 04/2023 for EV screening  -S/P LVP on 9/29 with 2.25L of ascitic fluid removed, does not appear c/w SBP culture NGTD  -- repeat LVP 10/11 as ascites accumulating once again  despite lasix attempts (10/8 and 10/10), s/p 4.5L ascitic fluid removed.  Ordered Albumin post-procedure but pt refused  -Hepatitis panel + Hep C ab  -Continue Xifaxan    - lactulose TID ordered- but patient having large amount of diarrhea since paracentesis and being held. Giving one dose of imodium as also minimally eating or drinking 2/2 diarrhea      Likely CKD  - creatinine remains 1.3-1.4  - Cr stable. However was fairly normal in may, currently 1.5 and suspect a mild dehydration component with decreased intake. Patient refusing IV, fluids at this time     Hypokalemia  -- Improved     Chronic HFrEF   Coronary artery disease involving native coronary artery of native heart without angina pectoris  Primary hypertension  -Echo 3/2022 - EF 49%, global hypokinesis, grade 1 diastolic dysfunction  -[facility] 4/3/2022 - nonobstructive LAD to LAD 60% in mid region  -Pt is supposed to follow with Dr Daniel, has missed appts   -Continue ASA 81, metoprolol  -Continue statin   - Monitor volume status     Type 2 diabetes mellitus with diabetic polyneuropathy, with long-term current use of insulin  -A1c has improved from 8.5% to 6.6% with medication compliance over the past 3 months  -Has appointment to establish with Endocrinology next month  -Continue Lantus 15 units nightly  -Continue Humalog 5 units TID meals   -Continue MDSSI  -Continue to HOLD Gabapentin to avoid lethargy   - no change today     Recent Gross hematuria  -Seen at  8/23/22 and diagnosed with UTI, treated with ABX   -Per patient the dysuria resolved but still having intermittent hematuria  -CT A/P without contrast with unremarkable bladder   -Patient has already been referred to Urology per PCP      Tobacco use  -Encourage smoking cessation  -PCP note says they plan to obtain PFTs outpatient as she has had some wheezing but no formal Dx of COPD  -PRN Albuterol   -Continue scheduled DuoNebs      High grade squamous intraepithelial lesion (HGSIL) on cytologic  smear of vagina  Vaginal cancer   -Diagnosed 2010? s/p radiation, surgical resection, and hysterectomy. Previously following with Dr. Roxanne Chaudhry, Gyn-Onc in [place] with [facility].   -She had vaginal pap smear with HGSIL and was lost to follow up.   -PCP has referred to GYN and gyn-onc for further evaluation      Schizophrenia, unspecified type  Hospital-related delirium vs hepatic encephalopathy  -Following with [place] for talk therapy  - restarted seroquel 150 mg qHS on 10/7, given an extra 50mg 10/8 am. QTc remains prolonged at 491. Will defer increasing Seroquel back to her home dose (200mg) due to this.  Monitor closely  -- lactulose plan as noted above     Substance abuse  -UDS positive for cocaine, oxycodone and TCA     R rib pain  -Likely from PNA  -Continue Lidoderm patch   -Takes Roxicodone at home, resume low dose 5mg q8PRN  - rib XR with no acute or healing rib fracture      Deep Tissue Pressure Injury (Bilateral Sacral area)  -WOC following/PT wound  -Dolphin bed ordered, patient not happy with this mattress     POC: Patient wants to leave, tired of being in the hospital. She has been refusing treatment. Would like me to discuss labs/ transfusion and medications with daughter before she will agree. I have attempted to call Willis, daughter, but have not been able to reach 3 times today  **Patient left AMA prior to completion of evaluation and management**      Day of Discharge     HPI:   Patient seen this morning. Complains about being in hospital. Currently refusing all care    Vital Signs:   Temp:  [97.5 °F (36.4 °C)-98.1 °F (36.7 °C)] 97.5 °F (36.4 °C)  Heart Rate:  [89-99] 89  Resp:  [18-20] 18  BP: (115-132)/(65-79) 115/79     Physical Exam (if applicable):  Constitutional: No acute distress, awake, alert  HENT: NCAT, mucous membranes moist  Respiratory: Clear bilat  Cardiovascular: RRR, no murmurs, rubs, or gallops  Gastrointestinal: Positive bowel sounds, soft, nontender, +  ascites  Musculoskeletal: No bilateral ankle edema  Psychiatric: Angry  Neurologic: Oriented x 3,COPELAND, speech clear  Skin: No rashes    Pending Labs     Order Current Status    ABO RH Specimen Verification Collected (10/11/22 0651)    Hepatitis C RNA, Quantitative, PCR (graph) In process        Discharge Details     Discharge Disposition:  **Patient left AMA prior to completion of evaluation and management, therefore discharge planning remains incomplete including absence of any needed discharging medications, testing arrangements or follow up unless otherwise specified**    Future Appointments   Date Time Provider Department Center   10/14/2022  9:45 AM Rosenstein, Kyle S., MD [facility]   10/21/2022  3:30 PM Iesha Harris DO [facility]             Margarita Yang, APRN  10/12/22

## 2022-10-12 NOTE — THERAPY TREATMENT NOTE
Patient Name: Kaylie Cruz  : 1965    MRN: [id number]                              Today's Date: 10/12/2022       Admit Date: 2022    Visit Dx:     ICD-10-CM ICD-9-CM   1. Pneumonia of right middle lobe due to infectious organism  J18.9 486   2. Right sided abdominal pain  R10.9 789.09   3. Cirrhosis of liver with ascites, unspecified hepatic cirrhosis type (HCC)  K74.60 571.5    R18.8    4. ANIKET (acute kidney injury) (HCC)  N17.9 584.9   5. Acute on chronic congestive heart failure, unspecified heart failure type (HCC)  I50.9 428.0   6. Sepsis, due to unspecified organism, unspecified whether acute organ dysfunction present (HCC)  A41.9 038.9     995.91   7. Dysphagia, unspecified type  R13.10 787.20     Patient Active Problem List   Diagnosis   • Chronic pain disorder   • Primary hypertension   • Long-term current use of opiate analgesic   • Lumbosacral spondylosis without myelopathy   • Migraine   • Coronary artery disease involving native coronary artery of native heart without angina pectoris   • Schizophrenia (HCC)   • Esophageal varices (HCC)   • Decompensated hepatic cirrhosis (HCC)   • HFrEF (heart failure with reduced ejection fraction) (HCC)   • Vaginal cancer (HCC)   • High grade squamous intraepithelial lesion (HGSIL) on cytologic smear of vagina   • Type 2 diabetes mellitus with hyperglycemia, with long-term current use of insulin (HCC)   • Chronic hepatitis C without hepatic coma (HCC)   • Tobacco use   • Pneumonia of right middle lobe due to infectious organism   • Sepsis, unspecified organism (HCC)     Past Medical History:   Diagnosis Date   • Anemia    • Cancer (HCC)     cervical   • Depression    • Hyperlipidemia    • Hypertension    • Infectious viral hepatitis    • Myocardial infarction (HCC)    • Substance abuse (HCC)      Past Surgical History:   Procedure Laterality Date   • BLADDER SURGERY     • CARDIAC CATHETERIZATION      4/3/2022   • CHOLECYSTECTOMY     • COLON RESECTION       7/19/2017, descending and transverse colon   • COLON SURGERY     • COLONOSCOPY      7/19/2017   • ENDOSCOPY      EGD 4/1/22   • HYSTERECTOMY     • TRANSESOPHAGEAL ECHOCARDIOGRAM (AVINASH)      Global hyokinesis, 49% EF, LVH      General Information     Row Name 10/12/22 0810          OT Time and Intention    Document Type therapy note (daily note)  -     Mode of Treatment occupational therapy  -AC     Row Name 10/12/22 0810          General Information    Patient Profile Reviewed yes  -AC     Existing Precautions/Restrictions fall  brief with OOB activity  -AC     Row Name 10/12/22 0810          Cognition    Orientation Status (Cognition) oriented x 3  -AC     Row Name 10/12/22 0810          Safety Issues, Functional Mobility    Safety Issues Affecting Function (Mobility) insight into deficits/self-awareness;safety precaution awareness;friction/shear risk;judgment  -AC     Impairments Affecting Function (Mobility) balance;endurance/activity tolerance;strength  -AC     Cognitive Impairments, Mobility Safety/Performance insight into deficits/self-awareness;judgment;safety precaution awareness  -           User Key  (r) = Recorded By, (t) = Taken By, (c) = Cosigned By    Initials Name Provider Type    AC Renae Alford, OT Occupational Therapist                 Mobility/ADL's     Row Name 10/12/22 0814          Bed Mobility    Bed Mobility rolling left;rolling right  -AC     Rolling Left [place] (Bed Mobility) modified independence  -AC     Rolling Right [place] (Bed Mobility) modified independence  -AC     Scooting/Bridging [place] (Bed Mobility) modified independence  -AC     Assistive Device (Bed Mobility) bed rails;head of bed elevated  -AC     Comment, (Bed Mobility) pt rolled right and left 4 times for dep jaclyn care and brief change  -     Row Name 10/12/22 0814          Transfers    Comment, (Transfers) pt declined stating she felt too weak and was frustrated over continuous bowel movements   -     Row Name 10/12/22 0814          Activities of Daily Living    BADL Assessment/Intervention lower body dressing  -     Row Name 10/12/22 0814          Lower Body Dressing Assessment/Training    [place] Level (Lower Body Dressing) doff;don;pants/bottoms  pull up brief  -AC     Position (Lower Body Dressing) supine  rolled R/L and bridged to pull up brief  -AC     Comment, (Lower Body Dressing) initial brief cut off, pt mod A to don pull up brief  -AC           User Key  (r) = Recorded By, (t) = Taken By, (c) = Cosigned By    Initials Name Provider Type    Renae Gorman, OT Occupational Therapist               Obj/Interventions     Row Name 10/12/22 0820          Shoulder (Therapeutic Exercise)    Shoulder AROM (Therapeutic Exercise) bilateral;flexion;extension;aBduction;aDduction;scapular elevation;15 repititions  -     Row Name 10/12/22 0820          Elbow/Forearm (Therapeutic Exercise)    Elbow/Forearm (Therapeutic Exercise) AROM (active range of motion)  -     Elbow/Forearm AROM (Therapeutic Exercise) bilateral;flexion;extension;15 repititions  -     Row Name 10/12/22 0820          Motor Skills    Therapeutic Exercise shoulder;elbow/forearm  -           User Key  (r) = Recorded By, (t) = Taken By, (c) = Cosigned By    Initials Name Provider Type    Renae Gorman, OT Occupational Therapist               Goals/Plan    No documentation.                Clinical Impression     Row Name 10/12/22 0821          Pain Assessment    Pretreatment Pain Rating 9/10  -     Posttreatment Pain Rating 9/10  -     Pain Location - abdomen;back  -     Pain Intervention(s) Repositioned;Ambulation/increased activity  -     Row Name 10/12/22 0821          Plan of Care Review    Plan of Care Reviewed With patient  -     Outcome Evaluation Pt fatigued and frustrated over having continuous bowel movements.  Pt mod ind to roll R/L x 4 for dep jaclyn care,  mod ind to scoot up in bed,  mod A to don  pullup brief in supine.  Pt completed 15 reps BUE TE given cues for full range. Pt limited by pain, weakness and decreased activity tolerance. Recommend IP rehab upon d/c.  -AC     Row Name 10/12/22 0821          Vital Signs    Post Systolic BP Rehab 121  -AC     Post Treatment Diastolic BP 72  -AC     Posttreatment Heart Rate (beats/min) 89  -AC     O2 Delivery Intra Treatment room air  -AC     Post SpO2 (%) 96  -AC     Pre Patient Position Supine  -AC     Post Patient Position Supine  -AC     Row Name 10/12/22 0821          Positioning and Restraints    Pre-Treatment Position in bed  -AC     Post Treatment Position bed  -AC     In Bed notified nsg;supine;call light within reach;encouraged to call for assist;exit alarm on  -AC           User Key  (r) = Recorded By, (t) = Taken By, (c) = Cosigned By    Initials Name Provider Type    Renae Gorman, SMITH Occupational Therapist               Outcome Measures     Row Name 10/12/22 0826          How much help from another is currently needed...    Putting on and taking off regular lower body clothing? 2  -AC     Bathing (including washing, rinsing, and drying) 2  -AC     Toileting (which includes using toilet bed pan or urinal) 2  -AC     Putting on and taking off regular upper body clothing 3  -AC     Taking care of personal grooming (such as brushing teeth) 3  -AC     Eating meals 4  -AC     AM-PAC 6 Clicks Score (OT) 16  -AC     Row Name 10/12/22 0826          Functional Assessment    Outcome Measure Options AM-PAC 6 Clicks Daily Activity (OT)  -AC           User Key  (r) = Recorded By, (t) = Taken By, (c) = Cosigned By    Initials Name Provider Type    Renae Gorman, SMITH Occupational Therapist                Occupational Therapy Education     Title: PT OT SLP Therapies (In Progress)     Topic: Occupational Therapy (In Progress)     Point: ADL training (In Progress)     Description:   Instruct learner(s) on proper safety adaptation and remediation techniques  during self care or transfers.   Instruct in proper use of assistive devices.              Learning Progress Summary           Patient Acceptance, E, NR by AC at 10/12/2022 0826    Acceptance, E, VU by AC at 10/10/2022 1335    Acceptance, E, DU,NR by CARO at 10/8/2022 1616    Comment: IV line management; energy conservation/pacing; PLB    Acceptance, E, VU by AC at 10/6/2022 1128    Acceptance, E,TB,D, NR by KF at 10/3/2022 1546    Acceptance, E, VU by CARO at 9/29/2022 1421    Comment: Reason for OT consult    Acceptance, E, NR,NL by MR at 9/28/2022 0837   Family Acceptance, E, VU by CARO at 9/29/2022 1421    Comment: Reason for OT consult                   Point: Home exercise program (In Progress)     Description:   Instruct learner(s) on appropriate technique for monitoring, assisting and/or progressing therapeutic exercises/activities.              Learning Progress Summary           Patient Acceptance, E,TB,D, NR by KF at 10/3/2022 1546    Acceptance, E, NR,NL by MR at 9/28/2022 0837                   Point: Precautions (Done)     Description:   Instruct learner(s) on prescribed precautions during self-care and functional transfers.              Learning Progress Summary           Patient Acceptance, E, DU,NR by CARO at 10/8/2022 1616    Comment: IV line management; energy conservation/pacing; PLB    Acceptance, E,TB,D, NR by KF at 10/3/2022 1546    Acceptance, E, NR,NL by MR at 9/28/2022 0837                   Point: Body mechanics (Done)     Description:   Instruct learner(s) on proper positioning and spine alignment during self-care, functional mobility activities and/or exercises.              Learning Progress Summary           Patient Acceptance, E, DU,NR by CARO at 10/8/2022 1616    Comment: IV line management; energy conservation/pacing; PLB    Acceptance, E,TB,D, NR by KF at 10/3/2022 1546    Acceptance, E, NR,NL by MR at 9/28/2022 0837                               User Key     Initials Effective Dates Name  Provider Type Discipline    AC 06/16/21 -  Renae Alford, OT Occupational Therapist OT    KF 06/16/21 -  Neyda Cagle, OT Occupational Therapist OT    CARO 06/16/21 -  Sabrina Garcia, OT Occupational Therapist OT    MR 09/22/22 -  Guadalupe Garcia, OT Occupational Therapist OT              OT Recommendation and Plan     Plan of Care Review  Plan of Care Reviewed With: patient  Progress: improving  Outcome Evaluation: Pt fatigued and frustrated over having continuous bowel movements.  Pt mod ind to roll R/L x 4 for dep jaclyn care,  mod ind to scoot up in bed,  mod A to don pullup brief in supine.  Pt completed 15 reps BUE TE given cues for full range. Pt limited by pain, weakness and decreased activity tolerance. Recommend IP rehab upon d/c.     Time Calculation:    Time Calculation- OT     Row Name 10/12/22 0735             Time Calculation- OT    OT Start Time 0735  -AC      OT Received On 10/12/22  -AC      OT Goal Re-Cert Due Date 10/18/22  -AC         Timed Charges    41598 - OT Therapeutic Exercise Minutes 10  -AC      56220 - OT Therapeutic Activity Minutes 5  -AC      16479 - OT Self Care/Mgmt Minutes 10  -AC         Total Minutes    Timed Charges Total Minutes 25  -AC       Total Minutes 25  -AC            User Key  (r) = Recorded By, (t) = Taken By, (c) = Cosigned By    Initials Name Provider Type    AC Renae Alford, OT Occupational Therapist              Therapy Charges for Today     Code Description Service Date Service Provider Modifiers Qty    32456246638 HC OT THER PROC EA 15 MIN 10/12/2022 Renae Alford, OT GO 1    88043967156 HC OT SELF CARE/MGMT/TRAIN EA 15 MIN 10/12/2022 Rneae Alford OT GO 1               Renae Alford OT  10/12/2022

## 2022-10-14 LAB
HCV RNA SERPL NAA+PROBE-ACNC: NORMAL IU/ML
TEST INFORMATION: NORMAL

## 2022-12-16 ENCOUNTER — OFFICE VISIT (OUTPATIENT)
Dept: FAMILY MEDICINE CLINIC | Facility: CLINIC | Age: 57
End: 2022-12-16

## 2022-12-16 VITALS
BODY MASS INDEX: 20.38 KG/M2 | SYSTOLIC BLOOD PRESSURE: 130 MMHG | HEART RATE: 72 BPM | WEIGHT: 115 LBS | HEIGHT: 63 IN | DIASTOLIC BLOOD PRESSURE: 92 MMHG | OXYGEN SATURATION: 99 %

## 2022-12-16 DIAGNOSIS — I10 PRIMARY HYPERTENSION: ICD-10-CM

## 2022-12-16 DIAGNOSIS — E11.42 TYPE 2 DIABETES MELLITUS WITH DIABETIC POLYNEUROPATHY, WITH LONG-TERM CURRENT USE OF INSULIN: ICD-10-CM

## 2022-12-16 DIAGNOSIS — F20.9 SCHIZOPHRENIA, UNSPECIFIED TYPE: ICD-10-CM

## 2022-12-16 DIAGNOSIS — Z79.4 TYPE 2 DIABETES MELLITUS WITH HYPERGLYCEMIA, WITH LONG-TERM CURRENT USE OF INSULIN: ICD-10-CM

## 2022-12-16 DIAGNOSIS — I85.00 IDIOPATHIC ESOPHAGEAL VARICES WITHOUT BLEEDING: ICD-10-CM

## 2022-12-16 DIAGNOSIS — I50.20 HFREF (HEART FAILURE WITH REDUCED EJECTION FRACTION): ICD-10-CM

## 2022-12-16 DIAGNOSIS — K72.90 DECOMPENSATED HEPATIC CIRRHOSIS: Primary | ICD-10-CM

## 2022-12-16 DIAGNOSIS — R06.2 WHEEZING: ICD-10-CM

## 2022-12-16 DIAGNOSIS — B18.2 CHRONIC HEPATITIS C WITHOUT HEPATIC COMA: ICD-10-CM

## 2022-12-16 DIAGNOSIS — Z79.4 TYPE 2 DIABETES MELLITUS WITH DIABETIC POLYNEUROPATHY, WITH LONG-TERM CURRENT USE OF INSULIN: ICD-10-CM

## 2022-12-16 DIAGNOSIS — I25.10 CORONARY ARTERY DISEASE INVOLVING NATIVE CORONARY ARTERY OF NATIVE HEART WITHOUT ANGINA PECTORIS: ICD-10-CM

## 2022-12-16 DIAGNOSIS — K74.60 DECOMPENSATED HEPATIC CIRRHOSIS: Primary | ICD-10-CM

## 2022-12-16 DIAGNOSIS — Z72.0 CURRENT TOBACCO USE: ICD-10-CM

## 2022-12-16 DIAGNOSIS — E11.65 TYPE 2 DIABETES MELLITUS WITH HYPERGLYCEMIA, WITH LONG-TERM CURRENT USE OF INSULIN: ICD-10-CM

## 2022-12-16 PROBLEM — J18.9 PNEUMONIA OF RIGHT MIDDLE LOBE DUE TO INFECTIOUS ORGANISM: Status: RESOLVED | Noted: 2022-09-27 | Resolved: 2022-12-16

## 2022-12-16 LAB
EXPIRATION DATE: NORMAL
HBA1C MFR BLD: 6.4 %
Lab: NORMAL

## 2022-12-16 PROCEDURE — 99215 OFFICE O/P EST HI 40 MIN: CPT | Performed by: STUDENT IN AN ORGANIZED HEALTH CARE EDUCATION/TRAINING PROGRAM

## 2022-12-16 PROCEDURE — 3044F HG A1C LEVEL LT 7.0%: CPT | Performed by: STUDENT IN AN ORGANIZED HEALTH CARE EDUCATION/TRAINING PROGRAM

## 2022-12-16 PROCEDURE — 83036 HEMOGLOBIN GLYCOSYLATED A1C: CPT | Performed by: STUDENT IN AN ORGANIZED HEALTH CARE EDUCATION/TRAINING PROGRAM

## 2022-12-16 RX ORDER — METOPROLOL TARTRATE 50 MG/1
50 TABLET, FILM COATED ORAL 2 TIMES DAILY
Qty: 60 TABLET | Refills: 2 | Status: SHIPPED | OUTPATIENT
Start: 2022-12-16

## 2022-12-16 RX ORDER — INSULIN LISPRO 100 [IU]/ML
INJECTION, SOLUTION INTRAVENOUS; SUBCUTANEOUS
COMMUNITY
Start: 2022-10-28 | End: 2022-12-16

## 2022-12-16 RX ORDER — QUETIAPINE FUMARATE 200 MG/1
300 TABLET, FILM COATED ORAL NIGHTLY
Qty: 30 TABLET | Refills: 5 | Status: SHIPPED | OUTPATIENT
Start: 2022-12-16 | End: 2022-12-16

## 2022-12-16 RX ORDER — INSULIN GLARGINE 100 [IU]/ML
10 INJECTION, SOLUTION SUBCUTANEOUS NIGHTLY
Qty: 3 ML | Refills: 2 | Status: SHIPPED | OUTPATIENT
Start: 2022-12-16 | End: 2023-01-05

## 2022-12-16 RX ORDER — ALBUTEROL SULFATE 90 UG/1
2 AEROSOL, METERED RESPIRATORY (INHALATION) EVERY 4 HOURS PRN
Qty: 8 G | Refills: 1 | Status: SHIPPED | OUTPATIENT
Start: 2022-12-16

## 2022-12-16 RX ORDER — INSULIN ASPART 100 [IU]/ML
2 INJECTION, SOLUTION INTRAVENOUS; SUBCUTANEOUS 2 TIMES DAILY WITH MEALS
Qty: 3 ML | Refills: 5 | Status: SHIPPED | OUTPATIENT
Start: 2022-12-16

## 2022-12-16 RX ORDER — AMLODIPINE BESYLATE 10 MG/1
10 TABLET ORAL DAILY
Qty: 30 TABLET | Refills: 2 | Status: SHIPPED | OUTPATIENT
Start: 2022-12-16

## 2022-12-16 RX ORDER — QUETIAPINE FUMARATE 200 MG/1
TABLET, FILM COATED ORAL
Qty: 30 TABLET | Refills: 5 | Status: SHIPPED | OUTPATIENT
Start: 2022-12-16

## 2022-12-16 RX ORDER — AMITRIPTYLINE HYDROCHLORIDE 50 MG/1
50 TABLET, FILM COATED ORAL NIGHTLY
Qty: 30 TABLET | Refills: 2 | Status: SHIPPED | OUTPATIENT
Start: 2022-12-16

## 2022-12-16 RX ORDER — SPIRONOLACTONE 50 MG/1
50 TABLET, FILM COATED ORAL DAILY
Qty: 30 TABLET | Refills: 2 | Status: SHIPPED | OUTPATIENT
Start: 2022-12-16

## 2022-12-16 RX ORDER — FUROSEMIDE 40 MG/1
40 TABLET ORAL DAILY
Qty: 30 TABLET | Refills: 5 | Status: SHIPPED | OUTPATIENT
Start: 2022-12-16

## 2022-12-16 NOTE — PROGRESS NOTES
Established Office Visit      Patient Name: Kaylie Cruz  : 1965   MRN: [id number]   Care Team: Patient Care Team:  Iesha Harris DO as PCP - General (Internal Medicine)  Provider, No Known  Provider, No Known  Provider, No Known    Chief Complaint:    Chief Complaint   Patient presents with   • Hypertension   • Diabetes     History of Present Illness: Kaylie Cruz is a 57 y.o. 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    She was hospitalized at  in October for sepsis 2/2 pneumonia, decompensated cirrhosis. She reports feeling better. Reports missing several appointments due to hospitalization and feel generally weak afterwards. Wants new referrals.     She reports ascites is not well controlled. Had paracentesis last week at  [facility]. Since then symptoms of bloating, abdominal discomfort, dyspnea have resolved. She feels her fluid is accumulating every few weeks. She is compliant with lasix 40mg daily. Felt it was better controlled when taking spironolactone. This was discontinued during her hospitalization when she had ANIKET and not resumed once it had improved.     Denies nausea, vomiting, hematemesis, constipation, confusion. She is taking lactulose and reports having bowel movements regularly. She is tolerating PO intake well. Denies alcohol use.     T2DM - A1c 6.4% today. Taking lantus 10u qhs in addition to Lispro 2u BID with meals.     Current tobacco use - 1/4 ppd.   Dyspena improves with use of albuterol inhaler as needed, she requests refill. She never had PFTs done and is open to having this reordered today.     Requests refill of amitriptyline. This is helpful for her insomnia and chronic pain.      Subjective      Review of Systems:   Review of Systems - See HPI    I have reviewed and the following portions of the patient's history were updated as appropriate: past family history, past medical history, past social history, past surgical history and problem list.    Medications:     Current Outpatient Medications:   •  albuterol sulfate  (90 Base) MCG/ACT inhaler, Inhale 2 puffs Every 4 (Four) Hours As Needed for Wheezing., Disp: 8 g, Rfl: 1  •  amitriptyline (ELAVIL) 50 MG tablet, Take 1 tablet by mouth Every Night., Disp: 30 tablet, Rfl: 2  •  amLODIPine (NORVASC) 10 MG tablet, Take 1 tablet by mouth Daily., Disp: 30 tablet, Rfl: 2  •  aspirin 81 MG EC tablet, Take 1 tablet by mouth Daily., Disp: 30 tablet, Rfl: 2  •  Cholecalciferol 50 MCG (2000 UT) capsule, Take 2,000 Units by mouth Daily., Disp: , Rfl:   •  Easy Touch Lancets 30G misc, , Disp: , Rfl:   •  folic acid (FOLVITE) 1 MG tablet, Take 1 mg by mouth Daily., Disp: , Rfl:   •  furosemide (LASIX) 40 MG tablet, Take 1 tablet by mouth Daily., Disp: 30 tablet, Rfl: 5  •  gabapentin (NEURONTIN) 400 MG capsule, Take 800 mg by mouth Every 8 (Eight) Hours., Disp: , Rfl:   •  Insulin Aspart FlexPen 100 UNIT/ML solution pen-injector, Inject 2 Units under the skin into the appropriate area as directed 2 (Two) Times a Day With Meals., Disp: 3 mL, Rfl: 5  •  insulin glargine (LANTUS, SEMGLEE) 100 UNIT/ML injection, Inject 10 Units under the skin into the appropriate area as directed Every Night., Disp: 3 mL, Rfl: 2  •  Insulin Pen Needle (Pen Needles) 31G X 5 MM misc, 1 each 4 (Four) Times a Day Before Meals & at Bedtime., Disp: 300 each, Rfl: 2  •  lactulose (CHRONULAC) 10 GM/15ML solution, Take 15 mL by mouth 3 (Three) Times a Day As Needed (constipation)., Disp: 473 mL, Rfl: 1  •  metoprolol tartrate (LOPRESSOR) 50 MG tablet, Take 1 tablet by mouth 2 (Two) Times a Day., Disp: 60 tablet, Rfl: 2  •  OneTouch Verio test strip, , Disp: , Rfl:   •   "polyethylene glycol (MIRALAX) 17 GM/SCOOP powder, TAKE 17 GRAMS BY MOUTH DAILY AS NEEDED (CONSTIPATION)., Disp: 238 g, Rfl: 1  •  QUEtiapine (SEROquel) 200 MG tablet, Take one tablet nightly, Disp: 30 tablet, Rfl: 5  •  spironolactone (Aldactone) 50 MG tablet, Take 1 tablet by mouth Daily., Disp: 30 tablet, Rfl: 2  •  thiamine 100 MG tablet, , Disp: , Rfl:     Allergies:   Allergies   Allergen Reactions   • Codeine Hives   • Morphine Hives   • Tagamet [Cimetidine] Other (See Comments)     DISCOLORATION OF SKIN   • Toradol [Ketorolac Tromethamine] Hives       Objective     Physical Exam:  Vital Signs:   Vitals:    12/16/22 1303   BP: 130/92   Pulse: 72   SpO2: 99%   Weight: 52.2 kg (115 lb)   Height: 160 cm (63\")     Body mass index is 20.37 kg/m².     Physical Exam  Vitals reviewed.   Constitutional:       Appearance: Normal appearance.      Comments: Chronically ill appearing   Cardiovascular:      Rate and Rhythm: Normal rate and regular rhythm.      Pulses: Normal pulses.      Heart sounds: Normal heart sounds.   Pulmonary:      Effort: Pulmonary effort is normal. No respiratory distress.      Breath sounds: Normal breath sounds. No wheezing.   Abdominal:      General: There is distension.      Tenderness: There is no abdominal tenderness. There is no guarding or rebound.   Skin:     General: Skin is warm and dry.   Neurological:      Mental Status: She is alert.   Psychiatric:         Mood and Affect: Mood normal.         Behavior: Behavior normal.         Judgment: Judgment normal.         Assessment / Plan      Assessment/Plan:   Problems Addressed This Visit  Diagnoses and all orders for this visit:    1. Decompensated hepatic cirrhosis (HCC) (Primary)  2. Chronic hepatitis C without hepatic coma (HCC)  -     spironolactone (Aldactone) 50 MG tablet; Take 1 tablet by mouth Daily.  Dispense: 30 tablet; Refill: 2    Decompensated - esophageal varices, portal gastropathy, ascites  Following with GI - [facility] " FRANK Chamberlain but missed last appointment. Encouraged her to reschedule   She reports completing treatment for Hep C  Continue Nadolol, Lactulose, Lasix  Resume Spironolactone to help with ascites   Last paracentesis 12/12/22  EGD 10/25: Esophageal candidiasis with small esophageal varices and portal hypertensive gastropathy.  Discussed importance of avoiding NSAIDs and alcohol. Avoid Tylenol >2g. Follow low salt diet.     I recommended repeating labs today to monitor platelets, hgb, CMP but she declined.    3. Coronary artery disease involving native coronary artery of native heart without angina pectoris  4. Primary hypertension  5. HFrEF (heart failure with reduced ejection fraction) ([facility])  -     amLODIPine (NORVASC) 10 MG tablet; Take 1 tablet by mouth Daily.  Dispense: 30 tablet; Refill: 2  -     spironolactone (Aldactone) 50 MG tablet; Take 1 tablet by mouth Daily.  Dispense: 30 tablet; Refill: 2  -     furosemide (LASIX) 40 MG tablet; Take 1 tablet by mouth Daily.  Dispense: 30 tablet; Refill: 5    CAD, HFrEF, HTN  -BP well controlled with Amlodipine  -She has chosen to resume ASA81 for secondary ppx. I discussed the risk of bleeding and thrombocytopenia associated with ASA81 especially in someone with decompensated cirrhosis. I recommended repeating labs today to monitor platelets, hgb  -Statin discontinued to due decompensated liver disease   -Echo 3/2022 - EF 49%, global hypokinesis, grade 1 diastolic dysfunction  -LHC 4/3/2022 - nonobstructive LAD to LAD 60% in mid region  -Pt is supposed to follow with Dr Daniel, has missed appts. Provided with number to reschedule today.  -Continue metoprolol      7. Type 2 diabetes mellitus with diabetic polyneuropathy, with long-term current use of insulin ([facility])  -     insulin glargine (LANTUS, SEMGLEE) 100 UNIT/ML injection; Inject 10 Units under the skin into the appropriate area as directed Every Night.  Dispense: 3 mL; Refill: 2  -     Insulin Aspart FlexPen 100  UNIT/ML solution pen-injector; Inject 2 Units under the skin into the appropriate area as directed 2 (Two) Times a Day With Meals.  Dispense: 3 mL; Refill: 5    A1c 6.4% today, at goal. Taking lantus 10u qhs in addition to Lispro 2u BID with meals     8. Idiopathic esophageal varices without bleeding (HCC)    9. Wheezing  -     albuterol sulfate  (90 Base) MCG/ACT inhaler; Inhale 2 puffs Every 4 (Four) Hours As Needed for Wheezing.  Dispense: 8 g; Refill: 1  -     Full Pulmonary Function Test With Bronchodilator; Future    10. Current tobacco use  -     Full Pulmonary Function Test With Bronchodilator; Future    11. Schizophrenia, unspecified type (HCC)  -     amitriptyline (ELAVIL) 50 MG tablet; Take 1 tablet by mouth Every Night.  Dispense: 30 tablet; Refill: 2  -     QUEtiapine (SEROquel) 200 MG tablet; Take 1 tablets by mouth Every Night.  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 s/p radiation, surgical resection, and hysterectomy. Previously following with Dr. Roxanne Chaudhry, Gyn-Onc in [place] with [facility].   -She had vaginal pap smear with HGSIL and was lost to follow up.   -Referred previously to GYN and again provided with information to make appointment      Plan of care reviewed with patient at the conclusion of today's visit. Education was provided regarding diagnosis and management.  Patient verbalizes understanding of and agreement with management plan.    Follow Up:   Return in about 4 weeks (around 1/13/2023) for Recheck cirrhosis .    I spent 49 minutes caring for Kaylie on this date of service. This time includes time spent by me in the following activities:preparing for the visit, reviewing tests, obtaining and/or reviewing a separately obtained history, performing a medically appropriate examination and/or evaluation ,  counseling and educating the patient/family/caregiver, ordering medications, tests, or procedures, referring and communicating with other health care professionals , documenting information in the medical record and independently interpreting results and communicating that information with the patient/family/caregiver    DO FIOR Sharma RD  [facility]  [address]  [facility] [address]  Fax [phone]  Phone [phone]

## 2023-01-05 ENCOUNTER — APPOINTMENT (OUTPATIENT)
Dept: PULMONOLOGY | Facility: HOSPITAL | Age: 58
End: 2023-01-05
Payer: COMMERCIAL

## 2023-01-05 ENCOUNTER — TELEPHONE (OUTPATIENT)
Dept: FAMILY MEDICINE CLINIC | Facility: CLINIC | Age: 58
End: 2023-01-05
Payer: COMMERCIAL

## 2023-01-05 DIAGNOSIS — Z79.4 TYPE 2 DIABETES MELLITUS WITH DIABETIC POLYNEUROPATHY, WITH LONG-TERM CURRENT USE OF INSULIN: Primary | ICD-10-CM

## 2023-01-05 DIAGNOSIS — E11.42 TYPE 2 DIABETES MELLITUS WITH DIABETIC POLYNEUROPATHY, WITH LONG-TERM CURRENT USE OF INSULIN: Primary | ICD-10-CM

## 2023-01-05 NOTE — TELEPHONE ENCOUNTER
Pt had received a prescription of Lantus on 12/17/2022 in vile form with no needles, pt called on 12/27/22 to get the insulin switched to the pen form. Spoke with pharmacist about switching out the one for the other since pt hasn't used the vile and they said a new prescription needed to me put in since it's been 23 days ago. Pt hasn't had her insulin for 3 weeks    Please advise  Kaylie Cruz  [phone]

## 2023-01-09 NOTE — TELEPHONE ENCOUNTER
[facility] has this prescription ready for pickup. Patient is requesting this delivered. I advised her to contact the pharmacist further

## 2023-04-17 ENCOUNTER — TELEPHONE (OUTPATIENT)
Dept: FAMILY MEDICINE CLINIC | Facility: CLINIC | Age: 58
End: 2023-04-17
Payer: COMMERCIAL

## 2023-04-17 NOTE — TELEPHONE ENCOUNTER
Contacted patient to discuss further, she is requesting a new referral to pain management. I advised her to keep her appt on 4/21 for further eval so PCP can follow up after recent hospitalization. She verbalized understanding

## 2023-04-17 NOTE — TELEPHONE ENCOUNTER
Caller: Kaylie Cruz    Relationship: Self    Best call back number: [phone]    What is the medical concern/diagnosis:NA    What specialty or service is being requested: PAIN MANAGEMNT    What is the provider, practice or medical service name: [facility]

## 2023-04-18 ENCOUNTER — TELEPHONE (OUTPATIENT)
Dept: FAMILY MEDICINE CLINIC | Facility: CLINIC | Age: 58
End: 2023-04-18
Payer: COMMERCIAL

## 2023-04-18 NOTE — TELEPHONE ENCOUNTER
Contacted pt & informed her that we do not have an access code for the office and if she is unable to get through on the phone lines then she may have to stop by their office. Pt verbalized understanding and did not have any additional questions at this time.

## 2023-04-18 NOTE — TELEPHONE ENCOUNTER
Caller: Kaylie Cruz    Relationship: Self    Best call back number: [phone]    What was the call regarding: PATIENT STATES SHE HAS BEEN  TRYING TO CALL DR. CALABRESE FROM [facility] ON [facility] AND SHE STATES IT WILL RING AND ASK FOR AN ACCESS CODE AND SHE WOULD LIKE TO KNOW IF DR. TREVINO KNOWS THE ACCESS CODE

## 2023-04-21 ENCOUNTER — OFFICE VISIT (OUTPATIENT)
Dept: FAMILY MEDICINE CLINIC | Facility: CLINIC | Age: 58
End: 2023-04-21
Payer: COMMERCIAL

## 2023-04-21 VITALS
WEIGHT: 109 LBS | OXYGEN SATURATION: 99 % | HEART RATE: 78 BPM | HEIGHT: 63 IN | BODY MASS INDEX: 19.31 KG/M2 | SYSTOLIC BLOOD PRESSURE: 120 MMHG | DIASTOLIC BLOOD PRESSURE: 70 MMHG

## 2023-04-21 DIAGNOSIS — R05.1 ACUTE COUGH: ICD-10-CM

## 2023-04-21 DIAGNOSIS — E55.9 VITAMIN D DEFICIENCY: ICD-10-CM

## 2023-04-21 DIAGNOSIS — F20.9 SCHIZOPHRENIA, UNSPECIFIED TYPE: ICD-10-CM

## 2023-04-21 DIAGNOSIS — J40 BRONCHITIS: ICD-10-CM

## 2023-04-21 DIAGNOSIS — E11.42 TYPE 2 DIABETES MELLITUS WITH DIABETIC POLYNEUROPATHY, WITH LONG-TERM CURRENT USE OF INSULIN: Primary | ICD-10-CM

## 2023-04-21 DIAGNOSIS — B18.2 CHRONIC HEPATITIS C WITHOUT HEPATIC COMA: ICD-10-CM

## 2023-04-21 DIAGNOSIS — K72.90 DECOMPENSATED HEPATIC CIRRHOSIS: ICD-10-CM

## 2023-04-21 DIAGNOSIS — I25.10 CORONARY ARTERY DISEASE INVOLVING NATIVE CORONARY ARTERY OF NATIVE HEART WITHOUT ANGINA PECTORIS: ICD-10-CM

## 2023-04-21 DIAGNOSIS — I10 PRIMARY HYPERTENSION: ICD-10-CM

## 2023-04-21 DIAGNOSIS — K74.60 DECOMPENSATED HEPATIC CIRRHOSIS: ICD-10-CM

## 2023-04-21 DIAGNOSIS — Z79.4 TYPE 2 DIABETES MELLITUS WITH DIABETIC POLYNEUROPATHY, WITH LONG-TERM CURRENT USE OF INSULIN: Primary | ICD-10-CM

## 2023-04-21 PROBLEM — I50.20 HFREF (HEART FAILURE WITH REDUCED EJECTION FRACTION): Status: RESOLVED | Noted: 2022-05-26 | Resolved: 2023-04-21

## 2023-04-21 RX ORDER — BLOOD-GLUCOSE METER
KIT MISCELLANEOUS
Qty: 1 EACH | Refills: 0 | Status: SHIPPED | OUTPATIENT
Start: 2023-04-21

## 2023-04-21 RX ORDER — BENZONATATE 100 MG/1
100 CAPSULE ORAL 3 TIMES DAILY PRN
Qty: 30 CAPSULE | Refills: 0 | Status: SHIPPED | OUTPATIENT
Start: 2023-04-21

## 2023-04-21 RX ORDER — LANCETS
EACH MISCELLANEOUS
Qty: 300 EACH | Refills: 12 | Status: SHIPPED | OUTPATIENT
Start: 2023-04-21

## 2023-04-21 RX ORDER — BLOOD SUGAR DIAGNOSTIC
STRIP MISCELLANEOUS
Qty: 300 EACH | Refills: 5 | Status: SHIPPED | OUTPATIENT
Start: 2023-04-21

## 2023-04-21 RX ORDER — QUETIAPINE FUMARATE 100 MG/1
100 TABLET, FILM COATED ORAL NIGHTLY
Qty: 30 TABLET | Refills: 5 | Status: SHIPPED | OUTPATIENT
Start: 2023-04-21

## 2023-04-21 RX ORDER — CARVEDILOL 12.5 MG/1
12.5 TABLET ORAL 2 TIMES DAILY WITH MEALS
Qty: 60 TABLET | Refills: 5 | Status: SHIPPED | OUTPATIENT
Start: 2023-04-21

## 2023-04-21 RX ORDER — DOXYCYCLINE HYCLATE 100 MG/1
100 CAPSULE ORAL 2 TIMES DAILY
Qty: 10 CAPSULE | Refills: 0 | Status: SHIPPED | OUTPATIENT
Start: 2023-04-21

## 2023-04-21 RX ORDER — OXYCODONE HCL 10 MG/1
10 TABLET, FILM COATED, EXTENDED RELEASE ORAL EVERY 12 HOURS
COMMUNITY

## 2023-04-21 NOTE — PROGRESS NOTES
Established Office Visit      Patient Name: Kaylie Cruz  : 1965   MRN: [id number]   Care Team: Patient Care Team:  Iesha Harris DO as PCP - General (Internal Medicine)  Provider, No Known  Provider, No Known  Provider, No Known    Chief Complaint:    Chief Complaint   Patient presents with   • Hospital Follow Up Visit     23 kidney failure        History of Present Illness: Kaylie Cruz is a 57 y.o. 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    She was hospitalized at  3/13/23-23 for abdominal pain and nausea. Hospital course was complicated by pre renal ANIKET and homelessness.     Since discharge, she has established with weekly outpatient paracentesis and referred for transplant. She reports significant improvement in abdominal pain with this. She has been having bowel movements regualrly without needing to use lactulose. She reports no problems with urinating.     ANIKET on CKD - peak Cr 3.2, improved to baseline of 1.5 upon discharge. Thought to be due to hypovolemia, unable to tolerate diuretics.  Lasix and spironolactone were stopped.    T2DM - controlled with lifestyle modifications alone. A1c 5.6%.  She does not have a glucometer right now and has not been checking her sugars at home    HTN - well controlled with amlodipine and coreg.    She is living at [facility] right now. She has been coughing lately and fears she caught an infection. Reports cough is productive/thick.   She is waiting on an apartment  She has a       Subjective      Review of Systems:   Review of Systems - See HPI    I have reviewed and the following portions of the patient's history were updated as appropriate: past  family history, past medical history, past social history, past surgical history and problem list.    Medications:     Current Outpatient Medications:   •  albuterol sulfate  (90 Base) MCG/ACT inhaler, Inhale 2 puffs Every 4 (Four) Hours As Needed for Wheezing., Disp: 8 g, Rfl: 1  •  amitriptyline (ELAVIL) 50 MG tablet, Take 1 tablet by mouth Every Night., Disp: 30 tablet, Rfl: 2  •  amLODIPine (NORVASC) 10 MG tablet, Take 1 tablet by mouth Daily., Disp: 30 tablet, Rfl: 2  •  Cholecalciferol 50 MCG (2000 UT) capsule, Take 1 capsule by mouth Daily., Disp: 30 each, Rfl: 5  •  gabapentin (NEURONTIN) 400 MG capsule, Take 2 capsules by mouth Every 8 (Eight) Hours., Disp: , Rfl:   •  Insulin Aspart FlexPen 100 UNIT/ML solution pen-injector, Inject 2 Units under the skin into the appropriate area as directed 2 (Two) Times a Day With Meals., Disp: 3 mL, Rfl: 5  •  Insulin Pen Needle (Pen Needles) 31G X 5 MM misc, 1 each 4 (Four) Times a Day Before Meals & at Bedtime., Disp: 300 each, Rfl: 2  •  lactulose (CHRONULAC) 10 GM/15ML solution, Take 15 mL by mouth 3 (Three) Times a Day As Needed (constipation)., Disp: 473 mL, Rfl: 1  •  OneTouch Verio test strip, Check blood sugar 3x daily, Disp: 300 each, Rfl: 5  •  oxyCODONE (oxyCONTIN) 10 MG 12 hr tablet, Take 1 tablet by mouth Every 12 (Twelve) Hours., Disp: , Rfl:   •  polyethylene glycol (MIRALAX) 17 GM/SCOOP powder, TAKE 17 GRAMS BY MOUTH DAILY AS NEEDED (CONSTIPATION)., Disp: 238 g, Rfl: 1  •  QUEtiapine (SEROquel) 100 MG tablet, Take 1 tablet by mouth Every Night. Take one tablet nightly, Disp: 30 tablet, Rfl: 5  •  thiamine 100 MG tablet, , Disp: , Rfl:   •  benzonatate (Tessalon Perles) 100 MG capsule, Take 1 capsule by mouth 3 (Three) Times a Day As Needed for Cough., Disp: 30 capsule, Rfl: 0  •  carvedilol (Coreg) 12.5 MG tablet, Take 1 tablet by mouth 2 (Two) Times a Day With Meals., Disp: 60 tablet, Rfl: 5  •  doxycycline (VIBRAMYCIN) 100 MG capsule, Take 1  "capsule by mouth 2 (Two) Times a Day., Disp: 10 capsule, Rfl: 0  •  glucose monitor monitoring kit, Use to check blood sugar 3x daily, Disp: 1 each, Rfl: 0  •  Lancets (onetouch ultrasoft) lancets, Use to check blood sugar 3x daily, Disp: 300 each, Rfl: 12    Allergies:   Allergies   Allergen Reactions   • Ketamine Hives   • Codeine Hives   • Morphine Hives   • Tagamet [Cimetidine] Other (See Comments)     DISCOLORATION OF SKIN   • Toradol [Ketorolac Tromethamine] Hives       Objective     Physical Exam:  Vital Signs:   Vitals:    04/21/23 1415   BP: 120/70   Pulse: 78   SpO2: 99%   Weight: 49.4 kg (109 lb)   Height: 160 cm (63\")     Body mass index is 19.31 kg/m².     Physical Exam  Vitals reviewed.   Constitutional:       Appearance: Normal appearance.      Comments: Chronically ill-appearing  Hoarse voice  Coughing throughout exam   Cardiovascular:      Rate and Rhythm: Normal rate.   Pulmonary:      Effort: Pulmonary effort is normal. No respiratory distress.      Breath sounds: Normal breath sounds. No wheezing, rhonchi or rales.   Abdominal:      General: Abdomen is flat. There is no distension.      Palpations: Abdomen is soft.      Tenderness: There is no abdominal tenderness.   Skin:     General: Skin is warm and dry.   Neurological:      Mental Status: She is alert.   Psychiatric:         Mood and Affect: Mood normal.         Behavior: Behavior normal.         Judgment: Judgment normal.         Assessment / Plan      Assessment/Plan:   Problems Addressed This Visit  Diagnoses and all orders for this visit:    1. Type 2 diabetes mellitus with diabetic polyneuropathy, with long-term current use of insulin (Primary)  -     glucose monitor monitoring kit; Use to check blood sugar 3x daily  Dispense: 1 each; Refill: 0  -     OneTouch Verio test strip; Check blood sugar 3x daily  Dispense: 300 each; Refill: 5  -     Lancets (onetouch ultrasoft) lancets; Use to check blood sugar 3x daily  Dispense: 300 each; " Refill: 12  Previously taking 10 units of Lantus and 2 units 3 times daily of mealtime insulin.  Now managed with dietary modifications only.  Most recent A1c very well controlled at 5.6%.  We will continue to monitor home glucose readings without insulin therapy and can always add back if necessary.  Following with endocrinology    2. Coronary artery disease involving native coronary artery of native heart without angina pectoris  3. Primary hypertension  -     carvedilol (Coreg) 12.5 MG tablet; Take 1 tablet by mouth 2 (Two) Times a Day With Meals.  Dispense: 60 tablet; Refill: 5  -[facility] 4/3/2022 - nonobstructive LAD to LAD 60% in mid region  Blood pressure is well controlled with amlodipine and Coreg.  Has been referred to cardiology previously, Dr. Daniel, but unfortunately missed appointment.  She has the office number and will plan to reschedule.  She has chosen to discontinue aspirin therapy for secondary prophylaxis.  We have previously discussed risk of bleeding and thrombocytopenia associated with decompensated cirrhosis, but also benefit of continuing aspirin therapy to prevent cardiovascular event.  -Not taking statin due to decompensated cirrhosis     4. Schizophrenia, unspecified type  -     QUEtiapine (SEROquel) 100 MG tablet; Take 1 tablet by mouth Every Night. Take one tablet nightly  Dispense: 30 tablet; Refill: 5    5. Vitamin D deficiency  -     Cholecalciferol 50 MCG (2000 UT) capsule; Take 1 capsule by mouth Daily.  Dispense: 30 each; Refill: 5    6. Decompensated hepatic cirrhosis  7. Chronic hepatitis C without hepatic coma  Discussed medication changes following hospitalization.  She is overdue for follow-up with her GI provider.  Encouraged her to schedule this.  She has been referred to [facility] transplant to discuss candidacy for liver transplant.  She has established with outpatient clinic at  for weekly paracentesis and this is controlling ascites very well.  No longer  taking Lasix or spironolactone due to ANIKET in setting of hypovolemia.  Continue lactulose with goal bowel movements 3 times daily to prevent hepatic encephalopathy  EGD 10/25: Esophageal candidiasis with small esophageal varices and portal hypertensive gastropathy.  Discussed importance of avoiding NSAIDs and alcohol. Avoid Tylenol >2g. Follow low salt diet.     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 s/p radiation, surgical resection, and hysterectomy. Previously following with Dr. Roxanne Chaudhry, Gyn-Onc in [place] with [facility].   -She had vaginal pap smear with HGSIL and was lost to follow up.   -Referred previously to GYN and again provided with information to make appointment       Plan of care reviewed with patient at the conclusion of today's visit. Education was provided regarding diagnosis and management.  Patient verbalizes understanding of and agreement with management plan.    Follow Up:   Return in about 2 months (around 6/21/2023) for Recheck - 30 min appointment .        DO FIOR Sharma RD  [facility]  [address]  [facility] [address]  Fax [phone]  Phone [phone]

## 2023-04-25 ENCOUNTER — TELEPHONE (OUTPATIENT)
Dept: FAMILY MEDICINE CLINIC | Facility: CLINIC | Age: 58
End: 2023-04-25
Payer: COMMERCIAL

## 2023-04-25 NOTE — TELEPHONE ENCOUNTER
Caller: Kaylie Cruz    Relationship: Self    Best call back number: [phone]    What medication are you requesting: SOMETHING TO HELP WITH SYMPTOMS    What are your current symptoms: DIARRHEA    How long have you been experiencing symptoms: 4/24/23    Have you had these symptoms before:    [x] Yes  [] No    Have you been treated for these symptoms before:  OVER THE COUNTER IMODIUM HAS NOT HELPED AT ALL   [x] Yes  [] No    If a prescription is needed, what is your preferred pharmacy and phone number: [facility] [id number] [address] [phone] [facility] [phone]      Additional notes: PLEASE ADVISE PATIENT IF/WHEN SOMETHING WILL BE SENT IN TO THE PHARMACY.    SHE STATES THAT FOOD AND LIQUIDS GO RIGHT THROUGH HER SO SHE HAS NOT BEEN EATING OR DRINKING.

## 2023-04-25 NOTE — TELEPHONE ENCOUNTER
Patient reports that she is experiencing severe diarrhea since last night and struggling to maintain fluids    She has been using imodium today, not effective so far. She denies abdominal pain. She will report to ER.

## 2023-04-28 ENCOUNTER — TELEPHONE (OUTPATIENT)
Dept: FAMILY MEDICINE CLINIC | Facility: CLINIC | Age: 58
End: 2023-04-28
Payer: COMMERCIAL

## 2023-04-28 NOTE — TELEPHONE ENCOUNTER
Patient reports she was unable to schedule with Dr. Daniel's office.     Contacted Cardiology, they requested referral to be resent, records faxed.

## 2023-04-28 NOTE — TELEPHONE ENCOUNTER
Caller: Kaylie Cruz    Relationship: Self    Best call back number: [phone]    What is the medical concern/diagnosis: PATIENT IS UNSURE WHAT IS HAPPENING WITH HER HEART THAT SHE NEEDS TO BE SEEN FOR     What specialty or service is being requested: CARDIOLOGY    What is the provider, practice or medical service name: DR EDGAR    What is the office location: [address] [place] [address] [address] [address]    What is the office phone number: [phone]    Any additional details:

## 2023-05-02 DIAGNOSIS — Z12.31 VISIT FOR SCREENING MAMMOGRAM: Primary | ICD-10-CM

## 2023-05-24 ENCOUNTER — OFFICE VISIT (OUTPATIENT)
Dept: CARDIOLOGY | Facility: CLINIC | Age: 58
End: 2023-05-24
Payer: COMMERCIAL

## 2023-05-24 VITALS
DIASTOLIC BLOOD PRESSURE: 76 MMHG | OXYGEN SATURATION: 99 % | HEIGHT: 63 IN | SYSTOLIC BLOOD PRESSURE: 128 MMHG | HEART RATE: 88 BPM | BODY MASS INDEX: 21.09 KG/M2 | WEIGHT: 119 LBS

## 2023-05-24 DIAGNOSIS — I10 PRIMARY HYPERTENSION: ICD-10-CM

## 2023-05-24 DIAGNOSIS — I25.10 CORONARY ARTERY DISEASE INVOLVING NATIVE CORONARY ARTERY OF NATIVE HEART WITHOUT ANGINA PECTORIS: Primary | ICD-10-CM

## 2023-05-24 PROCEDURE — 3078F DIAST BP <80 MM HG: CPT | Performed by: INTERNAL MEDICINE

## 2023-05-24 PROCEDURE — 93000 ELECTROCARDIOGRAM COMPLETE: CPT | Performed by: INTERNAL MEDICINE

## 2023-05-24 PROCEDURE — 3074F SYST BP LT 130 MM HG: CPT | Performed by: INTERNAL MEDICINE

## 2023-05-24 PROCEDURE — 99204 OFFICE O/P NEW MOD 45 MIN: CPT | Performed by: INTERNAL MEDICINE

## 2023-05-24 RX ORDER — FUROSEMIDE 40 MG/1
40 TABLET ORAL 2 TIMES DAILY
COMMUNITY
End: 2023-05-26 | Stop reason: SDUPTHER

## 2023-05-24 RX ORDER — SPIRONOLACTONE 50 MG/1
50 TABLET, FILM COATED ORAL DAILY
Qty: 30 TABLET | Refills: 11 | COMMUNITY
Start: 2023-05-23 | End: 2024-05-22

## 2023-05-24 RX ORDER — AMLODIPINE BESYLATE 5 MG/1
5 TABLET ORAL DAILY
Qty: 30 TABLET | Refills: 11 | Status: SHIPPED | OUTPATIENT
Start: 2023-05-24

## 2023-05-24 NOTE — PROGRESS NOTES
[facility] Cardiology  Consultation H&P  Kaylie Render  1965  [phone]  There is no work phone number on file..    VISIT DATE:  05/24/2023    PCP: Iesha Harris DO  [address] [facility] [address]    CC:  Chief Complaint   Patient presents with   • CAD involving native coronary artery of native heart w/o an       Previous cardiac studies and procedures:  4/3/2022 - C - nonobstructive LAD to LAD 60% in mid region    October 2022 TTE  •  Left Ventricle: The left ventricular systolic function is normal. The   LVEF as measured by biplane volume is 70%. Unable to assess diastolic   function.   •  Right Ventricle: Right ventricle size is normal. The right ventricular   systolic function is normal. Right ventricular systolic pressure is mildly   elevated (35-50mmHg). The estimated RVSP is 48 mmHg.   •  All cardiac valves were reasonably well visualized with 2D and/or color   flow Doppler and there was no significant valve regurgitation or stenosis   seen.   •  Pericardium: No pericardial effusion.   •  There is no recent study available for direct [id number] comparison.       ASSESSMENT:   Diagnosis Plan   1. Coronary artery disease involving native coronary artery of native heart without angina pectoris        2. Primary hypertension              PLAN:  Coronary artery disease: Currently asymptomatic.  Nonobstructive.  Aspirin and statin therapy not indicated in the setting of significant chronic anemia and end-stage cirrhosis.  Continue beta-blockade.    Heart failure with reduced ejection fraction, chronic: Recovery of EF on current medical therapy.  Currently appears intravascularly euvolemic.  Continue combination of Lasix and spironolactone in the setting of recurrent ascites.    Edema: Continue combination of Lasix 40 mg p.o. twice daily and spironolactone 25 mg p.o. daily.  Weaning amlodipine down to 5 mg p.o. daily.  Continue keep home blood pressure  "log.    Smoking cessation.    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  Currently requiring therapeutic paracentesis weekly.  She currently denies chest pain, limiting dyspnea on exertion or palpitations.  Stable bilateral lower extremity edema.  She currently appears compliant with medical therapy.  Blood pressures running less than 130/80 mmHg.    PHYSICAL EXAMINATION:  Vitals:    05/24/23 1344   BP: 128/76   BP Location: Right arm   Patient Position: Sitting   Cuff Size: Adult   Pulse: 88   SpO2: 99%   Weight: 54 kg (119 lb)   Height: 160 cm (63\")     General Appearance:    Alert, cooperative, no distress, appears stated age   Head:    Normocephalic, without obvious abnormality, atraumatic   Eyes:    conjunctiva/corneas clear, EOM's intact, fundi     benign, both eyes   Ears:    Normal TM's and external ear canals, both ears   Nose:   Nares normal, septum midline, mucosa normal, no drainage    or sinus tenderness   Throat:   Lips, mucosa, and tongue normal; teeth and gums normal   Neck:   Supple, symmetrical, trachea midline, no adenopathy;     thyroid:  no enlargement/tenderness/nodules; no carotid    bruit or JVD   Back:     Symmetric, no curvature, ROM normal, no CVA tenderness   Lungs:    Diminished at the right base, otherwise clear, respirations unlabored   Chest Wall:    No tenderness or deformity    Heart:    Regular rate and rhythm, S1 and S2 normal, no murmur, rub   or gallop, normal carotid impulse bilaterally without bruit.   Abdomen:     Soft, non-tender, ascites present   Extremities:   Extremities normal, atraumatic, no cyanosis.  2+ bilateral pretibial edema   Pulses:   2+ and symmetric all extremities   Skin:   Skin color, texture, turgor normal, no rashes or lesions   Lymph nodes:   Cervical, " supraclavicular, and axillary nodes normal   Neurologic:   normal strength, sensation intact     throughout       Diagnostic Data:    ECG 12 Lead    Date/Time: 5/24/2023 2:00 PM  Performed by: Eyad Daniel III, MD  Authorized by: Eyad Daniel III, MD   Comparison: compared with previous ECG from 10/9/2020  Similar to previous ECG  Rhythm: sinus rhythm  Other findings: non-specific ST-T wave changes    Clinical impression: abnormal EKG          Lab Results   Component Value Date    TRIG 165 (H) 05/26/2022    HDL 33 (L) 05/26/2022     Lab Results   Component Value Date    GLUCOSE 86 10/12/2022    BUN 38 (H) 10/12/2022    CREATININE 1.50 (H) 10/12/2022     10/12/2022    K 5.2 (H) 03/26/2023     (H) 10/12/2022    CO2 20.0 (L) 10/12/2022     Lab Results   Component Value Date    HGBA1C 5.9 (H) 03/13/2023     Lab Results   Component Value Date    WBC 6.25 05/23/2023    HGB 8.7 (L) 05/23/2023    HCT 27.9 (L) 05/23/2023     05/23/2023       PROBLEM LIST:  Patient Active Problem List   Diagnosis   • Chronic pain disorder   • Primary hypertension   • Long-term current use of opiate analgesic   • Lumbosacral spondylosis without myelopathy   • Migraine   • Coronary artery disease involving native coronary artery of native heart without angina pectoris   • Schizophrenia   • Esophageal varices   • Decompensated hepatic cirrhosis   • Vaginal cancer   • High grade squamous intraepithelial lesion (HGSIL) on cytologic smear of vagina   • Type 2 diabetes mellitus with hyperglycemia, with long-term current use of insulin   • Chronic hepatitis C without hepatic coma   • Tobacco use   • Sepsis, unspecified organism       PAST MEDICAL HX  Past Medical History:   Diagnosis Date   • Anemia    • Cancer     cervical   • Depression    • Hyperlipidemia    • Hypertension    • Infectious viral hepatitis    • Myocardial infarction    • Substance abuse        Allergies  Allergies   Allergen Reactions   • Codeine Hives   •  Ketamine Hives   • Morphine Hives   • Tagamet [Cimetidine] Other (See Comments)     DISCOLORATION OF SKIN   • Toradol [Ketorolac Tromethamine] Hives       Current Medications    Current Outpatient Medications:   •  amitriptyline (ELAVIL) 50 MG tablet, Take 1 tablet by mouth Every Night., Disp: 30 tablet, Rfl: 2  •  carvedilol (Coreg) 12.5 MG tablet, Take 1 tablet by mouth 2 (Two) Times a Day With Meals., Disp: 60 tablet, Rfl: 5  •  Cholecalciferol 50 MCG (2000 UT) capsule, Take 1 capsule by mouth Daily., Disp: 30 each, Rfl: 5  •  furosemide (LASIX) 40 MG tablet, Take 1 tablet by mouth 2 (Two) Times a Day., Disp: , Rfl:   •  gabapentin (NEURONTIN) 400 MG capsule, Take 2 capsules by mouth Every 8 (Eight) Hours., Disp: , Rfl:   •  glucose monitor monitoring kit, Use to check blood sugar 3x daily, Disp: 1 each, Rfl: 0  •  Insulin Aspart FlexPen 100 UNIT/ML solution pen-injector, Inject 2 Units under the skin into the appropriate area as directed 2 (Two) Times a Day With Meals., Disp: 3 mL, Rfl: 5  •  Insulin Pen Needle (Pen Needles) 31G X 5 MM misc, 1 each 4 (Four) Times a Day Before Meals & at Bedtime., Disp: 300 each, Rfl: 2  •  lactulose (CHRONULAC) 10 GM/15ML solution, Take 15 mL by mouth 3 (Three) Times a Day As Needed (constipation)., Disp: 473 mL, Rfl: 1  •  Lancets (onetouch ultrasoft) lancets, Use to check blood sugar 3x daily, Disp: 300 each, Rfl: 12  •  OneTouch Verio test strip, Check blood sugar 3x daily, Disp: 300 each, Rfl: 5  •  oxyCODONE (oxyCONTIN) 10 MG 12 hr tablet, Take 1 tablet by mouth Every 12 (Twelve) Hours., Disp: , Rfl:   •  polyethylene glycol (MIRALAX) 17 GM/SCOOP powder, TAKE 17 GRAMS BY MOUTH DAILY AS NEEDED (CONSTIPATION)., Disp: 238 g, Rfl: 1  •  QUEtiapine (SEROquel) 100 MG tablet, Take 1 tablet by mouth Every Night. Take one tablet nightly, Disp: 30 tablet, Rfl: 5  •  spironolactone (ALDACTONE) 50 MG tablet, Take 1 tablet by mouth Daily., Disp: 30 tablet, Rfl: 11  •  thiamine 100 MG  tablet, , Disp: , Rfl:   •  amLODIPine (NORVASC) 5 MG tablet, Take 1 tablet by mouth Daily., Disp: 30 tablet, Rfl: 11         ROS  ROS      SOCIAL HX  Social History     Socioeconomic History   • Marital status: Single   Tobacco Use   • Smoking status: Every Day     Packs/day: 0.25     Years: 10.00     Pack years: 2.50     Types: Cigarettes     Start date: 1998   • Smokeless tobacco: Never   Vaping Use   • Vaping Use: Former   • Substances: Nicotine, Flavoring   • Devices: Disposable   • Passive vaping exposure: Yes   Substance and Sexual Activity   • Alcohol use: No   • Drug use: Not Currently     Types: Marijuana, Cocaine(coke)     Comment: from [facility] History April 2022   • Sexual activity: Defer       FAMILY HX  Family History   Problem Relation Age of Onset   • Other Mother         atrial septal defect   • Colon cancer Neg Hx              Eyad Daniel III, MD, FACC

## 2023-05-26 DIAGNOSIS — Z79.4 TYPE 2 DIABETES MELLITUS WITH DIABETIC POLYNEUROPATHY, WITH LONG-TERM CURRENT USE OF INSULIN: ICD-10-CM

## 2023-05-26 DIAGNOSIS — I10 PRIMARY HYPERTENSION: ICD-10-CM

## 2023-05-26 DIAGNOSIS — I25.10 CORONARY ARTERY DISEASE INVOLVING NATIVE CORONARY ARTERY OF NATIVE HEART WITHOUT ANGINA PECTORIS: ICD-10-CM

## 2023-05-26 DIAGNOSIS — E11.42 TYPE 2 DIABETES MELLITUS WITH DIABETIC POLYNEUROPATHY, WITH LONG-TERM CURRENT USE OF INSULIN: ICD-10-CM

## 2023-05-26 DIAGNOSIS — F20.9 SCHIZOPHRENIA, UNSPECIFIED TYPE: ICD-10-CM

## 2023-05-26 DIAGNOSIS — E55.9 VITAMIN D DEFICIENCY: ICD-10-CM

## 2023-05-26 RX ORDER — QUETIAPINE FUMARATE 100 MG/1
100 TABLET, FILM COATED ORAL NIGHTLY
Qty: 30 TABLET | Refills: 5 | Status: SHIPPED | OUTPATIENT
Start: 2023-05-26

## 2023-05-26 RX ORDER — FUROSEMIDE 40 MG/1
40 TABLET ORAL 2 TIMES DAILY
Qty: 90 TABLET | Refills: 0 | Status: SHIPPED | OUTPATIENT
Start: 2023-05-26

## 2023-05-26 RX ORDER — POLYETHYLENE GLYCOL 3350 17 G/17G
17 POWDER, FOR SOLUTION ORAL DAILY
Qty: 238 G | Refills: 1 | Status: SHIPPED | OUTPATIENT
Start: 2023-05-26

## 2023-05-26 RX ORDER — AMITRIPTYLINE HYDROCHLORIDE 50 MG/1
50 TABLET, FILM COATED ORAL NIGHTLY
Qty: 30 TABLET | Refills: 2 | Status: SHIPPED | OUTPATIENT
Start: 2023-05-26

## 2023-05-26 RX ORDER — PEN NEEDLE, DIABETIC 30 GX3/16"
1 NEEDLE, DISPOSABLE MISCELLANEOUS
Qty: 300 EACH | Refills: 2 | Status: SHIPPED | OUTPATIENT
Start: 2023-05-26

## 2023-05-26 RX ORDER — CARVEDILOL 12.5 MG/1
12.5 TABLET ORAL 2 TIMES DAILY WITH MEALS
Qty: 60 TABLET | Refills: 5 | OUTPATIENT
Start: 2023-05-26

## 2023-05-26 NOTE — TELEPHONE ENCOUNTER
Caller: Nancy Kaylie    Relationship: Self    Best call back number: [phone]    Requested Prescriptions:   Requested Prescriptions     Pending Prescriptions Disp Refills   • carvedilol (Coreg) 12.5 MG tablet 60 tablet 5     Sig: Take 1 tablet by mouth 2 (Two) Times a Day With Meals.   • Insulin Pen Needle (Pen Needles) 31G X 5 MM misc 300 each 2     Si each 4 (Four) Times a Day Before Meals & at Bedtime.   • amitriptyline (ELAVIL) 50 MG tablet 30 tablet 2     Sig: Take 1 tablet by mouth Every Night.   • QUEtiapine (SEROquel) 100 MG tablet 30 tablet 5     Sig: Take 1 tablet by mouth Every Night. Take one tablet nightly   • furosemide (LASIX) 40 MG tablet       Sig: Take 1 tablet by mouth 2 (Two) Times a Day.   • Cholecalciferol 50 MCG (2000 UT) capsule 30 each 5     Sig: Take 1 capsule by mouth Daily.   • polyethylene glycol (MIRALAX) 17 GM/SCOOP powder 238 g 1        Pharmacy where request should be sent: [facility] [id number] [address] [phone] PH - [phone] FX     Last office visit with prescribing clinician: 2023   Last telemedicine visit with prescribing clinician: Visit date not found   Next office visit with prescribing clinician: 2023     Does the patient have less than a 3 day supply:  [x] Yes  [] No    Would you like a call back once the refill request has been completed: [] Yes [x] No    If the office needs to give you a call back, can they leave a voicemail: [] Yes [x] No    Gia Phipps Rep   23 11:11 EDT

## 2023-06-09 RX ORDER — AMLODIPINE BESYLATE 5 MG/1
5 TABLET ORAL DAILY
Qty: 30 TABLET | Refills: 11 | Status: SHIPPED | OUTPATIENT
Start: 2023-06-09

## 2023-06-09 NOTE — TELEPHONE ENCOUNTER
Caller: Kaylie Cruz    Relationship: Self    Best call back number: [phone]    Requested Prescriptions:   Requested Prescriptions     Pending Prescriptions Disp Refills    amLODIPine (NORVASC) 5 MG tablet 30 tablet 11     Sig: Take 1 tablet by mouth Daily.        Pharmacy where request should be sent: [facility] [id number] [facility] [address] [place] [address] [phone] PH - [phone]      Last office visit with prescribing clinician: 4/21/2023   Last telemedicine visit with prescribing clinician: Visit date not found   Next office visit with prescribing clinician: 6/22/2023     Additional details provided by patient: THE PATIENT STATES THAT SHE IS OUT OF THE MEDICATION THE PATIENT WOULD ALSO LIKE TO KNOW IF THE DOCTOR CAN ADD ANOTHER BLOOD PRESSURE MEDICATION TO HER MEDICATIONS   THE PATIENT WOULD ALSO LIKE TO FIND OUT IF SHE CAN GET A DEVICE THAT GOES NO HER STOMACH THAT CAN CHECK HER BLOOD PRESSURE DARRICK   Does the patient have less than a 3 day supply:  [x] Yes  [] No    Would you like a call back once the refill request has been completed: [] Yes [x] No    If the office needs to give you a call back, can they leave a voicemail: [] Yes [x] No    Gia Mendoza   06/09/23 11:47 EDT

## 2023-10-30 ENCOUNTER — TELEPHONE (OUTPATIENT)
Dept: FAMILY MEDICINE CLINIC | Facility: CLINIC | Age: 58
End: 2023-10-30
Payer: COMMERCIAL

## 2023-10-30 NOTE — TELEPHONE ENCOUNTER
The patient called stating that a form that needs to be completed by her provider, has been faxed to us twice. Once from [employer] and once from her  at . We can't find the form. El searched for the form as well. She started crying and said she needed the form today or they would not transport her to her weekly Wednesday appointment at the hospital if it was not complete. Malgorzata called [employer] asking what form they needed. They said they would call the patient. I called her , Yomaira. When she called back Malgorzata painter to her. She has transportation set up for the patient for this week and next, until the form gets completed. But, she has been unable to get in touch with the patient. The patient had given me a different phone number, and yamilex gave that phone number to her . Her  will let the patient know to call [employer] to discuss what is needed.

## 2023-10-30 NOTE — TELEPHONE ENCOUNTER
DELETE AFTER REVIEWING: Telephone encounter to be sent to the non-clinical pool.    The [facility] received a fax that requires your attention. The document has been indexed to the patient’s chart for your review.      Reason for sending: AUTHORIZATION FOR TRANSPORTATION    Documents Description: TRANSPORTATION FORM FROM [employer].     Name of Sender: WIL TSAI     Date Indexed: [id number]    Notes (if needed): THE TRANSPORTATION COMPANY NEEDS A SIGNATURE FROM THE PROVIDER.

## 2023-11-08 DIAGNOSIS — E11.42 TYPE 2 DIABETES MELLITUS WITH DIABETIC POLYNEUROPATHY, WITH LONG-TERM CURRENT USE OF INSULIN: ICD-10-CM

## 2023-11-08 DIAGNOSIS — Z79.4 TYPE 2 DIABETES MELLITUS WITH DIABETIC POLYNEUROPATHY, WITH LONG-TERM CURRENT USE OF INSULIN: ICD-10-CM

## 2023-11-08 DIAGNOSIS — F20.9 SCHIZOPHRENIA, UNSPECIFIED TYPE: ICD-10-CM

## 2023-11-08 RX ORDER — INSULIN ASPART 100 [IU]/ML
2 INJECTION, SOLUTION INTRAVENOUS; SUBCUTANEOUS 2 TIMES DAILY WITH MEALS
Qty: 3 ML | Refills: 1 | Status: SHIPPED | OUTPATIENT
Start: 2023-11-08

## 2023-11-08 RX ORDER — AMLODIPINE BESYLATE 5 MG/1
5 TABLET ORAL DAILY
Qty: 30 TABLET | Refills: 0 | Status: SHIPPED | OUTPATIENT
Start: 2023-11-08

## 2023-11-08 RX ORDER — QUETIAPINE FUMARATE 100 MG/1
100 TABLET, FILM COATED ORAL NIGHTLY
Qty: 30 TABLET | Refills: 0 | Status: SHIPPED | OUTPATIENT
Start: 2023-11-08

## 2023-11-08 RX ORDER — FUROSEMIDE 40 MG/1
40 TABLET ORAL 2 TIMES DAILY
Qty: 30 TABLET | Refills: 0 | Status: SHIPPED | OUTPATIENT
Start: 2023-11-08

## 2023-11-08 RX ORDER — AMITRIPTYLINE HYDROCHLORIDE 50 MG/1
50 TABLET, FILM COATED ORAL NIGHTLY
Qty: 30 TABLET | Refills: 0 | Status: SHIPPED | OUTPATIENT
Start: 2023-11-08

## 2023-11-08 RX ORDER — SPIRONOLACTONE 50 MG/1
50 TABLET, FILM COATED ORAL DAILY
Qty: 30 TABLET | Refills: 0 | Status: SHIPPED | OUTPATIENT
Start: 2023-11-08 | End: 2024-11-07

## 2023-11-08 NOTE — TELEPHONE ENCOUNTER
I sent 30 day supply of medications to her pharmacy, pt was last seen in April and asked to FU in 2 months, has not been seen since. Can schedule w PCP when she returns for chronic care FU.    She will need an appt to discuss this referral, I cannot see that she has ever been seen for hemorrhoids in the past.

## 2023-11-08 NOTE — TELEPHONE ENCOUNTER
Patient called to request a referral to Dr. Oreilly @ [facility] for [facility] for dx of hemorrhoids.      FAX:  [phone]      Patient also states she is needing refills on all medicines & Vitamin D.

## 2023-11-17 ENCOUNTER — TELEPHONE (OUTPATIENT)
Dept: FAMILY MEDICINE CLINIC | Facility: CLINIC | Age: 58
End: 2023-11-17
Payer: COMMERCIAL

## 2023-11-17 NOTE — TELEPHONE ENCOUNTER
Left message for Kaylie Render  to return my call.    Hub may relay message and document/schedule    Patients primary care physician is not in the office and will not be back until next month.   The patient will need an appointment with a physician in the office to discuss referrals.

## 2023-11-17 NOTE — TELEPHONE ENCOUNTER
PATIENT CALLED REQUESTING A REFERRAL TO GASTRO, SHE STATED THAT SHE REQUESTED THIS WEEKS AGO AND HASN'T HEARD ANYTHING ABOUT IT. UPON LOOKING IN HER CHART I DIDN'T SEE ANY ENCOUNTER MADE WITH THIS REQUEST. SHE'S ASKING THAT SHE BE REFERRED TO DR. YARBROUGH'S OFFICE [address]      PLEASE ADVISE

## 2023-11-21 RX ORDER — FUROSEMIDE 40 MG/1
40 TABLET ORAL 2 TIMES DAILY
Qty: 30 TABLET | Refills: 0 | OUTPATIENT
Start: 2023-11-21

## 2023-12-04 RX ORDER — AMLODIPINE BESYLATE 5 MG/1
5 TABLET ORAL DAILY
Qty: 30 TABLET | Refills: 0 | Status: SHIPPED | OUTPATIENT
Start: 2023-12-04

## 2023-12-04 NOTE — TELEPHONE ENCOUNTER
Rx Refill Note  Requested Prescriptions     Pending Prescriptions Disp Refills    amLODIPine (NORVASC) 5 MG tablet 30 tablet 0     Sig: Take 1 tablet by mouth Daily.      Last office visit with prescribing clinician: 04/21/2023  Next office visit with prescribing clinician: return in about 2 months 6/21/2023  Pt phone has been disconnected     Carolynn Westbrook MA  12/04/23, 14:06 EST

## 2023-12-06 NOTE — TELEPHONE ENCOUNTER
Rx Refill Note  Requested Prescriptions     Pending Prescriptions Disp Refills    spironolactone (ALDACTONE) 50 MG tablet [Pharmacy Med Name: SPIRONOLACTONE 50 MG TABLET] 30 tablet 0     Sig: TAKE 1 TABLET BY MOUTH DAILY      Last office visit with prescribing clinician: 04/21/2023  Next office visit with prescribing clinician: Visit date not found     Tried to call patient no answer and no vm   Carolynn Westbrook MA  12/06/23, 09:26 EST

## 2023-12-08 RX ORDER — SPIRONOLACTONE 50 MG/1
50 TABLET, FILM COATED ORAL DAILY
Qty: 30 TABLET | Refills: 0 | OUTPATIENT
Start: 2023-12-08 | End: 2024-12-07

## 2023-12-19 RX ORDER — AMLODIPINE BESYLATE 5 MG/1
5 TABLET ORAL DAILY
Qty: 90 TABLET | Refills: 1 | Status: SHIPPED | OUTPATIENT
Start: 2023-12-19

## 2023-12-19 NOTE — TELEPHONE ENCOUNTER
Rx Refill Note  Requested Prescriptions      No prescriptions requested or ordered in this encounter      Last office visit with prescribing clinician: 4/21/2023     Next office visit with prescribing clinician: Visit date not found       Carolynn Westbrook MA  12/19/23, 15:47 EST

## 2024-01-05 ENCOUNTER — READMISSION MANAGEMENT (OUTPATIENT)
Dept: CALL CENTER | Facility: HOSPITAL | Age: 59
End: 2024-01-05
Payer: COMMERCIAL

## 2024-01-05 NOTE — OUTREACH NOTE
Prep Survey      Flowsheet Row Responses   Muslim facility patient discharged from? Non-BH   Is LACE score < 7 ? Non-BH Discharge   Eligibility [facility]   Date of Admission 12/22/23   Date of Discharge 01/04/24   Discharge Disposition Home or Self Care   Discharge diagnosis Acute kidney injury   Does the patient have one of the following disease processes/diagnoses(primary or secondary)? Other   Prep survey completed? Yes            Angelic MONTES DE OCA - Registered Nurse

## 2024-01-08 ENCOUNTER — TRANSITIONAL CARE MANAGEMENT TELEPHONE ENCOUNTER (OUTPATIENT)
Dept: CALL CENTER | Facility: HOSPITAL | Age: 59
End: 2024-01-08
Payer: COMMERCIAL

## 2024-01-08 NOTE — OUTREACH NOTE
Call Center TCM Note      Flowsheet Row Responses   Hinduism facility patient discharged from? Non-  [[facility]]   Does the patient have one of the following disease processes/diagnoses(primary or secondary)? Other   TCM attempt successful? No   Unsuccessful attempts Attempt 1            Janessa Jurado RN    1/8/2024, 10:38 EST

## 2024-01-08 NOTE — OUTREACH NOTE
Call Center TCM Note      Flowsheet Row Responses   Zoroastrian facility patient discharged from? Non-  [[facility]]   Does the patient have one of the following disease processes/diagnoses(primary or secondary)? Other   TCM attempt successful? No   Unsuccessful attempts Attempt 2   Revoked Reason Phone Issues  [All numbers listed reports that this is wrong number. Attempted to reach daughter to get correct number, and that number gives recording that number has been changed or disconnected.]            Janessa Jurado RN    1/8/2024, 12:19 EST

## 2024-01-15 DIAGNOSIS — F20.9 SCHIZOPHRENIA, UNSPECIFIED TYPE: ICD-10-CM

## 2024-01-15 RX ORDER — AMITRIPTYLINE HYDROCHLORIDE 50 MG/1
50 TABLET, FILM COATED ORAL NIGHTLY
Qty: 90 TABLET | Refills: 0 | Status: SHIPPED | OUTPATIENT
Start: 2024-01-15

## 2024-01-15 NOTE — TELEPHONE ENCOUNTER
Rx Refill Note  Requested Prescriptions     Pending Prescriptions Disp Refills    amitriptyline (ELAVIL) 50 MG tablet [Pharmacy Med Name: AMITRIPTYLINE HCL 50 MG TAB] 30 tablet 0     Sig: TAKE ONE TABLET BY MOUTH ONCE NIGHTLY      Last office visit with prescribing clinician: 4/21/2023   Last telemedicine visit with prescribing clinician: Visit date not found   Next office visit with prescribing clinician: Visit date not found                         Would you like a call back once the refill request has been completed: [] Yes [] No    If the office needs to give you a call back, can they leave a voicemail: [] Yes [] No    Azalia Duffy MA  01/15/24, 07:46 EST

## 2024-02-05 ENCOUNTER — TELEPHONE (OUTPATIENT)
Dept: FAMILY MEDICINE CLINIC | Facility: CLINIC | Age: 59
End: 2024-02-05
Payer: COMMERCIAL

## 2024-02-05 NOTE — TELEPHONE ENCOUNTER
Caller: GARETT    Relationship: Provider    Best call back number: [phone]     What form or medical record are you requesting: [employer] RENEWAL FORM     PLEASE SEND TO TRANSPORTATION CENTER       Additional notes: GARETT WITH [facility] MEDICAINE ADVISED  PATIENT IS NEEDING HOSPITAL FOLLOW UP FROM HOSPITAL STAY AT [facility] MEDICINE   ADMITTED 1/12 AND DISCHARGED 1/26  [employer] IS NEEDING RENEWED

## 2024-03-04 ENCOUNTER — TELEPHONE (OUTPATIENT)
Dept: FAMILY MEDICINE CLINIC | Facility: CLINIC | Age: 59
End: 2024-03-04
Payer: COMMERCIAL

## 2024-03-04 ENCOUNTER — OFFICE VISIT (OUTPATIENT)
Dept: FAMILY MEDICINE CLINIC | Facility: CLINIC | Age: 59
End: 2024-03-04
Payer: COMMERCIAL

## 2024-03-04 VITALS
WEIGHT: 106.2 LBS | HEART RATE: 80 BPM | OXYGEN SATURATION: 99 % | HEIGHT: 63 IN | BODY MASS INDEX: 18.82 KG/M2 | DIASTOLIC BLOOD PRESSURE: 88 MMHG | SYSTOLIC BLOOD PRESSURE: 164 MMHG

## 2024-03-04 DIAGNOSIS — R26.81 GAIT INSTABILITY: ICD-10-CM

## 2024-03-04 DIAGNOSIS — B18.2 CHRONIC HEPATITIS C WITHOUT HEPATIC COMA: Primary | ICD-10-CM

## 2024-03-04 DIAGNOSIS — K64.4 EXTERNAL PROLAPSED HEMORRHOIDS: ICD-10-CM

## 2024-03-04 PROCEDURE — 99214 OFFICE O/P EST MOD 30 MIN: CPT

## 2024-03-04 PROCEDURE — 3079F DIAST BP 80-89 MM HG: CPT

## 2024-03-04 PROCEDURE — 1159F MED LIST DOCD IN RCRD: CPT

## 2024-03-04 PROCEDURE — 1160F RVW MEDS BY RX/DR IN RCRD: CPT

## 2024-03-04 PROCEDURE — 3077F SYST BP >= 140 MM HG: CPT

## 2024-03-04 NOTE — ASSESSMENT & PLAN NOTE
I do believe that a walker would provide better gait stability for her.  I have given the patient a prescription for this.  Advised her to call her insurance company to see which medical supply company and they prefer to use.  Patient to notify me of any issues regarding this.  Patient advised to return to clinic if her symptoms worsen or she becomes lightheaded, dizzy, chest pain or has syncopal episodes.  Patient verbalized understanding and agreed to this plan.

## 2024-03-04 NOTE — ASSESSMENT & PLAN NOTE
Advised the patient that Dr. Oreilly had recommended she see GI and that there would be a referral placed.  They did not recommend removal of the hemorrhoids at that time since she was actively ill and requiring attention for possible dehydration.  Patient states she wishes to see Dr. Ceballos again.  I advised her if she wants to see Dr. Ceballos she would not need an appointment to do so as she is already established with their clinic.  Patient to notify us if she ends up needing a GI referral as I cannot see if one was placed at .  Patient verbalized understanding and agreed to this plan.

## 2024-03-04 NOTE — TELEPHONE ENCOUNTER
Called [facility] [phone] spoke with them to discuss  transportation paperwork. [facility] states they will fax the paper to [phone] today. Pt. States this is very important and she needs transportation due to up coming surgery March 11 2024. Completed Paperwork should be faxed to [employer] @ Fax# [phone].

## 2024-03-04 NOTE — ASSESSMENT & PLAN NOTE
Patient and medical assistant were able to discuss paperwork with the transportation center.  Transportation center stated they were sending the paperwork over today.  I told the patient that I would let her PCP know it would be sent over within the next several days.  I advised her to make a telehealth appointment with her PCP to ensure that this gets done.  She verbalized understanding and agreed to this plan.    I have also placed a referral to social work to discuss assistance with paying for her boost drinks and pull ups.

## 2024-03-04 NOTE — PROGRESS NOTES
"    Office Note     Name: Kaylie Cruz    : 1965     MRN: [id number]     Chief Complaint  Discuss paperwork and referrals    Subjective     History of Present Illness:  Kaylie Cruz is a 58 y.o. female who presents today for discussion regarding transportation paperwork. Patient has a past medical history of diabetes, hyperlipidemia, hypertension, MI, substance use, cervical cancer, depression and chronic hepatitis C with recurrent ascites.  Patient undergoes abdominal paracentesis every 1 to 2 weeks with interventional radiology at .  She is having a surgery to put a stent in her liver next week.  Patient states that she had had a form sent to the office to her PCP regarding transportation.  Patient called the office about 3 weeks ago regarding her battery to transportation needing to be renewed.  Patient states that transportation office never received the paperwork back.  From review of the media chart it does not look like our office ever received it.  Patient expresses frustration and calls the [facility] while in the room.  Patient states she needs this today before she leaves.    Patient explains that she has had an unstable gait in the past several months due to the fluid on her abdomen from the ascites and hepatitis C.  She states that the weight of the fluid will sometimes cause her to fall forward.  She states she fell last week on her knee.  Denies hitting her head.  She is requesting a walker with a seat attachment to keep her from falling.  She states she has a cane, but she feels like she needs something more sturdy on both sides.    Patient also comes in requesting a referral to see a GI doctor.  Patient has a history of external hemorrhoids.  She states today that they are \"hanging out.\"  States that they have been this way for quite some time.  She was actually evaluated on 24 at 's general surgery clinic with Dr. Janes garcia.  At that time they stated " she cannot have a hemorrhoidectomy at that time due to her condition and current state of health.  Patient states that the hemorrhoids are still causing her pain and she was told she had a referral to GI to be evaluated further.  Patient states her hemorrhoids are not bleeding, but are prolapsed.  States there is intermittent pain.  Patient states she wishes to go back and see Dr. Melissa Oreilly.  Denies blood in the stool, nausea, vomiting, abdominal pain.     Patient would also like assistance with affording her boosts and disposable underwear/pull ups.  She requires boost for additional calories and protein for her hepatic impairment.  Wears prolapse due to being on active lactulose for her liver impairment as well.        Review of Systems:   Review of Systems   Constitutional:  Negative for appetite change, chills and fever.   HENT:  Negative for congestion, ear pain, sinus pressure and sore throat.    Eyes:  Negative for visual disturbance.   Respiratory:  Negative for chest tightness and shortness of breath.    Cardiovascular:  Negative for chest pain, palpitations and leg swelling.   Gastrointestinal:  Negative for abdominal pain, anal bleeding, blood in stool, nausea and vomiting.   Musculoskeletal:  Positive for gait problem. Negative for myalgias.   Neurological:  Negative for dizziness, light-headedness and headache.       Past Medical History:   Past Medical History:   Diagnosis Date    Anemia     Cancer     cervical    Depression     Hyperlipidemia     Hypertension     Infectious viral hepatitis     Myocardial infarction     Substance abuse        Past Surgical History:   Past Surgical History:   Procedure Laterality Date    BLADDER SURGERY      CARDIAC CATHETERIZATION      4/3/2022    CHOLECYSTECTOMY      COLON RESECTION      7/19/2017, descending and transverse colon    COLON SURGERY      COLONOSCOPY      7/19/2017    ENDOSCOPY      EGD 4/1/22    HYSTERECTOMY      TRANSESOPHAGEAL ECHOCARDIOGRAM (AVINASH)       Global hyokinesis, 49% EF, LVH       Family History:   Family History   Problem Relation Age of Onset    Other Mother         atrial septal defect    Colon cancer Neg Hx        Social History:   Social History     Socioeconomic History    Marital status: Single   Tobacco Use    Smoking status: Every Day     Current packs/day: 0.25     Average packs/day: 0.3 packs/day for 26.2 years (6.5 ttl pk-yrs)     Types: Cigarettes     Start date: 1998     Passive exposure: Never    Smokeless tobacco: Never   Vaping Use    Vaping status: Former    Substances: Nicotine, Flavoring    Devices: Disposable    Passive vaping exposure: Yes   Substance and Sexual Activity    Alcohol use: No    Drug use: Not Currently     Types: Marijuana, Cocaine(coke)     Comment: from [facility] History April 2022    Sexual activity: Defer       Immunizations: There is no immunization history for the selected administration types on file for this patient.     Medications:     Current Outpatient Medications:     amitriptyline (ELAVIL) 50 MG tablet, TAKE ONE TABLET BY MOUTH ONCE NIGHTLY, Disp: 90 tablet, Rfl: 0    amLODIPine (NORVASC) 5 MG tablet, Take 1 tablet by mouth Daily., Disp: 90 tablet, Rfl: 1    carvedilol (Coreg) 12.5 MG tablet, Take 1 tablet by mouth 2 (Two) Times a Day With Meals., Disp: 60 tablet, Rfl: 5    Cholecalciferol 50 MCG (2000 UT) capsule, Take 1 capsule by mouth Daily., Disp: 30 each, Rfl: 5    furosemide (LASIX) 40 MG tablet, Take 1 tablet by mouth 2 (Two) Times a Day., Disp: 30 tablet, Rfl: 0    gabapentin (NEURONTIN) 400 MG capsule, Take 2 capsules by mouth Every 8 (Eight) Hours., Disp: , Rfl:     glucose monitor monitoring kit, Use to check blood sugar 3x daily, Disp: 1 each, Rfl: 0    Insulin Aspart FlexPen 100 UNIT/ML solution pen-injector, Inject 2 Units under the skin into the appropriate area as directed 2 (Two) Times a Day With Meals., Disp: 3 mL, Rfl: 1    Insulin Pen Needle (Pen Needles) 31G X 5 MM misc, 1  "each 4 (Four) Times a Day Before Meals & at Bedtime., Disp: 300 each, Rfl: 2    lactulose (CHRONULAC) 10 GM/15ML solution, Take 15 mL by mouth 3 (Three) Times a Day As Needed (constipation)., Disp: 473 mL, Rfl: 1    Lancets (onetouch ultrasoft) lancets, Use to check blood sugar 3x daily, Disp: 300 each, Rfl: 12    OneTouch Verio test strip, Check blood sugar 3x daily, Disp: 300 each, Rfl: 5    oxyCODONE (oxyCONTIN) 10 MG 12 hr tablet, Take 1 tablet by mouth Every 12 (Twelve) Hours., Disp: , Rfl:     polyethylene glycol (MIRALAX) 17 GM/SCOOP powder, Take 17 g by mouth Daily., Disp: 238 g, Rfl: 1    QUEtiapine (SEROquel) 100 MG tablet, Take 1 tablet by mouth Every Night. Take one tablet nightly, Disp: 30 tablet, Rfl: 0    spironolactone (ALDACTONE) 50 MG tablet, Take 1 tablet by mouth Daily., Disp: 30 tablet, Rfl: 0    thiamine 100 MG tablet, , Disp: , Rfl:     Allergies:   Allergies   Allergen Reactions    Codeine Hives    Ketamine Hives    Morphine Hives    Tagamet [Cimetidine] Other (See Comments)     DISCOLORATION OF SKIN    Toradol [Ketorolac Tromethamine] Hives       Objective     Vital Signs  /88 (BP Location: Right arm, Patient Position: Sitting, Cuff Size: Adult)   Pulse 80   Ht 160 cm (62.99\")   Wt 48.2 kg (106 lb 3.2 oz)   SpO2 99%   BMI 18.82 kg/m²   Estimated body mass index is 18.82 kg/m² as calculated from the following:    Height as of this encounter: 160 cm (62.99\").    Weight as of this encounter: 48.2 kg (106 lb 3.2 oz).    BMI is within normal parameters. No other follow-up for BMI required.       Physical Exam  Constitutional:       Appearance: Normal appearance.   HENT:      Head: Normocephalic and atraumatic.      Nose: Nose normal.      Mouth/Throat:      Mouth: Mucous membranes are moist.      Pharynx: No posterior oropharyngeal erythema.   Eyes:      Extraocular Movements: Extraocular movements intact.      Pupils: Pupils are equal, round, and reactive to light.   Cardiovascular: "      Rate and Rhythm: Normal rate and regular rhythm.      Pulses: Normal pulses.      Heart sounds: Normal heart sounds.   Pulmonary:      Effort: Pulmonary effort is normal.      Breath sounds: Normal breath sounds.   Abdominal:      General: Abdomen is flat. Bowel sounds are normal.   Skin:     General: Skin is warm.   Neurological:      General: No focal deficit present.      Mental Status: She is alert and oriented to person, place, and time.   Psychiatric:         Mood and Affect: Mood normal.       Results:  No results found for this or any previous visit (from the past 24 hour(s)).     Assessment and Plan     Assessment/Plan:  Diagnoses and all orders for this visit:    1. Chronic hepatitis C without hepatic coma (Primary)  Assessment & Plan:  Patient and medical assistant were able to discuss paperwork with the transportation center.  Transportation center stated they were sending the paperwork over today.  I told the patient that I would let her PCP know it would be sent over within the next several days.  I advised her to make a telehealth appointment with her PCP to ensure that this gets done.  She verbalized understanding and agreed to this plan.    I have also placed a referral to social work to discuss assistance with paying for her boost drinks and pull ups.     Orders:  -     Ambulatory Referral to Case Management Barriers to Care, Caregiving/Support    2. Gait instability  Assessment & Plan:  I do believe that a walker would provide better gait stability for her.  I have given the patient a prescription for this.  Advised her to call her insurance company to see which medical supply company and they prefer to use.  Patient to notify me of any issues regarding this.  Patient advised to return to clinic if her symptoms worsen or she becomes lightheaded, dizzy, chest pain or has syncopal episodes.  Patient verbalized understanding and agreed to this plan.    Orders:  -     Accessories: Walker, Cane or  Crutch    3. External prolapsed hemorrhoids  Assessment & Plan:  Advised the patient that Dr. Oreilly had recommended she see GI and that there would be a referral placed.  They did not recommend removal of the hemorrhoids at that time since she was actively ill and requiring attention for possible dehydration.  Patient states she wishes to see Dr. Ceballos again.  I advised her if she wants to see Dr. Ceballos she would not need an appointment to do so as she is already established with their clinic.  Patient to notify us if she ends up needing a GI referral as I cannot see if one was placed at .  Patient verbalized understanding and agreed to this plan.          Follow Up  Return for Recheck w/ Brennon, telehealth end of week .    Ludy Burk PA-C   [facility]

## 2024-03-05 ENCOUNTER — REFERRAL TRIAGE (OUTPATIENT)
Dept: CASE MANAGEMENT | Facility: OTHER | Age: 59
End: 2024-03-05
Payer: COMMERCIAL

## 2024-03-06 ENCOUNTER — PATIENT OUTREACH (OUTPATIENT)
Dept: CASE MANAGEMENT | Facility: OTHER | Age: 59
End: 2024-03-06
Payer: COMMERCIAL

## 2024-03-06 NOTE — OUTREACH NOTE
AMBULATORY CASE MANAGEMENT NOTE    Name and Relationship of Patient/Support Person: Render, Kaylie - Self  Self    Care Coordination    RN-ACM received Ambulatory Case Management Referral from PCP office, TAMIR Reeder, after office visit 3/4/24.  Attempt to call the patient was unsuccessful; did leave her voicemail with RNSHANNEN contact information.    RNNEGROM called patient's insurance, [employer], [phone], and got the following general information about [employer]'s coverage, though nothing specific regarding this patient's benefits:  -Boost Supplement Nutritional drinks and Pull-Ups may be covered in full by [employer], if Prior-Authorization is approved.  -PCP office has to put through a Prior Authorization for each, showing medical need.   -Once Prior-Auth is approved, the PCP office can send the orders to the DME Co./ Supplier, that participates with the insurance (insurance can tell the office who participating providers are; they did say that [employer] is a par.provider for the Pull-Ups, and [employer] can ship them to patient). They did not say which par.provider has Boost on this call.     This note will be routed to the PCP.    Belén HERNANDEZ  Ambulatory Case Management    3/6/2024, 10:54 EST

## 2024-03-06 NOTE — PROGRESS NOTES
Left message for Kaylie Render  to return my call.    Hub may relay message and document    Iesha Willett, DO [address] Clinical Pool1 hour ago (11:24 AM)       Please call patient to have her schedule an appointment with me so we can discuss pull ups, boost prescriptions

## 2024-03-07 NOTE — TELEPHONE ENCOUNTER
Patient called regarding [employer] paperwork. She states that she needs this filled out urgently, as she has surgery Monday morning and does not have a ride there. She requests a call back once paperwork is completed and faxed.

## 2024-03-11 ENCOUNTER — PATIENT OUTREACH (OUTPATIENT)
Dept: CASE MANAGEMENT | Facility: OTHER | Age: 59
End: 2024-03-11
Payer: COMMERCIAL

## 2024-03-11 NOTE — OUTREACH NOTE
AMBULATORY CASE MANAGEMENT NOTE    Name and Relationship of Patient/Support Person: Render, Kaylie - Self  Self    Patient Outreach    RN-ACM called patient, 3rd attempt, since getting Ambulatory Case Management Referral.  Patient answered phone; said she was admitted to [facility] this morning and had surgery, a stent place in liver.  Conversation with patient was brief.    Care Coordination    RN-MAGALIM called PCP office, spoke with Janae, regarding the Prior-Auth that the insurance stated they would need to cover Boost and the Pull-Ups for patient.  Janae spoke with other staff, and said they will call the insurance and try to get the appropriate forms to complete for Prior Auth on these two things and get them sent in.  Provided a phone number for patient's insurance, [phone].    Belén HERNANDEZ  Ambulatory Case Management    3/11/2024, 15:29 EDT

## 2024-03-12 ENCOUNTER — TELEPHONE (OUTPATIENT)
Dept: FAMILY MEDICINE CLINIC | Facility: CLINIC | Age: 59
End: 2024-03-12
Payer: COMMERCIAL

## 2024-03-12 NOTE — TELEPHONE ENCOUNTER
Care Coordination   Belén HERNANDEZ  Ambulatory Case Management    RN-ACRAMON called PCP office, spoke with Janae, regarding the Prior-Auth that the insurance stated they would need to cover Boost and the Pull-Ups for patient.  Janae spoke with other staff, and said they will call the insurance and try to get the appropriate forms to complete for Prior Auth on these two things and get them sent in.  Provided a phone number for patient's insurance, [phone].       Meena GONSALES  Clinical coordinator    Called insurance and was informed Boost and Adult diapers needs to be run as a DME order. [employer] med[employer] is faxing over the forms.

## 2024-03-15 NOTE — TELEPHONE ENCOUNTER
[facility] called and confirmed they are sending all information and DME orders for adult diapers and boost to [facility]

## 2024-03-18 ENCOUNTER — PATIENT OUTREACH (OUTPATIENT)
Dept: CASE MANAGEMENT | Facility: OTHER | Age: 59
End: 2024-03-18
Payer: COMMERCIAL

## 2024-03-18 NOTE — OUTREACH NOTE
AMBULATORY CASE MANAGEMENT NOTE    Name and Relationship of Patient/Support Person: Render, Kaylie - Self  Self    Care Coordination    Received inbasket message, 3/15/24, from ENMANUEL Robledo of PCP, Dr. Willett's office, saying that [employer] was sending all information and DME orders for Adult Diapers and Boost to [facility] (DME Co[employer]in [place]). RN-TRAV called [employer], DME Co. today, spoke with Luiza.  She said they have not received any recent information or orders on this patient. Was informed that [employer] [facility] does not bill insurance for urinary incontinent supplies, though patients can pay out of pocket, retail price. Was informed that [employer] does have Boost supplemental nutrition drinks and those can be shipped to patients.     RN-TRAV called [employer] of [place], spoke with Stacy, to check if [facility] is a participating provider of urinary incontinent supplies.  Was transferred to their Case Management dept, and had to leave voicemail requesting a call back.    Tried to call the patient today. There was no answer, and voicemail was left with RN-ACM contact information.          Belén HERNANDEZ  Ambulatory Case Management    3/18/2024, 16:23 EDT

## 2024-03-19 ENCOUNTER — PATIENT OUTREACH (OUTPATIENT)
Dept: CASE MANAGEMENT | Facility: OTHER | Age: 59
End: 2024-03-19
Payer: COMMERCIAL

## 2024-03-19 NOTE — OUTREACH NOTE
AMBULATORY CASE MANAGEMENT NOTE    Name and Relationship of Patient/Support Person: Render, Kaylie - Self  Self    Care Coordination    RN-MAGALIM called patient, 2nd attempt this week, with no answer to phone; did leave voicemails with RN-ACM contact information.     RN-MAGALIM called [facility], DME Co[employer] spoke with Luiza.  Inquired if they received any info/ orders on this patient from her [facility] Medicaid.  She stated they still have not received any information or orders on this patient.  She said usually the PCP office sends them initial information and orders on patient, and then they deal with the insurance.      This note will be routed to PCP Clinical Pool.     Belén HERNANDEZ  Ambulatory Case Management    3/19/2024, 11:14 EDT

## 2024-03-19 NOTE — PROGRESS NOTES
" \"Would you be able to send [facility] the orders/ info, for the Boost?   So for Urinary Incontinent Supplies, Pull-Ups, can you try sending orders and information on patient to [employer] (phone# [phone]/ fax# [phone]) to see if they can get these under patient's insurance, as I know they commonly ship supplieS\"    COMPLETED ABOVE FAXED OVER  "

## 2024-03-24 ENCOUNTER — READMISSION MANAGEMENT (OUTPATIENT)
Dept: CALL CENTER | Facility: HOSPITAL | Age: 59
End: 2024-03-24
Payer: COMMERCIAL

## 2024-03-24 NOTE — OUTREACH NOTE
Prep Survey      Flowsheet Row Responses   [facility] facility patient discharged from? Non-BH   Is LACE score < 7 ? Non-BH Discharge   Eligibility [facility]   Date of Admission 03/13/24   Date of Discharge 03/24/24   Discharge Disposition Home or Self Care   Discharge diagnosis Chest pain, unspecified type (Primary Dx),   Acute kidney injury (CMS/HCC),   Hyperkalemia,   ANIKET (acute kidney injury) (CMS/HCC),   Malignant neoplasm of cervix, unspecified site (CMS/HCC)  Discharge   Does the patient have one of the following disease processes/diagnoses(primary or secondary)? Other   Does the patient have Home health ordered? No   Is there a DME ordered? No   Prep survey completed? Yes            KATE OLSON - Registered Nurse

## 2024-03-25 ENCOUNTER — TRANSITIONAL CARE MANAGEMENT TELEPHONE ENCOUNTER (OUTPATIENT)
Dept: CALL CENTER | Facility: HOSPITAL | Age: 59
End: 2024-03-25
Payer: COMMERCIAL

## 2024-03-25 NOTE — OUTREACH NOTE
Call Center TCM Note      Flowsheet Row Responses   [facility] patient discharged from? Non-   Does the patient have one of the following disease processes/diagnoses(primary or secondary)? Other   TCM attempt successful? No   Unsuccessful attempts Attempt 1   Revoked Reason Patient/Family Refused            Ricki Ni RN    3/25/2024, 13:12 EDT

## 2024-03-26 ENCOUNTER — PATIENT OUTREACH (OUTPATIENT)
Dept: CASE MANAGEMENT | Facility: OTHER | Age: 59
End: 2024-03-26
Payer: COMMERCIAL

## 2024-03-26 NOTE — OUTREACH NOTE
AMBULATORY CASE MANAGEMENT NOTE    Name and Relationship of Patient/Support Person: Nancy, Kaylie - Self  Self    Patient Outreach    RN-ACM outreached to patient following recent [facility] d/c, 3/24/24.  Patient said she is doing okay; is using a walker now when up walking; denied having any falls. Discussed patient's request to get insurance coverage of Pull-Ups, urinary incontinent supplies, and of Boost, supplement nutritional drink.  Patient stated she has had to pay out of pocket in the past, and cannot afford them.  Explained to patient that her PCP office has sent orders from her doctor to Medical suppliers to see if her insurance will help cover some costs, and if so, these items would be shipped to her.  Patient voiced appreciation for the help.    Care Coordination    RN-ACM called [employer] [facility], DME supplier, spoke with Luiza.  She said they have received the orders for Boost Nutritional supplement, and they are submitting this to insurance today.  She said they usually will hear back from insurance within 2 days.    RN-ACM called [employer], [phone], spoke with Checo.  Checo confirmed that [employer] does have orders from the PCP for Pull-Ups, for urinary incontinence , but there is no size on the order and they must know a size.  RN-ACM made a 3-way call with [employer] and with the patient.  Patient told Dazoie that she would need a size Small/Medium, and said she did not have a preference of Brand name. Patient's part of the call ended.  Checo with [employer] said this will now be sent to patient's insurance and when it is approved, will be shipped to the patient. He said that [employer] will contact the patient when supplies are shipped.      Belén HERNANDEZ  Ambulatory Case Management    3/26/2024, 15:10 EDT

## 2024-03-28 ENCOUNTER — PATIENT OUTREACH (OUTPATIENT)
Dept: CASE MANAGEMENT | Facility: OTHER | Age: 59
End: 2024-03-28
Payer: COMMERCIAL

## 2024-03-28 NOTE — OUTREACH NOTE
AMBULATORY CASE MANAGEMENT NOTE    Name and Relationship of Patient/Support Person: [facility] [employer] -     Care Coordination    RN-ACM called [employer] [employer] [phone], spoke with Christi, to check status of the Boost supplement drink order.  She said that patient's insurance approved it, and the patient will be notified when the Boost is shipped out to her. Confirmed they have patient's correct address.      RN-ACM called [employer], [phone], spoke with Tobias to check status of the Pull-ups order.  She said they need diagnosis clarification from the PCP, and will send this request to the PCP office.    This note will be routed to the PCP and PCP Clinical Pool.     Belén HERNANDEZ  Ambulatory Case Management    3/28/2024, 10:22 EDT

## 2024-04-02 ENCOUNTER — PATIENT OUTREACH (OUTPATIENT)
Dept: CASE MANAGEMENT | Facility: OTHER | Age: 59
End: 2024-04-02
Payer: COMMERCIAL

## 2024-04-02 NOTE — OUTREACH NOTE
AMBULATORY CASE MANAGEMENT NOTE    Name and Relationship of Patient/Support Person: Render, Kaylie - Self  Self    Patient Outreach    RN-ACM called patient to f/u on nutritional supplement drink and urinary incontinent supplies.  She said she has received both the nutritional drink and the pull-ups.  Patient asked RNNEGROM to look into why she has not gotten approved for [employer], as she thought the PCP had sent the papers in that were needed.      RNNEGROM called patient back and informed her of the information gathered from phone calls made (see Care Coordination note below).  Encouraged patient to call PCP office and reschedule her PCP appt that was missed.  Patient voiced understanding.        Care Coordination    RNNEGROM called [employer].  Confirmed pull-ups were delivered to patient as ordered.  They said they will call patient each month before shipping another month's supply, for patient to confirm if she needs another month's supply.     TONY called PCP, Dr. STEPHEN Willett's office, spoke with Janae about completed forms being sent to [employer] for patient. She said signed and completed forms were faxed back to [employer] on 3/7/24.     RN-ACM called [employer].  Was informed that the bus letter was not filled out correctly, and [facility].  Confirmed [facility] sent the return fax to a fax number that was a different MD office, not Dr Willett's.  Provided [facility] staff person the correct fax number for Dr. Willett's office. Staff person said she would get this faxed to Dr. Willett's office this afternoon.     TONY called PCP, Dr. STEPHEN Willett's office, spoke with Janae, and informed her of the above information.  Asked her for the office to watch for the incoming fax from [employer] on this patient, to complete the form as they request, and fax back to [employer] as soon as they are  able to.        Belén HERNANDEZ  Ambulatory Case Management    4/2/2024, 16:14 EDT

## 2024-04-08 ENCOUNTER — PATIENT OUTREACH (OUTPATIENT)
Dept: CASE MANAGEMENT | Facility: OTHER | Age: 59
End: 2024-04-08
Payer: COMMERCIAL

## 2024-04-08 ENCOUNTER — TELEPHONE (OUTPATIENT)
Dept: FAMILY MEDICINE CLINIC | Facility: CLINIC | Age: 59
End: 2024-04-08
Payer: COMMERCIAL

## 2024-04-08 NOTE — TELEPHONE ENCOUNTER
TARAH WITH NURSE NAVIGATOR FROM  HAS CALLED ON PATIENT BEHALF TO LET PCP PATIENT IS REQUESTING REFERRAL FOR INTERVENTIONAL PAIN CLINIC APPOINTMENT. PATIENT IS REQUESTING A CALL BACK TO DISCUSS.    CALL BACK NUMBER [phone]

## 2024-04-08 NOTE — TELEPHONE ENCOUNTER
Caller:  Liyz VEGA    Relationship to patient:     Best call back number: [phone]     GARY STATES THAT FORMS WERE FAXED TO [facility] [employer] FOR [employer], BUT THEY DID NOT RECEIVED THEM BACK.

## 2024-04-08 NOTE — OUTREACH NOTE
AMBULATORY CASE MANAGEMENT NOTE    Name and Relationship of Patient/Support Person: [employer] -     Care Coordination    RN-ACM called [employer], to check status of patient being able to utilize their service; spoke with Amauri.  Amauri said they have not received the forms back from the PCP office necessary to complete patient's application to ride [employer].    RN-ACM called the PCP office, spoke with Laney.  She said she would send a message to one of the nurses to send forms back to [employer] for the patient, and to call RN-ACM back.    RN-ACM received a voicemail from  Nurse Navigater, Garrett, regarding patient missing her GI Transplant appt at  on 4/3/24.  She requested a phone call back.  She said patient needs to call  Transplant to reschedule, at [phone].  TONY tried to call Garrett  Nurse Navigater back, and had to leave her voicemail with contact information.        Belén HERNANDEZ  Ambulatory Case Management    4/8/2024, 14:55 EDT

## 2024-04-08 NOTE — TELEPHONE ENCOUNTER
Patient has missed her last 2 appointments, I have not seen her in about 1 year. Needs to schedule appointment to discuss further.

## 2024-04-09 ENCOUNTER — PATIENT OUTREACH (OUTPATIENT)
Dept: CASE MANAGEMENT | Facility: OTHER | Age: 59
End: 2024-04-09
Payer: COMMERCIAL

## 2024-04-09 NOTE — OUTREACH NOTE
AMBULATORY CASE MANAGEMENT NOTE    Name and Relationship of Patient/Support Person: Render, Kaylie - Self  Self    Care Coordination    RN-TRAV sent care coordination via [employer] message to PCP office regarding [employer] set-up and follow up for patient.      Belén HERNANDEZ  Ambulatory Case Management    4/9/2024, 14:29 EDT

## 2024-04-10 NOTE — TELEPHONE ENCOUNTER
..Attempted to contact patient no answer unable to leave vm   ..HUB MAY RELAY MESSAGE AND DOCUMENT   Patient has missed her last 2 appointments, I have not seen her in about 1 year. Needs to schedule appointment to discuss lenard

## 2024-04-11 ENCOUNTER — PATIENT OUTREACH (OUTPATIENT)
Dept: CASE MANAGEMENT | Facility: OTHER | Age: 59
End: 2024-04-11
Payer: COMMERCIAL

## 2024-04-11 NOTE — OUTREACH NOTE
AMBULATORY CASE MANAGEMENT NOTE    Name and Relationship of Patient/Support Person: [employer] -     Care Coordination    RN-TRAV called [employer], [phone], to check on status of patient's application to ride [employer] to medical appts..  tic staff said that they received paperwork back yesterday from PCP office, but it was incomplete.  They said one of their staff called the PCP office yesterday and refaxed the form to PCP office again, that needs completion, saying it needs the medical reason that patient needs to ride [employer], for example: unsteady gait. Then the form has to be faxed back to [employer].   This note will be routed to the PCP Clinical Pool.      Belén HERNANDEZ  Ambulatory Case Management    4/11/2024, 13:59 EDT

## 2024-04-15 ENCOUNTER — PATIENT OUTREACH (OUTPATIENT)
Dept: CASE MANAGEMENT | Facility: OTHER | Age: 59
End: 2024-04-15
Payer: COMMERCIAL

## 2024-04-15 ENCOUNTER — TELEPHONE (OUTPATIENT)
Dept: FAMILY MEDICINE CLINIC | Facility: CLINIC | Age: 59
End: 2024-04-15
Payer: COMMERCIAL

## 2024-04-15 NOTE — OUTREACH NOTE
AMBULATORY CASE MANAGEMENT NOTE    Name and Relationship of Patient/Support Person: [employer] -     Care Coordination    RN-ACM called [employer], [phone], to check status of patient's ability to use the transportation.  Was informed that they are waiting on the PCP office to return the Bus Letter needed stating the medical reason for needing the bus transportation. They said since 4/10/24, they have not gotten what is needed.      RN-MAGALIM called PCP office of Dr. STEPHEN Willett, spoke with Janae.  Explained the need of [employer], for patient to be able to utilize the service. Provided the phone number to [employer], so they can call and get the correct form and information needed faxed back to them.      Belén HERNANDEZ  Ambulatory Case Management    4/15/2024, 11:41 EDT

## 2024-04-15 NOTE — TELEPHONE ENCOUNTER
Ya called from [facility] . She is still trying to help the patient receive transportation from [facility] and from appointments. The form was completed by Dr. Willett previously, but they said they needed more information. After talking with Ya, what they are needing are the 'chronic medical conditions' that the patient has.  Key said that they faxed a new form on 4/10/24. I looked for the form in the fax que on 4/10/24 & 4/11/24. I could not find this new form. I printed out the form from October, 2023 and gave to Azalia and explained what Key was requesting for this form to be completed correctly.

## 2024-04-22 ENCOUNTER — OFFICE VISIT (OUTPATIENT)
Dept: FAMILY MEDICINE CLINIC | Facility: CLINIC | Age: 59
End: 2024-04-22
Payer: COMMERCIAL

## 2024-04-22 ENCOUNTER — PATIENT OUTREACH (OUTPATIENT)
Dept: CASE MANAGEMENT | Facility: OTHER | Age: 59
End: 2024-04-22
Payer: COMMERCIAL

## 2024-04-22 VITALS
HEIGHT: 63 IN | WEIGHT: 109.2 LBS | SYSTOLIC BLOOD PRESSURE: 130 MMHG | DIASTOLIC BLOOD PRESSURE: 92 MMHG | HEART RATE: 87 BPM | BODY MASS INDEX: 19.35 KG/M2 | OXYGEN SATURATION: 99 %

## 2024-04-22 DIAGNOSIS — F20.9 SCHIZOPHRENIA, UNSPECIFIED TYPE: ICD-10-CM

## 2024-04-22 DIAGNOSIS — R10.2 CHRONIC PELVIC PAIN IN FEMALE: ICD-10-CM

## 2024-04-22 DIAGNOSIS — N30.00 ACUTE CYSTITIS WITHOUT HEMATURIA: ICD-10-CM

## 2024-04-22 DIAGNOSIS — N30.01 ACUTE CYSTITIS WITH HEMATURIA: Primary | ICD-10-CM

## 2024-04-22 DIAGNOSIS — M47.817 LUMBOSACRAL SPONDYLOSIS WITHOUT MYELOPATHY: ICD-10-CM

## 2024-04-22 DIAGNOSIS — I10 PRIMARY HYPERTENSION: ICD-10-CM

## 2024-04-22 DIAGNOSIS — N88.8 MASS OF CERVIX DETERMINED BY ULTRASOUND: ICD-10-CM

## 2024-04-22 DIAGNOSIS — G89.4 CHRONIC PAIN DISORDER: ICD-10-CM

## 2024-04-22 DIAGNOSIS — G89.29 CHRONIC PELVIC PAIN IN FEMALE: ICD-10-CM

## 2024-04-22 DIAGNOSIS — N30.01 ACUTE CYSTITIS WITH HEMATURIA: ICD-10-CM

## 2024-04-22 DIAGNOSIS — R87.623 HIGH GRADE SQUAMOUS INTRAEPITHELIAL LESION (HGSIL) ON CYTOLOGIC SMEAR OF VAGINA: ICD-10-CM

## 2024-04-22 LAB
BILIRUB BLD-MCNC: NEGATIVE MG/DL
CLARITY, POC: ABNORMAL
COLOR UR: ABNORMAL
EXPIRATION DATE: ABNORMAL
GLUCOSE UR STRIP-MCNC: NEGATIVE MG/DL
KETONES UR QL: NEGATIVE
LEUKOCYTE EST, POC: ABNORMAL
Lab: ABNORMAL
NITRITE UR-MCNC: POSITIVE MG/ML
PH UR: 6 [PH] (ref 5–8)
PROT UR STRIP-MCNC: ABNORMAL MG/DL
RBC # UR STRIP: ABNORMAL /UL
SP GR UR: 1.02 (ref 1–1.03)
UROBILINOGEN UR QL: NORMAL

## 2024-04-22 PROCEDURE — 3080F DIAST BP >= 90 MM HG: CPT | Performed by: STUDENT IN AN ORGANIZED HEALTH CARE EDUCATION/TRAINING PROGRAM

## 2024-04-22 PROCEDURE — 87186 SC STD MICRODIL/AGAR DIL: CPT | Performed by: STUDENT IN AN ORGANIZED HEALTH CARE EDUCATION/TRAINING PROGRAM

## 2024-04-22 PROCEDURE — 87077 CULTURE AEROBIC IDENTIFY: CPT | Performed by: STUDENT IN AN ORGANIZED HEALTH CARE EDUCATION/TRAINING PROGRAM

## 2024-04-22 PROCEDURE — 3075F SYST BP GE 130 - 139MM HG: CPT | Performed by: STUDENT IN AN ORGANIZED HEALTH CARE EDUCATION/TRAINING PROGRAM

## 2024-04-22 PROCEDURE — 87086 URINE CULTURE/COLONY COUNT: CPT | Performed by: STUDENT IN AN ORGANIZED HEALTH CARE EDUCATION/TRAINING PROGRAM

## 2024-04-22 PROCEDURE — 99214 OFFICE O/P EST MOD 30 MIN: CPT | Performed by: STUDENT IN AN ORGANIZED HEALTH CARE EDUCATION/TRAINING PROGRAM

## 2024-04-22 RX ORDER — CIPROFLOXACIN 500 MG/1
500 TABLET, FILM COATED ORAL 2 TIMES DAILY
Qty: 10 TABLET | Refills: 0 | Status: SHIPPED | OUTPATIENT
Start: 2024-04-22

## 2024-04-22 RX ORDER — AMLODIPINE BESYLATE 10 MG/1
10 TABLET ORAL DAILY
Qty: 90 TABLET | Refills: 2 | Status: SHIPPED | OUTPATIENT
Start: 2024-04-22

## 2024-04-22 NOTE — PROGRESS NOTES
Established Office Visit      Patient Name: Kaylie Cruz  : 1965   MRN: [id number]   Care Team: Patient Care Team:  Iesha Willett DO as PCP - General (Internal Medicine)  Eyad Daniel III, MD as Cardiologist (Cardiology)  Belén Burgos, MALIA as Ambulatory  ([facility])    Chief Complaint:    Chief Complaint   Patient presents with   • Urinary Tract Infection       History of Present Illness: Kaylie Cruz is a 58 y.o. 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    Patient reports 4-5 days of burning with urination, foul smelling odor, urinary frequency. A little nausea, no vomiting. No fever. Reports some bilateral low back discomfort.    She does have lumbosacral spondylosis and chronic pelvic pain for which she has seen [facility] for. Requests new referral today to get reestablished.    Extremely happy with TIPS procedure. Has not required paracentesis lately and feeling much better. Following closely with hepatology and reports her goal is to get on the transplant list.     Subjective      Review of Systems:   Review of Systems - See HPI    I have reviewed and the following portions of the patient's history were updated as appropriate: past family history, past medical history, past social history, past surgical history and problem list.    Medications:     Current Outpatient Medications:   •  amitriptyline (ELAVIL) 50 MG tablet, TAKE ONE TABLET BY MOUTH ONCE NIGHTLY, Disp: 90 tablet, Rfl: 0  •  amLODIPine (NORVASC) 10 MG tablet, Take 1 tablet by mouth Daily., Disp: 90 tablet, Rfl: 2  •  Cholecalciferol 50 MCG (2000) capsule, Take 1 capsule by mouth Daily., Disp: 30 each, Rfl: 5  •  glucose  "monitor monitoring kit, Use to check blood sugar 3x daily, Disp: 1 each, Rfl: 0  •  Insulin Aspart FlexPen 100 UNIT/ML solution pen-injector, Inject 2 Units under the skin into the appropriate area as directed 2 (Two) Times a Day With Meals., Disp: 3 mL, Rfl: 1  •  Insulin Pen Needle (Pen Needles) 31G X 5 MM misc, 1 each 4 (Four) Times a Day Before Meals & at Bedtime., Disp: 300 each, Rfl: 2  •  lactulose (CHRONULAC) 10 GM/15ML solution, Take 15 mL by mouth 3 (Three) Times a Day As Needed (constipation)., Disp: 473 mL, Rfl: 1  •  Lancets (onetouch ultrasoft) lancets, Use to check blood sugar 3x daily, Disp: 300 each, Rfl: 12  •  OneTouch Verio test strip, Check blood sugar 3x daily, Disp: 300 each, Rfl: 5  •  polyethylene glycol (MIRALAX) 17 GM/SCOOP powder, Take 17 g by mouth Daily., Disp: 238 g, Rfl: 1  •  QUEtiapine (SEROquel) 100 MG tablet, Take 1 tablet by mouth Every Night. Take one tablet nightly, Disp: 30 tablet, Rfl: 0  •  thiamine 100 MG tablet, , Disp: , Rfl:   •  ciprofloxacin (Cipro) 500 MG tablet, Take 1 tablet by mouth 2 (Two) Times a Day., Disp: 10 tablet, Rfl: 0    Allergies:   Allergies   Allergen Reactions   • Codeine Hives   • Ketamine Hives   • Morphine Hives   • Tagamet [Cimetidine] Other (See Comments)     DISCOLORATION OF SKIN   • Toradol [Ketorolac Tromethamine] Hives       Objective     Physical Exam:  Vital Signs:   Vitals:    04/22/24 1324   BP: 130/92   Pulse: 87   SpO2: 99%   Weight: 49.5 kg (109 lb 3.2 oz)   Height: 160 cm (62.99\")     Body mass index is 19.35 kg/m².     Physical Exam  Vitals reviewed.   Constitutional:       Appearance: Normal appearance.   Cardiovascular:      Rate and Rhythm: Normal rate.   Pulmonary:      Effort: Pulmonary effort is normal. No respiratory distress.   Neurological:      Mental Status: She is alert.   Psychiatric:         Mood and Affect: Mood normal.         Behavior: Behavior normal.         Judgment: Judgment normal.         Assessment / Plan  "     Assessment/Plan:   Problems Addressed This Visit  Diagnoses and all orders for this visit:    1. Acute cystitis with hematuria (Primary)  -     POC Urinalysis Dipstick, Automated  -     Urine Culture - Urine, Urine, Clean Catch; Future  -     ciprofloxacin (Cipro) 500 MG tablet; Take 1 tablet by mouth 2 (Two) Times a Day.  Dispense: 10 tablet; Refill: 0    3. Primary hypertension  -     amLODIPine (NORVASC) 10 MG tablet; Take 1 tablet by mouth Daily.  Dispense: 90 tablet; Refill: 2  Uncontrolled - increase amlodipine from 5mg to 10mg daily   Reports home readings typically >130/90    4. Lumbosacral spondylosis without myelopathy  5. Chronic pain disorder  6. Chronic pelvic pain in female  -     Ambulatory Referral to Pain Management  Has been following with [facility] for years. Needs new referral to continue to be seen, sent today.    7. High grade squamous intraepithelial lesion (HGSIL) on cytologic smear of vagina  8. Mass of cervix determined by ultrasound  -     Ambulatory Referral to Gynecology  Diagnosed 2010? s/p radiation, surgical resection, and hysterectomy. Previously following with Dr. Roxanne Chaudhry, Gyn-Onc in [place] with [facility].   -She had vaginal pap smear with HGSIL and was lost to follow up.   -Referred previously to GYN but she did not go   -3/2024 US showing soft tissue mass at the level of the cervix measureing 4.1x3x3.3 cm. Referred to GYN once again today.       Plan of care reviewed with patient at the conclusion of today's visit. Education was provided regarding diagnosis and management.  Patient verbalizes understanding of and agreement with management plan.    Follow Up: 3 months, sooner as needed   No follow-ups on file.        DO FIOR Lux RD  [facility]  [address]  [facility] [address]  Fax [phone]  Phone [phone]

## 2024-04-22 NOTE — OUTREACH NOTE
AMBULATORY CASE MANAGEMENT NOTE    Names and Relationships of Patient/Support Persons: Caller: Kaylie Cruz; Relationship: Self -     Care Coordination    RN-ACM called patient to follow up re: transportation to medical appts.  There was no answer to phone, and no voicemail available (full).    RN-ACM called [employer], spoke with Amauri.  He confirmed that they have received the needed paperwork back from PCP office, and that the patient is utilizing their transportation service today for an appt.        Belén HERNANDEZ  Ambulatory Case Management    4/22/2024, 10:23 EDT

## 2024-04-23 RX ORDER — AMITRIPTYLINE HYDROCHLORIDE 50 MG/1
50 TABLET, FILM COATED ORAL NIGHTLY
Qty: 90 TABLET | Refills: 0 | Status: SHIPPED | OUTPATIENT
Start: 2024-04-23

## 2024-04-24 LAB — BACTERIA SPEC AEROBE CULT: ABNORMAL

## 2024-04-25 ENCOUNTER — PATIENT OUTREACH (OUTPATIENT)
Dept: CASE MANAGEMENT | Facility: OTHER | Age: 59
End: 2024-04-25
Payer: COMMERCIAL

## 2024-04-25 ENCOUNTER — TELEPHONE (OUTPATIENT)
Dept: FAMILY MEDICINE CLINIC | Facility: CLINIC | Age: 59
End: 2024-04-25
Payer: COMMERCIAL

## 2024-04-25 DIAGNOSIS — N30.01 ACUTE CYSTITIS WITH HEMATURIA: Primary | ICD-10-CM

## 2024-04-25 RX ORDER — AMOXICILLIN AND CLAVULANATE POTASSIUM 875; 125 MG/1; MG/1
1 TABLET, FILM COATED ORAL 2 TIMES DAILY
Qty: 14 TABLET | Refills: 0 | Status: SHIPPED | OUTPATIENT
Start: 2024-04-25

## 2024-04-25 NOTE — OUTREACH NOTE
AMBULATORY CASE MANAGEMENT NOTE    Names and Relationships of Patient/Support Persons: Caller: Kaylie Cruz; Relationship: Self  Caller: Kaylie Cruz; Relationship: Self -     Patient Outreach    RNSHANNEN called patient to follow up re: HRCM needs.  Reviewed PCP appt instructions from 4/22/24.  Patient voiced clear understanding and compliance with drinking plenty of fluids/water for Acute Cystitis, of completing all of the antibiotic prescribed as directed.  Patient said she got the message from PCP office that the Antibiotic has been changed, and she voiced understanding to get that picked up today from her pharmacy and start it as soon as possible.  Patient said she could ask her sister to pick it up after work today.  Patient voiced clear understanding of the increased dose of Amlodipine for blood pressure, and voiced compliance taking the increased dose as directed.  She said she is checking her Blood Pressure at home and it has improved some.  Encouraged patient to update the PCP in about a week after starting the increased dose of medication, on how her BP is doing.  She voiced understanding.    Patient said she was pleased to finally have use of [employer] for medical appts.    Patient said she wants to have an agent or  from her insurance that she can call on when needed.  Encouraged patient to call her insurance and ask for a  to call her. She v/u.   No other needs or concerns voiced.     Belén HERNANDEZ  Ambulatory Case Management    4/25/2024, 11:17 EDT

## 2024-04-25 NOTE — TELEPHONE ENCOUNTER
I called the patient and let her know, per Dr. Willett,the antibiotic used to treat her UTI may not be appropriate for the bacteria that grew back on her culture. I let pt know that Dr. Willett wanted me to ask if she was still having symptoms, so she could send in a new antibiotic. Pt stated was still having symptoms. Dr. Willett is going submit a new antibiotic to pt's pharmacy.

## 2024-05-09 ENCOUNTER — TELEPHONE (OUTPATIENT)
Dept: FAMILY MEDICINE CLINIC | Facility: CLINIC | Age: 59
End: 2024-05-09
Payer: COMMERCIAL

## 2024-05-09 NOTE — TELEPHONE ENCOUNTER
Name: Nancy Kaylie    Relationship: Self    Best Callback Number:  [phone]    HUB PROVIDED THE RELAY MESSAGE FROM THE OFFICE   PATIENT HAS FURTHER QUESTIONS AND WOULD LIKE A CALL BACK AT THE FOLLOWING PHONE NUMBER     ADDITIONAL INFORMATION: WARM TRANSFERRED TO THE OFFICE AS PATIENT REFUSED TO MAKE AN APPT AND SHE HAS ATTEMPTED TO GET RECORDS FROM HOSPITALAND THEY WONT CALL HER BACK

## 2024-05-09 NOTE — TELEPHONE ENCOUNTER
Unable to reach Kaylie Render by phone or leave message.    Hub may relay message and document.    Patient will need to schedule a hospital follow up with PCP to discuss further treatment plan.   Also need to know which hospital patient went to so we can make sure we have records.

## 2024-05-09 NOTE — TELEPHONE ENCOUNTER
"Spoke to patient about her needing medication changes and that she is having vaginal irritation and that she wants fluid pills for her legs. I informed the patient that we would need to examine her to see what needs to be prescribed and what dosage. Patient adamantly refused and then stated \" I will just take the fluid pills I have that will mess up my kidney and then when I get sick you all will know what happened. I am finding a new doctor.\" Patient self dismissed from clinic and then disconnected the call.  "

## 2024-05-09 NOTE — TELEPHONE ENCOUNTER
Caller: Kaylie Cruz    Relationship: Self    Best call back number: [phone]     What is the best time to reach you: ANYTIME    Who are you requesting to speak with (clinical staff, provider,  specific staff member): PCP/MA    Do you know the name of the person who called: KAYLIE    What was the call regarding: PATIENT STATED SHE NEEDS A CALLBACK TODAY ASAP. SHE HAS HAD FLUID DRAWN OFF OF HER STOMACH AND THEY ADVISED HER TO GET A FLUID PILL TO GET EXCESS FLUID FROM FEET AND LEGS FROM HER PCP. SHE WAS ALSO GIVEN A MEDICATION IN THE HOSPITAL THAT SHE FEELS IS TOO STRONG AS IT MAKES HER DIZZY AND CAN'T HARDLY WALK AND WANTS TO SEE IF PCP CAN DECREASE IT. PATIENT STATED SHE WAS GIVEN AN ANTIBIOTIC AND SHE IS VERY IRRITATED/STINGING IN GROIN AREA THAT SHE NEEDS SOMETHING FOR ALSO    Is it okay if the provider responds through MyChart: CALLBACK TODAY ASAP

## 2024-05-13 ENCOUNTER — TELEPHONE (OUTPATIENT)
Dept: FAMILY MEDICINE CLINIC | Facility: CLINIC | Age: 59
End: 2024-05-13
Payer: COMMERCIAL

## 2024-05-13 NOTE — TELEPHONE ENCOUNTER
Patient called in an aggressive manner about an appointment she thought she had at this office. Informed patient that her appointment is at , she then proceeded to speak in aggressive manner with raised tone. I told her that she would need to speak to  about her appointment location. She then disconnected the line.

## 2024-05-14 ENCOUNTER — TELEPHONE (OUTPATIENT)
Dept: FAMILY MEDICINE CLINIC | Facility: CLINIC | Age: 59
End: 2024-05-14
Payer: COMMERCIAL

## 2024-05-14 NOTE — TELEPHONE ENCOUNTER
The patient called, because she was referred to [facility] on 4/26/24. She never received a phone call to schedule, so she tried calling them. She could not get an answer. So,she went to their location on [place], [place] and the office is closed. I looked up this Pain Management location and both locations say they are closed.  I'm assuming [facility] does not know this practice is permanently closed.  She will need to be referred somewhere else    I talked with Brie. She will take of this and send Referral to [facility].

## 2024-05-15 NOTE — TELEPHONE ENCOUNTER
Caller: Kaylie Cruz    Relationship: Self    Best call back number: [phone]    What is the medical concern/diagnosis: PAIN MANAGEMENT    What is the provider, practice or medical service name: [facility]    What is the office location: [address], [address], [place] [address]    What is the office phone number: [phone]    Any additional details: FAX: [phone]      PATIENT WOULD LIKE A CALL BACK ONCE REFERRAL HAS BEEN ENTERED

## 2024-05-15 NOTE — TELEPHONE ENCOUNTER
Name: Kaylie Cruz    Relationship: Self    Best Callback Number:     [phone] (Mobile)     HUB PROVIDED THE RELAY MESSAGE FROM THE OFFICE   PATIENT     VOICED UNDERSTANDING AND HAS NO FURTHER QUESTIONS AT THIS TIME

## 2024-05-15 NOTE — TELEPHONE ENCOUNTER
RELAY:  ATTEMPTED TO CALL PATIENT TO LET PATIENT KNOW PAIN REFERRAL WAS ORDERED 4/22/2024; WILL SEND IT TO  FOR SCHEDULING

## 2024-05-16 ENCOUNTER — TELEPHONE (OUTPATIENT)
Dept: FAMILY MEDICINE CLINIC | Facility: CLINIC | Age: 59
End: 2024-05-16
Payer: COMMERCIAL

## 2024-05-16 NOTE — TELEPHONE ENCOUNTER
Caller: Kaylie Cruz    Relationship: Self    Best call back number: [phone]    What was the call regarding: PT JUST CALLED AND SAID THAT SHE NEVER INFORMED US THAT SHE DID NOT WANT TO BE A PATIENT HERE. PLEASE ALLOW HER TO CONTINUE TO BE SEEN AT THIS OFFICE. PLEASE ALSO CALL HER AND LET HER KNOW THAT THIS MESSAGE WAS RECVD    ALSO WE NEED TO CALL THE PAIN MGMT SHE WAS REFERRED TO BECAUSE THEY TOLD HER SHE CANNOT HAVE AN APPT IF SHE DOES NOT SEE THE DR HERE    Is it okay if the provider responds through MyChart: NO

## 2024-05-21 ENCOUNTER — TELEPHONE (OUTPATIENT)
Dept: FAMILY MEDICINE CLINIC | Facility: CLINIC | Age: 59
End: 2024-05-21
Payer: COMMERCIAL

## 2024-05-21 NOTE — TELEPHONE ENCOUNTER
PATIENT CALLED TO ASK IF SHE WAS STILL PATIENT OF DR. GUSTAFSON; LET PATIENT KNOW SHE REQUESTED NOT TO BE PATIENT HERE AND HAS SELF DISMISSED

## 2024-05-23 ENCOUNTER — PATIENT OUTREACH (OUTPATIENT)
Dept: CASE MANAGEMENT | Facility: OTHER | Age: 59
End: 2024-05-23
Payer: COMMERCIAL

## 2024-05-23 NOTE — OUTREACH NOTE
AMBULATORY CASE MANAGEMENT NOTE    Names and Relationships of Patient/Support Persons: Caller: Kaylie Cruz; Relationship: Self -     Patient Outreach    RN-ACM called patient to follow up regarding HRCM needs, including BP management. Patient stated her blood pressure is doing good.  She reported compliance with monitoring her BP at home regularly.  Inquired if patient called her insurance about possible case management services from [employer], as previously discussed.  Patient stated that they told her they do not have this available.      RN-ACM discussed need to secure a primary care provider.  Asked patient if she wanted to stay with [facility], or go to ?  She said [facility].  Patient agreed to a 3-way call with patient to the  Primary Care Referral Center.  When patient was asked what location she preferred, she said [facility].  An appt was given with Dr. Mari Hooker, for 6-10-24, at 10:30, told to arrive by 10:00am.  Patient was provided the address and phone number for the office.   Patient voiced understanding and agreement.      Belén HERNANDEZ  Ambulatory Case Management    5/23/2024, 10:19 EDT

## 2024-07-09 ENCOUNTER — PATIENT OUTREACH (OUTPATIENT)
Dept: CASE MANAGEMENT | Facility: OTHER | Age: 59
End: 2024-07-09
Payer: COMMERCIAL

## 2024-07-09 NOTE — OUTREACH NOTE
AMBULATORY CASE MANAGEMENT NOTE    Names and Relationships of Patient/Support Persons: Caller: Kaylie Cruz; Relationship: Self -     Patient Outreach    RN-ACM called patient, to assess HRCM needs, following multiple [id number] calls made in June to patient.  Discussed need for a PCP, as noted patient did not go to new PCP appointment on 6/10/24.  Patient stated she got the dates mixed up.  Patient requested to reschedule with the new PCP, Dr. PATRICK Hooker, at the [facility] office.  A 3-way call was made with patient to the [facility] office.  Received an appt for patient to see Dr. PATRICK Hooker, a new-patient appointment, on 7-30-24, at 3:00pm; to arrive by 2:30pm.  Patient voiced understanding and agreement.  Provided patient the address of office, phone number; made sure she has the doctor's name, date and time of appt.  Confirmed with patient that she wrote all the information down.  Confirmed that she will be riding the [employer] to the appt.  Advised to always call the office ahead of time, if she cannot make an appointment.  Patient voiced no other issues or concerns for RN-ACM to address.    Care Coordination    A 3-way call was made with patient to [facility] office to reschedule her new-patient appointment with Dr. PATRICK Hooker.  (See details above in Patient Outreach.)    Belén HERNANDEZ  Ambulatory Case Management    7/9/2024, 10:03 EDT

## 2024-07-21 DIAGNOSIS — F20.9 SCHIZOPHRENIA, UNSPECIFIED TYPE: ICD-10-CM

## 2024-07-21 RX ORDER — AMITRIPTYLINE HYDROCHLORIDE 50 MG/1
50 TABLET, FILM COATED ORAL NIGHTLY
Qty: 90 TABLET | Refills: 0 | OUTPATIENT
Start: 2024-07-21

## 2024-07-25 ENCOUNTER — PATIENT OUTREACH (OUTPATIENT)
Dept: CASE MANAGEMENT | Facility: OTHER | Age: 59
End: 2024-07-25
Payer: COMMERCIAL

## 2024-07-25 NOTE — OUTREACH NOTE
AMBULATORY CASE MANAGEMENT NOTE    Names and Relationships of Patient/Support Persons: Contact: Kaylie Cruz; Relationship: Self -     Patient Outreach    RN-MAGALIM called patient, 2nd attempt this week, and patient answered the phone.  While beginning to speak with her, reminding her of her new-patient appt with Dr. Hooker on 7/30/24, the phone call ended/ disconnected.  RN-ACM called right back, twice, but no answer to calls; no voicemail available, saying VM was full.      Belén HERNANDEZ  Ambulatory Case Management    7/25/2024, 12:01 EDT